# Patient Record
Sex: FEMALE | Race: WHITE | NOT HISPANIC OR LATINO | URBAN - METROPOLITAN AREA
[De-identification: names, ages, dates, MRNs, and addresses within clinical notes are randomized per-mention and may not be internally consistent; named-entity substitution may affect disease eponyms.]

---

## 2024-10-06 ENCOUNTER — HOSPITAL ENCOUNTER (INPATIENT)
Facility: HOSPITAL | Age: 64
LOS: 22 days | Discharge: NURSING, REHAB OR LONG-TERM CARE | DRG: 064 | End: 2024-10-28
Attending: EMERGENCY MEDICINE | Admitting: PSYCHIATRY & NEUROLOGY
Payer: MEDICARE

## 2024-10-06 DIAGNOSIS — E11.65 TYPE 2 DIABETES MELLITUS WITH HYPERGLYCEMIA, WITH LONG-TERM CURRENT USE OF INSULIN: ICD-10-CM

## 2024-10-06 DIAGNOSIS — Z79.4 TYPE 2 DIABETES MELLITUS WITH HYPERGLYCEMIA, WITH LONG-TERM CURRENT USE OF INSULIN: ICD-10-CM

## 2024-10-06 DIAGNOSIS — I63.9 STROKE: ICD-10-CM

## 2024-10-06 DIAGNOSIS — I10 HYPERTENSION, UNSPECIFIED TYPE: Primary | ICD-10-CM

## 2024-10-06 DIAGNOSIS — R29.818 ACUTE FOCAL NEUROLOGICAL DEFICIT: ICD-10-CM

## 2024-10-06 DIAGNOSIS — I63.89 ACUTE ARTERIAL ISCHEMIC STROKE, MULTIFOCAL, MULTIPLE VASCULAR TERRITORIES: ICD-10-CM

## 2024-10-06 DIAGNOSIS — R06.02 SOB (SHORTNESS OF BREATH): ICD-10-CM

## 2024-10-06 DIAGNOSIS — K56.7 ILEUS: ICD-10-CM

## 2024-10-06 PROBLEM — R53.1 LEFT-SIDED WEAKNESS: Status: ACTIVE | Noted: 2024-10-06

## 2024-10-06 LAB
ALBUMIN SERPL BCP-MCNC: 3.7 G/DL (ref 3.5–5.2)
ALP SERPL-CCNC: 93 U/L (ref 55–135)
ALT SERPL W/O P-5'-P-CCNC: 19 U/L (ref 10–44)
ANION GAP SERPL CALC-SCNC: 14 MMOL/L (ref 8–16)
AST SERPL-CCNC: 24 U/L (ref 10–40)
BASOPHILS # BLD AUTO: 0.04 K/UL (ref 0–0.2)
BASOPHILS NFR BLD: 0.4 % (ref 0–1.9)
BILIRUB SERPL-MCNC: 0.6 MG/DL (ref 0.1–1)
BUN SERPL-MCNC: 16 MG/DL (ref 8–23)
CALCIUM SERPL-MCNC: 10 MG/DL (ref 8.7–10.5)
CHLORIDE SERPL-SCNC: 97 MMOL/L (ref 95–110)
CHOLEST SERPL-MCNC: 178 MG/DL (ref 120–199)
CHOLEST/HDLC SERPL: 4.3 {RATIO} (ref 2–5)
CO2 SERPL-SCNC: 26 MMOL/L (ref 23–29)
CREAT SERPL-MCNC: 1 MG/DL (ref 0.5–1.4)
CREAT SERPL-MCNC: 1.2 MG/DL (ref 0.5–1.4)
DIFFERENTIAL METHOD BLD: ABNORMAL
EOSINOPHIL # BLD AUTO: 0.1 K/UL (ref 0–0.5)
EOSINOPHIL NFR BLD: 1.1 % (ref 0–8)
ERYTHROCYTE [DISTWIDTH] IN BLOOD BY AUTOMATED COUNT: 12.8 % (ref 11.5–14.5)
EST. GFR  (NO RACE VARIABLE): 50.2 ML/MIN/1.73 M^2
GLUCOSE SERPL-MCNC: 249 MG/DL (ref 70–110)
GLUCOSE SERPL-MCNC: 251 MG/DL (ref 70–110)
HCT VFR BLD AUTO: 49.6 % (ref 37–48.5)
HDLC SERPL-MCNC: 41 MG/DL (ref 40–75)
HDLC SERPL: 23 % (ref 20–50)
HGB BLD-MCNC: 17.6 G/DL (ref 12–16)
IMM GRANULOCYTES # BLD AUTO: 0.05 K/UL (ref 0–0.04)
IMM GRANULOCYTES NFR BLD AUTO: 0.5 % (ref 0–0.5)
INR PPP: 1 (ref 0.8–1.2)
LDLC SERPL CALC-MCNC: 98.4 MG/DL (ref 63–159)
LYMPHOCYTES # BLD AUTO: 2.3 K/UL (ref 1–4.8)
LYMPHOCYTES NFR BLD: 24.3 % (ref 18–48)
MCH RBC QN AUTO: 32.9 PG (ref 27–31)
MCHC RBC AUTO-ENTMCNC: 35.5 G/DL (ref 32–36)
MCV RBC AUTO: 93 FL (ref 82–98)
MONOCYTES # BLD AUTO: 0.6 K/UL (ref 0.3–1)
MONOCYTES NFR BLD: 6.4 % (ref 4–15)
NEUTROPHILS # BLD AUTO: 6.5 K/UL (ref 1.8–7.7)
NEUTROPHILS NFR BLD: 67.3 % (ref 38–73)
NONHDLC SERPL-MCNC: 137 MG/DL
NRBC BLD-RTO: 0 /100 WBC
OHS QRS DURATION: 82 MS
OHS QTC CALCULATION: 470 MS
PLATELET # BLD AUTO: 218 K/UL (ref 150–450)
PMV BLD AUTO: 10.9 FL (ref 9.2–12.9)
POC PTINR: 0.9 (ref 0.9–1.2)
POC PTWBT: 8.9 SEC (ref 9.7–14.3)
POTASSIUM SERPL-SCNC: 3.3 MMOL/L (ref 3.5–5.1)
PROT SERPL-MCNC: 7.5 G/DL (ref 6–8.4)
PROTHROMBIN TIME: 10.8 SEC (ref 9–12.5)
RBC # BLD AUTO: 5.35 M/UL (ref 4–5.4)
SAMPLE: ABNORMAL
SAMPLE: NORMAL
SODIUM SERPL-SCNC: 137 MMOL/L (ref 136–145)
TRIGL SERPL-MCNC: 193 MG/DL (ref 30–150)
TSH SERPL DL<=0.005 MIU/L-ACNC: 1.24 UIU/ML (ref 0.4–4)
WBC # BLD AUTO: 9.64 K/UL (ref 3.9–12.7)

## 2024-10-06 PROCEDURE — 25000003 PHARM REV CODE 250: Performed by: PHYSICIAN ASSISTANT

## 2024-10-06 PROCEDURE — 99223 1ST HOSP IP/OBS HIGH 75: CPT | Mod: AI,FS,, | Performed by: PSYCHIATRY & NEUROLOGY

## 2024-10-06 PROCEDURE — 25000003 PHARM REV CODE 250: Performed by: NURSE PRACTITIONER

## 2024-10-06 PROCEDURE — 80053 COMPREHEN METABOLIC PANEL: CPT | Performed by: EMERGENCY MEDICINE

## 2024-10-06 PROCEDURE — 85025 COMPLETE CBC W/AUTO DIFF WBC: CPT | Performed by: EMERGENCY MEDICINE

## 2024-10-06 PROCEDURE — 82565 ASSAY OF CREATININE: CPT

## 2024-10-06 PROCEDURE — 85610 PROTHROMBIN TIME: CPT | Performed by: EMERGENCY MEDICINE

## 2024-10-06 PROCEDURE — 80061 LIPID PANEL: CPT | Performed by: EMERGENCY MEDICINE

## 2024-10-06 PROCEDURE — 93005 ELECTROCARDIOGRAM TRACING: CPT

## 2024-10-06 PROCEDURE — 99900035 HC TECH TIME PER 15 MIN (STAT)

## 2024-10-06 PROCEDURE — 11000001 HC ACUTE MED/SURG PRIVATE ROOM

## 2024-10-06 PROCEDURE — 84443 ASSAY THYROID STIM HORMONE: CPT | Performed by: EMERGENCY MEDICINE

## 2024-10-06 PROCEDURE — 93010 ELECTROCARDIOGRAM REPORT: CPT | Mod: ,,, | Performed by: INTERNAL MEDICINE

## 2024-10-06 PROCEDURE — 99285 EMERGENCY DEPT VISIT HI MDM: CPT | Mod: 25

## 2024-10-06 PROCEDURE — 82962 GLUCOSE BLOOD TEST: CPT

## 2024-10-06 PROCEDURE — G0427 INPT/ED TELECONSULT70: HCPCS | Mod: GT,,, | Performed by: PSYCHIATRY & NEUROLOGY

## 2024-10-06 PROCEDURE — 85610 PROTHROMBIN TIME: CPT

## 2024-10-06 RX ORDER — FAMOTIDINE 20 MG/1
20 TABLET, FILM COATED ORAL 2 TIMES DAILY
Status: DISCONTINUED | OUTPATIENT
Start: 2024-10-06 | End: 2024-10-12

## 2024-10-06 RX ORDER — BISACODYL 10 MG/1
10 SUPPOSITORY RECTAL DAILY PRN
Status: DISCONTINUED | OUTPATIENT
Start: 2024-10-06 | End: 2024-10-15

## 2024-10-06 RX ORDER — FAMOTIDINE 20 MG/1
20 TABLET, FILM COATED ORAL 2 TIMES DAILY
Status: DISCONTINUED | OUTPATIENT
Start: 2024-10-06 | End: 2024-10-06

## 2024-10-06 RX ORDER — ATORVASTATIN CALCIUM 40 MG/1
40 TABLET, FILM COATED ORAL DAILY
Status: DISCONTINUED | OUTPATIENT
Start: 2024-10-06 | End: 2024-10-14

## 2024-10-06 RX ORDER — HYDRALAZINE HYDROCHLORIDE 20 MG/ML
10 INJECTION INTRAMUSCULAR; INTRAVENOUS EVERY 6 HOURS PRN
Status: DISCONTINUED | OUTPATIENT
Start: 2024-10-06 | End: 2024-10-24

## 2024-10-06 RX ORDER — SODIUM CHLORIDE 0.9 % (FLUSH) 0.9 %
10 SYRINGE (ML) INJECTION
Status: DISCONTINUED | OUTPATIENT
Start: 2024-10-06 | End: 2024-10-28 | Stop reason: HOSPADM

## 2024-10-06 RX ADMIN — APIXABAN 5 MG: 5 TABLET, FILM COATED ORAL at 02:10

## 2024-10-06 RX ADMIN — FAMOTIDINE 20 MG: 20 TABLET ORAL at 08:10

## 2024-10-06 RX ADMIN — ATORVASTATIN CALCIUM 40 MG: 40 TABLET, FILM COATED ORAL at 02:10

## 2024-10-06 RX ADMIN — APIXABAN 5 MG: 5 TABLET, FILM COATED ORAL at 08:10

## 2024-10-06 RX ADMIN — FAMOTIDINE 20 MG: 20 TABLET ORAL at 02:10

## 2024-10-06 NOTE — NURSING
Patient is AAOx4, on RA, received in room via a stretcher.Oriented to room and to unit.No c/o  pain or discomfort.Bed is in low position,bed rails up x3, call light within reach and bed alarm on.Instructed patient to call for assistance.

## 2024-10-06 NOTE — TELEMEDICINE CONSULT
[facility]  Vascular Neurology  Comprehensive Stroke Center  TeleVascular Neurology Acute Consultation Note        Consult Information  Consults    Consulting Provider: MARGARET KINCAID   Current Providers  No providers found    Patient Location: [facility] ED [facility] EMERGENCY DEPARTMENT Emergency Department    [facility] hospital nurse at bedside with patient assisting consultant.  Patient information was obtained from relative(s).       Stroke Documentation  Acute Stroke Times   Stroke Team Arrival Date: 10/06/24  Stroke Team Arrival Time: 0608    NIH Scale:  Interval: baseline  1a. Level of Consciousness: 1-->Not alert, but arousable by minor stimulation to obey, answer, or respond  1b. LOC Questions: 0-->Answers both questions correctly  1c. LOC Commands: 0-->Performs both tasks correctly  2. Best Gaze: 0-->Normal  3. Visual: 0-->No visual loss  4. Facial Palsy: 2-->Partial paralysis (total or near-total paralysis of lower face)  5a. Motor Arm, Left: 4-->No movement  5b. Motor Arm, Right: 0-->No drift, limb holds 90 (or 45) degrees for full 10 secs  6a. Motor Leg, Left: 3-->No effort against gravity, leg falls to bed immediately  6b. Motor Leg, Right: 0-->No drift, leg holds 30 degree position for full 5 secs  7. Limb Ataxia: 0-->Absent  8. Sensory: 1-->Mild-to-moderate sensory loss, patient feels pinprick is less sharp or is dull on the affected side, or there is a loss of superficial pain with pinprick, but patient is aware of being touched  9. Best Language: 0-->No aphasia, normal  10. Dysarthria: 1-->Mild-to-moderate dysarthria, patient slurs at least some words and, at worst, can be understood with some difficulty  11. Extinction and Inattention (formerly Neglect): 1-->Visual, tactile, auditory, spatial, or personal inattention or extinction to bilateral simultaneous stimulation in one of the sensory modalities  Total (NIH Stroke Scale): 13       Modified [place]: Score: 3  [place] Coma Scale: 14   ABCD2 Score:    [id number] Score:    HAS -BLED Score:    ICH Score:    Hunt & Shane Classification:      There were no vitals taken for this visit.      In my opinion, this was a: Tier 1; VAN Stroke Assessment: Positive     Medical Decision Making  HPI:  64 y.o. female fell this am and noted to have slurred speech, left facial droop and left sided weakness. On eliquis (indication unknown).       Images personally reviewed and interpreted:  Study: Done but not available for review  Study Interpretation: no ICH     Assessment and plan:  Acute ischemic stroke    Lytics recommendation: Thrombolytic therapy not recommended due to Full dose anticoagulation     Thrombectomy recommendation: Awaiting CTA results from [facility] for determination   Placement recommendation: pending further studies               ROS  Physical Exam  No past medical history on file.  No past surgical history on file.  No family history on file.    Diagnoses  No problems updated.    Paul Valdez MD      Emergent/Acute neurological consultation requested by spoke provider due to critical concerns for possible cerebrovascular event that could result in permanent loss of neurologic/bodily function, severe disability or death of this patient.  Immediate/timely evaluation by a highly prepared expert is paramount for optimal outcomes  High risk for neurological deterioration if not properly diagnosed  High risk for neurological deterioration if not treated promplty/as soon as possible  Complex diagnostic evaluation may be required (advanced imaging)  High risk treatment options (thrombolytics and/or thrombectomy)    Patient care was coordinated with spoke provider, including but not limted to    Discussing likely diagnosis/etiology of symptoms  Making recommendations for further diagnostic studies  Making recommendations for intravenous thrombolytics or other advanced therapies  Making  recommendations for disposition (admission/transfer for higher level of care)

## 2024-10-06 NOTE — ED PROVIDER NOTES
Encounter Date: 10/6/2024       History     Chief Complaint   Patient presents with    Transfer     From [facility] for Vascular Neuro eval      Tammy Walker is a 64 F transfer from outside hospital for vascular Neurology consultation.  Briefly, 64-year-old female presenting today for slurred speech started at 1:30 a.m. in the morning.  She has a history of his stroke with left-sided deficits but is normally able to transfer do a little walking.  This morning, she had complete hemiplegia of the left side.    Telestroke consult performed, concerning for LVO.  Patient accepted to ED as transfer for emergent vascular neurology evaluation.     No issues with flight over.         Review of patient's allergies indicates:  Not on File  No past medical history on file.  No past surgical history on file.  No family history on file.     Review of Systems    Physical Exam     Initial Vitals [10/06/24 0909]   BP Pulse Resp Temp SpO2   (!) 162/87 90 15 98.2 °F (36.8 °C) 95 %      MAP       --         Physical Exam    Nursing note and vitals reviewed.  Constitutional: She appears well-developed and well-nourished. No distress.   HENT: Mouth/Throat: Oropharynx is clear and moist.   Eyes: Conjunctivae are normal. No scleral icterus.   Neck: No JVD present.   Cardiovascular:  Normal rate, regular rhythm and intact distal pulses.           Pulmonary/Chest: Breath sounds normal. No respiratory distress. She has no wheezes. She has no rales.   Abdominal: Abdomen is soft. Bowel sounds are normal. She exhibits no distension. There is no abdominal tenderness. There is no rebound.   Musculoskeletal:         General: No edema.     Lymphadenopathy:     She has no cervical adenopathy.   Neurological: She is alert and oriented to person, place, and time.   + mild dysarthria  + left sided hemiplegia  - neglect/vision loss  + right gaze     Skin: Skin is warm. No rash noted.         ED Course   Procedures  Labs Reviewed   CBC W/ AUTO  DIFFERENTIAL - Abnormal       Result Value    WBC 9.64      RBC 5.35      Hemoglobin 17.6 (*)     Hematocrit 49.6 (*)     MCV 93      MCH 32.9 (*)     MCHC 35.5      RDW 12.8      Platelets 218      MPV 10.9      Immature Granulocytes 0.5      Gran # (ANC) 6.5      Immature Grans (Abs) 0.05 (*)     Lymph # 2.3      Mono # 0.6      Eos # 0.1      Baso # 0.04      nRBC 0      Gran % 67.3      Lymph % 24.3      Mono % 6.4      Eosinophil % 1.1      Basophil % 0.4      Differential Method Automated     COMPREHENSIVE METABOLIC PANEL - Abnormal    Sodium 137      Potassium 3.3 (*)     Chloride 97      CO2 26      Glucose 251 (*)     BUN 16      Creatinine 1.2      Calcium 10.0      Total Protein 7.5      Albumin 3.7      Total Bilirubin 0.6      Alkaline Phosphatase 93      AST 24      ALT 19      eGFR 50.2 (*)     Anion Gap 14     LIPID PANEL - Abnormal    Cholesterol 178      Triglycerides 193 (*)     HDL 41      LDL Cholesterol 98.4      HDL/Cholesterol Ratio 23.0      Total Cholesterol/HDL Ratio 4.3      Non-HDL Cholesterol 137     POCT GLUCOSE, HAND-HELD DEVICE - Abnormal    POC Glucose 249 (*)    ISTAT PROCEDURE - Abnormal    POC PTWBT 8.9 (*)     POC PTINR 0.9      Sample unknown     PROTIME-INR    Prothrombin Time 10.8      INR 1.0     TSH    TSH 1.237     HEMOGLOBIN A1C   ISTAT CREATININE    POC Creatinine 1.0      Sample unknown       EKG Readings: (Independently Interpreted)   EKG: Sinus rhythm at 83, left anterior fascicular block, no ST elevations or depressions.       Imaging Results              MRI Brain Ischemic Inter Pro Incl MRA W/O Con (In process)  Result time 10/06/24 11:07:51                     X-Ray Skull Ltd Less Than 4 Views (Final result)  Result time 10/06/24 10:28:36      Final result by Rayshawn Larsen MD (10/06/24 10:28:36)                   Impression:      No acute radiographic abnormalities.      Electronically signed by: Rayshawn Larsen MD  Date:    10/06/2024  Time:    10:28                Narrative:    EXAMINATION:  XR SKULL LTD LESS THAN 4 VIEWS    CLINICAL HISTORY:  MRI clearance;    TECHNIQUE:  Two views of the skull.    COMPARISON:  None    FINDINGS:  There are partially imaged postoperative changes of the cervical spine with cervical spondylosis.  No acute fracture.  No radiopaque retained foreign body that would preclude MRI.                                       X-Ray Abdomen AP 1 View (KUB) (Final result)  Result time 10/06/24 10:27:03      Final result by Rayshawn Larsen MD (10/06/24 10:27:03)                   Impression:      No acute radiographic findings in the abdomen.      Electronically signed by: Rayshawn Larsen MD  Date:    10/06/2024  Time:    10:27               Narrative:    EXAMINATION:  XR ABDOMEN AP 1 VIEW    CLINICAL HISTORY:  Cerebral infarction, unspecified    TECHNIQUE:  AP View(s) of the abdomen was performed.    COMPARISON:  None    FINDINGS:  Patient is rotated.  No high-grade bowel obstruction or free air.  Visualized portions of the lung bases show no acute abnormalities.  There are cholecystectomy clips.  There is contrast in the bladder from prior CTA.  No abnormal radiopaque retained foreign body that would preclude MRI.                                       X-Ray Chest AP Portable (Final result)  Result time 10/06/24 09:47:11      Final result by Nathan Ward MD (10/06/24 09:47:11)                   Impression:      Nonspecific patchy opacities in both lungs could relate to atelectasis, edema, infection, or noninfectious inflammatory process.      Electronically signed by: Nathan Ward  Date:    10/06/2024  Time:    09:47               Narrative:    EXAMINATION:  XR CHEST AP PORTABLE    CLINICAL HISTORY:  Shortness of breath    TECHNIQUE:  Single frontal view of the chest was performed.    COMPARISON:  None    FINDINGS:  Loop recorder device.  Cardiac monitoring leads.    Enlarged cardiac silhouette.  Prominent central vasculature.  Patchy opacities  "in the mid lower lung zones.    No definite pneumothorax or large volume pleural effusion.    Query prior cholecystectomy.    No acute findings in the visualized portions of the abdomen.    Status post ACDF.  Suture anchors project in the right humeral head, possibly sequela rotator cuff repair.    No acute osseous or soft tissue findings are suggested.                                    X-Rays:   Independently Interpreted Readings:   Chest X-Ray: Normal heart size. No focal consolidation or effusion.  Patchy bilateral interstitial opacities noted     Medications   sodium chloride 0.9% flush 10 mL (has no administration in time range)   sodium chloride 0.9% bolus 500 mL 500 mL (has no administration in time range)   bisacodyL suppository 10 mg (has no administration in time range)   hydrALAZINE injection 10 mg (has no administration in time range)   atorvastatin tablet 40 mg (has no administration in time range)   apixaban tablet 5 mg (has no administration in time range)     Medical Decision Making  Emergent evaluation a 64-year-old female transferred from outside hospital for vascular Neurology evaluation.    Per flight team, OSH unable to provide disc due to machine issue    Discussed with vascular Neurology, recommending stat MRI ischemic brain protocol.  Order placed.    Patient brought to MRI, then disclosed that she has " something in her chest".  Brought back to ED for chest x-ray clearance.     Amount and/or Complexity of Data Reviewed  Independent Historian: EMS     Details: Flight crew  External Data Reviewed: notes.     Details: WBC 10.2, hemoglobin 14.8 platelets 230  CM:, sodium 135, potassium 3.0, BUN/creatinine 13/1.2 glucose 235    CTA concerning for right A2 segment occlusion there is some reconstitution of the distal branches.  There is also hypoattenuation and gray white matter differentiation of the right frontal temporal region compatible with an acute infarct  Labs: ordered. Decision-making " details documented in ED Course.     Details: Labs reviewed with no leukocytosis.  Hemoglobin stable.  CMP largely unremarkable.  Radiology: ordered and independent interpretation performed.  ECG/medicine tests: ordered and independent interpretation performed.    Risk  Decision regarding hospitalization.              Attending Attestation:         Attending Critical Care:   Critical Care Times:   ==============================================================  Total Critical Care Time - exclusive of procedural time: 45 minutes.  ==============================================================  Critical care reasons: acute Stroke.   Critical care was time spent personally by me on the following activities: discussion with consultants, re-evaluation of patient's conition, evaluation of patient's response to treatment, ordering and performing treatments and interventions, development of treatment plan with patient or relative, ordering lab, x-rays, and/or EKG and review of x-rays / CT sent with the patient.   Critical Care Condition: potentially life-threatening           ED Course as of 10/06/24 1113   Sun Oct 06, 2024   1055 MRI ischemic brain protocol performed, no obvious LVO requiring thrombectomy.  Patient admitted to vascular Neurology for further workup [GM]      ED Course User Index  [GM] Criss Maxwell MD                           Clinical Impression:  Final diagnoses:  [R29.818] Acute focal neurological deficit  [R06.02] SOB (shortness of breath)  [I63.9] Stroke                 Criss Maxwell MD  10/06/24 1114

## 2024-10-06 NOTE — ED NOTES
RN asked Haja QUACH if a repeat Bloom exam could be performed to see if patient could tolerate PO meds ordered for 1215. Haja QUACH gave approval to do repeat bloom and if passed patient could now have PO meds.

## 2024-10-06 NOTE — CONSULTS
"[facility] - Emergency Dept  Vascular Neurology  Comprehensive Stroke Center  Consult Note    Inpatient consult to Vascular (Stroke) Neurology  Consult performed by: Ambika Horne DNP  Consult ordered by: Criss Maxwell MD  Reason for consult: R MCA syndrome        Assessment/Plan:     I have seen the patient, reviewed the Resident's history and physical, assessment and plan, personally reviewed the imaging studies reported on the note, and agree with the findings.   I also performed the MDM (medical decision making) for this patient as detailed in the resident's note.    .Germain Smyth MD   Vascular Neurology  Comprehensive Stroke Center  [facility]        Patient is a 64 y.o. year old female with:    Left-sided weakness  65 y/o F transferred from [facility] for higher level of care secondary to acute KIARA stroke on CTA.  Briefly,the patient presented to the OSH overnight for slurred speech started at 1:30 a.m. in the morning.  She has a history of his stroke with left-sided deficits but is normally able to transfer and do a little walking. This morning, she had complete hemiplegia of the left side. Telestroke consult performed, concerning for LVO.  NIH 13. CTA Stroke MP performed and  was read as "acute R KIARA infarct". No TNK due to AC use. Patient accepted to ED as transfer for emergent vascular neurology evaluation.     -NIH 13; LFD, R gaze, decrease sensation on L, LA 0/5, LL 1/5   -Symptoms consistent with R MCA syndrome but OSH CTA read with R KIARA. No disc available from OSH. Stroke team unable to obtain scans in chart. Patient states she has a pacemaker, unable to obtain MRI ischemic protocol at this. X-ray pending for MRI clearance.   -IR team on standby for possible EVT; pending MRI Ischemic protocol  -SBP<220 until repeat imaging done.  -If patient goes to IR, will go to NCC post procedure.        STROKE DOCUMENTATION     Acute Stroke Times   Last Known Normal " Date:  (OSH)  Stroke Team Called Date: 10/06/24  Stroke Team Called Time:  (At OSH)  Stroke Team Arrival Date: 10/06/24  Stroke Team Arrival Time: 0608  CT Interpretation Time:  (CTA done around 4:20am per documents that came to [facility] with patient)  Thrombolytic Therapy Recommended: No  Thrombectomy Recommended:  (Pending MRI Brain)    NIH Scale:  Interval: baseline  1a. Level of Consciousness: 0-->Alert, keenly responsive  1b. LOC Questions: 0-->Answers both questions correctly  1c. LOC Commands: 0-->Performs both tasks correctly  2. Best Gaze: 1-->Partial gaze palsy, gaze is abnormal in one or both eyes, but forced deviation or total gaze paresis is not present  3. Visual: 0-->No visual loss  4. Facial Palsy: 2-->Partial paralysis (total or near-total paralysis of lower face)  5a. Motor Arm, Left: 4-->No movement  5b. Motor Arm, Right: 0-->No drift, limb holds 90 (or 45) degrees for full 10 secs  6a. Motor Leg, Left: 3-->No effort against gravity, leg falls to bed immediately  6b. Motor Leg, Right: 0-->No drift, leg holds 30 degree position for full 5 secs  7. Limb Ataxia: 0-->Absent  8. Sensory: 2-->Severe to total sensory loss, patient is not aware of being touched in the face, arm, and leg  9. Best Language: 0-->No aphasia, normal  10. Dysarthria: 0-->Normal  11. Extinction and Inattention (formerly Neglect): 1-->Visual, tactile, auditory, spatial, or personal inattention or extinction to bilateral simultaneous stimulation in one of the sensory modalities  Total (NIH Stroke Scale): 13    Modified [place] Score: 3  [place] Coma Scale:15   ABCD2 Score:    [id number] Score:   HAS -BLED Score:   ICH Score:   Hunt & Shane Classification:       Thrombolysis Candidate? No, Current use of direct thrombin inhibitors (dabigatran) or direct factor Xa inhibitors within 48 hours    Delays to Thrombolysis?  Not Applicable    Interventional Revascularization Candidate?   Is the patient eligible for mechanical endovascular  "reperfusion (JAVIER)?  Awaiting CTA results from [facility] for determination     Delays to Thrombectomy? Not Applicable    Hemorrhagic change of an Ischemic Stroke: Does this patient have an ischemic stroke with hemorrhagic changes? No     Subjective:     History of Present Illness:  65 y/o F transferred from [facility] for higher level of care secondary to acute KIARA stroke on CTA.  Briefly,the patient presented to the OSH overnight for slurred speech started at 1:30 a.m. in the morning.  She has a history of his stroke with left-sided deficits but is normally able to transfer and do a little walking. This morning, she had complete hemiplegia of the left side. Telestroke consult performed, concerning for LVO.  CTA Stroke MP performed and  was read as "acute R KIARA infarct". Patient accepted to ED as transfer for emergent vascular neurology evaluation.       History reviewed. No pertinent past medical history.  History reviewed. No pertinent surgical history.     Review of patient's allergies indicates:  No Known Allergies    Medications: I have reviewed the current medication administration record.    (Not in a hospital admission)      Review of Systems   Unable to perform ROS: Acuity of condition     Objective:     Vital Signs (Most Recent):  Temp: 98.2 °F (36.8 °C) (10/06/24 0909)  Pulse: 76 (10/06/24 1015)  Resp: 15 (10/06/24 1015)  BP: (!) 189/66 (10/06/24 1015)  SpO2: 97 % (10/06/24 1015)    Vital Signs Range (Last 24H):  Temp:  [98.2 °F (36.8 °C)]   Pulse:  [76-90]   Resp:  [15]   BP: (145-189)/(66-87)   SpO2:  [95 %-97 %]        Physical Exam         Neurological Exam:   LOC: alert  Attention Span: Good   Language: No aphasia  Articulation: No dysarthria  Orientation: Person, Place, Time   Visual Fields: Full  EOM (CN III, IV, VI): Gaze preference  right  Pupils (CN II, III): PERRL  Facial Sensation (CN V): Facial sensory loss  Facial Movement (CN VII): Upper & lower facial weakness on the Left  Motor: " "Arm left  Plegia 0/5  Leg left  Paresis: 1/5  Arm right  Normal 5/5  Leg right Normal 5/5  Sensation: Jason-anesthesia left      Laboratory:  CMP:   Recent Labs   Lab 10/06/24  0934   CALCIUM 10.0   ALBUMIN 3.7   PROT 7.5      K 3.3*   CO2 26   CL 97   BUN 16   CREATININE 1.2   ALKPHOS 93   ALT 19   AST 24   BILITOT 0.6     CBC: No results for input(s): "WBC", "RBC", "HGB", "HCT", "PLT", "MCV", "MCH", "MCHC" in the last 168 hours.  Lipid Panel:   Recent Labs   Lab 10/06/24  0934   CHOL 178   LDLCALC 98.4   HDL 41   TRIG 193*     Coagulation:   Recent Labs   Lab 10/06/24  1001   INR 0.9     Hgb A1C: No results for input(s): "HGBA1C" in the last 168 hours.  TSH: No results for input(s): "TSH" in the last 168 hours.    Diagnostic Results:      Brain imaging/Vessel Imaging:  MRI Ishcemic protocol - pending      CTA stroke MP - From OSH on 10/6 with the following impression;    "1. A subabcute right anterior cerebral artery territory infarction is demonstrated. Mild mass effect is noted but no hemorrhage can be documented."             Ambika Horne DNP  Comprehensive Stroke Center  Department of Vascular Neurology   [facility] - [facility] Dept   "

## 2024-10-06 NOTE — H&P
Please see consult note from today.    MRI Ischemic protocol with multiple territory acute infarcts. No IR per team.     Will admit to NPU.

## 2024-10-06 NOTE — ED NOTES
MRI delayed at this time due to patient stating she has metal implant in heart but unsure what device is. No family contact on file or previous imaging to verify medical device. Will return to ED for xray imaging. Charge nurse and ED MD aware

## 2024-10-06 NOTE — SUBJECTIVE & OBJECTIVE
"  History reviewed. No pertinent past medical history.  History reviewed. No pertinent surgical history.     Review of patient's allergies indicates:  No Known Allergies    Medications: I have reviewed the current medication administration record.    (Not in a hospital admission)      Review of Systems   Unable to perform ROS: Acuity of condition     Objective:     Vital Signs (Most Recent):  Temp: 98.2 °F (36.8 °C) (10/06/24 0909)  Pulse: 76 (10/06/24 1015)  Resp: 15 (10/06/24 1015)  BP: (!) 189/66 (10/06/24 1015)  SpO2: 97 % (10/06/24 1015)    Vital Signs Range (Last 24H):  Temp:  [98.2 °F (36.8 °C)]   Pulse:  [76-90]   Resp:  [15]   BP: (145-189)/(66-87)   SpO2:  [95 %-97 %]        Physical Exam         Neurological Exam:   LOC: alert  Attention Span: Good   Language: No aphasia  Articulation: No dysarthria  Orientation: Person, Place, Time   Visual Fields: Full  EOM (CN III, IV, VI): Gaze preference  right  Pupils (CN II, III): PERRL  Facial Sensation (CN V): Facial sensory loss  Facial Movement (CN VII): Upper & lower facial weakness on the Left  Motor: Arm left  Plegia 0/5  Leg left  Paresis: 1/5  Arm right  Normal 5/5  Leg right Normal 5/5  Sensation: Jason-anesthesia left      Laboratory:  CMP:   Recent Labs   Lab 10/06/24  0934   CALCIUM 10.0   ALBUMIN 3.7   PROT 7.5      K 3.3*   CO2 26   CL 97   BUN 16   CREATININE 1.2   ALKPHOS 93   ALT 19   AST 24   BILITOT 0.6     CBC: No results for input(s): "WBC", "RBC", "HGB", "HCT", "PLT", "MCV", "MCH", "MCHC" in the last 168 hours.  Lipid Panel:   Recent Labs   Lab 10/06/24  0934   CHOL 178   LDLCALC 98.4   HDL 41   TRIG 193*     Coagulation:   Recent Labs   Lab 10/06/24  1001   INR 0.9     Hgb A1C: No results for input(s): "HGBA1C" in the last 168 hours.  TSH: No results for input(s): "TSH" in the last 168 hours.    Diagnostic Results:      Brain imaging/Vessel Imaging:  MRI Ishcemic protocol - pending      CTA stroke MP - From OSH on 10/6 with the " "following impression;    "1. A subabcute right anterior cerebral artery territory infarction is demonstrated. Mild mass effect is noted but no hemorrhage can be documented."           "

## 2024-10-06 NOTE — NURSING
Patient Transferred to NPU Room 960       Upon arrival to the floor, patient greeted and oriented to room. Complete head to toe assessment completed per protocol. VSS, see flowsheet for details. Neuro assessment completed. Primary team notified of patient's transfer to floor. All current and transfer orders reviewed/reconciled per protocol. All emergency equipment set up in patient's room. Fall/seizure precautions initiated per providers orders. 4 Eyes skin assessment performed, see below for details. Reviewed assessment and rounding frequency with patient and family. Questions were encouraged and addressed. Repositioned patient for comfort with bed locked in lowest position, side rails up x 3, bed alarm set, and call light within reach. Instructed patient to call staff for mobility/assistance, verbalized understanding. No acute signs of distress noted at this time.           +++++ Special Considerations+++++++      +(If any belongings come with patient, make note of them in the patient belongings flow sheet)      +(If patient is with vascular neurology team, make sure to mention swallow assessment completed and, Please add to note that stroke booklet was transferred with patient)  NIHSS assessment completed on floor arrival. Patient's NIHSS score is 11 at this time. SCDs applied to patient per provider's orders. Bloom bedside swallow screen completed per stroke protocol. Patient has passed bloom bedside swallow screen, team notified of results. Stroke book individualized to patient and given to patient upon transfer. Reviewed stroke book with the patient at the bedside, see education flowsheets for details.

## 2024-10-06 NOTE — ED TRIAGE NOTES
Patient identifiers for Tammy Walker 64 y.o. female checked and correct. Pt arrives to ED via EMS as transfer from [facility] for neuro consult. Pt arrives AAOx4 with complete left sided hemiplegia. Pt has hx of prior CVA with left sided deficits but usually ambulatory. LNK 1400 yesterday 10/05/24. Woke up with slurred speech at 0130AM with increased left sided weakness from baseline.    Chief Complaint   Patient presents with    Transfer     From [facility] for Vascular Neuro eval      History reviewed. No pertinent past medical history.  Allergies reported: Review of patient's allergies indicates:  No Known Allergies

## 2024-10-06 NOTE — HPI
"65 y/o F transferred from [facility] for higher level of care secondary to acute KIARA stroke on CTA.  Briefly,the patient presented to the OSH overnight for slurred speech started at 1:30 a.m. in the morning.  She has a history of his stroke with left-sided deficits but is normally able to transfer and do a little walking. This morning, she had complete hemiplegia of the left side. Telestroke consult performed, concerning for LVO.  CTA Stroke MP performed and  was read as "acute R KIARA infarct". Patient accepted to ED as transfer for emergent vascular neurology evaluation.   "

## 2024-10-06 NOTE — ED NOTES
Assumed care of patient and received report from Ashlyn RN. Patient is lying on stretcher gowned with side rails up x2. Patient has no complaints at this time. Care ongoing

## 2024-10-06 NOTE — ASSESSMENT & PLAN NOTE
"63 y/o F transferred from [facility] for higher level of care secondary to acute KIARA stroke on CTA.  Briefly,the patient presented to the OSH overnight for slurred speech started at 1:30 a.m. in the morning.  She has a history of his stroke with left-sided deficits but is normally able to transfer and do a little walking. This morning, she had complete hemiplegia of the left side. Telestroke consult performed, concerning for LVO.  NIH 13. CTA Stroke MP performed and  was read as "acute R KIARA infarct". No TNK due to AC use. Patient accepted to ED as transfer for emergent vascular neurology evaluation.     -NIH 13; LFD, R gaze, decrease sensation on L, LA 0/5, LL 1/5   -Symptoms consistent with R MCA syndrome but OSH CTA read with R KIARA. No disc available from OSH. Stroke team unable to obtain scans in chart. Patient states she has a pacemaker, unable to obtain MRI ischemic protocol at this. X-ray pending for MRI clearance.   -IR team on standby for possible EVT; pending MRI Ischemic protocol  -SBP<220 until repeat imaging done.  -If patient goes to IR, will go to NCC post procedure.  "

## 2024-10-07 PROBLEM — E11.9 TYPE 2 DIABETES MELLITUS, WITH LONG-TERM CURRENT USE OF INSULIN: Status: ACTIVE | Noted: 2024-10-07

## 2024-10-07 PROBLEM — Z74.09 REDUCED MOBILITY: Status: ACTIVE | Noted: 2024-10-07

## 2024-10-07 PROBLEM — G81.90 HEMIPLEGIA: Status: ACTIVE | Noted: 2024-10-06

## 2024-10-07 PROBLEM — I48.91 ATRIAL FIBRILLATION: Status: ACTIVE | Noted: 2024-10-07

## 2024-10-07 PROBLEM — G81.94 HEMIPLEGIA, UNSPECIFIED AFFECTING LEFT NONDOMINANT SIDE: Status: ACTIVE | Noted: 2024-10-07

## 2024-10-07 PROBLEM — I63.89 ACUTE ARTERIAL ISCHEMIC STROKE, MULTIFOCAL, MULTIPLE VASCULAR TERRITORIES: Status: ACTIVE | Noted: 2024-10-06

## 2024-10-07 PROBLEM — R20.0 SENSORY LOSS: Status: ACTIVE | Noted: 2024-10-07

## 2024-10-07 PROBLEM — I63.89 ACUTE ISCHEMIC MULTIFOCAL MULTIPLE VASCULAR TERRITORIES STROKE: Status: ACTIVE | Noted: 2024-10-07

## 2024-10-07 PROBLEM — F11.90 OPIOID USE: Status: ACTIVE | Noted: 2024-10-07

## 2024-10-07 PROBLEM — Z79.4 TYPE 2 DIABETES MELLITUS, WITH LONG-TERM CURRENT USE OF INSULIN: Status: ACTIVE | Noted: 2024-10-07

## 2024-10-07 PROBLEM — I10 HTN (HYPERTENSION): Status: ACTIVE | Noted: 2024-10-07

## 2024-10-07 PROBLEM — E87.6 HYPOKALEMIA: Status: ACTIVE | Noted: 2024-10-07

## 2024-10-07 LAB
ALBUMIN SERPL BCP-MCNC: 3.8 G/DL (ref 3.5–5.2)
ALP SERPL-CCNC: 94 U/L (ref 55–135)
ALT SERPL W/O P-5'-P-CCNC: 17 U/L (ref 10–44)
ANION GAP SERPL CALC-SCNC: 15 MMOL/L (ref 8–16)
APTT PPP: 32.8 SEC (ref 21–32)
ASCENDING AORTA: 3.16 CM
AST SERPL-CCNC: 25 U/L (ref 10–40)
AV AREA BY CONTINUOUS VTI: 2.8 CM2
AV INDEX (PROSTH): 0.77
AV LVOT MEAN GRADIENT: 2 MMHG
AV LVOT PEAK GRADIENT: 3 MMHG
AV MEAN GRADIENT: 3.7 MMHG
AV PEAK GRADIENT: 7.8 MMHG
AV VALVE AREA BY VELOCITY RATIO: 2.2 CM²
AV VALVE AREA: 2.7 CM2
AV VELOCITY RATIO: 0.64
BASOPHILS # BLD AUTO: 0.04 K/UL (ref 0–0.2)
BASOPHILS NFR BLD: 0.4 % (ref 0–1.9)
BILIRUB SERPL-MCNC: 0.8 MG/DL (ref 0.1–1)
BUN SERPL-MCNC: 17 MG/DL (ref 8–23)
CALCIUM SERPL-MCNC: 10.2 MG/DL (ref 8.7–10.5)
CHLORIDE SERPL-SCNC: 97 MMOL/L (ref 95–110)
CO2 SERPL-SCNC: 23 MMOL/L (ref 23–29)
CREAT SERPL-MCNC: 1 MG/DL (ref 0.5–1.4)
CV ECHO LV RWT: 0.53 CM
DIFFERENTIAL METHOD BLD: ABNORMAL
DOP CALC AO PEAK VEL: 1.4 M/S
DOP CALC AO VTI: 18.1 CM
DOP CALC LVOT AREA: 3.5 CM2
DOP CALC LVOT DIAMETER: 2.1 CM
DOP CALC LVOT PEAK VEL: 0.9 M/S
DOP CALC LVOT STROKE VOLUME: 48.1 CM3
DOP CALCLVOT PEAK VEL VTI: 13.9 CM
E WAVE DECELERATION TIME: 85.96 MS
E/A RATIO: 0.62
E/E' RATIO: 10.18 M/S
ECHO EF ESTIMATED: 58 %
ECHO LV POSTERIOR WALL: 0.9 CM (ref 0.6–1.1)
EOSINOPHIL # BLD AUTO: 0 K/UL (ref 0–0.5)
EOSINOPHIL NFR BLD: 0.4 % (ref 0–8)
ERYTHROCYTE [DISTWIDTH] IN BLOOD BY AUTOMATED COUNT: 12.5 % (ref 11.5–14.5)
EST. GFR  (NO RACE VARIABLE): >60 ML/MIN/1.73 M^2
FRACTIONAL SHORTENING: 29.4 % (ref 28–44)
GLUCOSE SERPL-MCNC: 253 MG/DL (ref 70–110)
HCT VFR BLD AUTO: 48.9 % (ref 37–48.5)
HGB BLD-MCNC: 17 G/DL (ref 12–16)
IMM GRANULOCYTES # BLD AUTO: 0.02 K/UL (ref 0–0.04)
IMM GRANULOCYTES NFR BLD AUTO: 0.2 % (ref 0–0.5)
INR PPP: 1.1 (ref 0.8–1.2)
INTERVENTRICULAR SEPTUM: 0.9 CM (ref 0.6–1.1)
LA MAJOR: 4.5 CM
LA MINOR: 3.94 CM
LA WIDTH: 3.04 CM
LEFT ATRIUM SIZE: 2.55 CM
LEFT ATRIUM VOLUME: 27.68 CM3
LEFT INTERNAL DIMENSION IN SYSTOLE: 2.4 CM (ref 2.1–4)
LEFT VENTRICLE DIASTOLIC VOLUME: 48.08 ML
LEFT VENTRICLE SYSTOLIC VOLUME: 20.26 ML
LEFT VENTRICULAR INTERNAL DIMENSION IN DIASTOLE: 3.4 CM (ref 3.5–6)
LEFT VENTRICULAR MASS: 84.9 G
LV LATERAL E/E' RATIO: 8
LV SEPTAL E/E' RATIO: 14
LYMPHOCYTES # BLD AUTO: 1.8 K/UL (ref 1–4.8)
LYMPHOCYTES NFR BLD: 19.6 % (ref 18–48)
MAGNESIUM SERPL-MCNC: 1.9 MG/DL (ref 1.6–2.6)
MCH RBC QN AUTO: 33 PG (ref 27–31)
MCHC RBC AUTO-ENTMCNC: 34.8 G/DL (ref 32–36)
MCV RBC AUTO: 95 FL (ref 82–98)
MONOCYTES # BLD AUTO: 0.6 K/UL (ref 0.3–1)
MONOCYTES NFR BLD: 6.8 % (ref 4–15)
MV A" WAVE DURATION": 117.03 MS
MV PEAK A VEL: 0.91 M/S
MV PEAK E VEL: 0.56 M/S
NEUTROPHILS # BLD AUTO: 6.6 K/UL (ref 1.8–7.7)
NEUTROPHILS NFR BLD: 72.6 % (ref 38–73)
NRBC BLD-RTO: 0 /100 WBC
OHS CV RV/LV RATIO: 0.82 CM
PHOSPHATE SERPL-MCNC: 2.5 MG/DL (ref 2.7–4.5)
PLATELET # BLD AUTO: 200 K/UL (ref 150–450)
PMV BLD AUTO: 10.5 FL (ref 9.2–12.9)
POTASSIUM SERPL-SCNC: 3.5 MMOL/L (ref 3.5–5.1)
PROT SERPL-MCNC: 7.7 G/DL (ref 6–8.4)
PROTHROMBIN TIME: 11.8 SEC (ref 9–12.5)
PULM VEIN A" WAVE DURATION": 117.03 MS
PULM VEIN S/D RATIO: 1.28
PULMONIC VEIN PEAK A VELOCITY: 0.3 M/S
PV PEAK D VEL: 0.36 M/S
PV PEAK S VEL: 0.46 M/S
RA MAJOR: 3.41 CM
RA PRESSURE ESTIMATED: 3 MMHG
RBC # BLD AUTO: 5.15 M/UL (ref 4–5.4)
RIGHT VENTRICLE DIASTOLIC BASEL DIMENSION: 2.8 CM
RV TISSUE DOPPLER FREE WALL SYSTOLIC VELOCITY 1 (APICAL 4 CHAMBER VIEW): 16.57 CM/S
SINUS: 3.21 CM
SODIUM SERPL-SCNC: 135 MMOL/L (ref 136–145)
STJ: 2.48 CM
TDI LATERAL: 0.07 M/S
TDI SEPTAL: 0.04 M/S
TDI: 0.06 M/S
TROPONIN I SERPL DL<=0.01 NG/ML-MCNC: 0.01 NG/ML (ref 0–0.03)
WBC # BLD AUTO: 9.12 K/UL (ref 3.9–12.7)

## 2024-10-07 PROCEDURE — 25000003 PHARM REV CODE 250: Performed by: NURSE PRACTITIONER

## 2024-10-07 PROCEDURE — 97530 THERAPEUTIC ACTIVITIES: CPT

## 2024-10-07 PROCEDURE — 84484 ASSAY OF TROPONIN QUANT: CPT | Performed by: NURSE PRACTITIONER

## 2024-10-07 PROCEDURE — 11000001 HC ACUTE MED/SURG PRIVATE ROOM

## 2024-10-07 PROCEDURE — 25000003 PHARM REV CODE 250

## 2024-10-07 PROCEDURE — 36415 COLL VENOUS BLD VENIPUNCTURE: CPT | Performed by: NURSE PRACTITIONER

## 2024-10-07 PROCEDURE — 97112 NEUROMUSCULAR REEDUCATION: CPT

## 2024-10-07 PROCEDURE — 85730 THROMBOPLASTIN TIME PARTIAL: CPT | Performed by: NURSE PRACTITIONER

## 2024-10-07 PROCEDURE — 97162 PT EVAL MOD COMPLEX 30 MIN: CPT

## 2024-10-07 PROCEDURE — 97535 SELF CARE MNGMENT TRAINING: CPT

## 2024-10-07 PROCEDURE — 99233 SBSQ HOSP IP/OBS HIGH 50: CPT | Mod: GC,,, | Performed by: PSYCHIATRY & NEUROLOGY

## 2024-10-07 PROCEDURE — 25000003 PHARM REV CODE 250: Performed by: PHYSICIAN ASSISTANT

## 2024-10-07 PROCEDURE — 83735 ASSAY OF MAGNESIUM: CPT | Performed by: NURSE PRACTITIONER

## 2024-10-07 PROCEDURE — 92610 EVALUATE SWALLOWING FUNCTION: CPT

## 2024-10-07 PROCEDURE — 85025 COMPLETE CBC W/AUTO DIFF WBC: CPT | Performed by: NURSE PRACTITIONER

## 2024-10-07 PROCEDURE — 85610 PROTHROMBIN TIME: CPT | Performed by: NURSE PRACTITIONER

## 2024-10-07 PROCEDURE — 84100 ASSAY OF PHOSPHORUS: CPT | Performed by: NURSE PRACTITIONER

## 2024-10-07 PROCEDURE — 97166 OT EVAL MOD COMPLEX 45 MIN: CPT

## 2024-10-07 PROCEDURE — 80053 COMPREHEN METABOLIC PANEL: CPT | Performed by: NURSE PRACTITIONER

## 2024-10-07 RX ORDER — INSULIN ASPART 100 [IU]/ML
0-10 INJECTION, SOLUTION INTRAVENOUS; SUBCUTANEOUS EVERY 6 HOURS PRN
Status: DISCONTINUED | OUTPATIENT
Start: 2024-10-07 | End: 2024-10-08

## 2024-10-07 RX ORDER — ONDANSETRON 4 MG/1
4 TABLET, ORALLY DISINTEGRATING ORAL EVERY 12 HOURS PRN
Status: DISCONTINUED | OUTPATIENT
Start: 2024-10-07 | End: 2024-10-14

## 2024-10-07 RX ORDER — ACETAMINOPHEN 325 MG/1
650 TABLET ORAL EVERY 8 HOURS PRN
Status: DISCONTINUED | OUTPATIENT
Start: 2024-10-07 | End: 2024-10-13

## 2024-10-07 RX ORDER — OXYCODONE HYDROCHLORIDE 5 MG/1
5 TABLET ORAL EVERY 12 HOURS PRN
Status: DISCONTINUED | OUTPATIENT
Start: 2024-10-07 | End: 2024-10-13

## 2024-10-07 RX ORDER — GLUCAGON 1 MG
1 KIT INJECTION
Status: DISCONTINUED | OUTPATIENT
Start: 2024-10-07 | End: 2024-10-08

## 2024-10-07 RX ORDER — CARVEDILOL 3.12 MG/1
3.12 TABLET ORAL 2 TIMES DAILY
Status: DISCONTINUED | OUTPATIENT
Start: 2024-10-07 | End: 2024-10-13

## 2024-10-07 RX ORDER — NAPROXEN SODIUM 220 MG/1
81 TABLET, FILM COATED ORAL DAILY
Status: DISCONTINUED | OUTPATIENT
Start: 2024-10-08 | End: 2024-10-11

## 2024-10-07 RX ADMIN — FAMOTIDINE 20 MG: 20 TABLET ORAL at 08:10

## 2024-10-07 RX ADMIN — FAMOTIDINE 20 MG: 20 TABLET ORAL at 09:10

## 2024-10-07 RX ADMIN — ATORVASTATIN CALCIUM 40 MG: 40 TABLET, FILM COATED ORAL at 08:10

## 2024-10-07 RX ADMIN — ACETAMINOPHEN 650 MG: 325 TABLET ORAL at 02:10

## 2024-10-07 RX ADMIN — CARVEDILOL 3.12 MG: 3.12 TABLET, FILM COATED ORAL at 12:10

## 2024-10-07 RX ADMIN — APIXABAN 5 MG: 5 TABLET, FILM COATED ORAL at 08:10

## 2024-10-07 RX ADMIN — CARVEDILOL 3.12 MG: 3.12 TABLET, FILM COATED ORAL at 09:10

## 2024-10-07 NOTE — CONSULTS
Inpatient consult to Physical Medicine Rehab  Consult performed by: Karely Balbuena NP  Consult ordered by: Ambika Horne DNP  Reason for consult: Rehab      Consult received.     AARON Washington, FNP-C  Physical Medicine & Rehabilitation   10/07/2024

## 2024-10-07 NOTE — PT/OT/SLP EVAL
Physical Therapy Co-Evaluation and Co-Treatment    Patient Name:  Tammy Multani   MRN:  [id number]    Co-evaluation and co-treatment performed for this visit due to suspected patient need for two skilled therapists to ensure patient and staff safety and to accommodate for patient activity tolerance/pain management     Recommendations:     Discharge Recommendations: High Intensity Therapy  Discharge Equipment Recommendations: hospital bed, lift device, wheelchair, bedside commode, bath bench   Barriers to Discharge: Increased level of assist and Decreased caregiver support  Safest Mobility Level with Nursing: Bed Level ROM - Pt only safe for OOB mobility with therapy staff at this time    Assessment:     Tammy Multani is a 64 y.o. female admitted with a medical diagnosis of Acute ischemic multifocal multiple vascular territories stroke. She presents with the following impairments/functional limitations: weakness, impaired endurance, impaired sensation, impaired self care skills, impaired functional mobility, gait instability, impaired balance, pain, decreased safety awareness, decreased lower extremity function, decreased upper extremity function, impaired fine motor, impaired coordination, impaired cognition, visual deficits, decreased ROM, abnormal tone. Pt presenting with HOB elevated and agreeable to completing therapy evaluation. Pt with L sided inattention (UE>LE), AMS, decreased command following, visual impairments, decreased trunk control, decreased activity tolerance, and absent LLE motor activation. Pt requiring significant assistance to complete all visualized functional mobility this date. Prior to admission, pt modified independent for mobility and independent with ADLs. Recommend high intensity therapy following discharge once medically stable in order to reduce fall risk, reduce caregiver burden, improve quality of life, and return to PLOF.  Patient presents with good participation,  "motivation, and family support to return to prior level of function and demonstrates appropriate endurance, strength, pain control, and fine motor control to participate in up to 3 hours daily or 15hrs weekly of multidisciplinary intensive therapy. Pt would continue to benefit from skilled acute PT in order to address current deficits and progress functional mobility.     Rehab Prognosis: Good; patient would benefit from acute skilled PT services 4 x/week to address these deficits and reach maximum level of function.  Recent Surgery: * No surgery found *      Plan:     During this hospitalization, patient to be seen 4 x/week to address the identified rehab impairments via gait training, therapeutic activities, therapeutic exercises, neuromuscular re-education and progress toward the following goals:    Plan of Care Expires:  11/07/24    Subjective     Chief Complaint: None verbalized  Patient/Family Comments/Goals: "[place]"   Pain/Comfort:  Pain Rating 1: 0/10    Patients cultural, spiritual, Worship conflicts given the current situation: no    Living Environment:  Living Environment: Patient lives with their granddaughter  in a first floor apartment with number of outside stair(s): 0 and tub-shower combo.  Prior Level of Function: Prior to admission, patient independent for ADLs and modified independent for mobility using RW.  Equipment Used at Home: walker, rolling.  DME owned (not currently used): none  Assistance Upon Discharge: family, not available 24/7, pt's daughter reports family that lives close either works or cannot provide physical assistance    Objective:     Communicated with nursing prior to session. Patient found HOB elevated with PureWick, telemetry, bed alarm, SCD upon PT entry to room.    General Precautions: Standard, fall  Orthopedic Precautions:N/A    Braces: N/A    Exams:  Cognitive Exam:  Patient is oriented to Person, Place (when provided binary options), Time, Situation, follows " commands 75% of the time  RLE ROM: WFL  RLE Strength: WFL, grossly 4/5  LLE ROM: WFL  LLE Strength: 0/5  Sensation: -       Impaired  light/touch LLE and sharp/dull LLE    Functional Mobility:  Bed Mobility:  Verbal cues for sequencing and technique  Scooting: total assistance  Supine to Sit: total assistance for LE management and trunk management  Sit to Supine: total assistance of 2 persons for LE management and trunk management  Transfers:   Deferred 2/2 absent LLE motor activation and poor static sitting balance  Balance:   Static Sitting: Poor, able to maintain for 20 minute(s) with total assistance progressing to moderate assistance; brief periods of CGA  Verbal and tactile cues provided for upright posture, increased cervical extension, maintenance of midline orientation, anterior trunk lean, core activation, command following, and improved used of BUE for support  Dynamic Sitting: Poor: Patient unable to accept challenge or move without loss of balance, total assistance  Pt completed 5x10s lateral lean onto left elbow while seated at EOB to increase joint proprioception and weightbearing with emphasis on tricep activation  Static Standing: not assessed this visit  Dynamic Standing: not assessed this visit    Therapeutic Activities and Exercises:  Patient educated on role of acute care PT and PT POC, safety while in hospital including calling nurse for mobility, and call light usage  Pt educated on importance of maximal participation in therapy session in order to reduce negative effects of prolonged sedentary positioning.   Answered all questions within PT scope of practice and addressed functional mobility concerns.    AM-PAC 6 CLICK MOBILITY  Total Score:6     Patient left HOB elevated with all lines intact, call button in reach, RN notified, bed alarm on, and daughter present.    GOALS:   Multidisciplinary Problems       Physical Therapy Goals          Problem: Physical Therapy    Goal Priority  Disciplines Outcome Interventions   Physical Therapy Goal     PT, PT/OT Progressing    Description: Goals to be met by: 2024     Patient will increase functional independence with mobility by performin. Supine to sit with Moderate Assistance  2. Sit to supine with Moderate Assistance  3. Bed to chair transfer with Maximum Assistance using LRAD  4. Sitting at edge of bed x5 minutes with Stand-by Assistance  5. Lower extremity exercise program x15 reps per handout, with independence    Hospital Bed - Patient requires a hospital bed for positioning of the body in ways that are not feasible with an ordinary bed. The patient requires special positioning for pain relief, limited mobility, and/or being unable to independently make changes in body position without the use of a hospital bed. Pillows and wedges will not be adequate for resolving these positional issues.     Wheelchair - Patient has a mobility limitation that significantly impairs their ability to participate in one or more mobility related activities of daily living in customary locations in the home. The mobility limitation cannot be sufficiently resolved by the use of a cane or walker. The use of a manual wheelchair will greatly improve the patient's ability to participate in MRADLs. The patient will use the wheelchair on a regular basis at home. They have expressed their willingness to use a manual wheelchair in the home, and have a caregiver who is available and willing to assist with the wheelchair if needed.    Bedside Commode - Patient has a mobility limitation that significantly impairs their ability to participate in one or more mobility related activities of daily living, including toileting. This deficit can be resolved by using a bedside commode. Patient demonstrates mobility limitations that will cause them to be confined to one room at home without bathroom access for up to 30 days. Using a bedside commode will greatly improve the  patient's ability to participate in MRADLs.                           History:     Past Medical History:   Diagnosis Date    Anticoagulant long-term use     Diabetes mellitus     Hypertension     Stroke        Past Surgical History:   Procedure Laterality Date    ABDOMINAL SURGERY      JOINT REPLACEMENT         Time Tracking:     PT Received On: 10/07/24  PT Start Time: 0959     PT Stop Time: 1029  PT Total Time (min): 30 min     Billable Minutes: Evaluation 10 Therapeutic Activity 10 and Neuromuscular Re-education 10      10/07/2024

## 2024-10-07 NOTE — CARE UPDATE
I have reviewed the chart of Tammy Multani who is hospitalized for the following:    Active Hospital Problems    Diagnosis    *Acute ischemic multifocal multiple vascular territories stroke    Reduced mobility     Therapy to evaluate and treat       Sensory loss    HTN (hypertension)    Hypokalemia     POA  Monitor with daily cmp  Replace   K 3.3      Hemiplegia        Jacque Whitfield NP  Unit Based [facility]

## 2024-10-07 NOTE — PLAN OF CARE
PT evaluation complete and appropriate goals established.    Problem: Physical Therapy  Goal: Physical Therapy Goal  Description: Goals to be met by: 2024     Patient will increase functional independence with mobility by performin. Supine to sit with Moderate Assistance  2. Sit to supine with Moderate Assistance  3. Bed to chair transfer with Maximum Assistance using LRAD  4. Sitting at edge of bed x5 minutes with Stand-by Assistance  5. Lower extremity exercise program x15 reps per handout, with independence    10/7/2024 1116 by Ana Paula Figueroa, PT  Outcome: Progressing     10/7/2024

## 2024-10-07 NOTE — PLAN OF CARE
10/07/24 1457   Post-Acute Status   Post-Acute Authorization Placement   Post-Acute Placement Status Patient List Provided   Discharge Plan   Discharge Plan A Rehab     SW provided Rehab list to patient and her daughter Milana at bedside.  They will review and provide choice.      Miladys Brown LMSW  [facility]  [phone]

## 2024-10-07 NOTE — ASSESSMENT & PLAN NOTE
History of. Prescribed oxycodone 15mg at home. Family concerned that medication is being diverted by family member, unclear how often patient takes. Reportedly is prescribed for neck pain.     -oxycodone 5mg PRN BID for severe pain  -monitor for signs of withdrawal, as is unclear how often patient is taking

## 2024-10-07 NOTE — PROGRESS NOTES
"[facility] - Neurosurgery ([facility])  Vascular Neurology  Comprehensive Stroke Center  Progress Note    Assessment/Plan:     * Acute arterial ischemic stroke, multifocal, multiple vascular territories  63 y/o F transferred from [facility] for higher level of care secondary to acute KIARA stroke on CTA.  Briefly,the patient presented to the OSH overnight for slurred speech started at 1:30 a.m. in the morning.  She has a history of his stroke with left-sided deficits but is normally able to transfer and do a little walking. This morning, she had complete hemiplegia of the left side. Telestroke consult performed, concerning for LVO.  NIH 13. CTA Stroke MP performed and  was read as "acute R KIARA infarct". No TNK due to AC use. Patient accepted to ED as transfer for emergent vascular neurology evaluation. No endovascular intervention.     History of atrial fibrillation per discussion with daughter. Was started on Eliquis on 10/6: hold given size of infarct, start ASA on 10/8. Transition again to Eliquis in 5-7 days. Given history of Afib, etiology likely embolic. Though not apparent on MRA, if R KIARA occlusion was proximal (supported by MRI) could account for R MCA-like picture.    -ASA 81mg-->Eliquis 5mg BID in 5-7 days  -high intensity statin  -SBP<220 in acute setting  -echo done, w/o evidence of intra-cardiac shunt, no LAE, but does have history of Afib  -PT/OT  -NPO per speech  ->pending MBSS tomorrow    Type 2 diabetes mellitus, with long-term current use of insulin  History of. Takes Levemir 35 units QHS at home. No prior A1C on file. A1C ordered on admission but never collected. CMP w/glucose 253 this morning. Patient is NPO.    -goal 140-180 while admitted  -ordered MDSS  -holding Levemir 35 units QHS as is NPO  -accuchecks    Atrial fibrillation  History of. Takes Eliquis 5mg BID and Coreg 6.25mg BID at home.    -hold Eliquis given size of stroke, may restarted in 5-7 days, ASA 81mg QD in the " interim  -continue coreg at 3.125mg BID given SBP goal of permissive HTN in acute setting    Opioid use  History of. Prescribed oxycodone 15mg at home. Family concerned that medication is being diverted by family member, unclear how often patient takes. Reportedly is prescribed for neck pain.     -oxycodone 5mg PRN BID for severe pain  -monitor for signs of withdrawal, as is unclear how often patient is taking    Left hemiplegia  See stroke    HTN (hypertension)  Home medication regimen as below  -lisinopril 40mg QD  -amlodipine 10mg QD    Hold home medications to allow for permissive HTN in acute setting. SBP goal < 220.         10/7: gómez SINCLAIR exam stable, echo today, NPO aside from meds per speech, barium study tmrw    STROKE DOCUMENTATION   Acute Stroke Times   Last Known Normal Date:  (OSH)  Stroke Team Called Date: 10/06/24  Stroke Team Called Time:  (At OSH)  Stroke Team Arrival Date: 10/06/24  Stroke Team Arrival Time: 0608  CT Interpretation Time:  (CTA done around 4:20am [place] [facility] [place]  Thrombolytic Therapy Recommended: No  Thrombectomy Recommended:  (Pending MRI Brain)    NIH Scale:  1a. Level of Consciousness: 0-->Alert, keenly responsive  1b. LOC Questions: 0-->Answers both questions correctly  1c. LOC Commands: 0-->Performs both tasks correctly  2. Best Gaze: 0-->Normal  3. Visual: 0-->No visual loss  4. Facial Palsy: 1-->Minor paralysis (flattened nasolabial fold, asymmetry on smiling)  5a. Motor Arm, Left: 4-->No movement  5b. Motor Arm, Right: 0-->No drift, limb holds 90 (or 45) degrees for full 10 secs  6a. Motor Leg, Left: 4-->No movement  6b. Motor Leg, Right: 0-->No drift, leg holds 30 degree position for full 5 secs  7. Limb Ataxia: 2-->Present in two limbs  8. Sensory: 0-->Normal, no sensory loss  9. Best Language: 1-->Mild-to-moderate aphasia, some obvious loss of fluency or facility of comprehension, without significant limitation on ideas expressed or form  of expression. Reduction of speech and/or comprehension, however, makes conversation. . . (see row details)  10. Dysarthria: 1-->Mild-to-moderate dysarthria, patient slurs at least some words and, at worst, can be understood with some difficulty  11. Extinction and Inattention (formerly Neglect): 0-->No abnormality  Total (NIH Stroke Scale): 13       Modified [place] Score: 3  [place] Coma Scale:    ABCD2 Score:    [id number] Score:   HAS -BLED Score:   ICH Score:   Hunt & Shane Classification:      Hemorrhagic change of an Ischemic Stroke: Does this patient have an ischemic stroke with hemorrhagic changes? No     Neurologic Chief Complaint: infarcts in multiple vascular territories    Subjective:     Interval History: Patient is seen for follow-up neurological assessment and treatment recommendations: see HPI    HPI, Past Medical, Family, and Social History remains the same as documented in the initial encounter.     Review of Systems   Respiratory:  Negative for shortness of breath.    Cardiovascular:  Negative for chest pain.   Neurological:  Positive for weakness and numbness.     Scheduled Meds:   [START ON 10/8/2024] aspirin  81 mg Oral Daily    atorvastatin  40 mg Oral Daily    carvediloL  3.125 mg Oral BID    famotidine  20 mg Oral BID     Continuous Infusions:  PRN Meds:  Current Facility-Administered Medications:     acetaminophen, 650 mg, Oral, Q8H PRN    bisacodyL, 10 mg, Rectal, Daily PRN    hydrALAZINE, 10 mg, Intravenous, Q6H PRN    ondansetron, 4 mg, Oral, Q12H PRN    oxyCODONE, 5 mg, Oral, Q12H PRN    sodium chloride 0.9%, 500 mL, Intravenous, PRN    sodium chloride 0.9%, 10 mL, Intravenous, PRN    Objective:     Vital Signs (Most Recent):  Temp: 97.5 °F (36.4 °C) (10/07/24 1700)  Pulse: 85 (10/07/24 1700)  Resp: 18 (10/07/24 1700)  BP: (!) 166/89 (10/07/24 1700)  SpO2: 95 % (10/07/24 1700)  BP Location: Left arm    Vital Signs Range (Last 24H):  Temp:  [97.5 °F (36.4 °C)-98.4 °F (36.9 °C)]   Pulse:  " []   Resp:  [16-18]   BP: (137-171)/()   SpO2:  [95 %-99 %]   BP Location: Left arm       Physical Exam  Vitals and nursing note reviewed.   Constitutional:       Appearance: She is ill-appearing.   HENT:      Head: Normocephalic and atraumatic.   Cardiovascular:      Rate and Rhythm: Normal rate and regular rhythm.   Pulmonary:      Effort: Pulmonary effort is normal.   Musculoskeletal:      Right lower leg: No edema.      Left lower leg: No edema.   Neurological:      Mental Status: She is alert.          Neurological Exam:   LOC: alert  Attention Span: poor  Language: Expressive aphasia.  Articulation: Dysarthria  Orientation: Person, Place, Time   Visual Fields: Full  EOM (CN III, IV, VI): Full/intact  Pupils (CN II, III): PERRL  Facial Sensation (CN V): Facial sensory loss  Facial Movement (CN VII): Upper facial weakness on the Left  Motor: Arm left  Plegia 0/5  Leg left  Paresis: 1/5  Arm right  Normal 5/5  Leg right Normal 5/5  Cerebellum: No evidence of appendicular or axial ataxia  Sensation: Jason-anesthesia left    Laboratory:  CMP:   Recent Labs   Lab 10/07/24  1000   CALCIUM 10.2   ALBUMIN 3.8   PROT 7.7   *   K 3.5   CO2 23   CL 97   BUN 17   CREATININE 1.0   ALKPHOS 94   ALT 17   AST 25   BILITOT 0.8     CBC:   Recent Labs   Lab 10/07/24  1000   WBC 9.12   RBC 5.15   HGB 17.0*   HCT 48.9*      MCV 95   MCH 33.0*   MCHC 34.8     Lipid Panel:   Recent Labs   Lab 10/06/24  0934   CHOL 178   LDLCALC 98.4   HDL 41   TRIG 193*     Hgb A1C: No results for input(s): "HGBA1C" in the last 168 hours.  TSH:   Recent Labs   Lab 10/06/24  0934   TSH 1.237       Diagnostic Results     Brain and Vessel Imaging   Impression: Acute infarcts involving the right anterior circulation and posterior circulation as detailed above without evidence of intracranial hemorrhage or mass effect.  Embolic disease to be considered.     Chronic left parietal infarct.     MRA demonstrates limited visualization " of distal right KIARA branches in keeping with the territory of the acute infarct.  There is also narrowing of at least one proximal left M2 branch although no acute infarct in that territory.    Cardiac Imaging     Left Ventricle: The left ventricle is normal in size. Normal wall thickness. There is concentric remodeling. Normal wall motion. There is hyperdynamic systolic function with a visually estimated ejection fraction of greater than 70%. There is normal diastolic function.    Right Ventricle: Normal right ventricular cavity size. Wall thickness is normal. Systolic function is normal.    Left Atrium: Bubble study is likely negative, but full opacification of the RA is not well seen on these images which decreases sensitivity.    IVC/SVC: Normal venous pressure at 3 mmHg.    Roger Conklin DO  [facility]  Department of Vascular Neurology   [facility] - Neurosurgery [facility])

## 2024-10-07 NOTE — PT/OT/SLP EVAL
Occupational Therapy  Co- Evaluation & Co-Treatment    Name: Tammy Multani  MRN: [id number]  Admitting Diagnosis: Acute ischemic multifocal multiple vascular territories stroke  Recent Surgery: * No surgery found *      Recommendations:     Discharge Recommendations: High Intensity Therapy  Discharge Equipment Recommendations:  bedside commode, bath bench, hospital bed, lift device, wheelchair  Barriers to discharge:  Other (Comment) (level of assistnace required)    Assessment:     Tammy Multani is a 64 y.o. female with a medical diagnosis of Acute ischemic multifocal multiple vascular territories stroke.  She presents with confusion, although able to follow commands. No active movement present in left upper extremity with noted inattention/neglect to left side. Decreased sensation overall during testing. Prior to admission, patient ambulatory and able to complete ADLs with modified independence. Performance deficits affecting function: impaired endurance, weakness, impaired balance, impaired sensation, visual deficits, impaired functional mobility, decreased upper extremity function, impaired self care skills, gait instability, decreased lower extremity function, impaired cognition, decreased safety awareness, impaired fine motor, decreased ROM.  Patient is significantly below PLOF. Patient presents with good participation and motivation to return to prior level of function with high intensity therapy.  The patient demonstrates appropriate pain control to participate in up to 3 hours or 15hrs of combined therapy post acute.     Rehab Prognosis: Good; patient would benefit from acute skilled OT services to address these deficits and reach maximum level of function.       Plan:     Patient to be seen   to address the above listed problems via self-care/home management, therapeutic activities, therapeutic exercises, neuromuscular re-education  Plan of Care Expires: 11/07/24  Plan of Care Reviewed with:  "patient    Subjective     Chief Complaint: L UE with no movement  Patient/Family Comments/goals: "[place]" (when asking type of building for orientation questions    Occupational Profile:  Living Environment: lives with their 22yo granddaughter in a first floor apartment with no steps to enter with. Bathroom set up: bathtub shower combo  with  no AD  Previous level of function: Patient reports they were independent with ADLs and ambulatory (intermittent use of RW); reports 6-7 falls within last 2 weeks d/t tripping over L leg  Roles and Routines: not active ; not working  Equipment Used at Home: walker, rolling  Assistance upon Discharge: family unable to provide 24/7 assistance    Pain/Comfort:  Pain Rating 1: 0/10  Pain Rating Post-Intervention 1: 0/10    Patients cultural, spiritual, Latter day conflicts given the current situation: no    Objective:   Co-evaluation and treatment necessary due to patient's medical complexity requiring two skilled therapists for patient safety, activity tolerance, and appropriate progression towards functional goals.      Communicated with: nursing prior to session.  Patient found HOB elevated with bed alarm, SCD, telemetry, PureWick upon OT entry to room. Patient's daughter present in room during session.     General Precautions: Standard, fall  Orthopedic Precautions: N/A  Braces: N/A  Respiratory Status: Room air    Occupational Performance:    Bed Mobility:    Patient completed Supine to Sit with total assistance  Patient completed Sit to Supine with total assistance and 2 persons  Patient sat EOB for ~ 20 minutes with Total Assistance progressing to overall moderate assistance with brief periods of CGA    Functional Mobility/Transfers:  not completed d/t level of assistance required for dynamic sitting balance    Cognitive/Visual Perceptual:  Cognitive/Psychosocial Skills:     -       Oriented to: Person, Time, and Situation   -       Follows " Commands/attention:Follows one-step commands  -       Communication: dysarthria  -       Memory: Impaired STM  -       Safety awareness/insight to disability: impaired   -       Mood/Affect/Coping skills/emotional control: Appropriate to situation  Visual/Perceptual:      -unable to complete formal visual testing secondary to complex command following capability ; noted R gaze preference; suspected L visual field cut but able to track across midline    Physical Exam:  Sensation:    -       Impaired  light touch in both upper extremities  Dominant hand:    -       R  Upper Extremity Range of Motion:     -       Right Upper Extremity: WFL  -       Left Upper Extremity: no active movement; PROM through full range with no pain  Upper Extremity Strength:    -       Right Upper Extremity: WFL  -       Left Upper Extremity: 0/5   Strength:    -       Right Upper Extremity: WFL  -       Left Upper Extremity: no grasp/release present  Fine Motor Coordination:    -       Impaired  Right hand thumb/finger opposition skills      AMPAC 6 Click ADL:  AMPAC Total Score: 10    Treatment & Education:  Patient educated on:   -purpose of OT and OT POC  -facilitation and education on proper body mechanics, energy conservation, and safety  -importance of early mobility and out of bed activities with staff assist  -overall benefits of therapy     All questions answered within OT scope and to patient's satisfaction    Patient left HOB elevated with all lines intact, call button in reach, bed alarm on, and daughter present    GOALS:   Multidisciplinary Problems       Occupational Therapy Goals          Problem: Occupational Therapy    Goal Priority Disciplines Outcome Interventions   Occupational Therapy Goal     OT, PT/OT Progressing    Description: Goals to be met by: 11/7/24     Patient will increase functional independence with ADLs by performing:    UE Dressing with Minimal Assistance.  LE Dressing with Moderate  Assistance.  Grooming while seated with Set-up Assistance.  Toileting from bedside commode with Moderate Assistance for hygiene and clothing management.   Sitting at edge of bed x5 minutes with Contact Guard Assistance.  Supine to sit with Contact Guard Assistance.  Step transfer with Moderate Assistance    DME Justifications (see above for complete DME recommendations)    Bedside Commode- Patient has a mobility limitation that significantly impairs their ability to participate in one or more mobility related activities of daily living, including toileting. This deficit can be resolved by using a bedside commode. Patient demonstrates mobility limitations that will cause them to be confined to one room at home without bathroom access for up to 30 days. Using a bedside commode will greatly improve the patient's ability to participate in MRADLs.     Wheelchair-  Patient has a mobility limitation that significantly impairs their ability to participate in one or more mobility related activities of daily living in customary locations in the home. The mobility limitation cannot be sufficiently resolved by the use of a cane or walker. The use of a manual wheelchair will greatly improve the patient's ability to participate in MRADLs. The patient will use the wheelchair on a regular basis at home. They have expressed their willingness to use a manual wheelchair in the home, and have a caregiver who is available and willing to assist with the wheelchair if needed.     Hospital Bed-  Patient requires a hospital bed for positioning of the body in ways that are not feasible with an ordinary bed. The patient requires special positioning for pain relief, limited mobility, and/or being unable to independently make changes in body position without the use of a hospital bed. Pillows and wedges will not be adequate for resolving these positional issues.                        History:     Past Medical History:   Diagnosis Date     Anticoagulant long-term use     Diabetes mellitus     Hypertension     Stroke        History reviewed. No pertinent surgical history.    Time Tracking:     OT Date of Treatment: 10/07/24  OT Start Time: 0959  OT Stop Time: 1029  OT Total Time (min): 30 min    Billable Minutes:Evaluation 7  Therapeutic Activity 13  Neuromuscular Re-education 10    10/7/2024

## 2024-10-07 NOTE — NURSING NOTE
Pt verified by 2 identifiers.  Pt here for TTE with bubble study. Test explained, understanding verbalized. IV in place to Rt AC. Bubble study explained & perfomed per protocol, images obtained. IV flushed. Patient tolerated procedure well without complaint.  Pt transferred back to room via stretcher accompanied by escort.

## 2024-10-07 NOTE — PT/OT/SLP EVAL
"Speech Language Pathology Evaluation  Bedside Swallow    Patient Name:  Tammy Multani   MRN:  [id number]  Admitting Diagnosis: Acute ischemic multifocal multiple vascular territories stroke    Recommendations:                 General Recommendations:  Speech language evaluation, Cognitive-linguistic evaluation, and Modified barium swallow study  Diet recommendations:  NPO, NPO   Aspiration Precautions: Frequent oral care, Meds crushed in puree, Monitor for s/s of aspiration, and Strict aspiration precautions   General Precautions: Standard, aspiration, fall, vision impaired, NPO (left neglect)  Communication strategies:  go to room if call light pushed    Assessment:     Tammy Multani is a 64 y.o. female with an SLP diagnosis of Dysphagia.  She also presents with cognitive-linguistic deficits and left neglect, though formal assessment still pending.     History:     Past Medical History:   Diagnosis Date    Anticoagulant long-term use     Diabetes mellitus     Hypertension     Stroke        Past Surgical History:   Procedure Laterality Date    ABDOMINAL SURGERY      JOINT REPLACEMENT       History of Present Illness:  65 y/o F transferred from [facility] for higher level of care secondary to acute KIARA stroke on CTA.  Briefly,the patient presented to the OSH overnight for slurred speech started at 1:30 a.m. in the morning.  She has a history of his stroke with left-sided deficits but is normally able to transfer and do a little walking. This morning, she had complete hemiplegia of the left side. Telestroke consult performed, concerning for LVO.  CTA Stroke MP performed and  was read as "acute R KIARA infarct". Patient accepted to ED as transfer for emergent vascular neurology evaluation.      Prior Intubation HX:  none during this admission    Modified Barium Swallow: none on file    Chest X-Rays: 10/6/24: Impression:   Nonspecific patchy opacities in both lungs could relate to atelectasis, edema, " "infection, or noninfectious inflammatory process.    Prior diet: currently NPO      Subjective     "I had a stroke last night." Pt was O x 4.     Pain/Comfort:  Pain Rating 1: 0/10    Respiratory Status: Room air    Objective:     Oral Musculature Evaluation  Oral Musculature: facial asymmetry present, left weakness  Dentition: scattered dentition (upper dentures at home; scattered lower dentition)  Secretion Management: adequate  Mucosal Quality: adequate  Mandibular Strength and Mobility: WFL  Oral Labial Strength and Mobility: impaired retraction  Lingual Strength and Mobility: WFL  Buccal Strength and Mobility: decreased tone  Volitional Cough: fair  Volitional Swallow: elicited  Voice Prior to PO Intake: dry, clear    Bedside Swallow Eval:   Consistencies Assessed:  Thin liquids tsp x 2, cup sip x 5, straw sip x 2  Puree 1/2 tsp x 1, full tsp x 5  Soft solids crumbled srinivasan cracker mixed in pudding x 1      Oral Phase:   Prespillage suspected    Pharyngeal Phase:   Delayed cough present after 1 out of 2 tsp thin liquid trials, cough after 2/5 cup sip of thin liquids, and after soft solid trial.  Slight changed in vocal quality detected after puree trial that followed thin liquid trial.     Compensatory Strategies  None    Treatment:  Education was provided to pt and family member regarding role of SLP, purpose of swallowing assessment, concerns for aspiration, recommendation to conduct MBSS to r/o aspiration, recommendations to remain NPO except for meds crushed/buried in puree until MBSS is performed, increasing attention to left, and SLP treatment plan and POC.  Pt's family member expressed good understanding. Pt's full understanding questionable.  Recommending MBSS tomorrow. SLC eval to be conducted this week as well.     Goals:   Multidisciplinary Problems       SLP Goals          Problem: SLP    Goal Priority Disciplines Outcome   SLP Goal     SLP    Description: Speech Language Pathology Goals  Goals " expected to be met by 10/14:  1. Pt will participate in Modified Barium Swallow Study to r/o aspiration and determine safest oral diet.   2. Pt will participate in speech/language/cognitive evaluation to establish further tx plan.                                Plan:     Patient to be seen:  5 x/week   Plan of Care expires:  11/06/24  Plan of Care reviewed with:  patient, family   SLP Follow-Up:  Yes       Discharge recommendations:  High Intensity Therapy     Time Tracking:     SLP Treatment Date:   10/07/24  Speech Start Time:  1155  Speech Stop Time:  1221     Speech Total Time (min):  26 min    Billable Minutes: Eval Swallow and Oral Function 16 and Self Care/Home Management Training 10    10/07/2024

## 2024-10-07 NOTE — PLAN OF CARE
[facility] ([facility])  Initial Discharge Assessment       Primary Care Provider: Tatum, Primary Doctor    Admission Diagnosis: SOB (shortness of breath) [R06.02]  Stroke [I63.9]  Acute arterial ischemic stroke, multifocal, multiple vascular territories [I63.89]  Acute focal neurological deficit [R29.818]    Admission Date: 10/6/2024  Expected Discharge Date:     Transition of Care Barriers: (P) None    Payor: [employer] MEDICARE / Plan: [employer] HMO PPO SPECIAL NEEDS / Product Type: Medicare Advantage /     No emergency contact information on file.    Discharge Plan A: (P) Rehab  Discharge Plan B: (P) Home with family, Home Health    No Pharmacies Listed    Initial Assessment (most recent)       Adult Discharge Assessment - 10/07/24 6121          Discharge Assessment    Assessment Type Discharge Planning Assessment (P)      Confirmed/corrected address, phone number and insurance Yes (P)      Confirmed Demographics Correct on Facesheet (P)      Source of Information patient;family (P)      When was your last doctors appointment? 09/04/24 (P)      Communicated SHARONDA with patient/caregiver Date not available/Unable to determine (P)      Reason For Admission stroke (P)      People in Home grandchild(beverly) (P)      Do you expect to return to your current living situation? Yes (P)      Do you have help at home or someone to help you manage your care at home? Yes (P)      Who are your caregiver(s) and their phone number(s[facility]      Prior to hospitilization cognitive status: Unable to Assess (P)      Current cognitive status: Alert/Oriented (P)      Walking or Climbing Stairs Difficulty yes (P)      Walking or Climbing Stairs ambulation difficulty, requires equipment (P)      Mobility Management RW (P)      Dressing/Bathing Difficulty no (P)      Home Accessibility wheelchair accessible (P)      Home Layout Able to live on 1st floor (P)      Equipment Currently Used at Home walker, rolling (P)       Readmission within 30 days? No (P)      Patient currently being followed by outpatient case management? No (P)      Do you currently have service(s) that help you manage your care at home? No (P)      Do you take prescription medications? Yes (P)      Do you have prescription coverage? Yes (P)      Coverage [employer] Medicare (P)      Do you have any problems affording any of your prescribed medications? No (P)      Is the patient taking medications as prescribed? yes (P)      Who is going to help you get home at discharge? dilip Cortés (P)      How do you get to doctors appointments? family or friend will provide (P)      Are you on dialysis? No (P)      Do you take coumadin? No (P)      Discharge Plan A Rehab (P)      Discharge Plan B Home with family;Home Health (P)      DME Needed Upon Discharge  none (P)      Discharge Plan discussed with: Patient;Adult children (P)      Transition of Care Barriers None (P)         Physical Activity    On average, how many days per week do you engage in moderate to strenuous exercise (like a brisk walk)? 0 days (P)      On average, how many minutes do you engage in exercise at this level? 0 min (P)         Financial Resource Strain    How hard is it for you to pay for the very basics like food, housing, medical care, and heating? Not very hard (P)         Housing Stability    In the last 12 months, was there a time when you were not able to pay the mortgage or rent on time? No (P)      At any time in the past 12 months, were you homeless or living in a shelter (including now)? No (P)         Transportation Needs    Has the lack of transportation kept you from medical appointments, meetings, work or from getting things needed for daily living? No (P)         Food Insecurity    Within the past 12 months, you worried that your food would run out before you got the money to buy more. Sometimes true (P)      Within the past 12 months, the food you bought just didn't  last and you didn't have money to get more. Sometimes true (P)         Stress    Do you feel stress - tense, restless, nervous, or anxious, or unable to sleep at night because your mind is troubled all the time - these days? To some extent (P)         Social Isolation    How often do you feel lonely or isolated from those around you?  Rarely (P)         Alcohol Use    Q1: How often do you have a drink containing alcohol? Never (P)      Q2: How many drinks containing alcohol do you have on a typical day when you are drinking? Patient does not drink (P)      Q3: How often do you have six or more drinks on one occasion? Never (P)         Utilities    In the past 12 months has the electric, gas, oil, or water company threatened to shut off services in your home? No (P)         Health Literacy    How often do you need to have someone help you when you read instructions, pamphlets, or other written material from your doctor or pharmacy? Rarely (P)         OTHER    Name(s) of People in Home grandtr and great grandtr (P)                  SW met with patient and her daughter Milana at bedside.  Patient resides in a 1st floor apartment with her granddaughter and great grandtr.  She uses a RW at home, but has no HH needs.  Patient is not on HD or Coumadin.  Patient has recs for high intensity and SW will provide Rehab list.      Discharge Plan A and Plan B have been determined by review of patient's clinical status, future medical and therapeutic needs, and coverage/benefits for post-acute care in coordination with multidisciplinary team members.    Miladys Brown, THONG  [facility]  [phone]

## 2024-10-07 NOTE — PLAN OF CARE
Problem: Stroke, Ischemic (Includes Transient Ischemic Attack)  Goal: Improved Communication Skills  Outcome: Progressing  Goal: Optimal Functional Ability  Outcome: Progressing  Goal: Improved Sensorimotor Function  Outcome: Progressing  Goal: Safe and Effective Swallow  Outcome: Progressing     Problem: Fall Injury Risk  Goal: Absence of Fall and Fall-Related Injury  Outcome: Progressing     Problem: Adult Inpatient Plan of Care  Goal: Plan of Care Review  Outcome: Progressing  Goal: Absence of Hospital-Acquired Illness or Injury  Outcome: Progressing  Goal: Optimal Comfort and Wellbeing  Outcome: Progressing  Goal: Readiness for Transition of Care  Outcome: Progressing     POC reviewed and updated with the patient. Questions regarding POC were encouraged and addressed with the patient. Tele maintained per clement. DOTTIE. Patient is AOx 4 at this time. Fall precautions maintained, no signs of injury noted during shift. Upon exiting room, patient's bed locked in low position, side rails up x 3, bed alarm set, with call light within reach. Instructed patient to call staff for mobility, verbalized understanding. No acute signs of distress noted at this time. Plan of care continues.

## 2024-10-07 NOTE — CONSULTS
"[facility] ([facility])  Adult Nutrition  Consult Note    SUMMARY     Recommendations    Advance diet per MD, texture per SLP.     RD to monitor and follow up.    Goals: Meet % EEN/EPN by RD follow up.  Nutrition Goal Status: new  Communication of RD Recs: other (comment) (POC)    Assessment and Plan    Nutrition Problem  Inadequate oral intake    Related to (etiology):   Inability to consume sufficient nutrients    Signs and Symptoms (as evidenced by):   NPO     Interventions/Recommendations (treatment strategy):  Collab of nutrition care w/ other providers    Nutrition Diagnosis Status:   New      Malnutrition Assessment    Will re-attempt to assess at f/u.    Reason for Assessment    Reason For Assessment: consult  Diagnosis: other (see comments) (Acute ischemic multifocal multiple vascular territories stroke)  General Information Comments: Consulted for cardiac diet education, but pt remains NPO. Diet education not appropriate at this time. Pt w/ no documented PMHx. PETER nutrition hx at visit due to pt being moved to a different bed. Limited wt hx in chart. Will assess on f/u.  Nutrition Discharge Planning: Pending medical course    Nutrition Related Social Determinants of Health: SDOH: Unable to assess at this time.       Nutrition Risk Screen    Nutrition Risk Screen: no indicators present    Nutrition/Diet History    Spiritual, Cultural Beliefs, Ep[employer]alian Practices, Values that Affect Care: no  Food Allergies: other (see comments) (PETER)  Factors Affecting Nutritional Intake: other (see comments) (PETER)    Anthropometrics    Temp: 98.1 °F (36.7 °C)  Height Method: Stated  Height: 5' 2" (157.5 cm)  Height (inches): 62 in  Weight Method: Bed Scale  Weight: 72.6 kg (160 lb 0.9 oz)  Weight (lb): 160.06 lb  Ideal Body Weight (IBW), Female: 110 lb  % Ideal Body Weight, Female (lb): 145.51 %  BMI (Calculated): 29.3  BMI Grade: 25 - 29.9 - overweight       Lab/Procedures/Meds    Pertinent Labs " Reviewed: reviewed  Pertinent Labs Comments: Sodium 135, Glucose 253, Phosphorus 2.5  Pertinent Medications Reviewed: reviewed  Pertinent Medications Comments: Atorvastatin, famotidine      Estimated/Assessed Needs    Weight Used For Calorie Calculations: 72.6 kg (160 lb 0.9 oz)  Energy Calorie Requirements (kcal): 1469  Energy Need Method: [place]-St Jeor (MSJ x 1.2 PAL)  Protein Requirements: 73-87 (1.0-1.2 g/kg ABW)  Weight Used For Protein Calculations: 72.6 kg (160 lb 0.9 oz)  Fluid Requirements (mL): 1 ml/kcal or Per MD     RDA Method (mL): 1469         Nutrition Prescription Ordered    Current Diet Order: NPO    Evaluation of Received Nutrient/Fluid Intake    I/O: No data documented  Comments: LBM unspecified  % Intake of Estimated Energy Needs: 0 - 25 %  % Meal Intake: NPO    Nutrition Risk    Level of Risk/Frequency of Follow-up: low - moderate (1-2x/week)       Monitor and Evaluation    Food and Nutrient Intake: energy intake, food and beverage intake  Food and Nutrient Adminstration: diet order  Anthropometric Measurements: body mass index, weight change, weight  Biochemical Data, Medical Tests and Procedures: lipid profile, inflammatory profile, glucose/endocrine profile, gastrointestinal profile, electrolyte and renal panel  Nutrition-Focused Physical Findings: overall appearance       Nutrition Follow-Up    RD Follow-up?: Yes

## 2024-10-07 NOTE — HOSPITAL COURSE
10/7: DOTTIE, nuero exam stable, echo today, NPO aside from meds per speech, barium study tmrw  10/08/2024 NAEON, stable neuro exam, went for the Br study, and was started on a diet. Endocrine consulted for the A1c 10%, pending recs.  10/09/2024 NAEON, Stable neuro exam, will restart Lisinopril 20 mg (half of her dome dose). Seen by endocrine, with adjustment of her insulin doses.  10/10/2024 NAEON, no change in her neuro exam, will restart Eliquis today, BP is better controlled.  10/11/2024 NAEON, stable neuro exam, still decreased PO intake. Patient gets some episodes of in attention; EEG was ordered. Endocrine adjusted her insulins. Accepted at [facility] in [place].   10/12/2024 NAYOLANDA, waiting on EEG interpretation (contacted epilepsy), started Keppra 500mg BID and will see if reduces transient alterations in awareness, neurologic examination stable  10/13/2024 NAEON, EEG w/encephalopathy (no seizure or discharges), stop Keppra, SBP ~150 to 160 so increased Coreg to 6.25mg BID  10/14/2024 General surgery was consulted overnight for constipation, episodes of nausea and emesis. KUB concerning for SBO vs. Ileus. F/u CT AP was more indicative of ileus, no evidence of mechanical obstruction was observed. Patient is NPO, can get some meds via NGT. Eliquis switched to heparin drip.   10/15/2024 Patient has still not had an actual bowel movement. Is distended, has some abdominal pain. General surgery following, is getting XR gastrogaffin challenge, will f/u results.   10/16/2024 Still monitoring patient for bowel movements in the setting of Ileus. General surgery is following. Mild hyperkalemia and hypernatremia this morning; will continue to monitor.   10/17/2024 Patient still has not had a bowel movement. Ileus not resolved on repeat imaging. Suppository ordered earlier this morning, will monitor to see if improvement with suppository.   10/18/2024 Patient still had not had a solid bowel movement due to her ileus, had  a number of watery movements yesterday but nothing substantive. Suppositories now scheduled. General surgery following.   10/19/2024 Patient stable overnight. LBM yesterday. Gen Surg ok with advancing diet to clear liquids. Heparin gtt stopped, started on Eliquis. Awaiting placement.  10/20/2024 NAEON. Neuro exam remains stable. Patient with audible upper respiratory sounds and cough. CPT ordered. Patient noted to be increasingly acidotic on labs. IVFs changed from NS to LR. Patient awaiting placement.  10/21/24 NAEON. Neuro exam remains stable. Not oriented to time. Family says patient's exam stable compared to yesterday. SLP to re-evaluate, on diet level 4. Placement pending  10/22/24 NAEON. Neuro exam remains stable. Not oriented to time. Family says patient's exam stable compared to yesterday. SLP to re-evaluate, on diet level 4. Placement pending  10/23/24 NAEON. Neuro exam remains stable/slightly improved mental status. Oriented to time. Accepted for placement but will plan for d/c tomorrow given hypokalemia. Lytes corrected and repeat lab pending.  10/24/24 NAEON. Neuro exam remains stable/slightly improved mental status. Workup for hypoK ordered and pending. DDX HyperAldo vs Diarrhea.  10/25/2024 NAEON. Neuro exam remains stable/slightly improved mental status. Diarrhea resolving. D/C Monday d/t logisitics  10/26/2024 NAEON. Neuro exam remains stable/slightly improved mental status. D/C Monday d/t logisitics  10/27/2024 NAEON. Neuro exam remains stable/slightly improved mental status. Mild transamnitis, will get Liver US and Hep C. Likely D/C Monday d/t logisitics  10/28/2024 Patient remained stable overnight. Neuro exam stable. LFTs stable. Liver US negative. Hep C non-reactive. Patient to d/c to [facility] today pending transportation.

## 2024-10-07 NOTE — ASSESSMENT & PLAN NOTE
Home medication regimen as below  -lisinopril 40mg QD  -amlodipine 10mg QD    Hold home medications to allow for permissive HTN in acute setting. SBP goal < 220.

## 2024-10-07 NOTE — PLAN OF CARE
PT evaluation complete and appropriate goals established.    Problem: Physical Therapy  Goal: Physical Therapy Goal  Description: Goals to be met by: 2024     Patient will increase functional independence with mobility by performin. Supine to sit with Stand-by Assistance  2. Sit to supine with Stand-by Assistance  3. Sit to stand transfer with Supervision  4. Gait  x 100 feet with Stand-by Assistance using LRAD.   5. Lower extremity exercise program x15 reps per handout, with independence    Outcome: Progressing     10/7/2024

## 2024-10-07 NOTE — ASSESSMENT & PLAN NOTE
History of. Takes Eliquis 5mg BID and Coreg 6.25mg BID at home.    -hold Eliquis given size of stroke, may restarted in 5-7 days, ASA 81mg QD in the interim  -continue coreg at 3.125mg BID given SBP goal of permissive HTN in acute setting

## 2024-10-07 NOTE — PLAN OF CARE
Problem: Skin Injury Risk Increased  Goal: Skin Health and Integrity  Outcome: Progressing     Problem: Stroke, Ischemic (Includes Transient Ischemic Attack)  Goal: Optimal Coping  Outcome: Progressing     Problem: Stroke, Ischemic (Includes Transient Ischemic Attack)  Goal: Optimal Cerebral Tissue Perfusion  Outcome: Progressing     Problem: Stroke, Ischemic (Includes Transient Ischemic Attack)  Goal: Optimal Cognitive Function  Outcome: Progressing     Problem: Stroke, Ischemic (Includes Transient Ischemic Attack)  Goal: Improved Communication Skills  Outcome: Progressing     Problem: Fall Injury Risk  Goal: Absence of Fall and Fall-Related Injury  Outcome: Progressing     Problem: Adult Inpatient Plan of Care  Goal: Readiness for Transition of Care  Outcome: Progressing

## 2024-10-07 NOTE — PLAN OF CARE
Problem: Occupational Therapy  Goal: Occupational Therapy Goal  Description: Goals to be met by: 11/7/24     Patient will increase functional independence with ADLs by performing:    UE Dressing with Minimal Assistance.  LE Dressing with Moderate Assistance.  Grooming while seated with Set-up Assistance.  Toileting from bedside commode with Moderate Assistance for hygiene and clothing management.   Sitting at edge of bed x5 minutes with Contact Guard Assistance.  Supine to sit with Contact Guard Assistance.  Step transfer with Moderate Assistance    DME Justifications (see above for complete DME recommendations)    Bedside Commode- Patient has a mobility limitation that significantly impairs their ability to participate in one or more mobility related activities of daily living, including toileting. This deficit can be resolved by using a bedside commode. Patient demonstrates mobility limitations that will cause them to be confined to one room at home without bathroom access for up to 30 days. Using a bedside commode will greatly improve the patient's ability to participate in MRADLs.     Wheelchair-  Patient has a mobility limitation that significantly impairs their ability to participate in one or more mobility related activities of daily living in customary locations in the home. The mobility limitation cannot be sufficiently resolved by the use of a cane or walker. The use of a manual wheelchair will greatly improve the patient's ability to participate in MRADLs. The patient will use the wheelchair on a regular basis at home. They have expressed their willingness to use a manual wheelchair in the home, and have a caregiver who is available and willing to assist with the wheelchair if needed.     Hospital Bed-  Patient requires a hospital bed for positioning of the body in ways that are not feasible with an ordinary bed. The patient requires special positioning for pain relief, limited mobility, and/or being  unable to independently make changes in body position without the use of a hospital bed. Pillows and wedges will not be adequate for resolving these positional issues.   Outcome: Progressing     Patient tolerated OT eval. Goals and POC established. See note for further details.

## 2024-10-07 NOTE — ASSESSMENT & PLAN NOTE
"63 y/o F transferred from [facility] for higher level of care secondary to acute KIARA stroke on CTA.  Briefly,the patient presented to the OSH overnight for slurred speech started at 1:30 a.m. in the morning.  She has a history of his stroke with left-sided deficits but is normally able to transfer and do a little walking. This morning, she had complete hemiplegia of the left side. Telestroke consult performed, concerning for LVO.  NIH 13. CTA Stroke MP performed and  was read as "acute R KIARA infarct". No TNK due to AC use. Patient accepted to ED as transfer for emergent vascular neurology evaluation. No endovascular intervention.     History of atrial fibrillation per discussion with daughter. Was started on Eliquis on 10/6: hold given size of infarct, start ASA on 10/8. Transition again to Eliquis in 5-7 days. Given history of Afib, etiology likely embolic. Though not apparent on MRA, if R KIARA occlusion was proximal (supported by MRI) could account for R MCA-like picture.    -ASA 81mg-->Eliquis 5mg BID in 5-7 days  -high intensity statin  -SBP<220 in acute setting  -echo done, w/o evidence of intra-cardiac shunt, no LAE, but does have history of Afib  -PT/OT  -NPO per speech  ->pending MBSS tomorrow  "

## 2024-10-08 LAB
ALBUMIN SERPL BCP-MCNC: 3.6 G/DL (ref 3.5–5.2)
ALP SERPL-CCNC: 88 U/L (ref 55–135)
ALT SERPL W/O P-5'-P-CCNC: 16 U/L (ref 10–44)
ANION GAP SERPL CALC-SCNC: 11 MMOL/L (ref 8–16)
AST SERPL-CCNC: 30 U/L (ref 10–40)
BASOPHILS # BLD AUTO: 0.05 K/UL (ref 0–0.2)
BASOPHILS NFR BLD: 0.5 % (ref 0–1.9)
BILIRUB SERPL-MCNC: 0.8 MG/DL (ref 0.1–1)
BUN SERPL-MCNC: 22 MG/DL (ref 8–23)
CALCIUM SERPL-MCNC: 9.9 MG/DL (ref 8.7–10.5)
CHLORIDE SERPL-SCNC: 98 MMOL/L (ref 95–110)
CO2 SERPL-SCNC: 25 MMOL/L (ref 23–29)
CREAT SERPL-MCNC: 1 MG/DL (ref 0.5–1.4)
DIFFERENTIAL METHOD BLD: ABNORMAL
EOSINOPHIL # BLD AUTO: 0.1 K/UL (ref 0–0.5)
EOSINOPHIL NFR BLD: 0.9 % (ref 0–8)
ERYTHROCYTE [DISTWIDTH] IN BLOOD BY AUTOMATED COUNT: 12.5 % (ref 11.5–14.5)
EST. GFR  (NO RACE VARIABLE): >60 ML/MIN/1.73 M^2
ESTIMATED AVG GLUCOSE: 240 MG/DL (ref 68–131)
GLUCOSE SERPL-MCNC: 254 MG/DL (ref 70–110)
HBA1C MFR BLD: 10 % (ref 4–5.6)
HCT VFR BLD AUTO: 44.5 % (ref 37–48.5)
HGB BLD-MCNC: 16.1 G/DL (ref 12–16)
IMM GRANULOCYTES # BLD AUTO: 0.02 K/UL (ref 0–0.04)
IMM GRANULOCYTES NFR BLD AUTO: 0.2 % (ref 0–0.5)
LYMPHOCYTES # BLD AUTO: 2.3 K/UL (ref 1–4.8)
LYMPHOCYTES NFR BLD: 24 % (ref 18–48)
MCH RBC QN AUTO: 33.4 PG (ref 27–31)
MCHC RBC AUTO-ENTMCNC: 36.2 G/DL (ref 32–36)
MCV RBC AUTO: 92 FL (ref 82–98)
MONOCYTES # BLD AUTO: 0.8 K/UL (ref 0.3–1)
MONOCYTES NFR BLD: 8.5 % (ref 4–15)
NEUTROPHILS # BLD AUTO: 6.2 K/UL (ref 1.8–7.7)
NEUTROPHILS NFR BLD: 65.9 % (ref 38–73)
NRBC BLD-RTO: 0 /100 WBC
PLATELET # BLD AUTO: 247 K/UL (ref 150–450)
PMV BLD AUTO: 10.7 FL (ref 9.2–12.9)
POCT GLUCOSE: 244 MG/DL (ref 70–110)
POCT GLUCOSE: 256 MG/DL (ref 70–110)
POCT GLUCOSE: 260 MG/DL (ref 70–110)
POCT GLUCOSE: 270 MG/DL (ref 70–110)
POCT GLUCOSE: 290 MG/DL (ref 70–110)
POTASSIUM SERPL-SCNC: 3.4 MMOL/L (ref 3.5–5.1)
PROT SERPL-MCNC: 7.6 G/DL (ref 6–8.4)
RBC # BLD AUTO: 4.82 M/UL (ref 4–5.4)
SODIUM SERPL-SCNC: 134 MMOL/L (ref 136–145)
WBC # BLD AUTO: 9.39 K/UL (ref 3.9–12.7)

## 2024-10-08 PROCEDURE — A9698 NON-RAD CONTRAST MATERIALNOC: HCPCS | Performed by: PSYCHIATRY & NEUROLOGY

## 2024-10-08 PROCEDURE — 80053 COMPREHEN METABOLIC PANEL: CPT | Performed by: NURSE PRACTITIONER

## 2024-10-08 PROCEDURE — 36415 COLL VENOUS BLD VENIPUNCTURE: CPT

## 2024-10-08 PROCEDURE — 25500020 PHARM REV CODE 255: Performed by: PSYCHIATRY & NEUROLOGY

## 2024-10-08 PROCEDURE — 83036 HEMOGLOBIN GLYCOSYLATED A1C: CPT

## 2024-10-08 PROCEDURE — 63600175 PHARM REV CODE 636 W HCPCS: Performed by: PHYSICIAN ASSISTANT

## 2024-10-08 PROCEDURE — 63600175 PHARM REV CODE 636 W HCPCS

## 2024-10-08 PROCEDURE — 99222 1ST HOSP IP/OBS MODERATE 55: CPT | Mod: ,,, | Performed by: PHYSICIAN ASSISTANT

## 2024-10-08 PROCEDURE — 25000003 PHARM REV CODE 250

## 2024-10-08 PROCEDURE — 25000003 PHARM REV CODE 250: Performed by: NURSE PRACTITIONER

## 2024-10-08 PROCEDURE — 97535 SELF CARE MNGMENT TRAINING: CPT

## 2024-10-08 PROCEDURE — 85025 COMPLETE CBC W/AUTO DIFF WBC: CPT | Performed by: NURSE PRACTITIONER

## 2024-10-08 PROCEDURE — 99233 SBSQ HOSP IP/OBS HIGH 50: CPT | Mod: GC,,, | Performed by: PSYCHIATRY & NEUROLOGY

## 2024-10-08 PROCEDURE — 92611 MOTION FLUOROSCOPY/SWALLOW: CPT

## 2024-10-08 PROCEDURE — 25000003 PHARM REV CODE 250: Performed by: PHYSICIAN ASSISTANT

## 2024-10-08 PROCEDURE — 11000001 HC ACUTE MED/SURG PRIVATE ROOM

## 2024-10-08 RX ORDER — GLUCAGON 1 MG
1 KIT INJECTION
Status: DISCONTINUED | OUTPATIENT
Start: 2024-10-08 | End: 2024-10-28 | Stop reason: HOSPADM

## 2024-10-08 RX ORDER — INSULIN ASPART 100 [IU]/ML
0-10 INJECTION, SOLUTION INTRAVENOUS; SUBCUTANEOUS EVERY 4 HOURS PRN
Status: DISCONTINUED | OUTPATIENT
Start: 2024-10-08 | End: 2024-10-09

## 2024-10-08 RX ORDER — INSULIN GLARGINE 100 [IU]/ML
15 INJECTION, SOLUTION SUBCUTANEOUS DAILY
Status: DISCONTINUED | OUTPATIENT
Start: 2024-10-08 | End: 2024-10-09

## 2024-10-08 RX ADMIN — FAMOTIDINE 20 MG: 20 TABLET ORAL at 09:10

## 2024-10-08 RX ADMIN — ATORVASTATIN CALCIUM 40 MG: 40 TABLET, FILM COATED ORAL at 09:10

## 2024-10-08 RX ADMIN — INSULIN ASPART 6 UNITS: 100 INJECTION, SOLUTION INTRAVENOUS; SUBCUTANEOUS at 04:10

## 2024-10-08 RX ADMIN — INSULIN ASPART 6 UNITS: 100 INJECTION, SOLUTION INTRAVENOUS; SUBCUTANEOUS at 01:10

## 2024-10-08 RX ADMIN — INSULIN ASPART 6 UNITS: 100 INJECTION, SOLUTION INTRAVENOUS; SUBCUTANEOUS at 09:10

## 2024-10-08 RX ADMIN — ASPIRIN 81 MG CHEWABLE TABLET 81 MG: 81 TABLET CHEWABLE at 09:10

## 2024-10-08 RX ADMIN — CARVEDILOL 3.12 MG: 3.12 TABLET, FILM COATED ORAL at 09:10

## 2024-10-08 RX ADMIN — ACETAMINOPHEN 650 MG: 325 TABLET ORAL at 02:10

## 2024-10-08 RX ADMIN — BARIUM SULFATE 10 ML: 0.81 POWDER, FOR SUSPENSION ORAL at 10:10

## 2024-10-08 RX ADMIN — INSULIN GLARGINE 15 UNITS: 100 INJECTION, SOLUTION SUBCUTANEOUS at 01:10

## 2024-10-08 RX ADMIN — INSULIN ASPART 2 UNITS: 100 INJECTION, SOLUTION INTRAVENOUS; SUBCUTANEOUS at 10:10

## 2024-10-08 NOTE — PROGRESS NOTES
"[facility] - Neurosurgery ([facility])  Vascular Neurology  Comprehensive Stroke Center  Progress Note    Assessment/Plan:     * Acute arterial ischemic stroke, multifocal, multiple vascular territories  65 y/o F transferred from [facility] for higher level of care secondary to acute KIARA stroke on CTA.  Briefly,the patient presented to the OSH overnight for slurred speech started at 1:30 a.m. in the morning.  She has a history of his stroke with left-sided deficits but is normally able to transfer and do a little walking. This morning, she had complete hemiplegia of the left side. Telestroke consult performed, concerning for LVO.  NIH 13. CTA Stroke MP performed and  was read as "acute R KIARA infarct". No TNK due to AC use. Patient accepted to ED as transfer for emergent vascular neurology evaluation. No endovascular intervention.     History of atrial fibrillation per discussion with daughter. Was started on Eliquis on 10/6: hold given size of infarct, start ASA on 10/8. Transition again to Eliquis in 5-7 days. Given history of Afib, etiology likely embolic. Though not apparent on MRA, if R KIARA occlusion was proximal (supported by MRI) could account for R MCA-like picture.    -ASA 81mg-->Eliquis 5mg BID in 5-7 days  -high intensity statin  -SBP<220 in acute setting  -echo done, w/o evidence of intra-cardiac shunt, no LAE, but does have history of Afib  -PT/OT  -Started on diet    Type 2 diabetes mellitus, with long-term current use of insulin  History of. Takes Levemir 35 units QHS at home. A1C 10%..     -goal 140-180 while admitted  -ordered MDSS  -On Levemir 15 units QHS   -accuchecks  -Endocrine consulted for A1c 10%, appreciate recs    Atrial fibrillation  History of. Takes Eliquis 5mg BID and Coreg 6.25mg BID at home.    -hold Eliquis given size of stroke, may restarted in 5-7 days, ASA 81mg QD in the interim  -continue coreg at 3.125mg BID given SBP goal of permissive HTN in acute setting    Opioid " use  History of. Prescribed oxycodone 15mg at home. Family concerned that medication is being diverted by family member, unclear how often patient takes. Reportedly is prescribed for neck pain.     -oxycodone 5mg PRN BID for severe pain  -monitor for signs of withdrawal, as is unclear how often patient is taking    Left hemiplegia  See stroke    HTN (hypertension)  Home medication regimen as below  -lisinopril 40mg QD  -amlodipine 10mg QD    Hold home medications to allow for permissive HTN in acute setting. SBP goal < 220.         10/7: gómez SINCLAIR exam stable, echo today, NPO aside from meds per speech, barium study tmrw  10/08/2024 NAEON, stable neuro exam, went for the Br study, and was started on a diet. Endocrine consulted for the A1c 10%, pending recs.    STROKE DOCUMENTATION   Acute Stroke Times   Last Known Normal Date:  (OSH)  Stroke Team Called Date: 10/06/24  Stroke Team Called Time:  (At OSH)  Stroke Team Arrival Date: 10/06/24  Stroke Team Arrival Time: 0608  CT Interpretation Time:  (CTA done around 4:20am per documents that came to [facility] with [place])  Thrombolytic Therapy Recommended: No  Thrombectomy Recommended:  (Pending MRI Brain)    NIH Scale:  1a. Level of Consciousness: 0-->Alert, keenly responsive  1b. LOC Questions: 0-->Answers both questions correctly  1c. LOC Commands: 0-->Performs both tasks correctly  2. Best Gaze: 0-->Normal  3. Visual: 0-->No visual loss  4. Facial Palsy: 1-->Minor paralysis (flattened nasolabial fold, asymmetry on smiling)  5a. Motor Arm, Left: 4-->No movement  5b. Motor Arm, Right: 0-->No drift, limb holds 90 (or 45) degrees for full 10 secs  6a. Motor Leg, Left: 4-->No movement  6b. Motor Leg, Right: 0-->No drift, leg holds 30 degree position for full 5 secs  7. Limb Ataxia: 2-->Present in two limbs  8. Sensory: 0-->Normal, no sensory loss  9. Best Language: 1-->Mild-to-moderate aphasia, some obvious loss of fluency or facility of comprehension, without significant  limitation on ideas expressed or form of expression. Reduction of speech and/or comprehension, however, makes conversation. . . (see row details)  10. Dysarthria: 1-->Mild-to-moderate dysarthria, patient slurs at least some words and, at worst, can be understood with some difficulty  11. Extinction and Inattention (formerly Neglect): 0-->No abnormality  Total (NIH Stroke Scale): 13       Modified Danny Score: 3  Diane Coma Scale:    ABCD2 Score:    [id number] Score:   HAS -BLED Score:   ICH Score:   Hunt & Shane Classification:      Hemorrhagic change of an Ischemic Stroke: Does this patient have an ischemic stroke with hemorrhagic changes? No     Neurologic Chief Complaint: infarcts in multiple vascular territories    Subjective:     Interval History: Patient is seen for follow-up neurological assessment and treatment recommendations: see HPI    HPI, Past Medical, Family, and Social History remains the same as documented in the initial encounter.     Review of Systems   Respiratory:  Negative for shortness of breath.    Cardiovascular:  Negative for chest pain.   Neurological:  Positive for weakness and numbness.     Scheduled Meds:   aspirin  81 mg Oral Daily    atorvastatin  40 mg Oral Daily    carvediloL  3.125 mg Oral BID    famotidine  20 mg Oral BID    insulin glargine U-100  15 Units Subcutaneous Daily     Continuous Infusions:  PRN Meds:  Current Facility-Administered Medications:     acetaminophen, 650 mg, Oral, Q8H PRN    bisacodyL, 10 mg, Rectal, Daily PRN    dextrose 10%, 12.5 g, Intravenous, PRN    dextrose 10%, 25 g, Intravenous, PRN    glucagon (human recombinant), 1 mg, Intramuscular, PRN    hydrALAZINE, 10 mg, Intravenous, Q6H PRN    insulin aspart U-100, 0-10 Units, Subcutaneous, Q4H PRN    ondansetron, 4 mg, Oral, Q12H PRN    oxyCODONE, 5 mg, Oral, Q12H PRN    sodium chloride 0.9%, 500 mL, Intravenous, PRN    sodium chloride 0.9%, 10 mL, Intravenous, PRN    Objective:     Vital Signs (Most  Recent):  Temp: 97.3 °F (36.3 °C) (10/08/24 1157)  Pulse: 97 (10/08/24 1157)  Resp: 16 (10/08/24 1157)  BP: (!) 144/96 (10/08/24 1157)  SpO2: 97 % (10/08/24 1157)  BP Location: Left arm    Vital Signs Range (Last 24H):  Temp:  [97.3 °F (36.3 °C)-98.6 °F (37 °C)]   Pulse:  [78-97]   Resp:  [16-19]   BP: (144-184)/(88-96)   SpO2:  [93 %-98 %]   BP Location: Left arm       Physical Exam  Vitals and nursing note reviewed.   Constitutional:       Appearance: She is ill-appearing.   HENT:      Head: Normocephalic and atraumatic.   Cardiovascular:      Rate and Rhythm: Normal rate and regular rhythm.   Pulmonary:      Effort: Pulmonary effort is normal.   Musculoskeletal:      Right lower leg: No edema.      Left lower leg: No edema.   Neurological:      Mental Status: She is alert.          Neurological Exam:   LOC: alert  Attention Span: poor  Language: Expressive aphasia.  Articulation: Dysarthria  Orientation: Person, Place, Time   Visual Fields: Full  EOM (CN III, IV, VI): Full/intact  Pupils (CN II, III): PERRL  Facial Sensation (CN V): Facial sensory loss  Facial Movement (CN VII): Upper facial weakness on the Left  Motor: Arm left  Plegia 0/5  Leg left  Paresis: 1/5  Arm right  Normal 5/5  Leg right Normal 5/5  Cerebellum: No evidence of appendicular or axial ataxia  Sensation: Jason-anesthesia left    Laboratory:  CMP:   Recent Labs   Lab 10/08/24  0217   CALCIUM 9.9   ALBUMIN 3.6   PROT 7.6   *   K 3.4*   CO2 25   CL 98   BUN 22   CREATININE 1.0   ALKPHOS 88   ALT 16   AST 30   BILITOT 0.8     CBC:   Recent Labs   Lab 10/08/24  0217   WBC 9.39   RBC 4.82   HGB 16.1*   HCT 44.5      MCV 92   MCH 33.4*   MCHC 36.2*     Lipid Panel:   Recent Labs   Lab 10/06/24  0934   CHOL 178   LDLCALC 98.4   HDL 41   TRIG 193*     Hgb A1C:   Recent Labs   Lab 10/08/24  0217   HGBA1C 10.0*     TSH:   Recent Labs   Lab 10/06/24  0934   TSH 1.237       Diagnostic Results     Brain and Vessel Imaging   Impression: Acute  infarcts involving the right anterior circulation and posterior circulation as detailed above without evidence of intracranial hemorrhage or mass effect.  Embolic disease to be considered.     Chronic left parietal infarct.     MRA demonstrates limited visualization of distal right KIARA branches in keeping with the territory of the acute infarct.  There is also narrowing of at least one proximal left M2 branch although no acute infarct in that territory.    Cardiac Imaging     Left Ventricle: The left ventricle is normal in size. Normal wall thickness. There is concentric remodeling. Normal wall motion. There is hyperdynamic systolic function with a visually estimated ejection fraction of greater than 70%. There is normal diastolic function.    Right Ventricle: Normal right ventricular cavity size. Wall thickness is normal. Systolic function is normal.    Left Atrium: Bubble study is likely negative, but full opacification of the RA is not well seen on these images which decreases sensitivity.    IVC/SVC: Normal venous pressure at 3 mmHg.    Nohelia Barlow MD  Comprehensive Stroke Center  Department of Vascular Neurology   [facility])

## 2024-10-08 NOTE — SUBJECTIVE & OBJECTIVE
Interval HPI:   No acute events overnight. Patient in room 960/960 A. Blood glucose worsening. BG above goal on current insulin regimen (SSI ). Steroid use- None .      Renal function- Normal   Vasopressors-  None     Diet NPO Except for: Medication (meds crushed in puree)     Eating:   NPO  Nausea: No  Hypoglycemia and intervention: No  Fever: No  TPN and/or TF: No       PMH, PSH, FH, SH updated and reviewed     ROS:       Review of Systems   Unable to perform ROS: Mental status change       Current Medications and/or Treatments Impacting Glycemic Control  Immunotherapy:    Immunosuppressants       None          Steroids:   Hormones (From admission, onward)      None          Pressors:    Autonomic Drugs (From admission, onward)      None          Hyperglycemia/Diabetes Medications:   Antihyperglycemics (From admission, onward)      Start     Stop Route Frequency Ordered    10/07/24 1951  insulin aspart U-100 pen 0-10 Units         -- SubQ Every 6 hours PRN 10/07/24 1852             PHYSICAL EXAMINATION:  Vitals:    10/08/24 0819   BP: (!) 184/92   Pulse: 90   Resp: 18   Temp: 98.6 °F (37 °C)     Body mass index is 29.27 kg/m².     Physical Exam  Constitutional:       General: She is not in acute distress.     Appearance: She is not ill-appearing.   Pulmonary:      Effort: No respiratory distress.   Abdominal:      General: There is no distension.

## 2024-10-08 NOTE — CARE UPDATE
Reviewed patient's home medications with her daughter today, and are as below.    -oxycodone 15mg IR PRN (unsure regarding frequency)  -Zanaflex 4mg QHS  -Ambien 2.5mg QHS  -Zofran PRN  -lisinopril 40mg QD  -norvasc 10mg QD  -Coreg 6.25mg BID  -liraglutide  -Levemir 35 units QHS  -aspirin 81mg (reportedly not taking)  -Eliquis 5mg BID    Please see today's progress not for further details.

## 2024-10-08 NOTE — ASSESSMENT & PLAN NOTE
"65 y/o F transferred from [facility] for higher level of care secondary to acute KIARA stroke on CTA.  Briefly,the patient presented to the OSH overnight for slurred speech started at 1:30 a.m. in the morning.  She has a history of his stroke with left-sided deficits but is normally able to transfer and do a little walking. This morning, she had complete hemiplegia of the left side. Telestroke consult performed, concerning for LVO.  NIH 13. CTA Stroke MP performed and  was read as "acute R KIARA infarct". No TNK due to AC use. Patient accepted to ED as transfer for emergent vascular neurology evaluation. No endovascular intervention.     History of atrial fibrillation per discussion with daughter. Was started on Eliquis on 10/6: hold given size of infarct, start ASA on 10/8. Transition again to Eliquis in 5-7 days. Given history of Afib, etiology likely embolic. Though not apparent on MRA, if R KIARA occlusion was proximal (supported by MRI) could account for R MCA-like picture.    -ASA 81mg-->Eliquis 5mg BID in 5-7 days  -high intensity statin  -SBP<220 in acute setting  -echo done, w/o evidence of intra-cardiac shunt, no LAE, but does have history of Afib  -PT/OT  -Started on diet  "

## 2024-10-08 NOTE — PLAN OF CARE
Problem: Skin Injury Risk Increased  Goal: Skin Health and Integrity  Outcome: Progressing     Problem: Stroke, Ischemic (Includes Transient Ischemic Attack)  Goal: Optimal Coping  Outcome: Progressing  Goal: Effective Bowel Elimination  Outcome: Progressing  Goal: Optimal Cerebral Tissue Perfusion  Outcome: Progressing  Goal: Optimal Cognitive Function  Outcome: Progressing   POC and meds reviewed with patient and daughter, started on puree diet during this shift, noted poor appetite , patient refusing all meals.Bed remains in low position, call lioght within reach and bed alarm on.

## 2024-10-08 NOTE — HPI
Reason for Consult: Management of T2DM, Hyperglycemia       Diabetes diagnosis year:  Over 5 years    Home Diabetes Medications:    -liraglutide  -Levemir 35 units QHS    How often checking glucose at home? Not checking       Diabetes Complications include:     Hyperglycemia    Complicating diabetes co morbidities:   History of CVA      HPI:   Patient is a 64 y.o. female with  DM2 , HTN who was transferred from [facility] for higher level of care secondary to acute KIARA stroke on CTA. Briefly,the patient presented to the OSH overnight for slurred speech started at 1:30 a.m. in the morning. She has a history of his stroke with left-sided deficits but is normally able to transfer and do a little walking.  Endocrine consulted for BG management.

## 2024-10-08 NOTE — PLAN OF CARE
Problem: Stroke, Ischemic (Includes Transient Ischemic Attack)  Goal: Optimal Coping  Outcome: Progressing     Problem: Skin Injury Risk Increased  Goal: Skin Health and Integrity  Outcome: Progressing     Problem: Stroke, Ischemic (Includes Transient Ischemic Attack)  Goal: Optimal Cognitive Function  Outcome: Progressing     Problem: Stroke, Ischemic (Includes Transient Ischemic Attack)  Goal: Optimal Cerebral Tissue Perfusion  Outcome: Progressing     Problem: Stroke, Ischemic (Includes Transient Ischemic Attack)  Goal: Optimal Functional Ability  Outcome: Progressing     Problem: Stroke, Ischemic (Includes Transient Ischemic Attack)  Goal: Improved Sensorimotor Function  Outcome: Progressing

## 2024-10-08 NOTE — ASSESSMENT & PLAN NOTE
BG goal: 140-180  T2DM admitted withacute KIARA stroke.  NPO.  BG above goal    - -Start Lantus 15 units daily  -Start Novolog Moderate dose correction with ISF 25 starting at 150    - POCT Glucose every 4 hours while NPO  - Hypoglycemia protocol in place      ** Please notify Endocrine for any change and/or advance in diet**  ** Please call Endocrine for any BG related issues **     Discharge Planning:   TBD. Please notify endocrinology prior to discharge.

## 2024-10-08 NOTE — SUBJECTIVE & OBJECTIVE
Neurologic Chief Complaint: infarcts in multiple vascular territories    Subjective:     Interval History: Patient is seen for follow-up neurological assessment and treatment recommendations: see HPI    HPI, Past Medical, Family, and Social History remains the same as documented in the initial encounter.     Review of Systems   Respiratory:  Negative for shortness of breath.    Cardiovascular:  Negative for chest pain.   Neurological:  Positive for weakness and numbness.     Scheduled Meds:   aspirin  81 mg Oral Daily    atorvastatin  40 mg Oral Daily    carvediloL  3.125 mg Oral BID    famotidine  20 mg Oral BID    insulin glargine U-100  15 Units Subcutaneous Daily     Continuous Infusions:  PRN Meds:  Current Facility-Administered Medications:     acetaminophen, 650 mg, Oral, Q8H PRN    bisacodyL, 10 mg, Rectal, Daily PRN    dextrose 10%, 12.5 g, Intravenous, PRN    dextrose 10%, 25 g, Intravenous, PRN    glucagon (human recombinant), 1 mg, Intramuscular, PRN    hydrALAZINE, 10 mg, Intravenous, Q6H PRN    insulin aspart U-100, 0-10 Units, Subcutaneous, Q4H PRN    ondansetron, 4 mg, Oral, Q12H PRN    oxyCODONE, 5 mg, Oral, Q12H PRN    sodium chloride 0.9%, 500 mL, Intravenous, PRN    sodium chloride 0.9%, 10 mL, Intravenous, PRN    Objective:     Vital Signs (Most Recent):  Temp: 97.3 °F (36.3 °C) (10/08/24 1157)  Pulse: 97 (10/08/24 1157)  Resp: 16 (10/08/24 1157)  BP: (!) 144/96 (10/08/24 1157)  SpO2: 97 % (10/08/24 1157)  BP Location: Left arm    Vital Signs Range (Last 24H):  Temp:  [97.3 °F (36.3 °C)-98.6 °F (37 °C)]   Pulse:  [78-97]   Resp:  [16-19]   BP: (144-184)/(88-96)   SpO2:  [93 %-98 %]   BP Location: Left arm       Physical Exam  Vitals and nursing note reviewed.   Constitutional:       Appearance: She is ill-appearing.   HENT:      Head: Normocephalic and atraumatic.   Cardiovascular:      Rate and Rhythm: Normal rate and regular rhythm.   Pulmonary:      Effort: Pulmonary effort is normal.    Musculoskeletal:      Right lower leg: No edema.      Left lower leg: No edema.   Neurological:      Mental Status: She is alert.          Neurological Exam:   LOC: alert  Attention Span: poor  Language: Expressive aphasia.  Articulation: Dysarthria  Orientation: Person, Place, Time   Visual Fields: Full  EOM (CN III, IV, VI): Full/intact  Pupils (CN II, III): PERRL  Facial Sensation (CN V): Facial sensory loss  Facial Movement (CN VII): Upper facial weakness on the Left  Motor: Arm left  Plegia 0/5  Leg left  Paresis: 1/5  Arm right  Normal 5/5  Leg right Normal 5/5  Cerebellum: No evidence of appendicular or axial ataxia  Sensation: Jason-anesthesia left    Laboratory:  CMP:   Recent Labs   Lab 10/08/24  0217   CALCIUM 9.9   ALBUMIN 3.6   PROT 7.6   *   K 3.4*   CO2 25   CL 98   BUN 22   CREATININE 1.0   ALKPHOS 88   ALT 16   AST 30   BILITOT 0.8     CBC:   Recent Labs   Lab 10/08/24  0217   WBC 9.39   RBC 4.82   HGB 16.1*   HCT 44.5      MCV 92   MCH 33.4*   MCHC 36.2*     Lipid Panel:   Recent Labs   Lab 10/06/24  0934   CHOL 178   LDLCALC 98.4   HDL 41   TRIG 193*     Hgb A1C:   Recent Labs   Lab 10/08/24  0217   HGBA1C 10.0*     TSH:   Recent Labs   Lab 10/06/24  0934   TSH 1.237       Diagnostic Results     Brain and Vessel Imaging   Impression: Acute infarcts involving the right anterior circulation and posterior circulation as detailed above without evidence of intracranial hemorrhage or mass effect.  Embolic disease to be considered.     Chronic left parietal infarct.     MRA demonstrates limited visualization of distal right KIARA branches in keeping with the territory of the acute infarct.  There is also narrowing of at least one proximal left M2 branch although no acute infarct in that territory.    Cardiac Imaging     Left Ventricle: The left ventricle is normal in size. Normal wall thickness. There is concentric remodeling. Normal wall motion. There is hyperdynamic systolic function with a  visually estimated ejection fraction of greater than 70%. There is normal diastolic function.    Right Ventricle: Normal right ventricular cavity size. Wall thickness is normal. Systolic function is normal.    Left Atrium: Bubble study is likely negative, but full opacification of the RA is not well seen on these images which decreases sensitivity.    IVC/SVC: Normal venous pressure at 3 mmHg.

## 2024-10-08 NOTE — PLAN OF CARE
Problem: Skin Injury Risk Increased  Goal: Skin Health and Integrity  10/8/2024 1819 by Sinai Beyer RN  Outcome: Progressing  10/8/2024 1810 by Sinai Beyer RN  Outcome: Progressing     Problem: Stroke, Ischemic (Includes Transient Ischemic Attack)  Goal: Optimal Coping  10/8/2024 1819 by Sinai Beyer RN  Outcome: Progressing  10/8/2024 1810 by Sinai Beyer RN  Outcome: Progressing  Goal: Effective Bowel Elimination  10/8/2024 1819 by Sinai Beyer RN  Outcome: Progressing  10/8/2024 1810 by Sinai Beyer RN  Outcome: Progressing  Goal: Optimal Cerebral Tissue Perfusion  10/8/2024 1819 by Sinai Beyer RN  Outcome: Progressing  10/8/2024 1810 by Sinai Beyer RN  Outcome: Progressing  Goal: Optimal Cognitive Function  10/8/2024 1819 by Sinai Beyer RN  Outcome: Progressing  10/8/2024 1810 by Sinai Beyer RN  Outcome: Progressing   Poc and meds reviewed with patient and daughter, all needs addressed.

## 2024-10-08 NOTE — PROCEDURES
Modified Barium Swallow    Patient Name:  Tammy Multani   MRN:  [id number]      Recommendations:     Recommendations:                General Recommendations:  Dysphagia therapy, Speech language evaluation, and Cognitive-linguistic evaluation  Diet recommendations:  Puree Diet - IDDSI Level 4, Mildly thick/Nectar thick liquids - IDDSI Level 2   Aspiration Precautions: 1 bite/sip at a time, Alternating bites/sips, Assistance with meals and Assistance with thickening liquids, Avoid talking while eating, Check for pocketing/oral residue, Eliminate distractions, Feed only when awake/alert, Frequent oral care, HOB to 90 degrees, Meds crushed in puree, Monitor for s/s of aspiration, Remain upright 30 minutes post meal, Small bites/sips, and Strict aspiration precautions   General Precautions: Standard, aspiration, fall, vision impaired  Communication strategies:  provide increased time to answer and go to room if call light pushed    Referral     Reason for Referral  Patient was referred for a Modified Barium Swallow Study to assess the efficiency of his/her swallow function, rule out aspiration and make recommendations regarding safe dietary consistencies, effective compensatory strategies, and safe eating environment.     Diagnosis: Acute arterial ischemic stroke, multifocal, multiple vascular territories       History:     Past Medical History:   Diagnosis Date    Anticoagulant long-term use     Diabetes mellitus     Hypertension     Stroke        Objective:     Current Respiratory Status: 10/08/24    Alert: yes    Cooperative: yes    Follows Directions: inconsistent    Visualization  Patient was seen in the lateral view  Cervical hardware/edema observed/osteophyte present    Oral Peripheral Examination  Oral Musculature: facial asymmetry present, left weakness  Dentition: scattered dentition (upper dentures at home; scattered lower dentition)  Secretion Management: adequate  Mucosal Quality: adequate  Mandibular  Strength and Mobility: WFL  Oral Labial Strength and Mobility: impaired retraction  Lingual Strength and Mobility: WFL  Buccal Strength and Mobility: decreased tone  Volitional Cough: fair  Volitional Swallow: elicited  Voice Prior to PO Intake: dry, clear    Consistencies Assessed  Thin tsp x 2  Nectar thick tsp x 1, cup sip x 1, straw sips x 2  Puree 1/2 tsp x 1, full tsp x 1  Minced/moist textures - full tsp pudding mixed with crumbled pieces of srinivasan cracker x 1    Oral Preparation/Oral Phase  Poor bolus formation and control (for minced and moist)  Oral holding across consistencies -inconsistent timing but often prolonged (>15 seconds ) holding   prolonged A-P   prolonged mastication (for minced and moist)  Decreased base of tongue mobility and tongue base residue (increased with minced and moist)  Premature spillage for thin liquids and minced and moist textures    Pharyngeal Phase   Pt delayed swallow initiation resulting in prespillage of thin liquids to the vallecula and pyriform sinuses and contributing to penetration.  Delayed swallow initiation also noted across remaining consistencies, but prespillage not observed with nectar thick, purees, or minced and moist textures  Pt with reduced BOT retraction resulting in tongue base residue across consistencies  Pt with adequate hyolaryngeal elevation and excursion patterns  Pt with complete epiglottic inversion patterns  Pt with penetration during the initial and secondary swallows with thin liquids.  Trace to min amounts remained coating the underbelly of the epiglottis.  No sensory response to penetration. Cued cough was not effective in clearing penetrated material.  Pt without penetration or aspiration with nectar thick liquids, purees, or minced/moist textures  Pt without sensory response/airway protective response when penetration of thin liquid occurred.   Pt warranted cued throat clear or cough, but this was ineffective in  ejecting material from  laryngeal vestibule  Pt with adequate airway protection without penetration or aspiration with nectar thick liquids, purees, and minced and moist textures  There was mild tongue base, vallecular, and pyriform sinus residue present for thin liquids.  Moderate tongue base residue present for minced and moist textures.       Cervical Esophageal Phase  UES appeared to accommodate all bolus types without stasis or retrograde movement observed     Assessment:     Impressions    Patient demonstrates moderate oral dysphagia and mild to moderate pharyngeal dysphagia characterized by inconsistent oral holding and delayed initiation of A-P transit, decreased bolus formation and control for minced/moist textures, decreased tongue base retraction, delayed initiation of pharyngeal swallow, penetration of thin liquids without sensory response and ineffective cued cough.  No penetration or aspiration observed with nectar thick liquids, purees, or minced/moist textures.      Prognosis: Fair    Barriers:  Cognitive status  Weakness s/p stroke    Plan  SLP recommending pt initiate a pureed diet with nectar thick liquids at this time.  SLP plans to continue to assess for appropriateness for diet advancement to minced and moist textures.  Pt must be fully upright for meals, will need assistance during meals, distractions eliminated during meals, small bites/sips, alternating bites/sips, and med crushed and buried in puree.      Education  Education was provided to pt and later to pt and daughter regarding MBSS procedure and results, diet recommendations, thickening instructions (with demonstration), plan for ongoing assessment to determine when safe for diet advancement, medication administration, aspiration precautions, and plan to initiate speech/language/cognitive evaluation on next service date.  Understanding was demonstrated, though pt will benefit from continued reinforcement.     Goals:   Multidisciplinary Problems       SLP  Goals          Problem: SLP    Goal Priority Disciplines Outcome   SLP Goal     SLP    Description: Speech Language Pathology Goals  Goals expected to be met by 10/14:  1. Pt will participate in Modified Barium Swallow Study to r/o aspiration and determine safest oral diet.   2. Pt will participate in speech/language/cognitive evaluation to establish further tx plan.                                Plan:   Patient to be seen:  Therapy Frequency: 4 x/week   Plan of Care expires:  11/06/24  Plan of Care reviewed with:  patient, daughter        Discharge recommendations:  High Intensity Therapy     Time Tracking:   SLP Treatment Date:   10/08/24  Speech Start Time:  1012  Speech Stop Time:  1100     Speech Total Time (min):  48 min    10/08/2024

## 2024-10-08 NOTE — PLAN OF CARE
10/08/24 1207   Post-Acute Status   Post-Acute Authorization Placement   Post-Acute Placement Status Referrals Sent   Discharge Plan   Discharge Plan A Rehab     Met with daughter Milana to review discharge recommendation of Rehab and is agreeable to plan    Patient/family provided list of facilities in-network with patient's payor plan. Providers that are owned, operated, or affiliated with [facility] are included on the list.     Notified that referral sent to below listed facilities from in-network list based on proximity to home/family support:   [facility]   2.   [facility]  3.  4.  5. (can send more than 5)    Patient/family instructed to identify preference.    Preferred Facility: (if more than 1, listed in order of descending preference)   [facility]     If an additional preferred facility not listed above is identified, additional referral to be sent. If above facilities unable to accept, will send additional referrals to in-network providers.     Discharge Plan A and Plan B have been determined by review of patient's clinical status, future medical and therapeutic needs, and coverage/benefits for post-acute care in coordination with multidisciplinary team members.    Miladys Brown, THOGN  [facility]  [phone]

## 2024-10-08 NOTE — ASSESSMENT & PLAN NOTE
History of. Takes Levemir 35 units QHS at home. A1C 10%..     -goal 140-180 while admitted  -ordered MDSS  -On Levemir 15 units QHS   -accuchecks  -Endocrine consulted for A1c 10%, appreciate recs

## 2024-10-08 NOTE — PT/OT/SLP PROGRESS
Occupational Therapy      Patient Name:  Tammy Multani   MRN:  [id number]    Patient not seen today secondary to out of room for Modified Barium Swallow study. Will follow-up 10/09/2024.    10/8/2024

## 2024-10-08 NOTE — PLAN OF CARE
Recommendations    Continue pureed diet as tolerated.     RD to monitor and follow up.    Goals: Meet % EEN/EPN by RD follow up.  Nutrition Goal Status: new  Communication of RD Recs: other (comment) (POC)

## 2024-10-08 NOTE — CONSULTS
[facility] ([facility])  Endocrinology  Diabetes Consult Note    Consult Requested by: Germain Smyth MD   Reason for admit: Acute arterial ischemic stroke, multifocal, multiple vascular territories    HISTORY OF PRESENT ILLNESS:  Reason for Consult: Management of T2DM, Hyperglycemia       Diabetes diagnosis year:  Over 5 years    Home Diabetes Medications:    -liraglutide  -Levemir 35 units QHS    How often checking glucose at home? Not checking       Diabetes Complications include:     Hyperglycemia    Complicating diabetes co morbidities:   History of CVA      HPI:   Patient is a 64 y.o. female with  DM2 , HTN who was transferred from [facility] for higher level of care secondary to acute KIARA stroke on CTA. Briefly,the patient presented to the OSH overnight for slurred speech started at 1:30 a.m. in the morning. She has a history of his stroke with left-sided deficits but is normally able to transfer and do a little walking.  Endocrine consulted for BG management.      Interval HPI:   No acute events overnight. Patient in room 960/960 A. Blood glucose worsening. BG above goal on current insulin regimen (SSI ). Steroid use- None .      Renal function- Normal   Vasopressors-  None     Diet NPO Except for: Medication (meds crushed in puree)     Eating:   NPO  Nausea: No  Hypoglycemia and intervention: No  Fever: No  TPN and/or TF: No       PMH, PSH, FH, SH updated and reviewed     ROS:       Review of Systems   Unable to perform ROS: Mental status change       Current Medications and/or Treatments Impacting Glycemic Control  Immunotherapy:    Immunosuppressants       None          Steroids:   Hormones (From admission, onward)      None          Pressors:    Autonomic Drugs (From admission, onward)      None          Hyperglycemia/Diabetes Medications:   Antihyperglycemics (From admission, onward)      Start     Stop Route Frequency Ordered    10/07/24 1951  insulin aspart U-100 pen 0-10  "Units         -- SubQ Every 6 hours PRN 10/07/24 1852             PHYSICAL EXAMINATION:  Vitals:    10/08/24 0819   BP: (!) 184/92   Pulse: 90   Resp: 18   Temp: 98.6 °F (37 °C)     Body mass index is 29.27 kg/m².     Physical Exam  Constitutional:       General: She is not in acute distress.     Appearance: She is not ill-appearing.   Pulmonary:      Effort: No respiratory distress.   Abdominal:      General: There is no distension.            Labs Reviewed and Include   Recent Labs   Lab 10/08/24  0217   *   CALCIUM 9.9   ALBUMIN 3.6   PROT 7.6   *   K 3.4*   CO2 25   CL 98   BUN 22   CREATININE 1.0   ALKPHOS 88   ALT 16   AST 30   BILITOT 0.8     Lab Results   Component Value Date    WBC 9.39 10/08/2024    HGB 16.1 (H) 10/08/2024    HCT 44.5 10/08/2024    MCV 92 10/08/2024     10/08/2024     Recent Labs   Lab 10/06/24  0934   TSH 1.237     Lab Results   Component Value Date    HGBA1C 10.0 (H) 10/08/2024       Nutritional status:   Body mass index is 29.27 kg/m².  Lab Results   Component Value Date    ALBUMIN 3.6 10/08/2024    ALBUMIN 3.8 10/07/2024    ALBUMIN 3.7 10/06/2024     No results found for: "PREALBUMIN"    Estimated Creatinine Clearance: 52.3 mL/min (based on SCr of 1 mg/dL).    Accu-Checks  Recent Labs     10/08/24  0636 10/08/24  0833 10/08/24  1202   POCTGLUCOSE 270* 290* 256*        ASSESSMENT and PLAN    Neuro  * Acute arterial ischemic stroke, multifocal, multiple vascular territories  Managed by primary team  Optimize BG control      Cardiac/Vascular  HTN (hypertension)  Managed by primary team  Optimize BG control      Endocrine  Type 2 diabetes mellitus, with long-term current use of insulin  BG goal: 140-180  T2DM admitted withacute KIARA stroke.  NPO.  BG above goal    - -Start Lantus 15 units daily  -Start Novolog Moderate dose correction with ISF 25 starting at 150    - POCT Glucose every 4 hours while NPO  - Hypoglycemia protocol in place      ** Please notify Endocrine " for any change and/or advance in diet**  ** Please call Endocrine for any BG related issues **     Discharge Planning:   TBD. Please notify endocrinology prior to discharge.            Plan discussed with patient, family, and RN at bedside.     Hilton Rodriguez PA-C  Endocrinology  [facility] ([facility])

## 2024-10-08 NOTE — PROGRESS NOTES
"[facility] ([facility])  Adult Nutrition  Progress Note    SUMMARY       Recommendations    Continue pureed diet as tolerated.     RD to monitor and follow up.    Goals: Meet % EEN/EPN by RD follow up.  Nutrition Goal Status: new  Communication of RD Recs: other (comment) (POC)    Assessment and Plan    Nutrition Problem  Inadequate oral intake     Related to (etiology):   Inability to consume sufficient nutrients     Signs and Symptoms (as evidenced by):   NPO      Interventions/Recommendations (treatment strategy):  Collab of nutrition care w/ other providers     Nutrition Diagnosis Status:   New         Reason for Assessment    Reason For Assessment: RD follow-up  Diagnosis: other (see comments) (Acute ischemic multifocal multiple vascular territories stroke)  General Information Comments: Pt w/ a PMHx of DM, HTN. Following up after initial consult, pt's diet advanced to pureed diet. Pt's daughter in room, states she attempted to feed pt mashed potatoes and meat, but pt spit it out. Daughter is unsure of pt's intake PTA as pt lives with her granddaughter [place] a different state. No visual wasting noted at this time. Pt's A1C 10.0%. Cardiac and CHO diet education not appropriate at this time.  Nutrition Discharge Planning: Pending medical course    Nutrition Risk Screen    Nutrition Risk Screen: difficulty chewing/swallowing    Nutrition/Diet History    Spiritual, Cultural Beliefs, Scientologist Practices, Values that Affect Care: no  Food Allergies: other (see comments) (PETER)  Factors Affecting Nutritional Intake: difficulty/impaired swallowing    Anthropometrics    Temp: 97.3 °F (36.3 °C)  Height Method: Stated  Height: 5' 2" (157.5 cm)  Height (inches): 62 in  Weight Method: Bed Scale  Weight: 72.6 kg (160 lb 0.9 oz)  Weight (lb): 160.06 lb  Ideal Body Weight (IBW), Female: 110 lb  % Ideal Body Weight, Female (lb): 145.51 %  BMI (Calculated): 29.3  BMI Grade: 25 - 29.9 - overweight   "     Lab/Procedures/Meds    Pertinent Labs Reviewed: reviewed  Pertinent Labs Comments: Sodium 134, Potassium 3.4, Glucose 254, Phosphorus 2.5, A1C 10.0  Pertinent Medications Reviewed: reviewed  Pertinent Medications Comments: Atorvastatin, famotidine, insulin        Estimated/Assessed Needs    Weight Used For Calorie Calculations: 72.6 kg (160 lb 0.9 oz)  Energy Calorie Requirements (kcal): 1469  Energy Need Method: Williams-St Jeor (MSJ x 1.2 PAL)  Protein Requirements: 73-87 (1.0-1.2 g/kg ABW)  Weight Used For Protein Calculations: 72.6 kg (160 lb 0.9 oz)  Fluid Requirements (mL): 1 ml/kcal or Per MD     RDA Method (mL): 1469         Nutrition Prescription Ordered    Current Diet Order: NPO    Evaluation of Received Nutrient/Fluid Intake    I/O: - 1,850 net I/O  Comments: LBM unspecified  % Intake of Estimated Energy Needs: 0 - 25 %  % Meal Intake: 0 - 25 %    Nutrition Risk    Level of Risk/Frequency of Follow-up: low - moderate (1-2x/week)     Monitor and Evaluation    Food and Nutrient Intake: energy intake, food and beverage intake  Food and Nutrient Adminstration: diet order  Anthropometric Measurements: body mass index, weight change, weight  Biochemical Data, Medical Tests and Procedures: lipid profile, inflammatory profile, glucose/endocrine profile, gastrointestinal profile, electrolyte and renal panel  Nutrition-Focused Physical Findings: overall appearance     Nutrition Follow-Up    RD Follow-up?: Yes    Benson Wilkinson, Registration Eligible, Provisional LDN

## 2024-10-09 PROBLEM — Z79.01 CHRONIC ANTICOAGULATION: Status: ACTIVE | Noted: 2024-10-09

## 2024-10-09 PROBLEM — D68.69 HYPERCOAGULABLE STATE DUE TO ATRIAL FIBRILLATION: Status: ACTIVE | Noted: 2024-10-09

## 2024-10-09 PROBLEM — I48.91 HYPERCOAGULABLE STATE DUE TO ATRIAL FIBRILLATION: Status: ACTIVE | Noted: 2024-10-09

## 2024-10-09 PROBLEM — E87.1 HYPONATREMIA: Status: ACTIVE | Noted: 2024-10-09

## 2024-10-09 LAB
POCT GLUCOSE: 240 MG/DL (ref 70–110)
POCT GLUCOSE: 295 MG/DL (ref 70–110)
POCT GLUCOSE: 346 MG/DL (ref 70–110)

## 2024-10-09 PROCEDURE — 97535 SELF CARE MNGMENT TRAINING: CPT

## 2024-10-09 PROCEDURE — 99233 SBSQ HOSP IP/OBS HIGH 50: CPT | Mod: GC,,, | Performed by: PSYCHIATRY & NEUROLOGY

## 2024-10-09 PROCEDURE — 97112 NEUROMUSCULAR REEDUCATION: CPT

## 2024-10-09 PROCEDURE — 63600175 PHARM REV CODE 636 W HCPCS: Performed by: PHYSICIAN ASSISTANT

## 2024-10-09 PROCEDURE — 99232 SBSQ HOSP IP/OBS MODERATE 35: CPT | Mod: ,,, | Performed by: PHYSICIAN ASSISTANT

## 2024-10-09 PROCEDURE — 97530 THERAPEUTIC ACTIVITIES: CPT

## 2024-10-09 PROCEDURE — 25000003 PHARM REV CODE 250: Performed by: PHYSICIAN ASSISTANT

## 2024-10-09 PROCEDURE — 25000003 PHARM REV CODE 250: Performed by: NURSE PRACTITIONER

## 2024-10-09 PROCEDURE — 92526 ORAL FUNCTION THERAPY: CPT

## 2024-10-09 PROCEDURE — 25000003 PHARM REV CODE 250

## 2024-10-09 PROCEDURE — 11000001 HC ACUTE MED/SURG PRIVATE ROOM

## 2024-10-09 RX ORDER — INSULIN ASPART 100 [IU]/ML
2 INJECTION, SOLUTION INTRAVENOUS; SUBCUTANEOUS
Status: DISCONTINUED | OUTPATIENT
Start: 2024-10-09 | End: 2024-10-09

## 2024-10-09 RX ORDER — INSULIN GLARGINE 100 [IU]/ML
18 INJECTION, SOLUTION SUBCUTANEOUS DAILY
Status: DISCONTINUED | OUTPATIENT
Start: 2024-10-09 | End: 2024-10-10

## 2024-10-09 RX ORDER — INSULIN ASPART 100 [IU]/ML
3 INJECTION, SOLUTION INTRAVENOUS; SUBCUTANEOUS
Status: DISCONTINUED | OUTPATIENT
Start: 2024-10-09 | End: 2024-10-10

## 2024-10-09 RX ORDER — LISINOPRIL 20 MG/1
20 TABLET ORAL DAILY
Status: DISCONTINUED | OUTPATIENT
Start: 2024-10-09 | End: 2024-10-11

## 2024-10-09 RX ORDER — INSULIN ASPART 100 [IU]/ML
0-10 INJECTION, SOLUTION INTRAVENOUS; SUBCUTANEOUS
Status: DISCONTINUED | OUTPATIENT
Start: 2024-10-09 | End: 2024-10-14

## 2024-10-09 RX ADMIN — INSULIN GLARGINE 18 UNITS: 100 INJECTION, SOLUTION SUBCUTANEOUS at 09:10

## 2024-10-09 RX ADMIN — INSULIN ASPART 3 UNITS: 100 INJECTION, SOLUTION INTRAVENOUS; SUBCUTANEOUS at 08:10

## 2024-10-09 RX ADMIN — FAMOTIDINE 20 MG: 20 TABLET ORAL at 09:10

## 2024-10-09 RX ADMIN — ASPIRIN 81 MG CHEWABLE TABLET 81 MG: 81 TABLET CHEWABLE at 10:10

## 2024-10-09 RX ADMIN — INSULIN ASPART 3 UNITS: 100 INJECTION, SOLUTION INTRAVENOUS; SUBCUTANEOUS at 03:10

## 2024-10-09 RX ADMIN — CARVEDILOL 3.12 MG: 3.12 TABLET, FILM COATED ORAL at 10:10

## 2024-10-09 RX ADMIN — LISINOPRIL 20 MG: 20 TABLET ORAL at 10:10

## 2024-10-09 RX ADMIN — INSULIN ASPART 3 UNITS: 100 INJECTION, SOLUTION INTRAVENOUS; SUBCUTANEOUS at 12:10

## 2024-10-09 RX ADMIN — CARVEDILOL 3.12 MG: 3.12 TABLET, FILM COATED ORAL at 08:10

## 2024-10-09 RX ADMIN — ATORVASTATIN CALCIUM 40 MG: 40 TABLET, FILM COATED ORAL at 10:10

## 2024-10-09 RX ADMIN — INSULIN ASPART 2 UNITS: 100 INJECTION, SOLUTION INTRAVENOUS; SUBCUTANEOUS at 09:10

## 2024-10-09 NOTE — ASSESSMENT & PLAN NOTE
BG goal: 140-180  T2DM admitted with acute KIARA stroke.   BG above goal.  Further increase regimen    - increase Lantus 18 units daily  -start NovoLog 3 units with meals  -continue Novolog Moderate dose correction with ISF 25 starting at 150    - POCT Glucose every 4 hours while NPO  - Hypoglycemia protocol in place      ** Please notify Endocrine for any change and/or advance in diet**  ** Please call Endocrine for any BG related issues **     Discharge Planning:   TBD. Please notify endocrinology prior to discharge.

## 2024-10-09 NOTE — PROGRESS NOTES
"Zackary Long - Neurosurgery ([facility])  Endocrinology  Progress Note    Admit Date: 10/6/2024     Reason for Consult: Management of T2DM, Hyperglycemia       Diabetes diagnosis year:  Over 5 years    Home Diabetes Medications:    -liraglutide  -Levemir 35 units QHS    How often checking glucose at home? Not checking       Diabetes Complications include:     Hyperglycemia    Complicating diabetes co morbidities:   History of CVA      HPI:   Patient is a 64 y.o. female with  DM2 , HTN who was transferred from [facility] for higher level of care secondary to acute KIARA stroke on CTA. Briefly,the patient presented to the OSH overnight for slurred speech started at 1:30 a.m. in the morning. She has a history of his stroke with left-sided deficits but is normally able to transfer and do a little walking.  Endocrine consulted for BG management.      Interval HPI:   No acute events overnight. Patient in room 960/960 A. Blood glucose stable. BG above goal on current insulin regimen (SSI and basal). Steroid use-       Renal function- Normal   Vasopressors-  None     Diet Pureed (IDDSI Level 4) Nectar Thick (Mildly Thick)     Eatin%  Nausea: No  Hypoglycemia and intervention: No  Fever: No  TPN and/or TF: No       BP (!) 170/98 (BP Location: Right arm, Patient Position: Lying)   Pulse 88   Temp 98.1 °F (36.7 °C)   Resp 16   Ht 5' 2" (1.575 m)   Wt 72.6 kg (160 lb 0.9 oz)   LMP  (LMP Unknown) Comment: Doesn't  have periods anymore  SpO2 95%   Breastfeeding No   BMI 29.27 kg/m²     Labs Reviewed and Include    No results for input(s): "GLU", "CALCIUM", "ALBUMIN", "PROT", "NA", "K", "CO2", "CL", "BUN", "CREATININE", "ALKPHOS", "ALT", "AST", "BILITOT" in the last 24 hours.  Lab Results   Component Value Date    WBC 9.39 10/08/2024    HGB 16.1 (H) 10/08/2024    HCT 44.5 10/08/2024    MCV 92 10/08/2024     10/08/2024     Recent Labs   Lab 10/06/24  0934   TSH 1.237     Lab Results   Component Value Date " "   HGBA1C 10.0 (H) 10/08/2024       Nutritional status:   Body mass index is 29.27 kg/m².  Lab Results   Component Value Date    ALBUMIN 3.6 10/08/2024    ALBUMIN 3.8 10/07/2024    ALBUMIN 3.7 10/06/2024     No results found for: "PREALBUMIN"    Estimated Creatinine Clearance: 52.3 mL/min (based on SCr of 1 mg/dL).    Accu-Checks  Recent Labs     10/08/24  0636 10/08/24  0833 10/08/24  1202 10/08/24  1543 10/08/24  2201   POCTGLUCOSE 270* 290* 256* 260* 244*       Current Medications and/or Treatments Impacting Glycemic Control  Immunotherapy:    Immunosuppressants       None          Steroids:   Hormones (From admission, onward)      None          Pressors:    Autonomic Drugs (From admission, onward)      None          Hyperglycemia/Diabetes Medications:   Antihyperglycemics (From admission, onward)      Start     Stop Route Frequency Ordered    10/09/24 0900  insulin glargine U-100 (Lantus) pen 18 Units         -- SubQ Daily 10/09/24 0710    10/09/24 0815  insulin aspart U-100 pen 3 Units         -- SubQ 3 times daily with meals 10/09/24 0712    10/09/24 0809  insulin aspart U-100 pen 0-10 Units         -- SubQ Before meals & nightly PRN 10/09/24 0710            ASSESSMENT and PLAN    Neuro  * Acute arterial ischemic stroke, multifocal, multiple vascular territories  Managed by primary team  Optimize BG control      Cardiac/Vascular  HTN (hypertension)  Managed by primary team  Optimize BG control      Endocrine  Type 2 diabetes mellitus, with long-term current use of insulin  BG goal: 140-180  T2DM admitted withacute KIARA stroke.   BG above goal.  Further increase regimen    - increase Lantus 18 units daily  -start NovoLog 3 units with meals  -continue Novolog Moderate dose correction with ISF 25 starting at 150    - POCT Glucose every 4 hours while NPO  - Hypoglycemia protocol in place      ** Please notify Endocrine for any change and/or advance in diet**  ** Please call Endocrine for any BG related issues **   "   Discharge Planning:   TBD. Please notify endocrinology prior to discharge.            Hilton Rodriguez PA-C  Endocrinology  Zackary haritha - Neurosurgery ([facility])

## 2024-10-09 NOTE — ASSESSMENT & PLAN NOTE
Home medication regimen as below  -lisinopril 40mg QD  -amlodipine 10mg QD    Restarted on Lisinopril 20 mg daily

## 2024-10-09 NOTE — PT/OT/SLP PROGRESS
Physical Therapy Treatment    Patient Name: Tammy Multani   MRN: [id number]    Therapy tech utilized for session to maximize physical performance and safety  Recommendations:     Discharge Recommendations: High Intensity Therapy  Discharge Equipment Recommendations: hospital bed, lift device, wheelchair, bedside commode, bath bench  Barriers to Discharge: Increased level of assist and Decreased caregiver support at current functional status  Safest Mobility Level with Nursing: Bed Level ROM - Pt only safe for OOB mobility with therapy staff at this time    Assessment:     Tammy Multani is a 64 y.o. female admitted with a medical diagnosis of Acute arterial ischemic stroke, multifocal, multiple vascular territories. She presents with the following impairments/functional limitations: weakness, impaired endurance, impaired self care skills, impaired sensation, impaired functional mobility, gait instability, impaired balance, decreased safety awareness, decreased lower extremity function, decreased upper extremity function, impaired coordination, impaired cognition, visual deficits, abnormal tone, decreased ROM. Pt presenting with HOB elevated and agreeable to completing therapy session. Pt in good spirits and eager to complete EOB mobility this date. Pt continues to require significant assistance to complete all visualized functional mobility, but improvements noted with education on hemiparetic techniques for bed mobility. Pt with improved postural stability while seated at EOB with moderate cues for RUE positioning and midline orientation. Pt limited by decreased activity tolerance, absent LLE motor activation, decreased command following, and L sided inattention. Pt remains appropriate for high intensity therapy following discharge once medically stable. Pt would continue to benefit from skilled acute PT in order to address current deficits and progress functional mobility.     Rehab Prognosis: Good; patient  "continues to benefit from acute skilled PT services to address these deficits and reach maximum level of function.  Recent Surgery: * No surgery found *      Plan:     During this hospitalization, patient to be seen 4 x/week to address the identified rehab impairments via gait training, therapeutic activities, therapeutic exercises, neuromuscular re-education and progress toward the following goals:    Plan of Care Expires:  11/07/24    Subjective     Chief Complaint: None verbalized  Patient/Family Comments/Goals: "My stomach is hurting. I think I need to use the restroom."  Pain/Comfort:  Pain Rating 1: 0/10    Objective:     Communicated with RN prior to session. Patient found HOB elevated with bed alarm, SCD, PureWick, telemetry upon PT entry to room.     General Precautions: Standard, fall  Orthopedic Precautions: N/A  Braces: N/A    Functional Mobility:  Bed Mobility:  Verbal cues for sequencing and hemiparetic technique  Supine to Sit: total assistance of 2 persons for LE management and trunk management  Sit to Supine: total assistance of 2 persons for LE management and trunk management  Upon laying down pt with 5-10 seconds of nystagmus (unable to clearly visualize direction) and reporting feeling dizzy. Pt's daughter informing therapist that happened earlier with OT  Transfers:   Deferred 2/2 absent LLE motor activation and pt request to return to supine for BM  Balance:   Static Sitting: Fair, able to maintain for 10 minute(s) with moderate assistance progressing to CGA  Verbal and tactile cues provided for upright posture, increased cervical extension, maintenance of midline orientation, anterior trunk lean, core activation, command following, and improved used of BUE for support.  Dynamic Sitting: Fair: Patient accepts minimal challenge, minimum assistance  PT completed PROM 1x10 to all major joints in LLE completed to reduce stiffness, maintain joint integrity, and prevent joint contractures while " seated at EOB  Patient performed 1 set(s) of 10 repetitions of  the following seated exercises: ankle pumps, long arc quads, and marches for right LE. Patient required skilled PT for instruction of exercises and appropriate cues to perform exercises safely and appropriately.    Pt completed 2x5 ea right UE multidirectional reaching with focus on weight shifts, UE ROM/strength, visual tracking, command following, core activation, challenging stability limits, crossing midline, and anterior trunk leans.    AM-PAC 6 CLICK MOBILITY  Turning over in bed (including adjusting bedclothes, sheets and blankets)?: 1  Sitting down on and standing up from a chair with arms (e.g., wheelchair, bedside commode, etc.): 1  Moving from lying on back to sitting on the side of the bed?: 1  Moving to and from a bed to a chair (including a wheelchair)?: 1  Need to walk in hospital room?: 1  Climbing 3-5 steps with a railing?: 1  Basic Mobility Total Score: 6     Therapeutic Activities and Exercises:  Patient educated on role of acute care PT and PT POC, safety while in hospital including calling nurse for mobility, and call light usage  Pt educated on importance of maximal participation in therapy session in order to reduce negative effects of prolonged sedentary positioning.   Answered all questions within PT scope of practice and addressed functional mobility concerns.  Pt requesting to have BM at end of session, no bed pan present in room. RN notified.    Patient left HOB elevated with all lines intact, call button in reach, RN notified, bed alarm on, and daughter present.    GOALS:   Multidisciplinary Problems       Physical Therapy Goals          Problem: Physical Therapy    Goal Priority Disciplines Outcome Interventions   Physical Therapy Goal     PT, PT/OT Progressing    Description: Goals to be met by: 2024     Patient will increase functional independence with mobility by performin. Supine to sit with Moderate  Assistance  2. Sit to supine with Moderate Assistance  3. Bed to chair transfer with Maximum Assistance using LRAD  4. Sitting at edge of bed x5 minutes with Stand-by Assistance  5. Lower extremity exercise program x15 reps per handout, with independence    Hospital Bed - Patient requires a hospital bed for positioning of the body in ways that are not feasible with an ordinary bed. The patient requires special positioning for pain relief, limited mobility, and/or being unable to independently make changes in body position without the use of a hospital bed. Pillows and wedges will not be adequate for resolving these positional issues.     Wheelchair - Patient has a mobility limitation that significantly impairs their ability to participate in one or more mobility related activities of daily living in customary locations in the home. The mobility limitation cannot be sufficiently resolved by the use of a cane or walker. The use of a manual wheelchair will greatly improve the patient's ability to participate in MRADLs. The patient will use the wheelchair on a regular basis at home. They have expressed their willingness to use a manual wheelchair in the home, and have a caregiver who is available and willing to assist with the wheelchair if needed.    Bedside Commode - Patient has a mobility limitation that significantly impairs their ability to participate in one or more mobility related activities of daily living, including toileting. This deficit can be resolved by using a bedside commode. Patient demonstrates mobility limitations that will cause them to be confined to one room at home without bathroom access for up to 30 days. Using a bedside commode will greatly improve the patient's ability to participate in MRADLs.                           Time Tracking:     PT Received On: 10/09/24  PT Start Time: 1256     PT Stop Time: 1312  PT Total Time (min): 16 min     Billable Minutes: Therapeutic Activity 16       Treatment Type: Treatment  PT/PTA: PT     Number of PTA visits since last PT visit: 0     10/09/2024

## 2024-10-09 NOTE — ASSESSMENT & PLAN NOTE
History of. Takes Levemir 35 units QHS at home. A1C 10%..     -goal 140-180 while admitted  -ordered MDSS  -On Levemir 18 units QHS   -accuchecks  -Endocrine consulted for A1c 10%

## 2024-10-09 NOTE — CARE UPDATE
Unit [facility] Care Support Interaction      I have reviewed the chart of Tammy Multani who is hospitalized for Acute arterial ischemic stroke, multifocal, multiple vascular territories. The patient is currently located in the following unit: NPU             I have seen and examined the patient and provided the following support:     Skin - waffle mattress and/or specialty bed ordered       Jacque Whitfield NP  Unit Based [facility]

## 2024-10-09 NOTE — PROGRESS NOTES
"Zackary Long - Neurosurgery ([facility])  Vascular Neurology  Comprehensive Stroke Center  Progress Note    Assessment/Plan:     * Acute arterial ischemic stroke, multifocal, multiple vascular territories  63 y/o F transferred from [facility] for higher level of care secondary to acute KIARA stroke on CTA.  Briefly,the patient presented to the OSH overnight for slurred speech started at 1:30 a.m. in the morning.  She has a history of his stroke with left-sided deficits but is normally able to transfer and do a little walking. This morning, she had complete hemiplegia of the left side. Telestroke consult performed, concerning for LVO.  NIH 13. CTA Stroke MP performed and  was read as "acute R KIARA infarct". No TNK due to AC use. Patient accepted to ED as transfer for emergent vascular neurology evaluation. No endovascular intervention.     History of atrial fibrillation per discussion with daughter. Was started on Eliquis on 10/6: hold given size of infarct, start ASA on 10/8. Transition again to Eliquis in 5-7 days. Given history of Afib, etiology likely embolic. Though not apparent on MRA, if R KIARA occlusion was proximal (supported by MRI) could account for R MCA-like picture.    -ASA 81mg-->Eliquis 5mg BID in 5-7 days  -high intensity statin  -SBP<220 in acute setting  -echo done, w/o evidence of intra-cardiac shunt, no LAE, but does have history of Afib  -PT/OT  -Started on diet    Type 2 diabetes mellitus, with long-term current use of insulin  History of. Takes Levemir 35 units QHS at home. A1C 10%..     -goal 140-180 while admitted  -ordered MDSS  -On Levemir 18 units QHS   -accuchecks  -Endocrine consulted for A1c 10%    Atrial fibrillation  History of. Takes Eliquis 5mg BID and Coreg 6.25mg BID at home.    -hold Eliquis given size of stroke, may restarted in 5-7 days, ASA 81mg QD in the interim  -continue coreg at 3.125mg BID given SBP goal of permissive HTN in acute setting    Opioid use  History of. " Prescribed oxycodone 15mg at home. Family concerned that medication is being diverted by family member, unclear how often patient takes. Reportedly is prescribed for neck pain.     -oxycodone 5mg PRN BID for severe pain  -monitor for signs of withdrawal, as is unclear how often patient is taking    Left hemiplegia  See stroke    HTN (hypertension)  Home medication regimen as below  -lisinopril 40mg QD  -amlodipine 10mg QD    Restarted on Lisinopril 20 mg daily          10/7: gómez SINCLAIR exam stable, echo today, NPO aside from meds per speech, barium study tmrw  10/08/2024 DOTTIE, stable neuro exam, went for the Br study, and was started on a diet. Endocrine consulted for the A1c 10%, pending recs.  10/09/2024 DOTTIE, Stable neuro exam, will restart Lisinopril 20 mg (half of her dome dose). Seen by endocrine, with adjustment of her insulin doses.    STROKE DOCUMENTATION   Acute Stroke Times   Last Known Normal Date:  (OSH)  Stroke Team Called Date: 10/06/24  Stroke Team Called Time:  (At OSH)  Stroke Team Arrival Date: 10/06/24  Stroke Team Arrival Time: 0608  CT Interpretation Time:  (CTA done around 4:20am per documents that came to C with patient)  Thrombolytic Therapy Recommended: No  Thrombectomy Recommended:  (Pending MRI Brain)    NIH Scale:  1a. Level of Consciousness: 0-->Alert, keenly responsive  1b. LOC Questions: 0-->Answers both questions correctly  1c. LOC Commands: 0-->Performs both tasks correctly  2. Best Gaze: 0-->Normal  3. Visual: 0-->No visual loss  4. Facial Palsy: 1-->Minor paralysis (flattened nasolabial fold, asymmetry on smiling)  5a. Motor Arm, Left: 4-->No movement  5b. Motor Arm, Right: 0-->No drift, limb holds 90 (or 45) degrees for full 10 secs  6a. Motor Leg, Left: 4-->No movement  6b. Motor Leg, Right: 0-->No drift, leg holds 30 degree position for full 5 secs  7. Limb Ataxia: 0-->Absent  8. Sensory: 0-->Normal, no sensory loss  9. Best Language: 1-->Mild-to-moderate aphasia, some  obvious loss of fluency or facility of comprehension, without significant limitation on ideas expressed or form of expression. Reduction of speech and/or comprehension, however, makes conversation. . . (see row details)  10. Dysarthria: 1-->Mild-to-moderate dysarthria, patient slurs at least some words and, at worst, can be understood with some difficulty  11. Extinction and Inattention (formerly Neglect): 0-->No abnormality  Total (NIH Stroke Scale): 11       Modified [place] Score: 3  [place] Coma Scale:    ABCD2 Score:    [id number] Score:   HAS -BLED Score:   ICH Score:   Hunt & Shane Classification:      Hemorrhagic change of an Ischemic Stroke: Does this patient have an ischemic stroke with hemorrhagic changes? No     Neurologic Chief Complaint: infarcts in multiple vascular territories    Subjective:     Interval History: Patient is seen for follow-up neurological assessment and treatment recommendations: see HPI    HPI, Past Medical, Family, and Social History remains the same as documented in the initial encounter.     Review of Systems   Respiratory:  Negative for shortness of breath.    Cardiovascular:  Negative for chest pain.   Neurological:  Positive for weakness and numbness.     Scheduled Meds:   aspirin  81 mg Oral Daily    atorvastatin  40 mg Oral Daily    carvediloL  3.125 mg Oral BID    famotidine  20 mg Oral BID    insulin aspart U-100  3 Units Subcutaneous TIDWM    insulin glargine U-100  18 Units Subcutaneous Daily    lisinopriL  20 mg Oral Daily     Continuous Infusions:  PRN Meds:  Current Facility-Administered Medications:     acetaminophen, 650 mg, Oral, Q8H PRN    bisacodyL, 10 mg, Rectal, Daily PRN    dextrose 10%, 12.5 g, Intravenous, PRN    dextrose 10%, 25 g, Intravenous, PRN    glucagon (human recombinant), 1 mg, Intramuscular, PRN    hydrALAZINE, 10 mg, Intravenous, Q6H PRN    insulin aspart U-100, 0-10 Units, Subcutaneous, QID (AC + HS) PRN    ondansetron, 4 mg, Oral, Q12H PRN     "oxyCODONE, 5 mg, Oral, Q12H PRN    sodium chloride 0.9%, 500 mL, Intravenous, PRN    sodium chloride 0.9%, 10 mL, Intravenous, PRN    Objective:     Vital Signs (Most Recent):  Temp: 98.3 °F (36.8 °C) (10/09/24 0820)  Pulse: 91 (10/09/24 1139)  Resp: 18 (10/09/24 0820)  BP: (!) 166/96 (10/09/24 0820)  SpO2: (!) 94 % (10/09/24 0820)  BP Location: Right arm    Vital Signs Range (Last 24H):  Temp:  [97.7 °F (36.5 °C)-98.3 °F (36.8 °C)]   Pulse:  [87-97]   Resp:  [16-20]   BP: (160-184)/(85-99)   SpO2:  [94 %-96 %]   BP Location: Right arm       Physical Exam  Vitals and nursing note reviewed.   Constitutional:       Appearance: She is ill-appearing.   HENT:      Head: Normocephalic and atraumatic.   Cardiovascular:      Rate and Rhythm: Normal rate and regular rhythm.   Pulmonary:      Effort: Pulmonary effort is normal.   Musculoskeletal:      Right lower leg: No edema.      Left lower leg: No edema.   Neurological:      Mental Status: She is alert.          Neurological Exam:   LOC: alert  Attention Span: poor  Language: Expressive aphasia.  Articulation: Dysarthria  Orientation: Person, Place, Time   Visual Fields: Full  EOM (CN III, IV, VI): Full/intact  Pupils (CN II, III): PERRL  Facial Sensation (CN V): Facial sensory loss  Facial Movement (CN VII): Upper facial weakness on the Left  Motor: Arm left  Plegia 0/5  Leg left  Paresis: 1/5  Arm right  Normal 5/5  Leg right Normal 5/5  Cerebellum: No evidence of appendicular or axial ataxia  Sensation: Jason-anesthesia left    Laboratory:  CMP:   No results for input(s): "GLUCOSE", "CALCIUM", "ALBUMIN", "PROT", "NA", "K", "CO2", "CL", "BUN", "CREATININE", "ALKPHOS", "ALT", "AST", "BILITOT" in the last 24 hours.    CBC:   Recent Labs   Lab 10/08/24  0217   WBC 9.39   RBC 4.82   HGB 16.1*   HCT 44.5      MCV 92   MCH 33.4*   MCHC 36.2*     Lipid Panel:   Recent Labs   Lab 10/06/24  0934   CHOL 178   LDLCALC 98.4   HDL 41   TRIG 193*     Hgb A1C:   Recent Labs "   Lab 10/08/24  0217   HGBA1C 10.0*     TSH:   Recent Labs   Lab 10/06/24  0934   TSH 1.237       Diagnostic Results     Brain and Vessel Imaging   Impression: Acute infarcts involving the right anterior circulation and posterior circulation as detailed above without evidence of intracranial hemorrhage or mass effect.  Embolic disease to be considered.     Chronic left parietal infarct.     MRA demonstrates limited visualization of distal right KIARA branches in keeping with the territory of the acute infarct.  There is also narrowing of at least one proximal left M2 branch although no acute infarct in that territory.    Cardiac Imaging     Left Ventricle: The left ventricle is normal in size. Normal wall thickness. There is concentric remodeling. Normal wall motion. There is hyperdynamic systolic function with a visually estimated ejection fraction of greater than 70%. There is normal diastolic function.    Right Ventricle: Normal right ventricular cavity size. Wall thickness is normal. Systolic function is normal.    Left Atrium: Bubble study is likely negative, but full opacification of the RA is not well seen on these images which decreases sensitivity.    IVC/SVC: Normal venous pressure at 3 mmHg.    Nohelia Barlow MD  Comprehensive Stroke Center  Department of Vascular Neurology   [facility] \[facility]\"")

## 2024-10-09 NOTE — SUBJECTIVE & OBJECTIVE
"Interval HPI:   No acute events overnight. Patient in room 960/960 A. Blood glucose stable. BG above goal on current insulin regimen (SSI and basal). Steroid use-       Renal function- Normal   Vasopressors-  None     Diet Pureed (IDDSI Level 4) Nectar Thick (Mildly Thick)     Eatin%  Nausea: No  Hypoglycemia and intervention: No  Fever: No  TPN and/or TF: No       BP (!) 170/98 (BP Location: Right arm, Patient Position: Lying)   Pulse 88   Temp 98.1 °F (36.7 °C)   Resp 16   Ht 5' 2" (1.575 m)   Wt 72.6 kg (160 lb 0.9 oz)   LMP  (LMP Unknown) Comment: Doesn't  have periods anymore  SpO2 95%   Breastfeeding No   BMI 29.27 kg/m²     Labs Reviewed and Include    No results for input(s): "GLU", "CALCIUM", "ALBUMIN", "PROT", "NA", "K", "CO2", "CL", "BUN", "CREATININE", "ALKPHOS", "ALT", "AST", "BILITOT" in the last 24 hours.  Lab Results   Component Value Date    WBC 9.39 10/08/2024    HGB 16.1 (H) 10/08/2024    HCT 44.5 10/08/2024    MCV 92 10/08/2024     10/08/2024     Recent Labs   Lab 10/06/24  0934   TSH 1.237     Lab Results   Component Value Date    HGBA1C 10.0 (H) 10/08/2024       Nutritional status:   Body mass index is 29.27 kg/m².  Lab Results   Component Value Date    ALBUMIN 3.6 10/08/2024    ALBUMIN 3.8 10/07/2024    ALBUMIN 3.7 10/06/2024     No results found for: "PREALBUMIN"    Estimated Creatinine Clearance: 52.3 mL/min (based on SCr of 1 mg/dL).    Accu-Checks  Recent Labs     10/08/24  0636 10/08/24  0833 10/08/24  1202 10/08/24  1543 10/08/24  2201   POCTGLUCOSE 270* 290* 256* 260* 244*       Current Medications and/or Treatments Impacting Glycemic Control  Immunotherapy:    Immunosuppressants       None          Steroids:   Hormones (From admission, onward)      None          Pressors:    Autonomic Drugs (From admission, onward)      None          Hyperglycemia/Diabetes Medications:   Antihyperglycemics (From admission, onward)      Start     Stop Route Frequency Ordered    " 10/09/24 0900  insulin glargine U-100 (Lantus) pen 18 Units         -- SubQ Daily 10/09/24 0710    10/09/24 0815  insulin aspart U-100 pen 3 Units         -- SubQ 3 times daily with meals 10/09/24 0712    10/09/24 0809  insulin aspart U-100 pen 0-10 Units         -- SubQ Before meals & nightly PRN 10/09/24 0710

## 2024-10-09 NOTE — SUBJECTIVE & OBJECTIVE
Neurologic Chief Complaint: infarcts in multiple vascular territories    Subjective:     Interval History: Patient is seen for follow-up neurological assessment and treatment recommendations: see HPI    HPI, Past Medical, Family, and Social History remains the same as documented in the initial encounter.     Review of Systems   Respiratory:  Negative for shortness of breath.    Cardiovascular:  Negative for chest pain.   Neurological:  Positive for weakness and numbness.     Scheduled Meds:   aspirin  81 mg Oral Daily    atorvastatin  40 mg Oral Daily    carvediloL  3.125 mg Oral BID    famotidine  20 mg Oral BID    insulin aspart U-100  3 Units Subcutaneous TIDWM    insulin glargine U-100  18 Units Subcutaneous Daily    lisinopriL  20 mg Oral Daily     Continuous Infusions:  PRN Meds:  Current Facility-Administered Medications:     acetaminophen, 650 mg, Oral, Q8H PRN    bisacodyL, 10 mg, Rectal, Daily PRN    dextrose 10%, 12.5 g, Intravenous, PRN    dextrose 10%, 25 g, Intravenous, PRN    glucagon (human recombinant), 1 mg, Intramuscular, PRN    hydrALAZINE, 10 mg, Intravenous, Q6H PRN    insulin aspart U-100, 0-10 Units, Subcutaneous, QID (AC + HS) PRN    ondansetron, 4 mg, Oral, Q12H PRN    oxyCODONE, 5 mg, Oral, Q12H PRN    sodium chloride 0.9%, 500 mL, Intravenous, PRN    sodium chloride 0.9%, 10 mL, Intravenous, PRN    Objective:     Vital Signs (Most Recent):  Temp: 98.3 °F (36.8 °C) (10/09/24 0820)  Pulse: 91 (10/09/24 1139)  Resp: 18 (10/09/24 0820)  BP: (!) 166/96 (10/09/24 0820)  SpO2: (!) 94 % (10/09/24 0820)  BP Location: Right arm    Vital Signs Range (Last 24H):  Temp:  [97.7 °F (36.5 °C)-98.3 °F (36.8 °C)]   Pulse:  [87-97]   Resp:  [16-20]   BP: (160-184)/(85-99)   SpO2:  [94 %-96 %]   BP Location: Right arm       Physical Exam  Vitals and nursing note reviewed.   Constitutional:       Appearance: She is ill-appearing.   HENT:      Head: Normocephalic and atraumatic.   Cardiovascular:      Rate and  "Rhythm: Normal rate and regular rhythm.   Pulmonary:      Effort: Pulmonary effort is normal.   Musculoskeletal:      Right lower leg: No edema.      Left lower leg: No edema.   Neurological:      Mental Status: She is alert.          Neurological Exam:   LOC: alert  Attention Span: poor  Language: Expressive aphasia.  Articulation: Dysarthria  Orientation: Person, Place, Time   Visual Fields: Full  EOM (CN III, IV, VI): Full/intact  Pupils (CN II, III): PERRL  Facial Sensation (CN V): Facial sensory loss  Facial Movement (CN VII): Upper facial weakness on the Left  Motor: Arm left  Plegia 0/5  Leg left  Paresis: 1/5  Arm right  Normal 5/5  Leg right Normal 5/5  Cerebellum: No evidence of appendicular or axial ataxia  Sensation: Jason-anesthesia left    Laboratory:  CMP:   No results for input(s): "GLUCOSE", "CALCIUM", "ALBUMIN", "PROT", "NA", "K", "CO2", "CL", "BUN", "CREATININE", "ALKPHOS", "ALT", "AST", "BILITOT" in the last 24 hours.    CBC:   Recent Labs   Lab 10/08/24  0217   WBC 9.39   RBC 4.82   HGB 16.1*   HCT 44.5      MCV 92   MCH 33.4*   MCHC 36.2*     Lipid Panel:   Recent Labs   Lab 10/06/24  0934   CHOL 178   LDLCALC 98.4   HDL 41   TRIG 193*     Hgb A1C:   Recent Labs   Lab 10/08/24  0217   HGBA1C 10.0*     TSH:   Recent Labs   Lab 10/06/24  0934   TSH 1.237       Diagnostic Results     Brain and Vessel Imaging   Impression: Acute infarcts involving the right anterior circulation and posterior circulation as detailed above without evidence of intracranial hemorrhage or mass effect.  Embolic disease to be considered.     Chronic left parietal infarct.     MRA demonstrates limited visualization of distal right KIARA branches in keeping with the territory of the acute infarct.  There is also narrowing of at least one proximal left M2 branch although no acute infarct in that territory.    Cardiac Imaging     Left Ventricle: The left ventricle is normal in size. Normal wall thickness. There is " concentric remodeling. Normal wall motion. There is hyperdynamic systolic function with a visually estimated ejection fraction of greater than 70%. There is normal diastolic function.    Right Ventricle: Normal right ventricular cavity size. Wall thickness is normal. Systolic function is normal.    Left Atrium: Bubble study is likely negative, but full opacification of the RA is not well seen on these images which decreases sensitivity.    IVC/SVC: Normal venous pressure at 3 mmHg.

## 2024-10-09 NOTE — PT/OT/SLP PROGRESS
Speech Language Pathology Treatment    Patient Name:  Tammy Multani   MRN:  [id number]  Admitting Diagnosis: Acute arterial ischemic stroke, multifocal, multiple vascular territories    Recommendations:                 General Recommendations:  Dysphagia therapy and Cognitive-linguistic evaluation  Diet recommendations:  Puree Diet - IDDSI Level 4, Liquid Diet Level: Mildly thick liquids - IDDSI Level 2   Aspiration Precautions: 1 bite/sip at a time, Alternating bites/sips, Assistance with meals, Eliminate distractions, Feed only when awake/alert, Frequent oral care, HOB to 90 degrees, Meds crushed in puree, Monitor for s/s of aspiration, Small bites/sips, and Strict aspiration precautions   General Precautions: Standard, aspiration, fall, pureed diet  Communication strategies:  go to room if call light pushed    Assessment:     Tammy Multani is a 64 y.o. female with an SLP diagnosis of Dysphagia.  She also presents with cognitive-linguistic deficits and left neglect, formal assessment pending.     Subjective     Pt pleasant and agreeable to session.    Pain/Comfort:  Pain Rating 1: 0/10    Respiratory Status: Room air    Objective:     Has the patient been evaluated by SLP for swallowing?   Yes  Keep patient NPO? No   Current Respiratory Status:    Room air    Pt seen at the bedside with her daughter present for a dysphagia f/u. Her lunch tray was untouched on bedside table. Daughter reports that she did not eat breakfast, but endorses that is normal for her. SLP notes liquids on bedside table that are thicker than mildly thick. SLP reviewed thickening process, remediated liquid to mildly thick, and wrote thickening instructions on the white board. HOB was raised prior to PO intake. Pt participated in trials of pureed green beans, apple sauce, and mildly thick apple juice. No overt signs of aspiration or changes in vocal quality noted, but pt continues to require cueing to swallow PO. Due to pt's poor  appetite, SLP administered cup and straw sips of Boost with no overt signs of aspiration. Pt appears safe to consume cup and straw sips of Boost for additional nutrition. SLP educated pt and daughter on aspiration precautions, feeding assistance, and poc.      Goals:   Multidisciplinary Problems       SLP Goals          Problem: SLP    Goal Priority Disciplines Outcome   SLP Goal     SLP    Description: Speech Language Pathology Goals  Goals expected to be met by 10/14:  1. Pt will participate in Modified Barium Swallow Study to r/o aspiration and determine safest oral diet.   2. Pt will participate in speech/language/cognitive evaluation to establish further tx plan.                                Plan:     Patient to be seen:  4 x/week   Plan of Care expires:  11/06/24  Plan of Care reviewed with:  patient, daughter   SLP Follow-Up:  Yes       Discharge recommendations:  High Intensity Therapy     Time Tracking:     SLP Treatment Date:   10/09/24  Speech Start Time:  1139  Speech Stop Time:  1210     Speech Total Time (min):  31 min    Billable Minutes: Treatment Swallowing Dysfunction 23 and Self Care/Home Management Training 8    10/09/2024

## 2024-10-09 NOTE — CARE UPDATE
I have reviewed the chart of Tammy Multani who is hospitalized for the following:    Active Hospital Problems    Diagnosis    *Acute arterial ischemic stroke, multifocal, multiple vascular territories    Hypercoagulable state due to atrial fibrillation     History of atrial fibrillation per discussion with daughter. Was started on Eliquis on 10/6: hold given size of infarct, start ASA on 10/8. Transition again to Eliquis in 5-7 days       Chronic anticoagulation     History of atrial fibrillation per discussion with daughter. Was started on Eliquis on 10/6: hold given size of infarct, start ASA on 10/8. Transition again to Eliquis in 5-7 days       Hyponatremia     Monitor with daily labs      Reduced mobility     Therapy to evaluate and treat       Sensory loss    HTN (hypertension)    Hypokalemia     POA  Monitor with daily cmp  Replace   K 3.3      Left hemiplegia    Opioid use    Atrial fibrillation    Type 2 diabetes mellitus, with long-term current use of insulin        Jacque Whitfield NP  [facility]

## 2024-10-09 NOTE — PLAN OF CARE
LISS [facility] to [facility]in at [facility].  She did not see the [facility] referral and asked referral to be faxed to [phone].  LISS sent fax and will f/u re: acceptance.      Miladys Brown, LMSW [facility]  [phone]

## 2024-10-09 NOTE — PT/OT/SLP PROGRESS
Occupational Therapy   Treatment    Name: Tammy Multani  MRN: [id number]  Admitting Diagnosis:  Acute arterial ischemic stroke, multifocal, multiple vascular territories       Recommendations:     Discharge Recommendations: High Intensity Therapy  Discharge Equipment Recommendations:  bedside commode, bath bench, hospital bed, wheelchair, lift device (TBD by next level of care)  Barriers to discharge:  Other (Comment) (increased (A) with ADLs and mobility)    Assessment:     Tammy Multani is a 64 y.o. female with a medical diagnosis of Acute arterial ischemic stroke, multifocal, multiple vascular territories.  Pt tolerated session well and without incident, but she continues to require significant (A) with ADLs and mobility.  She presents with the following.  Performance deficits affecting function are weakness, impaired endurance, impaired self care skills, impaired functional mobility, gait instability, impaired balance, visual deficits, impaired cognition, decreased lower extremity function, decreased upper extremity function, decreased safety awareness, decreased coordination, decreased ROM, impaired coordination, impaired fine motor.     Rehab Prognosis:  Good; patient would benefit from acute skilled OT services to address these deficits and reach maximum level of function.       Plan:     Patient to be seen 4 x/week to address the above listed problems via self-care/home management, therapeutic activities, therapeutic exercises, neuromuscular re-education  Plan of Care Expires: 11/07/24  Plan of Care Reviewed with: patient, daughter    Subjective     Chief Complaint: none  Patient/Family Comments/goals: pt was pleasant and agreeable to therapy  Pain/Comfort:  Pain Rating 1: 0/10  Pain Rating Post-Intervention 1: 0/10    Objective:     Communicated with: nurse prior to session.  Patient found HOB elevated with SCD, bed alarm, telemetry, PureWick with her daughter present upon OT entry to  room.    General Precautions: Standard, aspiration, nectar thick, pureed diet, fall, vision impaired    Orthopedic Precautions:N/A  Braces: N/A  Respiratory Status: Room air     Occupational Performance:     Bed Mobility:    Patient completed Supine to Sit to the L with total assistance.  She initiated moving her RLE toward EOB, but then her movements became uncoordinated.  Pt scooted to EOB to place her feet on the floor with total assistance.  She initially reported dizziness, which subsided.  Patient completed Sit to Supine with total assistance.  Once supine, her eyes began to flutter, lasting ~10 seconds.  When asked what had happened, pt said she felt dizzy.  Nurse notified.    Pt scooted to HOB while supine with HOB flat with total assistance of 2 via draw sheet and sharona pads    Functional Mobility/Transfers:  Not performed due to pt's requiring total assistance to shift her hips to the L toward HOB while seated EOB     Activities of Daily Living:  Feeding: Min-Max A for sitting balance EOB to drink nectar thick apple juice from cup using a straw.  Pt was able to perform using her R hand, but she required (A) to correct her L lateral lean; she also was able to wipe her mouth with a wash cloth with cueing.  She was able to drink some more juice while HOB elevated to prevent aspiration, but she required maximal cueing to swallow.    Lower Body Dressing: total assistance to adjust non-skid sock on her L foot while supine       [facility] 6 Click ADL: 10    Treatment & Education:  - While seated EOB, pt initially required total assistance for sitting balance to correct her posterior and L lateral lean.  She was able to progress to CGA on more than one occasion.  Pt was unable to hold midline for more than ~30 seconds at a time, requiring cueing to lean R to return to midline.  However, pt would overcorrect and reach for the foot of the bed to her R, and she required cueing and [place] (A) to return her RUE to her lap for  safety.    - While seated EOB, pt completed L lateral leans onto her LUE x 5 reps for proprioceptive input to her affected extremity, requiring total assistance.     - While seated EOB, pt completed functional reach activity x 5 reps.  Therapist cued pt to cross midline to her L visual field and touch therapist's hand with her RUE.  She was able to perform with cueing to track to her L visual field.  During last rep, pt was able to grab wash cloth from therapist's hand on command.      - Pt and her daughter educated on the importance of performing PROM LUE exercises at bed-level to maintain pt's functional ROM and to prevent contractures.  Therapist demonstrated the following: shoulder flex/ext, shoulder abd/add, elbow flex/ext, wrist flex/ext, radial/ulnar deviation, wrist circumduction, and composite digital flex/ext.  Educated pt and her daughter on going from proximal to distal.  Also educated pt and her daughter on having pt attempt to attend to her L visual field with cueing/assistance to perform AAROM LUE exercises while using her RUE to assist.    Pt and her daughter edu on role of OT, POC, safety when performing self care tasks, benefit of performing EOB activity, and safety when performing functional transfers and mobility.    - Self care tasks completed-- as noted above      Patient left HOB elevated with all lines intact, call button in reach, bed alarm on, nurse notified, and her daughter present    GOALS:   Multidisciplinary Problems       Occupational Therapy Goals          Problem: Occupational Therapy    Goal Priority Disciplines Outcome Interventions   Occupational Therapy Goal     OT, PT/OT Progressing    Description: Goals to be met by: 11/7/24     Patient will increase functional independence with ADLs by performing:    UE Dressing with Minimal Assistance.  LE Dressing with Moderate Assistance.  Grooming while seated with Set-up Assistance.  Toileting from bedside commode with Moderate  Assistance for hygiene and clothing management.   Sitting at edge of bed x5 minutes with Contact Guard Assistance.  Supine to sit with Contact Guard Assistance.  Step transfer with Moderate Assistance    DME Justifications (see above for complete DME recommendations)    Bedside Commode- Patient has a mobility limitation that significantly impairs their ability to participate in one or more mobility related activities of daily living, including toileting. This deficit can be resolved by using a bedside commode. Patient demonstrates mobility limitations that will cause them to be confined to one room at home without bathroom access for up to 30 days. Using a bedside commode will greatly improve the patient's ability to participate in MRADLs.     Wheelchair-  Patient has a mobility limitation that significantly impairs their ability to participate in one or more mobility related activities of daily living in customary locations in the home. The mobility limitation cannot be sufficiently resolved by the use of a cane or walker. The use of a manual wheelchair will greatly improve the patient's ability to participate in MRADLs. The patient will use the wheelchair on a regular basis at home. They have expressed their willingness to use a manual wheelchair in the home, and have a caregiver who is available and willing to assist with the wheelchair if needed.     Hospital Bed-  Patient requires a hospital bed for positioning of the body in ways that are not feasible with an ordinary bed. The patient requires special positioning for pain relief, limited mobility, and/or being unable to independently make changes in body position without the use of a hospital bed. Pillows and wedges will not be adequate for resolving these positional issues.                        Time Tracking:     OT Date of Treatment: 10/09/24  OT Start Time: 1007  OT Stop Time: 1042  OT Total Time (min): 35 min    Billable Minutes:Therapeutic Activity 18  min  Neuromuscular Re-education 17 min    OT/SUSANNE: OT          10/9/2024

## 2024-10-10 LAB
POCT GLUCOSE: 272 MG/DL (ref 70–110)
POCT GLUCOSE: 289 MG/DL (ref 70–110)
POCT GLUCOSE: 291 MG/DL (ref 70–110)
POCT GLUCOSE: 293 MG/DL (ref 70–110)
POCT GLUCOSE: 295 MG/DL (ref 70–110)

## 2024-10-10 PROCEDURE — 97535 SELF CARE MNGMENT TRAINING: CPT

## 2024-10-10 PROCEDURE — 25000003 PHARM REV CODE 250

## 2024-10-10 PROCEDURE — 11000001 HC ACUTE MED/SURG PRIVATE ROOM

## 2024-10-10 PROCEDURE — 97112 NEUROMUSCULAR REEDUCATION: CPT

## 2024-10-10 PROCEDURE — 25000003 PHARM REV CODE 250: Performed by: NURSE PRACTITIONER

## 2024-10-10 PROCEDURE — 63600175 PHARM REV CODE 636 W HCPCS: Performed by: NURSE PRACTITIONER

## 2024-10-10 PROCEDURE — 99232 SBSQ HOSP IP/OBS MODERATE 35: CPT | Mod: GC,,, | Performed by: NURSE PRACTITIONER

## 2024-10-10 PROCEDURE — 99233 SBSQ HOSP IP/OBS HIGH 50: CPT | Mod: GC,,, | Performed by: PSYCHIATRY & NEUROLOGY

## 2024-10-10 PROCEDURE — 97530 THERAPEUTIC ACTIVITIES: CPT

## 2024-10-10 PROCEDURE — 25000003 PHARM REV CODE 250: Performed by: PHYSICIAN ASSISTANT

## 2024-10-10 RX ORDER — INSULIN GLARGINE 100 [IU]/ML
24 INJECTION, SOLUTION SUBCUTANEOUS DAILY
Status: DISCONTINUED | OUTPATIENT
Start: 2024-10-11 | End: 2024-10-11

## 2024-10-10 RX ORDER — INSULIN GLARGINE 100 [IU]/ML
6 INJECTION, SOLUTION SUBCUTANEOUS ONCE
Status: COMPLETED | OUTPATIENT
Start: 2024-10-10 | End: 2024-10-10

## 2024-10-10 RX ORDER — INSULIN ASPART 100 [IU]/ML
6 INJECTION, SOLUTION INTRAVENOUS; SUBCUTANEOUS
Status: DISCONTINUED | OUTPATIENT
Start: 2024-10-10 | End: 2024-10-11

## 2024-10-10 RX ADMIN — FAMOTIDINE 20 MG: 20 TABLET ORAL at 08:10

## 2024-10-10 RX ADMIN — INSULIN GLARGINE 18 UNITS: 100 INJECTION, SOLUTION SUBCUTANEOUS at 09:10

## 2024-10-10 RX ADMIN — INSULIN ASPART 6 UNITS: 100 INJECTION, SOLUTION INTRAVENOUS; SUBCUTANEOUS at 04:10

## 2024-10-10 RX ADMIN — CARVEDILOL 3.12 MG: 3.12 TABLET, FILM COATED ORAL at 08:10

## 2024-10-10 RX ADMIN — INSULIN ASPART 6 UNITS: 100 INJECTION, SOLUTION INTRAVENOUS; SUBCUTANEOUS at 11:10

## 2024-10-10 RX ADMIN — INSULIN ASPART 6 UNITS: 100 INJECTION, SOLUTION INTRAVENOUS; SUBCUTANEOUS at 12:10

## 2024-10-10 RX ADMIN — ASPIRIN 81 MG CHEWABLE TABLET 81 MG: 81 TABLET CHEWABLE at 08:10

## 2024-10-10 RX ADMIN — INSULIN GLARGINE 6 UNITS: 100 INJECTION, SOLUTION SUBCUTANEOUS at 09:10

## 2024-10-10 RX ADMIN — INSULIN ASPART 6 UNITS: 100 INJECTION, SOLUTION INTRAVENOUS; SUBCUTANEOUS at 09:10

## 2024-10-10 RX ADMIN — INSULIN ASPART 3 UNITS: 100 INJECTION, SOLUTION INTRAVENOUS; SUBCUTANEOUS at 10:10

## 2024-10-10 RX ADMIN — LISINOPRIL 20 MG: 20 TABLET ORAL at 08:10

## 2024-10-10 RX ADMIN — INSULIN ASPART 3 UNITS: 100 INJECTION, SOLUTION INTRAVENOUS; SUBCUTANEOUS at 09:10

## 2024-10-10 RX ADMIN — ATORVASTATIN CALCIUM 40 MG: 40 TABLET, FILM COATED ORAL at 08:10

## 2024-10-10 NOTE — PT/OT/SLP PROGRESS
Speech Language Pathology Treatment    Patient Name:  Tammy Multani   MRN:  [id number]  Admitting Diagnosis: Acute arterial ischemic stroke, multifocal, multiple vascular territories    Recommendations:                 General Recommendations:  Dysphagia therapy and Cognitive-linguistic evaluation  Diet recommendations:  Puree Diet - IDDSI Level 4, Liquid Diet Level: Mildly thick liquids - IDDSI Level 2   Aspiration Precautions: 1 bite/sip at a time, Alternating bites/sips, Eliminate distractions, Feed only when awake/alert, Frequent oral care, HOB to 90 degrees, Meds crushed in puree, Monitor for s/s of aspiration, and Strict aspiration precautions   General Precautions: Standard, aspiration, fall, pureed diet  Communication strategies:  go to room if call light pushed    Assessment:     Tammy Multani is a 64 y.o. female with an SLP diagnosis of Dysphagia.  She presents with cognitive-linguistic deficits and left neglect, formal assessment pending. .    Subjective   Pt and daughter pleasant and agreeable to session.      Pain/Comfort:  Pain Rating 1: 0/10    Respiratory Status: Room air    Objective:     Has the patient been evaluated by SLP for swallowing?   Yes  Keep patient NPO? No   Current Respiratory Status:    Room air    Pt seen the bedside this date for ongoing dysphagia therapy and speech/language/cognitive evaluation with her daughter present. SLP provided education re: diet recs, signs of aspiration, aspiration precautions, continued clearance for straw use (white board updated), implication for speech/language/cognitive evaluation, and poc. Pt and her daughter both verbalized understanding of education. The daughter reported that pt had been finished using the bed pan for a while and had called RN a few minutes prior to SLP arrival. SLP left pt room to find RN or PCT for assistance. SLP schedule did permit her to return to room to initiate speech/language evaluation as planned. SLP to  follow.    Goals:   Multidisciplinary Problems       SLP Goals          Problem: SLP    Goal Priority Disciplines Outcome   SLP Goal     SLP    Description: Speech Language Pathology Goals  Goals expected to be met by 10/14:  1. Pt will participate in Modified Barium Swallow Study to r/o aspiration and determine safest oral diet.   2. Pt will participate in speech/language/cognitive evaluation to establish further tx plan.                                Plan:     Patient to be seen:  4 x/week   Plan of Care expires:  11/06/24  Plan of Care reviewed with:  patient, daughter   SLP Follow-Up:  Yes       Discharge recommendations:  High Intensity Therapy       Time Tracking:     SLP Treatment Date:   10/10/24  Speech Start Time:  1048  Speech Stop Time:  1056     Speech Total Time (min):  8 min    Billable Minutes: Self Care/Home Management Training 8    10/10/2024

## 2024-10-10 NOTE — ASSESSMENT & PLAN NOTE
BG goal: 140-180  T2DM admitted with acute KIARA stroke.   BG above goal.  Further increase regimen    - increase Lantus to 24 units daily (Fasting BG above goal ranges)   - increase NovoLog to 6 units with meals (prandial excursions noted; 0.5 u/kg dosing)   -continue Novolog Moderate dose correction with ISF 25 starting at 150    - POCT Glucose ac/hs  - Hypoglycemia protocol in place      ** Please notify Endocrine for any change and/or advance in diet**  ** Please call Endocrine for any BG related issues **     Discharge Planning:   TBD. Please notify endocrinology prior to discharge.

## 2024-10-10 NOTE — PT/OT/SLP PROGRESS
"Occupational Therapy   Treatment    Name: Tammy Multani  MRN: [id number]  Admitting Diagnosis:  Acute arterial ischemic stroke, multifocal, multiple vascular territories       Recommendations:     Discharge Recommendations: High Intensity Therapy  Discharge Equipment Recommendations:  hospital bed, lift device, wheelchair, bedside commode, bath bench  Barriers to discharge:   (increased skilled assistance)    Assessment:     Tammy Multani is a 64 y.o. female with a medical diagnosis of Acute arterial ischemic stroke, multifocal, multiple vascular territories.  She presents with the following performance deficits affecting function: weakness, impaired endurance, impaired sensation, impaired self care skills, impaired functional mobility, gait instability, impaired balance, decreased safety awareness, decreased lower extremity function, decreased upper extremity function, impaired cognition, visual deficits, abnormal tone, decreased ROM.  Pt was willing to participate, tolerated session fairly overall. Pt actively participated in session, required verbal cueing to maintain attention to tasks and sequence tasks. Pt sat eob, performed ADL tasks/exercises that involved crossing midline and weight shifting. Pt performed bed mobility and sat eob with significant assistance.     Rehab Prognosis:  Good; patient would benefit from acute skilled OT services to address these deficits and reach maximum level of function.       Plan:     Patient to be seen 4 x/week to address the above listed problems via self-care/home management, therapeutic activities, therapeutic exercises, neuromuscular re-education  Plan of Care Expires: 11/07/24  Plan of Care Reviewed with: patient, daughter    Subjective     Chief Complaint: none stated  Patient/Family Comments/goals: "Nice to meet you."   Pain/Comfort:  Pain Rating 1: 0/10  Pain Rating Post-Intervention 1: 0/10    Objective:     Communicated with: RN prior to session.  Patient " found supine with bed alarm, PureWick, SCD, telemetry upon OT entry to room.    General Precautions: Standard, aspiration, fall, pureed diet    Orthopedic Precautions:N/A  Braces: N/A  Respiratory Status: Room air     Occupational Performance:     Bed Mobility:    Patient completed Rolling/Turning to Left with  total assistance  Patient completed Rolling/Turning to Right with total assistance  Patient completed Scooting/Bridging with total assistance  Patient completed Supine to Sit with total assistance and 2 persons  Patient completed Sit to Supine with total assistance and 2 persons     Functional Mobility/Transfers:  Not attempted 2/2 to pt safety    Activities of Daily Living:  Grooming: setup; pt washed face sitting eob; pt able to cross midline c/ RUE to grab washcloth, cross midline to hand washcloth to therapist; required mod vc's to locate when crossing midline    Upper Body Dressing: maximal assistance donned gown as robe sitting eob  Toileting: total assistance pt c/ episode of incontinence sitting eob; pads changed; pt placed on bedpan; RN notified  Feeding: [place] assistance to grab cup c/ RUE, drink nectar thick liquid while sitting eob; max vc's to swallow    Neuro Ex:  Reaching ex: pt crossed midline c/ RUE to touch target; 1x5; min vc's to locate target, max vc's and Mod A to return to midline    Pt performed weight shifting exercise sitting eob; focused on lean onto L forearm/elbow to increase proprioception into LUE; 1x5; max vc's/tc's to lean L; Mod-Max A to return to midline c/ mod verbal cueing    Balance:   Sitting balance: initially Min A, progressed to Max A c/ L posterior lean; max vc's and tc's to maintain upright posture, use rail for support; sat eob ~15 min      [facility] 6 Click ADL: 10    Treatment & Education:  Pt edu on role of OT, POC, safety when performing self care tasks , benefit of performing OOB activity, and safety when performing functional transfers and mobility.  - White board  updated  - Self care tasks completed-- as noted above      Patient left supine with all lines intact, call button in reach, bed alarm on, RN notified, daughter present, and pt placed on bedpan    GOALS:   Multidisciplinary Problems       Occupational Therapy Goals          Problem: Occupational Therapy    Goal Priority Disciplines Outcome Interventions   Occupational Therapy Goal     OT, PT/OT Progressing    Description: Goals to be met by: 11/7/24     Patient will increase functional independence with ADLs by performing:    UE Dressing with Minimal Assistance.  LE Dressing with Moderate Assistance.  Grooming while seated with Set-up Assistance.  Toileting from bedside commode with Moderate Assistance for hygiene and clothing management.   Sitting at edge of bed x5 minutes with Contact Guard Assistance.  Supine to sit with Contact Guard Assistance.  Step transfer with Moderate Assistance    DME Justifications (see above for complete DME recommendations)    Bedside Commode- Patient has a mobility limitation that significantly impairs their ability to participate in one or more mobility related activities of daily living, including toileting. This deficit can be resolved by using a bedside commode. Patient demonstrates mobility limitations that will cause them to be confined to one room at home without bathroom access for up to 30 days. Using a bedside commode will greatly improve the patient's ability to participate in MRADLs.     Wheelchair-  Patient has a mobility limitation that significantly impairs their ability to participate in one or more mobility related activities of daily living in customary locations in the home. The mobility limitation cannot be sufficiently resolved by the use of a cane or walker. The use of a manual wheelchair will greatly improve the patient's ability to participate in MRADLs. The patient will use the wheelchair on a regular basis at home. They have expressed their willingness to use  a manual wheelchair in the home, and have a caregiver who is available and willing to assist with the wheelchair if needed.     Hospital Bed-  Patient requires a hospital bed for positioning of the body in ways that are not feasible with an ordinary bed. The patient requires special positioning for pain relief, limited mobility, and/or being unable to independently make changes in body position without the use of a hospital bed. Pillows and wedges will not be adequate for resolving these positional issues.                        Time Tracking:     OT Date of Treatment: 10/10/24  OT Start Time: 0956  OT Stop Time: 1027  OT Total Time (min): 31 min    Billable Minutes:Self Care/Home Management 16  Neuromuscular Re-education 15    OT/SUSANNE: OT          10/10/2024

## 2024-10-10 NOTE — PT/OT/SLP PROGRESS
Physical Therapy Treatment    Patient Name: Tammy Multani   MRN: [id number]    Therapy tech utilized for session to maximize physical performance and safety  Recommendations:     Discharge Recommendations: High Intensity Therapy  Discharge Equipment Recommendations: hospital bed, lift device, wheelchair, bedside commode, bath bench  Barriers to Discharge: Increased level of assist and Decreased caregiver support at current functional status  Safest Mobility Level with Nursing: Bed Level ROM - Pt only safe for OOB mobility with therapy staff at this time    Assessment:     Tammy Multani is a 64 y.o. female admitted with a medical diagnosis of Acute arterial ischemic stroke, multifocal, multiple vascular territories. She presents with the following impairments/functional limitations: weakness, impaired endurance, impaired self care skills, impaired sensation, impaired functional mobility, gait instability, impaired balance, decreased safety awareness, visual deficits, impaired cognition, impaired coordination, decreased upper extremity function, decreased lower extremity function, pain, impaired fine motor. Pt presenting with HOB elevated and agreeable to completing therapy session. Focus of session on improved static postural stability, command following, vision assessment, and RLE strengthening. Pt continues to require assistance to complete all visualized functional mobility this date but remains highly motivated to improve functional status. Pt remains appropriate for high intensity therapy following discharge once medically stable. Pt would continue to benefit from skilled acute PT in order to address current deficits and progress functional mobility.     Rehab Prognosis: Good; patient continues to benefit from acute skilled PT services to address these deficits and reach maximum level of function.  Recent Surgery: * No surgery found *      Plan:     During this hospitalization, patient to be seen 4  "x/week to address the identified rehab impairments via gait training, therapeutic activities, therapeutic exercises, neuromuscular re-education and progress toward the following goals:    Plan of Care Expires:  11/07/24    Subjective     Chief Complaint: None verbalized  Patient/Family Comments/Goals: "I want some Coke."  Pain/Comfort:  Pain Rating 1: 0/10    Objective:     Communicated with RN prior to session. Patient found HOB elevated with bed alarm, PureWick, SCD, telemetry upon PT entry to room.     General Precautions: Standard, aspiration, fall, pureed diet  Orthopedic Precautions: N/A  Braces: N/A    Functional Mobility:  Bed Mobility:  Verbal cues for sequencing and technique  Supine to Sit: total assistance of 2 persons for LE management and trunk management  Sit to Supine: total assistance of 2 persons for LE management and trunk management  Pt with R upward rotational nystagmus upon returning to supine lasting ~10s  Transfers:   Deferred 2/2 poor static sitting balance and absent LLE motor activation  Balance:   Static Sitting: Poor, able to maintain for 15 minute(s) with total assistance progressing to minimal assistance  Verbal and tactile cues provided for upright posture, increased cervical extension, maintenance of midline orientation, anterior trunk lean, core activation, command following, and improved used of BUE for support.  Dynamic Sitting: Poor: Patient unable to accept challenge or move without loss of balance, moderate assistance  PT completed PROM 1x10 to all major joints in LLE completed to reduce stiffness, maintain joint integrity, and prevent joint contractures while seated at EOB  Patient performed 1 set(s) of 10 repetitions of  the following seated exercises: ankle pumps, long arc quads, and marches for right LE. Patient required skilled PT for instruction of exercises and appropriate cues to perform exercises safely and appropriately.    Pt completed 2x5 ea right UE " multidirectional reaching with focus on weight shifts, UE ROM/strength, visual tracking, command following, core activation, challenging stability limits, crossing midline, and anterior trunk leans.    AM-PAC 6 CLICK MOBILITY  Turning over in bed (including adjusting bedclothes, sheets and blankets)?: 1  Sitting down on and standing up from a chair with arms (e.g., wheelchair, bedside commode, etc.): 1  Moving from lying on back to sitting on the side of the bed?: 1  Moving to and from a bed to a chair (including a wheelchair)?: 1  Need to walk in hospital room?: 1  Climbing 3-5 steps with a railing?: 1  Basic Mobility Total Score: 6     Therapeutic Activities and Exercises:  Patient educated on role of acute care PT and PT POC, safety while in hospital including calling nurse for mobility, and call light usage  Pt educated on importance of maximal participation in therapy session in order to reduce negative effects of prolonged sedentary positioning.   Answered all questions within PT scope of practice and addressed functional mobility concerns.    Patient left HOB elevated with all lines intact, call button in reach, RN notified, and daughter present.    GOALS:   Multidisciplinary Problems       Physical Therapy Goals          Problem: Physical Therapy    Goal Priority Disciplines Outcome Interventions   Physical Therapy Goal     PT, PT/OT Progressing    Description: Goals to be met by: 2024     Patient will increase functional independence with mobility by performin. Supine to sit with Moderate Assistance  2. Sit to supine with Moderate Assistance  3. Bed to chair transfer with Maximum Assistance using LRAD  4. Sitting at edge of bed x5 minutes with Stand-by Assistance  5. Lower extremity exercise program x15 reps per handout, with independence    Hospital Bed - Patient requires a hospital bed for positioning of the body in ways that are not feasible with an ordinary bed. The patient requires special  positioning for pain relief, limited mobility, and/or being unable to independently make changes in body position without the use of a hospital bed. Pillows and wedges will not be adequate for resolving these positional issues.     Wheelchair - Patient has a mobility limitation that significantly impairs their ability to participate in one or more mobility related activities of daily living in customary locations in the home. The mobility limitation cannot be sufficiently resolved by the use of a cane or walker. The use of a manual wheelchair will greatly improve the patient's ability to participate in MRADLs. The patient will use the wheelchair on a regular basis at home. They have expressed their willingness to use a manual wheelchair in the home, and have a caregiver who is available and willing to assist with the wheelchair if needed.    Bedside Commode - Patient has a mobility limitation that significantly impairs their ability to participate in one or more mobility related activities of daily living, including toileting. This deficit can be resolved by using a bedside commode. Patient demonstrates mobility limitations that will cause them to be confined to one room at home without bathroom access for up to 30 days. Using a bedside commode will greatly improve the patient's ability to participate in MRADLs.                           Time Tracking:     PT Received On: 10/10/24  PT Start Time: 1440     PT Stop Time: 1504  PT Total Time (min): 24 min     Billable Minutes: Therapeutic Activity 12 and Neuromuscular Re-education 12      Treatment Type: Treatment  PT/PTA: PT     Number of PTA visits since last PT visit: 0     10/10/2024

## 2024-10-10 NOTE — PLAN OF CARE
Problem: Skin Injury Risk Increased  Goal: Skin Health and Integrity  Outcome: Progressing     Problem: Stroke, Ischemic (Includes Transient Ischemic Attack)  Goal: Optimal Coping  Outcome: Progressing     Problem: Stroke, Ischemic (Includes Transient Ischemic Attack)  Goal: Optimal Cognitive Function  Outcome: Progressing     Problem: Stroke, Ischemic (Includes Transient Ischemic Attack)  Goal: Improved Communication Skills  Outcome: Progressing     Problem: Stroke, Ischemic (Includes Transient Ischemic Attack)  Goal: Optimal Functional Ability  Outcome: Progressing     Problem: Stroke, Ischemic (Includes Transient Ischemic Attack)  Goal: Safe and Effective Swallow  Outcome: Progressing     Problem: Fall Injury Risk  Goal: Absence of Fall and Fall-Related Injury  Outcome: Progressing     Problem: Adult Inpatient Plan of Care  Goal: Plan of Care Review  Outcome: Progressing     Problem: Adult Inpatient Plan of Care  Goal: Optimal Comfort and Wellbeing  Outcome: Progressing     Problem: Adult Inpatient Plan of Care  Goal: Readiness for Transition of Care  Outcome: Progressing  Patient has slept well this shift with no s/sx of distress, daughter remains at the bedside. She is alert to self and daughter and tell you her birth date but believes she is at her sisters house, not able to give year or today's date.  Has left side deficit noted, with left facial droop. Pureed diet and nectar thick liquids in place, explained to daughter she should not be using straws d/t risk for aspiration, voiced her understanding. SCD's in place with Stroke Booklet at the bedside. All questions/concerns addressed. POC continues.

## 2024-10-10 NOTE — PROGRESS NOTES
"Zackary Long - Neurosurgery ([facility])  Vascular Neurology  Comprehensive Stroke Center  Progress Note    Assessment/Plan:     * Acute arterial ischemic stroke, multifocal, multiple vascular territories  63 y/o F transferred from [facility] for higher level of care secondary to acute KIARA stroke on CTA.  Briefly,the patient presented to the OSH overnight for slurred speech started at 1:30 a.m. in the morning.  She has a history of his stroke with left-sided deficits but is normally able to transfer and do a little walking. This morning, she had complete hemiplegia of the left side. Telestroke consult performed, concerning for LVO.  NIH 13. CTA Stroke MP performed and  was read as "acute R KIARA infarct". No TNK due to AC use. Patient accepted to ED as transfer for emergent vascular neurology evaluation. No endovascular intervention.     History of atrial fibrillation per discussion with daughter. Was started on Eliquis on 10/6: hold given size of infarct, start ASA on 10/8. Transition again to Eliquis in 5-7 days. Given history of Afib, etiology likely embolic. Though not apparent on MRA, if R KIARA occlusion was proximal (supported by MRI) could account for R MCA-like picture.    -ASA 81mg-->Eliquis 5mg BID 10/10  -high intensity statin  -SBP<220 in acute setting  -echo done, w/o evidence of intra-cardiac shunt, no LAE, but does have history of Afib  -PT/OT  -Started on diet    Type 2 diabetes mellitus, with long-term current use of insulin  History of. Takes Levemir 35 units QHS at home. A1C 10%..     -goal 140-180 while admitted  -ordered MDSS  -On Levemir 18 units QHS   -accuchecks  -Endocrine consulted for A1c 10%    Atrial fibrillation  History of. Takes Eliquis 5mg BID and Coreg 6.25mg BID at home.    -hold Eliquis given size of stroke, may restarted in 5-7 days, ASA 81mg QD in the interim  -continue coreg at 3.125mg BID given SBP goal of permissive HTN in acute setting    Opioid use  History of. " Prescribed oxycodone 15mg at home. Family concerned that medication is being diverted by family member, unclear how often patient takes. Reportedly is prescribed for neck pain.     -oxycodone 5mg PRN BID for severe pain  -monitor for signs of withdrawal, as is unclear how often patient is taking    Left hemiplegia  See stroke    HTN (hypertension)  Home medication regimen as below  -lisinopril 40mg QD  -amlodipine 10mg QD    Restarted on Lisinopril 20 mg daily          10/7: gómez SINCLAIR exam stable, echo today, NPO aside from meds per speech, barium study tmrw  10/08/2024 DOTTIE, stable neuro exam, went for the Br study, and was started on a diet. Endocrine consulted for the A1c 10%, pending recs.  10/09/2024 DOTTIE, Stable neuro exam, will restart Lisinopril 20 mg (half of her dome dose). Seen by endocrine, with adjustment of her insulin doses.  10/10/2024 DOTTIE, no change in her neuro exam, will restart Eliquis today, BP is better controlled.    STROKE DOCUMENTATION   Acute Stroke Times   Last Known Normal Date:  (OSH)  Stroke Team Called Date: 10/06/24  Stroke Team Called Time:  (At OSH)  Stroke Team Arrival Date: 10/06/24  Stroke Team Arrival Time: 0608  CT Interpretation Time:  (CTA done around 4:20am per documents that came to C with patient)  Thrombolytic Therapy Recommended: No  Thrombectomy Recommended:  (Pending MRI Brain)    NIH Scale:  1a. Level of Consciousness: 0-->Alert, keenly responsive  1b. LOC Questions: 0-->Answers both questions correctly  1c. LOC Commands: 0-->Performs both tasks correctly  2. Best Gaze: 0-->Normal  3. Visual: 0-->No visual loss  4. Facial Palsy: 1-->Minor paralysis (flattened nasolabial fold, asymmetry on smiling)  5a. Motor Arm, Left: 4-->No movement  5b. Motor Arm, Right: 0-->No drift, limb holds 90 (or 45) degrees for full 10 secs  6a. Motor Leg, Left: 4-->No movement  6b. Motor Leg, Right: 0-->No drift, leg holds 30 degree position for full 5 secs  7. Limb Ataxia:  0-->Absent  8. Sensory: 0-->Normal, no sensory loss  9. Best Language: 1-->Mild-to-moderate aphasia, some obvious loss of fluency or facility of comprehension, without significant limitation on ideas expressed or form of expression. Reduction of speech and/or comprehension, however, makes conversation. . . (see row details)  10. Dysarthria: 1-->Mild-to-moderate dysarthria, patient slurs at least some words and, at worst, can be understood with some difficulty  11. Extinction and Inattention (formerly Neglect): 0-->No abnormality  Total (NIH Stroke Scale): 11       Modified Danny Score: 3  Diane Coma Scale:    ABCD2 Score:    [id number] Score:   HAS -BLED Score:   ICH Score:   Hunt & Shane Classification:      Hemorrhagic change of an Ischemic Stroke: Does this patient have an ischemic stroke with hemorrhagic changes? No     Neurologic Chief Complaint: infarcts in multiple vascular territories    Subjective:     Interval History: Patient is seen for follow-up neurological assessment and treatment recommendations: see HPI    HPI, Past Medical, Family, and Social History remains the same as documented in the initial encounter.     Review of Systems   Respiratory:  Negative for shortness of breath.    Cardiovascular:  Negative for chest pain.   Neurological:  Positive for weakness and numbness.     Scheduled Meds:   apixaban  5 mg Oral BID    aspirin  81 mg Oral Daily    atorvastatin  40 mg Oral Daily    carvediloL  3.125 mg Oral BID    famotidine  20 mg Oral BID    insulin aspart U-100  6 Units Subcutaneous TIDWM    [START ON 10/11/2024] insulin glargine U-100  24 Units Subcutaneous Daily    insulin glargine U-100  6 Units Subcutaneous Once    lisinopriL  20 mg Oral Daily     Continuous Infusions:  PRN Meds:  Current Facility-Administered Medications:     acetaminophen, 650 mg, Oral, Q8H PRN    bisacodyL, 10 mg, Rectal, Daily PRN    dextrose 10%, 12.5 g, Intravenous, PRN    dextrose 10%, 25 g, Intravenous, PRN     "glucagon (human recombinant), 1 mg, Intramuscular, PRN    hydrALAZINE, 10 mg, Intravenous, Q6H PRN    insulin aspart U-100, 0-10 Units, Subcutaneous, QID (AC + HS) PRN    ondansetron, 4 mg, Oral, Q12H PRN    oxyCODONE, 5 mg, Oral, Q12H PRN    sodium chloride 0.9%, 500 mL, Intravenous, PRN    sodium chloride 0.9%, 10 mL, Intravenous, PRN    Objective:     Vital Signs (Most Recent):  Temp: 98 °F (36.7 °C) (10/10/24 0800)  Pulse: 89 (10/10/24 0800)  Resp: 18 (10/10/24 0800)  BP: (!) 149/97 (10/10/24 0800)  SpO2: 97 % (10/10/24 0800)  BP Location: Left arm    Vital Signs Range (Last 24H):  Temp:  [97.2 °F (36.2 °C)-98.7 °F (37.1 °C)]   Pulse:  [78-95]   Resp:  [16-18]   BP: (139-180)/(80-97)   SpO2:  [87 %-99 %]   BP Location: Left arm       Physical Exam  Vitals and nursing note reviewed.   Constitutional:       Appearance: She is ill-appearing.   HENT:      Head: Normocephalic and atraumatic.   Cardiovascular:      Rate and Rhythm: Normal rate and regular rhythm.   Pulmonary:      Effort: Pulmonary effort is normal.   Musculoskeletal:      Right lower leg: No edema.      Left lower leg: No edema.   Neurological:      Mental Status: She is alert.          Neurological Exam:   LOC: alert  Attention Span: poor  Language: Expressive aphasia.  Articulation: Dysarthria  Orientation: Person, Place, Time   Visual Fields: Full  EOM (CN III, IV, VI): Full/intact  Pupils (CN II, III): PERRL  Facial Sensation (CN V): Facial sensory loss  Facial Movement (CN VII): Upper facial weakness on the Left  Motor: Arm left  Plegia 0/5  Leg left  Paresis: 1/5  Arm right  Normal 5/5  Leg right Normal 5/5  Cerebellum: No evidence of appendicular or axial ataxia  Sensation: Jason-anesthesia left    Laboratory:  CMP:   No results for input(s): "GLUCOSE", "CALCIUM", "ALBUMIN", "PROT", "NA", "K", "CO2", "CL", "BUN", "CREATININE", "ALKPHOS", "ALT", "AST", "BILITOT" in the last 24 hours.    CBC:   Recent Labs   Lab 10/08/24  0217   WBC 9.39   RBC " 4.82   HGB 16.1*   HCT 44.5      MCV 92   MCH 33.4*   MCHC 36.2*     Lipid Panel:   Recent Labs   Lab 10/06/24  0934   CHOL 178   LDLCALC 98.4   HDL 41   TRIG 193*     Hgb A1C:   Recent Labs   Lab 10/08/24  0217   HGBA1C 10.0*     TSH:   Recent Labs   Lab 10/06/24  0934   TSH 1.237       Diagnostic Results     Brain and Vessel Imaging   Impression: Acute infarcts involving the right anterior circulation and posterior circulation as detailed above without evidence of intracranial hemorrhage or mass effect.  Embolic disease to be considered.     Chronic left parietal infarct.     MRA demonstrates limited visualization of distal right KIARA branches in keeping with the territory of the acute infarct.  There is also narrowing of at least one proximal left M2 branch although no acute infarct in that territory.    Cardiac Imaging     Left Ventricle: The left ventricle is normal in size. Normal wall thickness. There is concentric remodeling. Normal wall motion. There is hyperdynamic systolic function with a visually estimated ejection fraction of greater than 70%. There is normal diastolic function.    Right Ventricle: Normal right ventricular cavity size. Wall thickness is normal. Systolic function is normal.    Left Atrium: Bubble study is likely negative, but full opacification of the RA is not well seen on these images which decreases sensitivity.    IVC/SVC: Normal venous pressure at 3 mmHg.    Nohelia Barlow MD  Comprehensive Stroke Center  Department of Vascular Neurology   [facility]

## 2024-10-10 NOTE — PROGRESS NOTES
"Zackary Long - Neurosurgery ([facility])  Endocrinology  Progress Note    Admit Date: 10/6/2024     Reason for Consult: Management of T2DM, Hyperglycemia       Diabetes diagnosis year:  Over 5 years    Home Diabetes Medications:    -liraglutide  -Levemir 35 units QHS    How often checking glucose at home? Not checking       Diabetes Complications include:     Hyperglycemia    Complicating diabetes co morbidities:   History of CVA      HPI:   Patient is a 64 y.o. female with  DM2 , HTN who was transferred from [facility] for higher level of care secondary to acute KIARA stroke on CTA. Briefly,the patient presented to the OSH overnight for slurred speech started at 1:30 a.m. in the morning. She has a history of his stroke with left-sided deficits but is normally able to transfer and do a little walking.  Endocrine consulted for BG management.      Interval HPI:   Overnight events: Remains on neuro floor. BG above goal ranges on current SQ insulin regimen. Diet Pureed (IDDSI Level 4) Nectar Thick (Mildly Thick)    Eatin%  Nausea: No  Hypoglycemia and intervention: No  Fever: No  TPN and/or TF: No  If yes, type of TF/TPN and rate: n/a    BP (!) 149/97 (BP Location: Left arm, Patient Position: Lying)   Pulse 89   Temp 98 °F (36.7 °C) (Oral)   Resp 18   Ht 5' 2" (1.575 m)   Wt 72.6 kg (160 lb 0.9 oz)   LMP  (LMP Unknown) Comment: Doesn't  have periods anymore  SpO2 97%   Breastfeeding No   BMI 29.27 kg/m²     Labs Reviewed and Include    No results for input(s): "GLU", "CALCIUM", "ALBUMIN", "PROT", "NA", "K", "CO2", "CL", "BUN", "CREATININE", "ALKPHOS", "ALT", "AST", "BILITOT" in the last 24 hours.  Lab Results   Component Value Date    WBC 9.39 10/08/2024    HGB 16.1 (H) 10/08/2024    HCT 44.5 10/08/2024    MCV 92 10/08/2024     10/08/2024     Recent Labs   Lab 10/06/24  0934   TSH 1.237     Lab Results   Component Value Date    HGBA1C 10.0 (H) 10/08/2024       Nutritional status:   Body mass " "index is 29.27 kg/m².  Lab Results   Component Value Date    ALBUMIN 3.6 10/08/2024    ALBUMIN 3.8 10/07/2024    ALBUMIN 3.7 10/06/2024     No results found for: "PREALBUMIN"    Estimated Creatinine Clearance: 52.3 mL/min (based on SCr of 1 mg/dL).    Accu-Checks  Recent Labs     10/08/24  0636 10/08/24  0833 10/08/24  1202 10/08/24  1543 10/08/24  2201 10/09/24  1224 10/09/24  1523 10/09/24  2132 10/10/24  0856   POCTGLUCOSE 270* 290* 256* 260* 244* 346* 295* 240* 291*       Current Medications and/or Treatments Impacting Glycemic Control  Immunotherapy:    Immunosuppressants       None          Steroids:   Hormones (From admission, onward)      None          Pressors:    Autonomic Drugs (From admission, onward)      None          Hyperglycemia/Diabetes Medications:   Antihyperglycemics (From admission, onward)      Start     Stop Route Frequency Ordered    10/11/24 0900  insulin glargine U-100 (Lantus) pen 24 Units         -- SubQ Daily 10/10/24 0923    10/10/24 1130  insulin aspart U-100 pen 6 Units         -- SubQ 3 times daily with meals 10/10/24 0922    10/09/24 0809  insulin aspart U-100 pen 0-10 Units         -- SubQ Before meals & nightly PRN 10/09/24 0710            ASSESSMENT and PLAN    Neuro  * Acute arterial ischemic stroke, multifocal, multiple vascular territories  Managed by primary team  Optimize BG control      Cardiac/Vascular  HTN (hypertension)  Managed by primary team  Optimize BG control      Endocrine  Type 2 diabetes mellitus, with long-term current use of insulin  BG goal: 140-180  T2DM admitted with acute KIARA stroke.   BG above goal.  Further increase regimen    - increase Lantus to 24 units daily (Fasting BG above goal ranges)   - increase NovoLog to 6 units with meals (prandial excursions noted; 0.5 u/kg dosing)   -continue Novolog Moderate dose correction with ISF 25 starting at 150    - POCT Glucose ac/hs  - Hypoglycemia protocol in place      ** Please notify Endocrine for any change " and/or advance in diet**  ** Please call Endocrine for any BG related issues **     Discharge Planning:   TBD. Please notify endocrinology prior to discharge.            Mattie Ramsey NP  Endocrinology  Zackary Long - Neurosurgery ([facility])

## 2024-10-10 NOTE — PLAN OF CARE
10/10/24 1339   Post-Acute Status   Post-Acute Authorization Placement   Post-Acute Placement Status Pending payor review/awaiting authorization (if required)   Discharge Plan   Discharge Plan A Rehab     SW spoke with Elin at [facility].  They have accepted the patient and submitted for auth with Елена.      Discharge Plan A and Plan B have been determined by review of patient's clinical status, future medical and therapeutic needs, and coverage/benefits for post-acute care in coordination with multidisciplinary team members.    Miladys Brown, THONG  [facility]  [phone]

## 2024-10-10 NOTE — SUBJECTIVE & OBJECTIVE
"Interval HPI:   Overnight events: Remains on neuro floor. BG above goal ranges on current SQ insulin regimen. Diet Pureed (IDDSI Level 4) Nectar Thick (Mildly Thick)    Eatin%  Nausea: No  Hypoglycemia and intervention: No  Fever: No  TPN and/or TF: No  If yes, type of TF/TPN and rate: n/a    BP (!) 149/97 (BP Location: Left arm, Patient Position: Lying)   Pulse 89   Temp 98 °F (36.7 °C) (Oral)   Resp 18   Ht 5' 2" (1.575 m)   Wt 72.6 kg (160 lb 0.9 oz)   LMP  (LMP Unknown) Comment: Doesn't  have periods anymore  SpO2 97%   Breastfeeding No   BMI 29.27 kg/m²     Labs Reviewed and Include    No results for input(s): "GLU", "CALCIUM", "ALBUMIN", "PROT", "NA", "K", "CO2", "CL", "BUN", "CREATININE", "ALKPHOS", "ALT", "AST", "BILITOT" in the last 24 hours.  Lab Results   Component Value Date    WBC 9.39 10/08/2024    HGB 16.1 (H) 10/08/2024    HCT 44.5 10/08/2024    MCV 92 10/08/2024     10/08/2024     Recent Labs   Lab 10/06/24  0934   TSH 1.237     Lab Results   Component Value Date    HGBA1C 10.0 (H) 10/08/2024       Nutritional status:   Body mass index is 29.27 kg/m².  Lab Results   Component Value Date    ALBUMIN 3.6 10/08/2024    ALBUMIN 3.8 10/07/2024    ALBUMIN 3.7 10/06/2024     No results found for: "PREALBUMIN"    Estimated Creatinine Clearance: 52.3 mL/min (based on SCr of 1 mg/dL).    Accu-Checks  Recent Labs     10/08/24  0636 10/08/24  0833 10/08/24  1202 10/08/24  1543 10/08/24  2201 10/09/24  1224 10/09/24  1523 10/09/24  2132 10/10/24  0856   POCTGLUCOSE 270* 290* 256* 260* 244* 346* 295* 240* 291*       Current Medications and/or Treatments Impacting Glycemic Control  Immunotherapy:    Immunosuppressants       None          Steroids:   Hormones (From admission, onward)      None          Pressors:    Autonomic Drugs (From admission, onward)      None          Hyperglycemia/Diabetes Medications:   Antihyperglycemics (From admission, onward)      Start     Stop Route Frequency " Ordered    10/11/24 0900  insulin glargine U-100 (Lantus) pen 24 Units         -- SubQ Daily 10/10/24 0923    10/10/24 1130  insulin aspart U-100 pen 6 Units         -- SubQ 3 times daily with meals 10/10/24 0922    10/09/24 0809  insulin aspart U-100 pen 0-10 Units         -- SubQ Before meals & nightly PRN 10/09/24 0710

## 2024-10-10 NOTE — ASSESSMENT & PLAN NOTE
"63 y/o F transferred from [facility] for higher level of care secondary to acute KIARA stroke on CTA.  Briefly,the patient presented to the OSH overnight for slurred speech started at 1:30 a.m. in the morning.  She has a history of his stroke with left-sided deficits but is normally able to transfer and do a little walking. This morning, she had complete hemiplegia of the left side. Telestroke consult performed, concerning for LVO.  NIH 13. CTA Stroke MP performed and  was read as "acute R KIARA infarct". No TNK due to AC use. Patient accepted to ED as transfer for emergent vascular neurology evaluation. No endovascular intervention.     History of atrial fibrillation per discussion with daughter. Was started on Eliquis on 10/6: hold given size of infarct, start ASA on 10/8. Transition again to Eliquis in 5-7 days. Given history of Afib, etiology likely embolic. Though not apparent on MRA, if R KIARA occlusion was proximal (supported by MRI) could account for R MCA-like picture.    -ASA 81mg-->Eliquis 5mg BID 10/10  -high intensity statin  -SBP<220 in acute setting  -echo done, w/o evidence of intra-cardiac shunt, no LAE, but does have history of Afib  -PT/OT  -Started on diet  " No

## 2024-10-10 NOTE — SUBJECTIVE & OBJECTIVE
Neurologic Chief Complaint: infarcts in multiple vascular territories    Subjective:     Interval History: Patient is seen for follow-up neurological assessment and treatment recommendations: see HPI    HPI, Past Medical, Family, and Social History remains the same as documented in the initial encounter.     Review of Systems   Respiratory:  Negative for shortness of breath.    Cardiovascular:  Negative for chest pain.   Neurological:  Positive for weakness and numbness.     Scheduled Meds:   apixaban  5 mg Oral BID    aspirin  81 mg Oral Daily    atorvastatin  40 mg Oral Daily    carvediloL  3.125 mg Oral BID    famotidine  20 mg Oral BID    insulin aspart U-100  6 Units Subcutaneous TIDWM    [START ON 10/11/2024] insulin glargine U-100  24 Units Subcutaneous Daily    insulin glargine U-100  6 Units Subcutaneous Once    lisinopriL  20 mg Oral Daily     Continuous Infusions:  PRN Meds:  Current Facility-Administered Medications:     acetaminophen, 650 mg, Oral, Q8H PRN    bisacodyL, 10 mg, Rectal, Daily PRN    dextrose 10%, 12.5 g, Intravenous, PRN    dextrose 10%, 25 g, Intravenous, PRN    glucagon (human recombinant), 1 mg, Intramuscular, PRN    hydrALAZINE, 10 mg, Intravenous, Q6H PRN    insulin aspart U-100, 0-10 Units, Subcutaneous, QID (AC + HS) PRN    ondansetron, 4 mg, Oral, Q12H PRN    oxyCODONE, 5 mg, Oral, Q12H PRN    sodium chloride 0.9%, 500 mL, Intravenous, PRN    sodium chloride 0.9%, 10 mL, Intravenous, PRN    Objective:     Vital Signs (Most Recent):  Temp: 98 °F (36.7 °C) (10/10/24 0800)  Pulse: 89 (10/10/24 0800)  Resp: 18 (10/10/24 0800)  BP: (!) 149/97 (10/10/24 0800)  SpO2: 97 % (10/10/24 0800)  BP Location: Left arm    Vital Signs Range (Last 24H):  Temp:  [97.2 °F (36.2 °C)-98.7 °F (37.1 °C)]   Pulse:  [78-95]   Resp:  [16-18]   BP: (139-180)/(80-97)   SpO2:  [87 %-99 %]   BP Location: Left arm       Physical Exam  Vitals and nursing note reviewed.   Constitutional:       Appearance: She is  "ill-appearing.   HENT:      Head: Normocephalic and atraumatic.   Cardiovascular:      Rate and Rhythm: Normal rate and regular rhythm.   Pulmonary:      Effort: Pulmonary effort is normal.   Musculoskeletal:      Right lower leg: No edema.      Left lower leg: No edema.   Neurological:      Mental Status: She is alert.          Neurological Exam:   LOC: alert  Attention Span: poor  Language: Expressive aphasia.  Articulation: Dysarthria  Orientation: Person, Place, Time   Visual Fields: Full  EOM (CN III, IV, VI): Full/intact  Pupils (CN II, III): PERRL  Facial Sensation (CN V): Facial sensory loss  Facial Movement (CN VII): Upper facial weakness on the Left  Motor: Arm left  Plegia 0/5  Leg left  Paresis: 1/5  Arm right  Normal 5/5  Leg right Normal 5/5  Cerebellum: No evidence of appendicular or axial ataxia  Sensation: Jason-anesthesia left    Laboratory:  CMP:   No results for input(s): "GLUCOSE", "CALCIUM", "ALBUMIN", "PROT", "NA", "K", "CO2", "CL", "BUN", "CREATININE", "ALKPHOS", "ALT", "AST", "BILITOT" in the last 24 hours.    CBC:   Recent Labs   Lab 10/08/24  0217   WBC 9.39   RBC 4.82   HGB 16.1*   HCT 44.5      MCV 92   MCH 33.4*   MCHC 36.2*     Lipid Panel:   Recent Labs   Lab 10/06/24  0934   CHOL 178   LDLCALC 98.4   HDL 41   TRIG 193*     Hgb A1C:   Recent Labs   Lab 10/08/24  0217   HGBA1C 10.0*     TSH:   Recent Labs   Lab 10/06/24  0934   TSH 1.237       Diagnostic Results     Brain and Vessel Imaging   Impression: Acute infarcts involving the right anterior circulation and posterior circulation as detailed above without evidence of intracranial hemorrhage or mass effect.  Embolic disease to be considered.     Chronic left parietal infarct.     MRA demonstrates limited visualization of distal right KIARA branches in keeping with the territory of the acute infarct.  There is also narrowing of at least one proximal left M2 branch although no acute infarct in that territory.    Cardiac Imaging "     Left Ventricle: The left ventricle is normal in size. Normal wall thickness. There is concentric remodeling. Normal wall motion. There is hyperdynamic systolic function with a visually estimated ejection fraction of greater than 70%. There is normal diastolic function.    Right Ventricle: Normal right ventricular cavity size. Wall thickness is normal. Systolic function is normal.    Left Atrium: Bubble study is likely negative, but full opacification of the RA is not well seen on these images which decreases sensitivity.    IVC/SVC: Normal venous pressure at 3 mmHg.

## 2024-10-11 PROBLEM — N17.9 AKI (ACUTE KIDNEY INJURY): Status: ACTIVE | Noted: 2024-10-11

## 2024-10-11 LAB
ALBUMIN SERPL BCP-MCNC: 3.3 G/DL (ref 3.5–5.2)
ALP SERPL-CCNC: 87 U/L (ref 55–135)
ALT SERPL W/O P-5'-P-CCNC: 15 U/L (ref 10–44)
ANION GAP SERPL CALC-SCNC: 13 MMOL/L (ref 8–16)
AST SERPL-CCNC: 24 U/L (ref 10–40)
BASOPHILS # BLD AUTO: 0.05 K/UL (ref 0–0.2)
BASOPHILS NFR BLD: 0.4 % (ref 0–1.9)
BILIRUB SERPL-MCNC: 0.6 MG/DL (ref 0.1–1)
BUN SERPL-MCNC: 62 MG/DL (ref 8–23)
CALCIUM SERPL-MCNC: 9.6 MG/DL (ref 8.7–10.5)
CHLORIDE SERPL-SCNC: 99 MMOL/L (ref 95–110)
CO2 SERPL-SCNC: 23 MMOL/L (ref 23–29)
CREAT SERPL-MCNC: 1.6 MG/DL (ref 0.5–1.4)
DIFFERENTIAL METHOD BLD: ABNORMAL
EOSINOPHIL # BLD AUTO: 0.1 K/UL (ref 0–0.5)
EOSINOPHIL NFR BLD: 0.5 % (ref 0–8)
ERYTHROCYTE [DISTWIDTH] IN BLOOD BY AUTOMATED COUNT: 13.1 % (ref 11.5–14.5)
EST. GFR  (NO RACE VARIABLE): 35.6 ML/MIN/1.73 M^2
GLUCOSE SERPL-MCNC: 280 MG/DL (ref 70–110)
HCT VFR BLD AUTO: 52.4 % (ref 37–48.5)
HGB BLD-MCNC: 17.3 G/DL (ref 12–16)
IMM GRANULOCYTES # BLD AUTO: 0.04 K/UL (ref 0–0.04)
IMM GRANULOCYTES NFR BLD AUTO: 0.3 % (ref 0–0.5)
LYMPHOCYTES # BLD AUTO: 1.7 K/UL (ref 1–4.8)
LYMPHOCYTES NFR BLD: 14.4 % (ref 18–48)
MCH RBC QN AUTO: 32.6 PG (ref 27–31)
MCHC RBC AUTO-ENTMCNC: 33 G/DL (ref 32–36)
MCV RBC AUTO: 99 FL (ref 82–98)
MONOCYTES # BLD AUTO: 1.1 K/UL (ref 0.3–1)
MONOCYTES NFR BLD: 9.5 % (ref 4–15)
NEUTROPHILS # BLD AUTO: 8.6 K/UL (ref 1.8–7.7)
NEUTROPHILS NFR BLD: 74.9 % (ref 38–73)
NRBC BLD-RTO: 0 /100 WBC
PLATELET # BLD AUTO: 265 K/UL (ref 150–450)
PMV BLD AUTO: 10.7 FL (ref 9.2–12.9)
POCT GLUCOSE: 135 MG/DL (ref 70–110)
POCT GLUCOSE: 195 MG/DL (ref 70–110)
POCT GLUCOSE: 209 MG/DL (ref 70–110)
POCT GLUCOSE: 285 MG/DL (ref 70–110)
POTASSIUM SERPL-SCNC: 2.6 MMOL/L (ref 3.5–5.1)
POTASSIUM SERPL-SCNC: 2.7 MMOL/L (ref 3.5–5.1)
PROT SERPL-MCNC: 7 G/DL (ref 6–8.4)
RBC # BLD AUTO: 5.3 M/UL (ref 4–5.4)
SODIUM SERPL-SCNC: 135 MMOL/L (ref 136–145)
WBC # BLD AUTO: 11.48 K/UL (ref 3.9–12.7)

## 2024-10-11 PROCEDURE — 63600175 PHARM REV CODE 636 W HCPCS: Mod: UD

## 2024-10-11 PROCEDURE — 99232 SBSQ HOSP IP/OBS MODERATE 35: CPT | Mod: ,,, | Performed by: NURSE PRACTITIONER

## 2024-10-11 PROCEDURE — 36415 COLL VENOUS BLD VENIPUNCTURE: CPT

## 2024-10-11 PROCEDURE — 97530 THERAPEUTIC ACTIVITIES: CPT

## 2024-10-11 PROCEDURE — 92526 ORAL FUNCTION THERAPY: CPT

## 2024-10-11 PROCEDURE — 99233 SBSQ HOSP IP/OBS HIGH 50: CPT | Mod: GC,,, | Performed by: PSYCHIATRY & NEUROLOGY

## 2024-10-11 PROCEDURE — 25000003 PHARM REV CODE 250

## 2024-10-11 PROCEDURE — 11000001 HC ACUTE MED/SURG PRIVATE ROOM

## 2024-10-11 PROCEDURE — 97535 SELF CARE MNGMENT TRAINING: CPT

## 2024-10-11 PROCEDURE — 95816 EEG AWAKE AND DROWSY: CPT

## 2024-10-11 PROCEDURE — 25000003 PHARM REV CODE 250: Performed by: PHYSICIAN ASSISTANT

## 2024-10-11 PROCEDURE — 25000003 PHARM REV CODE 250: Performed by: NURSE PRACTITIONER

## 2024-10-11 PROCEDURE — 85025 COMPLETE CBC W/AUTO DIFF WBC: CPT

## 2024-10-11 PROCEDURE — 95816 EEG AWAKE AND DROWSY: CPT | Mod: 26,,, | Performed by: PSYCHIATRY & NEUROLOGY

## 2024-10-11 PROCEDURE — 80053 COMPREHEN METABOLIC PANEL: CPT

## 2024-10-11 PROCEDURE — 84132 ASSAY OF SERUM POTASSIUM: CPT

## 2024-10-11 RX ORDER — SODIUM CHLORIDE 9 MG/ML
INJECTION, SOLUTION INTRAVENOUS CONTINUOUS
Status: ACTIVE | OUTPATIENT
Start: 2024-10-11 | End: 2024-10-12

## 2024-10-11 RX ORDER — INSULIN ASPART 100 [IU]/ML
10 INJECTION, SOLUTION INTRAVENOUS; SUBCUTANEOUS
Status: DISCONTINUED | OUTPATIENT
Start: 2024-10-11 | End: 2024-10-13

## 2024-10-11 RX ORDER — INSULIN ASPART 100 [IU]/ML
8 INJECTION, SOLUTION INTRAVENOUS; SUBCUTANEOUS
Status: DISCONTINUED | OUTPATIENT
Start: 2024-10-11 | End: 2024-10-11

## 2024-10-11 RX ORDER — INSULIN GLARGINE 100 [IU]/ML
30 INJECTION, SOLUTION SUBCUTANEOUS DAILY
Status: DISCONTINUED | OUTPATIENT
Start: 2024-10-12 | End: 2024-10-14

## 2024-10-11 RX ORDER — POTASSIUM CHLORIDE 7.45 MG/ML
10 INJECTION INTRAVENOUS
Status: DISPENSED | OUTPATIENT
Start: 2024-10-11 | End: 2024-10-11

## 2024-10-11 RX ADMIN — CARVEDILOL 3.12 MG: 3.12 TABLET, FILM COATED ORAL at 08:10

## 2024-10-11 RX ADMIN — CARVEDILOL 3.12 MG: 3.12 TABLET, FILM COATED ORAL at 09:10

## 2024-10-11 RX ADMIN — FAMOTIDINE 20 MG: 20 TABLET ORAL at 09:10

## 2024-10-11 RX ADMIN — LISINOPRIL 20 MG: 20 TABLET ORAL at 08:10

## 2024-10-11 RX ADMIN — POTASSIUM BICARBONATE 60 MEQ: 391 TABLET, EFFERVESCENT ORAL at 11:10

## 2024-10-11 RX ADMIN — SODIUM CHLORIDE: 9 INJECTION, SOLUTION INTRAVENOUS at 04:10

## 2024-10-11 RX ADMIN — ATORVASTATIN CALCIUM 40 MG: 40 TABLET, FILM COATED ORAL at 08:10

## 2024-10-11 RX ADMIN — INSULIN GLARGINE 24 UNITS: 100 INJECTION, SOLUTION SUBCUTANEOUS at 08:10

## 2024-10-11 RX ADMIN — INSULIN ASPART 2 UNITS: 100 INJECTION, SOLUTION INTRAVENOUS; SUBCUTANEOUS at 08:10

## 2024-10-11 RX ADMIN — APIXABAN 5 MG: 5 TABLET, FILM COATED ORAL at 08:10

## 2024-10-11 RX ADMIN — POTASSIUM CHLORIDE 10 MEQ: 7.46 INJECTION, SOLUTION INTRAVENOUS at 04:10

## 2024-10-11 RX ADMIN — INSULIN ASPART 6 UNITS: 100 INJECTION, SOLUTION INTRAVENOUS; SUBCUTANEOUS at 08:10

## 2024-10-11 RX ADMIN — INSULIN ASPART 8 UNITS: 100 INJECTION, SOLUTION INTRAVENOUS; SUBCUTANEOUS at 11:10

## 2024-10-11 RX ADMIN — INSULIN ASPART 4 UNITS: 100 INJECTION, SOLUTION INTRAVENOUS; SUBCUTANEOUS at 06:10

## 2024-10-11 RX ADMIN — FAMOTIDINE 20 MG: 20 TABLET ORAL at 08:10

## 2024-10-11 RX ADMIN — INSULIN ASPART 6 UNITS: 100 INJECTION, SOLUTION INTRAVENOUS; SUBCUTANEOUS at 12:10

## 2024-10-11 RX ADMIN — POTASSIUM BICARBONATE 60 MEQ: 391 TABLET, EFFERVESCENT ORAL at 08:10

## 2024-10-11 RX ADMIN — ASPIRIN 81 MG CHEWABLE TABLET 81 MG: 81 TABLET CHEWABLE at 08:10

## 2024-10-11 RX ADMIN — APIXABAN 5 MG: 5 TABLET, FILM COATED ORAL at 09:10

## 2024-10-11 RX ADMIN — BISACODYL 10 MG: 10 SUPPOSITORY RECTAL at 05:10

## 2024-10-11 RX ADMIN — INSULIN ASPART 10 UNITS: 100 INJECTION, SOLUTION INTRAVENOUS; SUBCUTANEOUS at 06:10

## 2024-10-11 NOTE — ASSESSMENT & PLAN NOTE
Likely pre-renal KAILYN, due to decreased PO intake  - Cr increased from 1 -> 1.6  - BUN 22 -> 62  - Start NS 75 mL/hr for 10 hours  - Strict input/output  - Avoid any nephrotoxic medication  - Monitory with daily CMP

## 2024-10-11 NOTE — PLAN OF CARE
Problem: Skin Injury Risk Increased  Goal: Skin Health and Integrity  Outcome: Progressing     Problem: Stroke, Ischemic (Includes Transient Ischemic Attack)  Goal: Optimal Coping  Outcome: Progressing     Problem: Stroke, Ischemic (Includes Transient Ischemic Attack)  Goal: Effective Bowel Elimination  Outcome: Progressing     Problem: Stroke, Ischemic (Includes Transient Ischemic Attack)  Goal: Improved Communication Skills  Outcome: Progressing     Problem: Stroke, Ischemic (Includes Transient Ischemic Attack)  Goal: Optimal Functional Ability  Outcome: Progressing   Patient slept throughout the night, no distress observed.

## 2024-10-11 NOTE — PT/OT/SLP PROGRESS
Physical Therapy Treatment    Patient Name: Tammy Multani   MRN: [id number]    Therapy tech utilized for session to maximize physical performance and safety  Recommendations:     Discharge Recommendations: High Intensity Therapy  Discharge Equipment Recommendations: hospital bed, lift device, wheelchair  Barriers to Discharge: Increased level of assist and Decreased caregiver support  Safest Mobility Level with Nursing: Bed Level ROM - Pt only safe for OOB mobility with therapy staff at this time    Assessment:     Tammy Multani is a 64 y.o. female admitted with a medical diagnosis of Acute arterial ischemic stroke, multifocal, multiple vascular territories. She presents with the following impairments/functional limitations: weakness, impaired endurance, impaired sensation, impaired self care skills, impaired functional mobility, gait instability, impaired balance, decreased safety awareness, decreased lower extremity function, decreased upper extremity function, impaired fine motor, impaired coordination, impaired cognition, visual deficits, abnormal tone, decreased ROM. Pt presenting with HOB elevated and agreeable to completing therapy session. Initial focus of session on clearance of cervical spine in preparation for Northwood-Hallpike testing due to pt's R upward rotational nystagmus noted yesterday. Northwood-Hallpike testing for R posterior canal found to be negative; therefore Epley maneuver not completed. Pt then found to be soiled with extensive BM, so toileting completed. Pt fatigued following bed mobility, so session ceased. Pt remains appropriate for high intensity therapy following discharge once medically stable. Pt would continue to benefit from skilled acute PT in order to address current deficits and progress functional mobility.     Rehab Prognosis: Good; patient continues to benefit from acute skilled PT services to address these deficits and reach maximum level of function.  Recent Surgery: * No  "surgery found *      Plan:     During this hospitalization, patient to be seen 4 x/week to address the identified rehab impairments via gait training, therapeutic activities, therapeutic exercises, neuromuscular re-education and progress toward the following goals:    Plan of Care Expires:  11/07/24    Subjective     Chief Complaint: None verbalized  Patient/Family Comments/Goals: "You came in here and pooped on me."  Pain/Comfort:  Pain Rating 1: 0/10    Objective:     Communicated with RN prior to session. Patient found HOB elevated with bed alarm, SCD, telemetry, PureWick upon PT entry to room.     General Precautions: Standard, aspiration, fall  Orthopedic Precautions: N/A  Braces: N/A    Functional Mobility:  Bed Mobility:    Rolling Left:  total assistance of 2 persons  Rolling Right: total assistance of 2 persons  Scooting: dependence    C-spine Clearing Test    Alar Ligament: negative   Sharp-Jim test: negative    Modified VAS (Vertebral Artery Screen), in sitting (rotation, then extension):   R: negative  L: negative     POSITIONAL CANAL TESTING (Looking for nystagmus)    Maurice Hallpike (posterior / CL anterior)   Right :  negative    AM-PAC 6 CLICK MOBILITY  Turning over in bed (including adjusting bedclothes, sheets and blankets)?: 1  Sitting down on and standing up from a chair with arms (e.g., wheelchair, bedside commode, etc.): 1  Moving from lying on back to sitting on the side of the bed?: 1  Moving to and from a bed to a chair (including a wheelchair)?: 1  Need to walk in hospital room?: 1  Climbing 3-5 steps with a railing?: 1  Basic Mobility Total Score: 6     Therapeutic Activities and Exercises:  Patient educated on role of acute care PT and PT POC, safety while in hospital including calling nurse for mobility, and call light usage  Pt educated on importance of maximal participation in therapy session in order to reduce negative effects of prolonged sedentary positioning.   Answered all " questions within PT scope of practice and addressed functional mobility concerns.  Pt with extensive BM during session. Pericare completed and chuckpads changed. RN notified.    Patient left HOB elevated with all lines intact, call button in reach, RN notified, and EEG staff and daughter present.    GOALS:   Multidisciplinary Problems       Physical Therapy Goals          Problem: Physical Therapy    Goal Priority Disciplines Outcome Interventions   Physical Therapy Goal     PT, PT/OT Progressing    Description: Goals to be met by: 2024     Patient will increase functional independence with mobility by performin. Supine to sit with Moderate Assistance  2. Sit to supine with Moderate Assistance  3. Bed to chair transfer with Maximum Assistance using LRAD  4. Sitting at edge of bed x5 minutes with Stand-by Assistance  5. Lower extremity exercise program x15 reps per handout, with independence    Hospital Bed - Patient requires a hospital bed for positioning of the body in ways that are not feasible with an ordinary bed. The patient requires special positioning for pain relief, limited mobility, and/or being unable to independently make changes in body position without the use of a hospital bed. Pillows and wedges will not be adequate for resolving these positional issues.     Wheelchair - Patient has a mobility limitation that significantly impairs their ability to participate in one or more mobility related activities of daily living in customary locations in the home. The mobility limitation cannot be sufficiently resolved by the use of a cane or walker. The use of a manual wheelchair will greatly improve the patient's ability to participate in MRADLs. The patient will use the wheelchair on a regular basis at home. They have expressed their willingness to use a manual wheelchair in the home, and have a caregiver who is available and willing to assist with the wheelchair if needed.                        Time Tracking:     PT Received On: 10/11/24  PT Start Time: 1336     PT Stop Time: 1400  PT Total Time (min): 24 min     Billable Minutes: Therapeutic Activity 24      Treatment Type: Treatment  PT/PTA: PT     Number of PTA visits since last PT visit: 0     10/11/2024

## 2024-10-11 NOTE — PT/OT/SLP PROGRESS
Speech Language Pathology      Tammy Multani  MRN: [id number]    Patient not seen today secondary to Medical team seeing pt at 11:04, and toileting x2 at 11:23 and 11:52. Will attempt to arrange for another SLP to attempt again this afternoon.

## 2024-10-11 NOTE — PT/OT/SLP PROGRESS
Occupational Therapy      Patient Name:  Tammy Multani   MRN:  [id number]    OT attempted this PM. EEG being performed upon therapist arrival. OT to attempt at next available date.     10/11/2024

## 2024-10-11 NOTE — ASSESSMENT & PLAN NOTE
"63 y/o F transferred from [facility] for higher level of care secondary to acute KIARA stroke on CTA.  Briefly,the patient presented to the OSH overnight for slurred speech started at 1:30 a.m. in the morning.  She has a history of his stroke with left-sided deficits but is normally able to transfer and do a little walking. This morning, she had complete hemiplegia of the left side. Telestroke consult performed, concerning for LVO.  NIH 13. CTA Stroke MP performed and  was read as "acute R KIARA infarct". No TNK due to AC use. Patient accepted to ED as transfer for emergent vascular neurology evaluation. No endovascular intervention.     History of atrial fibrillation per discussion with daughter. Was started on Eliquis on 10/6: hold given size of infarct, start ASA on 10/8. Transition again to Eliquis in 5-7 days. Given history of Afib, etiology likely embolic. Though not apparent on MRA, if R KIARA occlusion was proximal (supported by MRI) could account for R MCA-like picture.    -ASA 81mg-->Eliquis 5mg BID 10/10  -high intensity statin  -SBP<220 in acute setting  -echo done, w/o evidence of intra-cardiac shunt, no LAE, but does have history of Afib  -PT/OT  -Started on diet  "

## 2024-10-11 NOTE — SUBJECTIVE & OBJECTIVE
"Interval HPI:   Overnight events: Remains on neuro floor. BG above goal ranges on current SQ insulin regimen. Diet Pureed (IDDSI Level 4) Nectar Thick (Mildly Thick)    Eatin%  Nausea: No  Hypoglycemia and intervention: No  Fever: No  TPN and/or TF: No  If yes, type of TF/TPN and rate: n/a    /87   Pulse 83   Temp 97.5 °F (36.4 °C)   Resp 17   Ht 5' 2" (1.575 m)   Wt 72.6 kg (160 lb 0.9 oz)   LMP  (LMP Unknown) Comment: Doesn't  have periods anymore  SpO2 98%   Breastfeeding No   BMI 29.27 kg/m²     Labs Reviewed and Include    No results for input(s): "GLU", "CALCIUM", "ALBUMIN", "PROT", "NA", "K", "CO2", "CL", "BUN", "CREATININE", "ALKPHOS", "ALT", "AST", "BILITOT" in the last 24 hours.  Lab Results   Component Value Date    WBC 9.39 10/08/2024    HGB 16.1 (H) 10/08/2024    HCT 44.5 10/08/2024    MCV 92 10/08/2024     10/08/2024     Recent Labs   Lab 10/06/24  0934   TSH 1.237     Lab Results   Component Value Date    HGBA1C 10.0 (H) 10/08/2024       Nutritional status:   Body mass index is 29.27 kg/m².  Lab Results   Component Value Date    ALBUMIN 3.6 10/08/2024    ALBUMIN 3.8 10/07/2024    ALBUMIN 3.7 10/06/2024     No results found for: "PREALBUMIN"    Estimated Creatinine Clearance: 52.3 mL/min (based on SCr of 1 mg/dL).    Accu-Checks  Recent Labs     10/08/24  2201 10/09/24  1224 10/09/24  1523 10/09/24  2132 10/10/24  0856 10/10/24  1208 10/10/24  1550 10/10/24  2045 10/10/24  2215 10/11/24  0809   POCTGLUCOSE 244* 346* 295* 240* 291* 295* 293* 272* 289* 195*       Current Medications and/or Treatments Impacting Glycemic Control  Immunotherapy:    Immunosuppressants       None          Steroids:   Hormones (From admission, onward)      None          Pressors:    Autonomic Drugs (From admission, onward)      None          Hyperglycemia/Diabetes Medications:   Antihyperglycemics (From admission, onward)      Start     Stop Route Frequency Ordered    10/11/24 1130  insulin aspart " U-100 pen 8 Units         -- SubQ 3 times daily with meals 10/11/24 0905    10/11/24 0900  insulin glargine U-100 (Lantus) pen 24 Units         -- SubQ Daily 10/10/24 0923    10/09/24 0809  insulin aspart U-100 pen 0-10 Units         -- SubQ Before meals & nightly PRN 10/09/24 0710

## 2024-10-11 NOTE — PT/OT/SLP PROGRESS
"Speech Language Pathology Treatment    Patient Name:  Tammy Multani   MRN:  [id number]  Admitting Diagnosis: Acute arterial ischemic stroke, multifocal, multiple vascular territories    Recommendations:                 General Recommendations:  Dysphagia therapy and Cognitive-linguistic evaluation  Diet recommendations:  Puree Diet - IDDSI Level 4, Liquid Diet Level: Mildly thick liquids - IDDSI Level 2   Aspiration Precautions: 1 bite/sip at a time, Alternating bites/sips, Eliminate distractions, Feed only when awake/alert, Frequent oral care, HOB to 90 degrees, Meds crushed in puree, Monitor for s/s of aspiration, and Strict aspiration precautions   General Precautions: Standard, aspiration, fall, pureed diet  Communication strategies:  go to room if call light pushed                  Assessment:     Tammy Multani is a 64 y.o. female with an SLP diagnosis of Dysphagia, Cognitive-Linguistic Impairment and left neglect. SLP will continue to follow.     Subjective     SLP with two attempts to see the patient, however medical team at bedside requesting SLP to return. Upon third attempt, pt awake/alert and agreeable to SLP session. Daughter present for majority of session.     Pt stated, "you have something on your ear" as pt pointed to the SLP's earring.     Pain/Comfort:  Pain Rating 1: 0/10  Pain Rating Post-Intervention 1: 0/10    Respiratory Status: Room air    Objective:     Has the patient been evaluated by SLP for swallowing?   Yes  Keep patient NPO? No       Pt seen for ongoing dysphagia therapy. HOB elevated at the start of the session. Pt requested a soft drink prior to PO trials. SLP provided education re: recent MBSS, diet recs, and rationale for thickened liquids. Pt in agreement. Pt accepted PO trials of mildly (nectar) thick apple juice via straw sips (~2 oz.) and small bites of semi-solids (crackers crushed up in pudding) x2. Oral holding observed with all consistencies. Moderately prolonged " mastication and prolonged bolus formation noted with PO trials of semi solids. Pt displayed a delayed initiation of the pharyngeal swallow noted subjectively during palpation (~15 seconds with thickened liquids and ~30 seconds with semi-solids). SLP provided cues, large boluses, and dry spoon presentations to aid in improving the timeliness of the swallow. Post initial swallow, pt with majority of bolus remaining in the anterior portion of her oral cavity. Cued lingual sweeps and multiple liquid washes appeared effective in clearing oral residue. No overt signs of airway compromise noted with mildly (nectar) thick or semi-solids. With PO trials of semi-solids, pt took ~4 minutes and 15 seconds to complete oral and pharyngeal stages of the swallow (oral acceptance/labial seal to clearance of residue). Given moderate oral dysphagia deficits, safest means of PO is a puree/mildly (nectar) thick diet.  SLP provided education to the pt regarding ongoing diet recs, mbss results, thickening liquids, signs of airway compromise, aspiration precautions, and SLP POC. Pt in agreement. Upon exiting the room, pt remained upright in bed with no report of pain. RN at bedside.     Goals:   Multidisciplinary Problems       SLP Goals          Problem: SLP    Goal Priority Disciplines Outcome   SLP Goal     SLP    Description: Speech Language Pathology Goals  Goals expected to be met by 10/14:  1. Pt will participate in Modified Barium Swallow Study to r/o aspiration and determine safest oral diet.   2. Pt will participate in speech/language/cognitive evaluation to establish further tx plan.                                Plan:     Patient to be seen:  4 x/week   Plan of Care expires:  11/06/24  Plan of Care reviewed with:  patient, daughter   SLP Follow-Up:  Yes       Discharge recommendations:  High Intensity Therapy     Time Tracking:     SLP Treatment Date:   10/11/24  Speech Start Time:  1551  Speech Stop Time:  1611     Speech  Total Time (min):  20 min    Billable Minutes: Treatment Swallowing Dysfunction 12 and Self Care/Home Management Training 8    10/11/2024

## 2024-10-11 NOTE — ASSESSMENT & PLAN NOTE
BG goal: 140-180  T2DM admitted with acute KIARA stroke.   BG above goal.  Further increase regimen    - increase Lantus to 30 units daily (Fasting BG above goal ranges) 20% dose increase  - increase NovoLog to 10 units with meals (basal/prandial match)   -continue Novolog Moderate dose correction with ISF 25 starting at 150    - POCT Glucose ac/hs  - Hypoglycemia protocol in place      ** Please notify Endocrine for any change and/or advance in diet**  ** Please call Endocrine for any BG related issues **     Discharge Planning:   TBD. Please notify endocrinology prior to discharge.

## 2024-10-11 NOTE — ASSESSMENT & PLAN NOTE
History of. Takes Eliquis 5mg BID and Coreg 6.25mg BID at home.    -Initially Eliquis held given size of stroke; ASA 81mg QD in the interim. Eliquis was restarted on 10/10  -continue coreg at 3.125mg BID given SBP goal of permissive HTN in acute setting

## 2024-10-11 NOTE — PROGRESS NOTES
"Zackary Long - Neurosurgery ([facility])  Vascular Neurology  Comprehensive Stroke Center  Progress Note    Assessment/Plan:     * Acute arterial ischemic stroke, multifocal, multiple vascular territories  63 y/o F transferred from [facility] for higher level of care secondary to acute KIARA stroke on CTA.  Briefly,the patient presented to the OSH overnight for slurred speech started at 1:30 a.m. in the morning.  She has a history of his stroke with left-sided deficits but is normally able to transfer and do a little walking. This morning, she had complete hemiplegia of the left side. Telestroke consult performed, concerning for LVO.  NIH 13. CTA Stroke MP performed and  was read as "acute R KIARA infarct". No TNK due to AC use. Patient accepted to ED as transfer for emergent vascular neurology evaluation. No endovascular intervention.     History of atrial fibrillation per discussion with daughter. Was started on Eliquis on 10/6: hold given size of infarct, start ASA on 10/8. Transition again to Eliquis in 5-7 days. Given history of Afib, etiology likely embolic. Though not apparent on MRA, if R KIARA occlusion was proximal (supported by MRI) could account for R MCA-like picture.    -ASA 81mg-->Eliquis 5mg BID 10/10  -high intensity statin  -SBP<220 in acute setting  -echo done, w/o evidence of intra-cardiac shunt, no LAE, but does have history of Afib  -PT/OT  -Started on diet    KAILYN (acute kidney injury)  Likely pre-renal KAILYN, due to decreased PO intake  - Cr increased from 1 -> 1.6  - BUN 22 -> 62  - Start NS 75 mL/hr for 10 hours  - Strict input/output  - Avoid any nephrotoxic medication  - Monitory with daily CMP    Type 2 diabetes mellitus, with long-term current use of insulin  History of. Takes Levemir 35 units QHS at home. A1C 10%.    -goal 140-180 while admitted  -ordered MDSS  -On Levemir 30 units QHS & Aspart 10 units with meals  -accuchecks  -Endocrine consulted for A1c 10%; following, appreciate recs "     Atrial fibrillation  History of. Takes Eliquis 5mg BID and Coreg 6.25mg BID at home.    -Initially Eliquis held given size of stroke; ASA 81mg QD in the interim. Eliquis was restarted on 10/10  -continue coreg at 3.125mg BID given SBP goal of permissive HTN in acute setting    Opioid use  History of. Prescribed oxycodone 15mg at home. Family concerned that medication is being diverted by family member, unclear how often patient takes. Reportedly is prescribed for neck pain.     -oxycodone 5mg PRN BID for severe pain  -monitor for signs of withdrawal, as is unclear how often patient is taking    Left hemiplegia  See stroke    Hypokalemia  - K 2.7  Replace   - Follow up K level 8 PM  - Monitor with daily CMP        HTN (hypertension)  Home medication regimen as below  -lisinopril 40mg QD  -amlodipine 10mg QD    Restarted on Lisinopril 20 mg daily          10/7: gómez SINCLAIR exam stable, echo today, NPO aside from meds per speech, barium study tmrw  10/08/2024 DOTTIE, stable neuro exam, went for the Br study, and was started on a diet. Endocrine consulted for the A1c 10%, pending recs.  10/09/2024 DOTTIE, Stable neuro exam, will restart Lisinopril 20 mg (half of her dome dose). Seen by endocrine, with adjustment of her insulin doses.  10/10/2024 DOTTIE, no change in her neuro exam, will restart Eliquis today, BP is better controlled.  10/11/2024 DOTTIE, stable neuro exam, still decreased PO intake. Patient gets some episodes of in attention; EEG was ordered. Endocrine adjusted her insulins. Accepted at [facility] in [place].        STROKE DOCUMENTATION   Acute Stroke Times   Last Known Normal Date:  (OSH)  Stroke Team Called Date: 10/06/24  Stroke Team Called Time:  (At OSH)  Stroke Team Arrival Date: 10/06/24  Stroke Team Arrival Time: 0608  CT Interpretation Time:  (CTA done around 4:20am per documents that came to [facility] with patient)  Thrombolytic Therapy Recommended: No  Thrombectomy Recommended:  (Pending MRI  Brain)    NIH Scale:  1a. Level of Consciousness: 0-->Alert, keenly responsive  1b. LOC Questions: 0-->Answers both questions correctly  1c. LOC Commands: 0-->Performs both tasks correctly  2. Best Gaze: 0-->Normal  3. Visual: 0-->No visual loss  4. Facial Palsy: 1-->Minor paralysis (flattened nasolabial fold, asymmetry on smiling)  5a. Motor Arm, Left: 4-->No movement  5b. Motor Arm, Right: 0-->No drift, limb holds 90 (or 45) degrees for full 10 secs  6a. Motor Leg, Left: 4-->No movement  6b. Motor Leg, Right: 0-->No drift, leg holds 30 degree position for full 5 secs  7. Limb Ataxia: 0-->Absent  8. Sensory: 0-->Normal, no sensory loss  9. Best Language: 1-->Mild-to-moderate aphasia, some obvious loss of fluency or facility of comprehension, without significant limitation on ideas expressed or form of expression. Reduction of speech and/or comprehension, however, makes conversation. . . (see row details)  10. Dysarthria: 1-->Mild-to-moderate dysarthria, patient slurs at least some words and, at worst, can be understood with some difficulty  11. Extinction and Inattention (formerly Neglect): 0-->No abnormality  Total (NIH Stroke Scale): 11       Modified Mathiston Score: 3  Scott Coma Scale:    ABCD2 Score:    [id number] Score:   HAS -BLED Score:   ICH Score:   Hunt & Shane Classification:      Hemorrhagic change of an Ischemic Stroke: Does this patient have an ischemic stroke with hemorrhagic changes? No     Neurologic Chief Complaint: infarcts in multiple vascular territories    Subjective:     Interval History: Patient is seen for follow-up neurological assessment and treatment recommendations: see HPI    HPI, Past Medical, Family, and Social History remains the same as documented in the initial encounter.     Review of Systems   Constitutional:  Negative for fatigue.   Respiratory:  Negative for shortness of breath.    Cardiovascular:  Negative for chest pain.   Neurological:  Positive for weakness and numbness.      Scheduled Meds:   apixaban  5 mg Oral BID    atorvastatin  40 mg Oral Daily    carvediloL  3.125 mg Oral BID    famotidine  20 mg Oral BID    insulin aspart U-100  10 Units Subcutaneous TIDWM    [START ON 10/12/2024] insulin glargine U-100  30 Units Subcutaneous Daily    potassium chloride  10 mEq Intravenous Q1H     Continuous Infusions:   0.9% NaCl   Intravenous Continuous         PRN Meds:  Current Facility-Administered Medications:     acetaminophen, 650 mg, Oral, Q8H PRN    bisacodyL, 10 mg, Rectal, Daily PRN    dextrose 10%, 12.5 g, Intravenous, PRN    dextrose 10%, 25 g, Intravenous, PRN    glucagon (human recombinant), 1 mg, Intramuscular, PRN    hydrALAZINE, 10 mg, Intravenous, Q6H PRN    insulin aspart U-100, 0-10 Units, Subcutaneous, QID (AC + HS) PRN    ondansetron, 4 mg, Oral, Q12H PRN    oxyCODONE, 5 mg, Oral, Q12H PRN    sodium chloride 0.9%, 500 mL, Intravenous, PRN    sodium chloride 0.9%, 10 mL, Intravenous, PRN    Objective:     Vital Signs (Most Recent):  Temp: 98.1 °F (36.7 °C) (10/11/24 1211)  Pulse: 100 (10/11/24 1503)  Resp: 18 (10/11/24 1211)  BP: 106/63 (10/11/24 1211)  SpO2: 96 % (10/11/24 1211)  BP Location: Left arm    Vital Signs Range (Last 24H):  Temp:  [97.5 °F (36.4 °C)-98.4 °F (36.9 °C)]   Pulse:  []   Resp:  [16-20]   BP: (106-156)/(63-87)   SpO2:  [94 %-98 %]   BP Location: Left arm       Physical Exam  Vitals and nursing note reviewed.   Constitutional:       Appearance: She is ill-appearing.   HENT:      Head: Normocephalic and atraumatic.   Cardiovascular:      Rate and Rhythm: Normal rate and regular rhythm.   Pulmonary:      Effort: Pulmonary effort is normal.   Musculoskeletal:      Right lower leg: No edema.      Left lower leg: No edema.   Neurological:      Mental Status: She is alert.          Neurological Exam:   LOC: alert  Attention Span: poor  Language: Expressive aphasia.  Articulation: Dysarthria  Orientation: Person, Place, Time   Visual Fields:  Full  EOM (CN III, IV, VI): Full/intact  Pupils (CN II, III): PERRL  Facial Sensation (CN V): Facial sensory loss  Facial Movement (CN VII): Upper facial weakness on the Left  Motor: Arm left  Plegia 0/5  Leg left  Paresis: 1/5  Arm right  Normal 5/5  Leg right Normal 5/5  Cerebellum: No evidence of appendicular or axial ataxia  Sensation: Jason-anesthesia left    Laboratory:  CMP:   Recent Labs   Lab 10/11/24  1301   CALCIUM 9.6   ALBUMIN 3.3*   PROT 7.0   *   K 2.7*   CO2 23   CL 99   BUN 62*   CREATININE 1.6*   ALKPHOS 87   ALT 15   AST 24   BILITOT 0.6       CBC:   Recent Labs   Lab 10/11/24  1302   WBC 11.48   RBC 5.30   HGB 17.3*   HCT 52.4*      MCV 99*   MCH 32.6*   MCHC 33.0     Lipid Panel:   Recent Labs   Lab 10/06/24  0934   CHOL 178   LDLCALC 98.4   HDL 41   TRIG 193*     Hgb A1C:   Recent Labs   Lab 10/08/24  0217   HGBA1C 10.0*     TSH:   Recent Labs   Lab 10/06/24  0934   TSH 1.237       Diagnostic Results     Brain and Vessel Imaging   Impression: Acute infarcts involving the right anterior circulation and posterior circulation as detailed above without evidence of intracranial hemorrhage or mass effect.  Embolic disease to be considered.     Chronic left parietal infarct.     MRA demonstrates limited visualization of distal right KIARA branches in keeping with the territory of the acute infarct.  There is also narrowing of at least one proximal left M2 branch although no acute infarct in that territory.    Cardiac Imaging     Left Ventricle: The left ventricle is normal in size. Normal wall thickness. There is concentric remodeling. Normal wall motion. There is hyperdynamic systolic function with a visually estimated ejection fraction of greater than 70%. There is normal diastolic function.    Right Ventricle: Normal right ventricular cavity size. Wall thickness is normal. Systolic function is normal.    Left Atrium: Bubble study is likely negative, but full opacification of the RA is not  well seen on these images which decreases sensitivity.    IVC/SVC: Normal venous pressure at 3 mmHg.    Nohelia Barlow MD  [facility]  Department of Vascular Neurology   [facility])

## 2024-10-11 NOTE — PLAN OF CARE
10/11/24 1329   Post-Acute Status   Post-Acute Placement Status Set-up Complete/Auth obtained   Discharge Delays (!) Post-Acute Set-up   Discharge Plan   Discharge Plan A Rehab     Patient has been accepted at [facility] in [place], [place] and auth has been obtained.  Bed will be available on Monday.  They will need a covid test, d/c summary, vitals, orders and labs at that time.  They will contact family to let them know.      Miladys Brown, THONG  [facility]  [phone]

## 2024-10-11 NOTE — SUBJECTIVE & OBJECTIVE
Neurologic Chief Complaint: infarcts in multiple vascular territories    Subjective:     Interval History: Patient is seen for follow-up neurological assessment and treatment recommendations: see HPI    HPI, Past Medical, Family, and Social History remains the same as documented in the initial encounter.     Review of Systems   Constitutional:  Negative for fatigue.   Respiratory:  Negative for shortness of breath.    Cardiovascular:  Negative for chest pain.   Neurological:  Positive for weakness and numbness.     Scheduled Meds:   apixaban  5 mg Oral BID    atorvastatin  40 mg Oral Daily    carvediloL  3.125 mg Oral BID    famotidine  20 mg Oral BID    insulin aspart U-100  10 Units Subcutaneous TIDWM    [START ON 10/12/2024] insulin glargine U-100  30 Units Subcutaneous Daily    potassium chloride  10 mEq Intravenous Q1H     Continuous Infusions:   0.9% NaCl   Intravenous Continuous         PRN Meds:  Current Facility-Administered Medications:     acetaminophen, 650 mg, Oral, Q8H PRN    bisacodyL, 10 mg, Rectal, Daily PRN    dextrose 10%, 12.5 g, Intravenous, PRN    dextrose 10%, 25 g, Intravenous, PRN    glucagon (human recombinant), 1 mg, Intramuscular, PRN    hydrALAZINE, 10 mg, Intravenous, Q6H PRN    insulin aspart U-100, 0-10 Units, Subcutaneous, QID (AC + HS) PRN    ondansetron, 4 mg, Oral, Q12H PRN    oxyCODONE, 5 mg, Oral, Q12H PRN    sodium chloride 0.9%, 500 mL, Intravenous, PRN    sodium chloride 0.9%, 10 mL, Intravenous, PRN    Objective:     Vital Signs (Most Recent):  Temp: 98.1 °F (36.7 °C) (10/11/24 1211)  Pulse: 100 (10/11/24 1503)  Resp: 18 (10/11/24 1211)  BP: 106/63 (10/11/24 1211)  SpO2: 96 % (10/11/24 1211)  BP Location: Left arm    Vital Signs Range (Last 24H):  Temp:  [97.5 °F (36.4 °C)-98.4 °F (36.9 °C)]   Pulse:  []   Resp:  [16-20]   BP: (106-156)/(63-87)   SpO2:  [94 %-98 %]   BP Location: Left arm       Physical Exam  Vitals and nursing note reviewed.   Constitutional:        Appearance: She is ill-appearing.   HENT:      Head: Normocephalic and atraumatic.   Cardiovascular:      Rate and Rhythm: Normal rate and regular rhythm.   Pulmonary:      Effort: Pulmonary effort is normal.   Musculoskeletal:      Right lower leg: No edema.      Left lower leg: No edema.   Neurological:      Mental Status: She is alert.          Neurological Exam:   LOC: alert  Attention Span: poor  Language: Expressive aphasia.  Articulation: Dysarthria  Orientation: Person, Place, Time   Visual Fields: Full  EOM (CN III, IV, VI): Full/intact  Pupils (CN II, III): PERRL  Facial Sensation (CN V): Facial sensory loss  Facial Movement (CN VII): Upper facial weakness on the Left  Motor: Arm left  Plegia 0/5  Leg left  Paresis: 1/5  Arm right  Normal 5/5  Leg right Normal 5/5  Cerebellum: No evidence of appendicular or axial ataxia  Sensation: Jason-anesthesia left    Laboratory:  CMP:   Recent Labs   Lab 10/11/24  1301   CALCIUM 9.6   ALBUMIN 3.3*   PROT 7.0   *   K 2.7*   CO2 23   CL 99   BUN 62*   CREATININE 1.6*   ALKPHOS 87   ALT 15   AST 24   BILITOT 0.6       CBC:   Recent Labs   Lab 10/11/24  1302   WBC 11.48   RBC 5.30   HGB 17.3*   HCT 52.4*      MCV 99*   MCH 32.6*   MCHC 33.0     Lipid Panel:   Recent Labs   Lab 10/06/24  0934   CHOL 178   LDLCALC 98.4   HDL 41   TRIG 193*     Hgb A1C:   Recent Labs   Lab 10/08/24  0217   HGBA1C 10.0*     TSH:   Recent Labs   Lab 10/06/24  0934   TSH 1.237       Diagnostic Results     Brain and Vessel Imaging   Impression: Acute infarcts involving the right anterior circulation and posterior circulation as detailed above without evidence of intracranial hemorrhage or mass effect.  Embolic disease to be considered.     Chronic left parietal infarct.     MRA demonstrates limited visualization of distal right KIARA branches in keeping with the territory of the acute infarct.  There is also narrowing of at least one proximal left M2 branch although no acute infarct  in that territory.    Cardiac Imaging     Left Ventricle: The left ventricle is normal in size. Normal wall thickness. There is concentric remodeling. Normal wall motion. There is hyperdynamic systolic function with a visually estimated ejection fraction of greater than 70%. There is normal diastolic function.    Right Ventricle: Normal right ventricular cavity size. Wall thickness is normal. Systolic function is normal.    Left Atrium: Bubble study is likely negative, but full opacification of the RA is not well seen on these images which decreases sensitivity.    IVC/SVC: Normal venous pressure at 3 mmHg.

## 2024-10-11 NOTE — ASSESSMENT & PLAN NOTE
History of. Takes Levemir 35 units QHS at home. A1C 10%.    -goal 140-180 while admitted  -ordered MDSS  -On Levemir 30 units QHS & Aspart 10 units with meals  -accuchecks  -Endocrine consulted for A1c 10%; following, appreciate recs

## 2024-10-11 NOTE — PROGRESS NOTES
"Zackary Long - Neurosurgery ([facility])  Endocrinology  Progress Note    Admit Date: 10/6/2024     Reason for Consult: Management of T2DM, Hyperglycemia       Diabetes diagnosis year:  Over 5 years    Home Diabetes Medications:    -liraglutide  -Levemir 35 units QHS    How often checking glucose at home? Not checking       Diabetes Complications include:     Hyperglycemia    Complicating diabetes co morbidities:   History of CVA      HPI:   Patient is a 64 y.o. female with  DM2 , HTN who was transferred from [facility] for higher level of care secondary to acute KIARA stroke on CTA. Briefly,the patient presented to the OSH overnight for slurred speech started at 1:30 a.m. in the morning. She has a history of his stroke with left-sided deficits but is normally able to transfer and do a little walking.  Endocrine consulted for BG management.      Interval HPI:   Overnight events: Remains on neuro floor. BG above goal ranges on current SQ insulin regimen. Diet Pureed (IDDSI Level 4) Nectar Thick (Mildly Thick)    Eatin%  Nausea: No  Hypoglycemia and intervention: No  Fever: No  TPN and/or TF: No  If yes, type of TF/TPN and rate: n/a    /87   Pulse 83   Temp 97.5 °F (36.4 °C)   Resp 17   Ht 5' 2" (1.575 m)   Wt 72.6 kg (160 lb 0.9 oz)   LMP  (LMP Unknown) Comment: Doesn't  have periods anymore  SpO2 98%   Breastfeeding No   BMI 29.27 kg/m²     Labs Reviewed and Include    No results for input(s): "GLU", "CALCIUM", "ALBUMIN", "PROT", "NA", "K", "CO2", "CL", "BUN", "CREATININE", "ALKPHOS", "ALT", "AST", "BILITOT" in the last 24 hours.  Lab Results   Component Value Date    WBC 9.39 10/08/2024    HGB 16.1 (H) 10/08/2024    HCT 44.5 10/08/2024    MCV 92 10/08/2024     10/08/2024     Recent Labs   Lab 10/06/24  0934   TSH 1.237     Lab Results   Component Value Date    HGBA1C 10.0 (H) 10/08/2024       Nutritional status:   Body mass index is 29.27 kg/m².  Lab Results   Component Value Date " "   ALBUMIN 3.6 10/08/2024    ALBUMIN 3.8 10/07/2024    ALBUMIN 3.7 10/06/2024     No results found for: "PREALBUMIN"    Estimated Creatinine Clearance: 52.3 mL/min (based on SCr of 1 mg/dL).    Accu-Checks  Recent Labs     10/08/24  2201 10/09/24  1224 10/09/24  1523 10/09/24  2132 10/10/24  0856 10/10/24  1208 10/10/24  1550 10/10/24  2045 10/10/24  2215 10/11/24  0809   POCTGLUCOSE 244* 346* 295* 240* 291* 295* 293* 272* 289* 195*       Current Medications and/or Treatments Impacting Glycemic Control  Immunotherapy:    Immunosuppressants       None          Steroids:   Hormones (From admission, onward)      None          Pressors:    Autonomic Drugs (From admission, onward)      None          Hyperglycemia/Diabetes Medications:   Antihyperglycemics (From admission, onward)      Start     Stop Route Frequency Ordered    10/11/24 1130  insulin aspart U-100 pen 8 Units         -- SubQ 3 times daily with meals 10/11/24 0905    10/11/24 0900  insulin glargine U-100 (Lantus) pen 24 Units         -- SubQ Daily 10/10/24 0923    10/09/24 0809  insulin aspart U-100 pen 0-10 Units         -- SubQ Before meals & nightly PRN 10/09/24 0710            ASSESSMENT and PLAN    Neuro  * Acute arterial ischemic stroke, multifocal, multiple vascular territories  Managed by primary team  Optimize BG control      Cardiac/Vascular  HTN (hypertension)  Managed by primary team  Optimize BG control      Endocrine  Type 2 diabetes mellitus, with long-term current use of insulin  BG goal: 140-180  T2DM admitted with acute KIARA stroke.   BG above goal.  Further increase regimen    - increase Lantus to 30 units daily (Fasting BG above goal ranges) 20% dose increase  - increase NovoLog to 10 units with meals (basal/prandial match)   -continue Novolog Moderate dose correction with ISF 25 starting at 150    - POCT Glucose ac/hs  - Hypoglycemia protocol in place      ** Please notify Endocrine for any change and/or advance in diet**  ** Please call " Endocrine for any BG related issues **     Discharge Planning:   TBD. Please notify endocrinology prior to discharge.            Mattie Ramsey NP  Endocrinology  Zackary Long - Neurosurgery ([facility])

## 2024-10-11 NOTE — CARE UPDATE
I have reviewed the chart of Tammy Multani who is hospitalized for the following:    Active Hospital Problems    Diagnosis    *Acute arterial ischemic stroke, multifocal, multiple vascular territories    KAILYN (acute kidney injury)    Hypercoagulable state due to atrial fibrillation     History of atrial fibrillation per discussion with daughter. Was started on Eliquis on 10/6: hold given size of infarct, start ASA on 10/8. Transition again to Eliquis in 5-7 days       Chronic anticoagulation     History of atrial fibrillation per discussion with daughter. Was started on Eliquis on 10/6: hold given size of infarct, start ASA on 10/8. Transition again to Eliquis in 5-7 days       Hyponatremia     Monitor with daily labs      Reduced mobility     Therapy to evaluate and treat       Sensory loss    HTN (hypertension)    Hypokalemia     POA  Monitor with daily cmp  Replace   K 3.3      Left hemiplegia    Opioid use    Atrial fibrillation    Type 2 diabetes mellitus, with long-term current use of insulin        Jacque Whitfield NP  [facility] LILIBETH

## 2024-10-12 PROBLEM — R56.9 SEIZURE-LIKE ACTIVITY: Status: ACTIVE | Noted: 2024-10-12

## 2024-10-12 LAB
ALBUMIN SERPL BCP-MCNC: 3.2 G/DL (ref 3.5–5.2)
ALP SERPL-CCNC: 80 U/L (ref 55–135)
ALT SERPL W/O P-5'-P-CCNC: 15 U/L (ref 10–44)
ANION GAP SERPL CALC-SCNC: 14 MMOL/L (ref 8–16)
AST SERPL-CCNC: 24 U/L (ref 10–40)
BILIRUB SERPL-MCNC: 0.5 MG/DL (ref 0.1–1)
BUN SERPL-MCNC: 72 MG/DL (ref 8–23)
CALCIUM SERPL-MCNC: 10.5 MG/DL (ref 8.7–10.5)
CHLORIDE SERPL-SCNC: 97 MMOL/L (ref 95–110)
CO2 SERPL-SCNC: 25 MMOL/L (ref 23–29)
CREAT SERPL-MCNC: 1.6 MG/DL (ref 0.5–1.4)
EST. GFR  (NO RACE VARIABLE): 35.6 ML/MIN/1.73 M^2
GLUCOSE SERPL-MCNC: 157 MG/DL (ref 70–110)
MAGNESIUM SERPL-MCNC: 2.3 MG/DL (ref 1.6–2.6)
PHOSPHATE SERPL-MCNC: 3.1 MG/DL (ref 2.7–4.5)
POCT GLUCOSE: 218 MG/DL (ref 70–110)
POCT GLUCOSE: 237 MG/DL (ref 70–110)
POCT GLUCOSE: 94 MG/DL (ref 70–110)
POTASSIUM SERPL-SCNC: 3.6 MMOL/L (ref 3.5–5.1)
PROT SERPL-MCNC: 6.7 G/DL (ref 6–8.4)
SODIUM SERPL-SCNC: 136 MMOL/L (ref 136–145)

## 2024-10-12 PROCEDURE — 25000003 PHARM REV CODE 250: Mod: UD

## 2024-10-12 PROCEDURE — 25000003 PHARM REV CODE 250: Performed by: NURSE PRACTITIONER

## 2024-10-12 PROCEDURE — 99232 SBSQ HOSP IP/OBS MODERATE 35: CPT | Mod: ,,, | Performed by: NURSE PRACTITIONER

## 2024-10-12 PROCEDURE — 11000001 HC ACUTE MED/SURG PRIVATE ROOM

## 2024-10-12 PROCEDURE — 25000003 PHARM REV CODE 250: Performed by: PHYSICIAN ASSISTANT

## 2024-10-12 PROCEDURE — 83735 ASSAY OF MAGNESIUM: CPT

## 2024-10-12 PROCEDURE — 84100 ASSAY OF PHOSPHORUS: CPT

## 2024-10-12 PROCEDURE — 80053 COMPREHEN METABOLIC PANEL: CPT

## 2024-10-12 PROCEDURE — 36415 COLL VENOUS BLD VENIPUNCTURE: CPT

## 2024-10-12 PROCEDURE — 99233 SBSQ HOSP IP/OBS HIGH 50: CPT | Mod: GC,,, | Performed by: PSYCHIATRY & NEUROLOGY

## 2024-10-12 RX ORDER — POTASSIUM CHLORIDE 20 MEQ/1
20 TABLET, EXTENDED RELEASE ORAL ONCE
Status: COMPLETED | OUTPATIENT
Start: 2024-10-12 | End: 2024-10-12

## 2024-10-12 RX ORDER — FAMOTIDINE 20 MG/1
20 TABLET, FILM COATED ORAL DAILY
Status: DISCONTINUED | OUTPATIENT
Start: 2024-10-13 | End: 2024-10-14

## 2024-10-12 RX ORDER — LEVETIRACETAM 500 MG/1
500 TABLET ORAL 2 TIMES DAILY
Status: DISCONTINUED | OUTPATIENT
Start: 2024-10-12 | End: 2024-10-13

## 2024-10-12 RX ORDER — SODIUM CHLORIDE 9 MG/ML
INJECTION, SOLUTION INTRAVENOUS CONTINUOUS
Status: ACTIVE | OUTPATIENT
Start: 2024-10-12 | End: 2024-10-12

## 2024-10-12 RX ADMIN — CARVEDILOL 3.12 MG: 3.12 TABLET, FILM COATED ORAL at 09:10

## 2024-10-12 RX ADMIN — POTASSIUM CHLORIDE 20 MEQ: 1500 TABLET, EXTENDED RELEASE ORAL at 09:10

## 2024-10-12 RX ADMIN — INSULIN ASPART 10 UNITS: 100 INJECTION, SOLUTION INTRAVENOUS; SUBCUTANEOUS at 07:10

## 2024-10-12 RX ADMIN — LEVETIRACETAM 500 MG: 500 TABLET, FILM COATED ORAL at 08:10

## 2024-10-12 RX ADMIN — APIXABAN 5 MG: 5 TABLET, FILM COATED ORAL at 09:10

## 2024-10-12 RX ADMIN — ATORVASTATIN CALCIUM 40 MG: 40 TABLET, FILM COATED ORAL at 09:10

## 2024-10-12 RX ADMIN — APIXABAN 5 MG: 5 TABLET, FILM COATED ORAL at 08:10

## 2024-10-12 RX ADMIN — INSULIN ASPART 10 UNITS: 100 INJECTION, SOLUTION INTRAVENOUS; SUBCUTANEOUS at 12:10

## 2024-10-12 RX ADMIN — INSULIN ASPART 4 UNITS: 100 INJECTION, SOLUTION INTRAVENOUS; SUBCUTANEOUS at 07:10

## 2024-10-12 RX ADMIN — INSULIN ASPART 4 UNITS: 100 INJECTION, SOLUTION INTRAVENOUS; SUBCUTANEOUS at 12:10

## 2024-10-12 RX ADMIN — CARVEDILOL 3.12 MG: 3.12 TABLET, FILM COATED ORAL at 08:10

## 2024-10-12 RX ADMIN — FAMOTIDINE 20 MG: 20 TABLET ORAL at 09:10

## 2024-10-12 RX ADMIN — SODIUM CHLORIDE: 9 INJECTION, SOLUTION INTRAVENOUS at 11:10

## 2024-10-12 RX ADMIN — LEVETIRACETAM 500 MG: 500 TABLET, FILM COATED ORAL at 11:10

## 2024-10-12 RX ADMIN — INSULIN GLARGINE 30 UNITS: 100 INJECTION, SOLUTION SUBCUTANEOUS at 09:10

## 2024-10-12 NOTE — ASSESSMENT & PLAN NOTE
History of. Takes Levemir 35 units QHS at home. A1C 10%.    -goal 140-180 while admitted  -ordered MDSS  -On Lantus 30 units QHS & Aspart 10 units with meals  -accuchecks  -Endocrine consulted for A1c 10%; following, appreciate recs

## 2024-10-12 NOTE — SUBJECTIVE & OBJECTIVE
Neurologic Chief Complaint: infarcts in multiple vascular territories    Subjective:     Interval History: Patient is seen for follow-up neurological assessment and treatment recommendations: see HPI    HPI, Past Medical, Family, and Social History remains the same as documented in the initial encounter.     Review of Systems   Constitutional:  Negative for fatigue.   Respiratory:  Negative for shortness of breath.    Cardiovascular:  Negative for chest pain.   Neurological:  Positive for weakness and numbness.     Scheduled Meds:   apixaban  5 mg Oral BID    atorvastatin  40 mg Oral Daily    carvediloL  3.125 mg Oral BID    [START ON 10/13/2024] famotidine  20 mg Oral Daily    insulin aspart U-100  10 Units Subcutaneous TIDWM    insulin glargine U-100  30 Units Subcutaneous Daily    levETIRAcetam  500 mg Oral BID     Continuous Infusions:   0.9% NaCl   Intravenous Continuous 100 mL/hr at 10/12/24 1121 New Bag at 10/12/24 1121     PRN Meds:  Current Facility-Administered Medications:     acetaminophen, 650 mg, Oral, Q8H PRN    bisacodyL, 10 mg, Rectal, Daily PRN    dextrose 10%, 12.5 g, Intravenous, PRN    dextrose 10%, 25 g, Intravenous, PRN    glucagon (human recombinant), 1 mg, Intramuscular, PRN    hydrALAZINE, 10 mg, Intravenous, Q6H PRN    insulin aspart U-100, 0-10 Units, Subcutaneous, QID (AC + HS) PRN    ondansetron, 4 mg, Oral, Q12H PRN    oxyCODONE, 5 mg, Oral, Q12H PRN    sodium chloride 0.9%, 500 mL, Intravenous, PRN    sodium chloride 0.9%, 10 mL, Intravenous, PRN    Objective:     Vital Signs (Most Recent):  Temp: 97.8 °F (36.6 °C) (10/12/24 1134)  Pulse: 85 (10/12/24 1134)  Resp: 20 (10/12/24 1134)  BP: 132/60 (10/12/24 1134)  SpO2: 99 % (10/12/24 0733)  BP Location: Left arm    Vital Signs Range (Last 24H):  Temp:  [97.3 °F (36.3 °C)-98.1 °F (36.7 °C)]   Pulse:  []   Resp:  [16-20]   BP: (112-132)/(53-68)   SpO2:  [94 %-100 %]   BP Location: Left arm       Physical Exam  Vitals and nursing  note reviewed.   Constitutional:       Appearance: She is ill-appearing.   HENT:      Head: Normocephalic and atraumatic.   Cardiovascular:      Rate and Rhythm: Normal rate and regular rhythm.   Pulmonary:      Effort: Pulmonary effort is normal.   Musculoskeletal:      Right lower leg: No edema.      Left lower leg: No edema.   Neurological:      Mental Status: She is alert.          Neurological Exam:   LOC: alert  Attention Span: poor  Articulation: Dysarthria  Orientation: Person, Place, Time   Visual Fields: Full  EOM (CN III, IV, VI): Full/intact  Pupils (CN II, III): PERRL  Facial Sensation (CN V): Facial sensory loss  Facial Movement (CN VII): Upper facial weakness on the Left  Motor: Arm left  Plegia 0/5  Leg left  Paresis: 1/5  Arm right  Normal 5/5  Leg right Normal 5/5  Cerebellum: No evidence of appendicular or axial ataxia  Sensation: Jason-anesthesia left    Laboratory:  CMP:   Recent Labs   Lab 10/12/24  0229   CALCIUM 10.5   ALBUMIN 3.2*   PROT 6.7      K 3.6   CO2 25   CL 97   BUN 72*   CREATININE 1.6*   ALKPHOS 80   ALT 15   AST 24   BILITOT 0.5       CBC:   Recent Labs   Lab 10/11/24  1302   WBC 11.48   RBC 5.30   HGB 17.3*   HCT 52.4*      MCV 99*   MCH 32.6*   MCHC 33.0     Lipid Panel:   Recent Labs   Lab 10/06/24  0934   CHOL 178   LDLCALC 98.4   HDL 41   TRIG 193*     Hgb A1C:   Recent Labs   Lab 10/08/24  0217   HGBA1C 10.0*     TSH:   Recent Labs   Lab 10/06/24  0934   TSH 1.237       Diagnostic Results     Brain and Vessel Imaging   Impression: Acute infarcts involving the right anterior circulation and posterior circulation as detailed above without evidence of intracranial hemorrhage or mass effect.  Embolic disease to be considered.     Chronic left parietal infarct.     MRA demonstrates limited visualization of distal right KIARA branches in keeping with the territory of the acute infarct.  There is also narrowing of at least one proximal left M2 branch although no acute  infarct in that territory.    Cardiac Imaging     Left Ventricle: The left ventricle is normal in size. Normal wall thickness. There is concentric remodeling. Normal wall motion. There is hyperdynamic systolic function with a visually estimated ejection fraction of greater than 70%. There is normal diastolic function.    Right Ventricle: Normal right ventricular cavity size. Wall thickness is normal. Systolic function is normal.    Left Atrium: Bubble study is likely negative, but full opacification of the RA is not well seen on these images which decreases sensitivity.    IVC/SVC: Normal venous pressure at 3 mmHg.

## 2024-10-12 NOTE — ASSESSMENT & PLAN NOTE
Episodes of transient altered mental status observed by daughter, sometimes with a brief left gaze. Given location of KIARA stroke, considered seizure.    EEG done on 10/11, interpretation pending, contacted epilepsy  Started Keppra 500mg BID in the mean time to see if improves frequency of symptoms

## 2024-10-12 NOTE — NURSING
@2040 Received critical value from lab of potassium 2.6. Stroke team notified. IV leaking and not working. IV potassium chloride held. PO potassium bicarbonate ordered and given.

## 2024-10-12 NOTE — PROGRESS NOTES
"[facility] - Neurosurgery ([facility])  Vascular Neurology  Comprehensive Stroke Center  Progress Note    Assessment/Plan:     * Acute arterial ischemic stroke, multifocal, multiple vascular territories  65 y/o F transferred from [facility] for higher level of care secondary to acute KIARA stroke on CTA.  Briefly,the patient presented to the OSH overnight for slurred speech started at 1:30 a.m. in the morning.  She has a history of his stroke with left-sided deficits but is normally able to transfer and do a little walking. This morning, she had complete hemiplegia of the left side. Telestroke consult performed, concerning for LVO.  NIH 13. CTA Stroke MP performed and  was read as "acute R KIARA infarct". No TNK due to AC use. Patient accepted to ED as transfer for emergent vascular neurology evaluation. No endovascular intervention. History of atrial fibrillation per discussion with daughter. Was started on Eliquis on 10/6: hold given size of infarct, start ASA on 10/8. Transition again to Eliquis in 5-7 days. Given history of Afib, etiology likely embolic. Though not apparent on MRA, if R KIARA occlusion was proximal (supported by MRI) could account for R MCA-like picture.    Eliquis restarted. Transient episodes of altered awareness. EEG ordered and pending interpretation (contacted epilepsy). Started Keppra 500mg BID in the meantime to see if reduces the frequency of these episodes. Otherwise, pending placement    -Eliquis 5mg BID  -high intensity statin  -SBP<220 in acute setting  -echo done, w/o evidence of intra-cardiac shunt, no LAE, but does have history of Afib  -PT/OT  -Started on diet    Seizure-like activity  Episodes of transient altered mental status observed by daughter, sometimes with a brief left gaze. Given location of KIARA stroke, considered seizure.    EEG done on 10/11, interpretation pending, contacted epilepsy  Started Keppra 500mg BID in the mean time to see if improves frequency of " symptoms    KAILYN (acute kidney injury)  Likely pre-renal KAILYN, due to decreased PO intake  - Cr increased from 1 -> 1.6 --> 1.6  - BUN 22 -> 62  - Give 1L IVF over 10 hours  - Strict input/output  - Avoid any nephrotoxic medication  - Monitory with daily CMP    Type 2 diabetes mellitus, with long-term current use of insulin  History of. Takes Levemir 35 units QHS at home. A1C 10%.    -goal 140-180 while admitted  -ordered MDSS  -On Lantus 30 units QHS & Aspart 10 units with meals  -accuchecks  -Endocrine consulted for A1c 10%; following, appreciate recs     Atrial fibrillation  History of. Takes Eliquis 5mg BID and Coreg 6.25mg BID at home.    -Initially Eliquis held given size of stroke; ASA 81mg QD in the interim. Eliquis was restarted on 10/10  -continue coreg at 3.125mg BID given SBP goal of permissive HTN in acute setting    Opioid use  History of. Prescribed oxycodone 15mg at home. Family concerned that medication is being diverted by family member, unclear how often patient takes. Reportedly is prescribed for neck pain.     -oxycodone 5mg PRN BID for severe pain  -monitor for signs of withdrawal, as is unclear how often patient is taking    Left hemiplegia  See stroke    Hypokalemia  Resolved w/repletion. Continue to monitor on labs, particularly given Afib.        HTN (hypertension)  Home medication regimen as below  -lisinopril 40mg QD  -amlodipine 10mg QD    Lisinopril held given KAILYN. BP at goal, so have not needed to restart CCB. On Coreg 3.125 BID.         10/7: gómez SINCLAIR exam stable, echo today, NPO aside from meds per speech, barium study tmrw  10/08/2024 DOTTIE, stable neuro exam, went for the Br study, and was started on a diet. Endocrine consulted for the A1c 10%, pending recs.  10/09/2024 DOTTIE, Stable neuro exam, will restart Lisinopril 20 mg (half of her dome dose). Seen by endocrine, with adjustment of her insulin doses.  10/10/2024 DOTTIE, no change in her neuro exam, will restart Eliquis today,  yes BP is better controlled.  10/11/2024 NAEON, stable neuro exam, still decreased PO intake. Patient gets some episodes of in attention; EEG was ordered. Endocrine adjusted her insulins. Accepted at [facility] in [place].   10/12/2024 NAYOLANDA, waiting on EEG interpretation (contacted epilepsy), started Keppra 500mg BID and will see if reduces transient alterations in awareness, neurologic examination stable       STROKE DOCUMENTATION   Acute Stroke Times   Last Known Normal Date:  (OSH)  Stroke Team Called Date: 10/06/24  Stroke Team Called Time:  (At OSH)  Stroke Team Arrival Date: 10/06/24  Stroke Team Arrival Time: 0608  CT Interpretation Time:  (CTA done around 4:20am per documents that came to [facility] with patient)  Thrombolytic Therapy Recommended: No  Thrombectomy Recommended:  (Pending MRI Brain)    NIH Scale:  1a. Level of Consciousness: 0-->Alert, keenly responsive  1b. LOC Questions: 0-->Answers both questions correctly  1c. LOC Commands: 0-->Performs both tasks correctly  2. Best Gaze: 1-->Partial gaze palsy, gaze is abnormal in one or both eyes, but forced deviation or total gaze paresis is not present  3. Visual: 0-->No visual loss  4. Facial Palsy: 1-->Minor paralysis (flattened nasolabial fold, asymmetry on smiling)  5a. Motor Arm, Left: 4-->No movement  5b. Motor Arm, Right: 0-->No drift, limb holds 90 (or 45) degrees for full 10 secs  6a. Motor Leg, Left: 4-->No movement  6b. Motor Leg, Right: 0-->No drift, leg holds 30 degree position for full 5 secs  7. Limb Ataxia: 0-->Absent  8. Sensory: 1-->Mild-to-moderate sensory loss, patient feels pinprick is less sharp or is dull on the affected side, or there is a loss of superficial pain with pinprick, but patient is aware of being touched  9. Best Language: 0-->No aphasia, normal  10. Dysarthria: 1-->Mild-to-moderate dysarthria, patient slurs at least some words and, at worst, can be understood with some difficulty  11. Extinction and Inattention (formerly  Neglect): 0-->No abnormality  Total (NIH Stroke Scale): 12       Modified Ogden Score: 3  Diane Coma Scale:15   ABCD2 Score:    [id number] Score:   HAS -BLED Score:   ICH Score:   Hunt & Shane Classification:      Hemorrhagic change of an Ischemic Stroke: Does this patient have an ischemic stroke with hemorrhagic changes? No     Neurologic Chief Complaint: infarcts in multiple vascular territories    Subjective:     Interval History: Patient is seen for follow-up neurological assessment and treatment recommendations: see HPI    HPI, Past Medical, Family, and Social History remains the same as documented in the initial encounter.     Review of Systems   Constitutional:  Negative for fatigue.   Respiratory:  Negative for shortness of breath.    Cardiovascular:  Negative for chest pain.   Neurological:  Positive for weakness and numbness.     Scheduled Meds:   apixaban  5 mg Oral BID    atorvastatin  40 mg Oral Daily    carvediloL  3.125 mg Oral BID    [START ON 10/13/2024] famotidine  20 mg Oral Daily    insulin aspart U-100  10 Units Subcutaneous TIDWM    insulin glargine U-100  30 Units Subcutaneous Daily    levETIRAcetam  500 mg Oral BID     Continuous Infusions:   0.9% NaCl   Intravenous Continuous 100 mL/hr at 10/12/24 1121 New Bag at 10/12/24 1121     PRN Meds:  Current Facility-Administered Medications:     acetaminophen, 650 mg, Oral, Q8H PRN    bisacodyL, 10 mg, Rectal, Daily PRN    dextrose 10%, 12.5 g, Intravenous, PRN    dextrose 10%, 25 g, Intravenous, PRN    glucagon (human recombinant), 1 mg, Intramuscular, PRN    hydrALAZINE, 10 mg, Intravenous, Q6H PRN    insulin aspart U-100, 0-10 Units, Subcutaneous, QID (AC + HS) PRN    ondansetron, 4 mg, Oral, Q12H PRN    oxyCODONE, 5 mg, Oral, Q12H PRN    sodium chloride 0.9%, 500 mL, Intravenous, PRN    sodium chloride 0.9%, 10 mL, Intravenous, PRN    Objective:     Vital Signs (Most Recent):  Temp: 97.8 °F (36.6 °C) (10/12/24 1134)  Pulse: 85 (10/12/24  1134)  Resp: 20 (10/12/24 1134)  BP: 132/60 (10/12/24 1134)  SpO2: 99 % (10/12/24 0733)  BP Location: Left arm    Vital Signs Range (Last 24H):  Temp:  [97.3 °F (36.3 °C)-98.1 °F (36.7 °C)]   Pulse:  []   Resp:  [16-20]   BP: (112-132)/(53-68)   SpO2:  [94 %-100 %]   BP Location: Left arm       Physical Exam  Vitals and nursing note reviewed.   Constitutional:       Appearance: She is ill-appearing.   HENT:      Head: Normocephalic and atraumatic.   Cardiovascular:      Rate and Rhythm: Normal rate and regular rhythm.   Pulmonary:      Effort: Pulmonary effort is normal.   Musculoskeletal:      Right lower leg: No edema.      Left lower leg: No edema.   Neurological:      Mental Status: She is alert.          Neurological Exam:   LOC: alert  Attention Span: poor  Articulation: Dysarthria  Orientation: Person, Place, Time   Visual Fields: Full  EOM (CN III, IV, VI): Full/intact  Pupils (CN II, III): PERRL  Facial Sensation (CN V): Facial sensory loss  Facial Movement (CN VII): Upper facial weakness on the Left  Motor: Arm left  Plegia 0/5  Leg left  Paresis: 1/5  Arm right  Normal 5/5  Leg right Normal 5/5  Cerebellum: No evidence of appendicular or axial ataxia  Sensation: Jason-anesthesia left    Laboratory:  CMP:   Recent Labs   Lab 10/12/24  0229   CALCIUM 10.5   ALBUMIN 3.2*   PROT 6.7      K 3.6   CO2 25   CL 97   BUN 72*   CREATININE 1.6*   ALKPHOS 80   ALT 15   AST 24   BILITOT 0.5       CBC:   Recent Labs   Lab 10/11/24  1302   WBC 11.48   RBC 5.30   HGB 17.3*   HCT 52.4*      MCV 99*   MCH 32.6*   MCHC 33.0     Lipid Panel:   Recent Labs   Lab 10/06/24  0934   CHOL 178   LDLCALC 98.4   HDL 41   TRIG 193*     Hgb A1C:   Recent Labs   Lab 10/08/24  0217   HGBA1C 10.0*     TSH:   Recent Labs   Lab 10/06/24  0934   TSH 1.237       Diagnostic Results     Brain and Vessel Imaging   Impression: Acute infarcts involving the right anterior circulation and posterior circulation as detailed above  without evidence of intracranial hemorrhage or mass effect.  Embolic disease to be considered.     Chronic left parietal infarct.     MRA demonstrates limited visualization of distal right KIARA branches in keeping with the territory of the acute infarct.  There is also narrowing of at least one proximal left M2 branch although no acute infarct in that territory.    Cardiac Imaging     Left Ventricle: The left ventricle is normal in size. Normal wall thickness. There is concentric remodeling. Normal wall motion. There is hyperdynamic systolic function with a visually estimated ejection fraction of greater than 70%. There is normal diastolic function.    Right Ventricle: Normal right ventricular cavity size. Wall thickness is normal. Systolic function is normal.    Left Atrium: Bubble study is likely negative, but full opacification of the RA is not well seen on these images which decreases sensitivity.    IVC/SVC: Normal venous pressure at 3 mmHg.    Roger Conklin DO  [facility]  Department of Vascular Neurology   [facility] Neurosurgery [facility])

## 2024-10-12 NOTE — ASSESSMENT & PLAN NOTE
Home medication regimen as below  -lisinopril 40mg QD  -amlodipine 10mg QD    Lisinopril held given KAILYN. BP at goal, so have not needed to restart CCB. On Coreg 3.125 BID.

## 2024-10-12 NOTE — ASSESSMENT & PLAN NOTE
BG goal: 140-180  T2DM admitted with acute KIARA stroke.   BG above goal.  Further increase regimen    - increase Lantus to 30 units daily (Fasting BG above goal ranges) 20% dose increase  - continue NovoLog 10 units with meals (basal/prandial match)   -continue Novolog Moderate dose correction with ISF 25 starting at 150    - POCT Glucose ac/hs  - Hypoglycemia protocol in place      ** Please notify Endocrine for any change and/or advance in diet**  ** Please call Endocrine for any BG related issues **     Discharge Planning:   TBD. Please notify endocrinology prior to discharge.

## 2024-10-12 NOTE — ASSESSMENT & PLAN NOTE
Likely pre-renal KAILYN, due to decreased PO intake  - Cr increased from 1 -> 1.6 --> 1.6  - BUN 22 -> 62  - Give 1L IVF over 10 hours  - Strict input/output  - Avoid any nephrotoxic medication  - Monitory with daily CMP

## 2024-10-12 NOTE — SUBJECTIVE & OBJECTIVE
"Interval HPI:   Overnight events: Remains on neuro floor. BG at or slightly above goal ranges on current SQ insulin regimen. Diet Pureed (IDDSI Level 4) Nectar Thick (Mildly Thick)    Eatin%  Nausea: No  Hypoglycemia and intervention: No  Fever: No  TPN and/or TF: No  If yes, type of TF/TPN and rate: n/a      BP (!) 112/53 (Patient Position: Lying)   Pulse 85   Temp 97.3 °F (36.3 °C) (Oral)   Resp 16   Ht 5' 2" (1.575 m)   Wt 72.6 kg (160 lb 0.9 oz)   LMP  (LMP Unknown) Comment: Doesn't  have periods anymore  SpO2 99%   Breastfeeding No   BMI 29.27 kg/m²     Labs Reviewed and Include    Recent Labs   Lab 10/12/24  0229   *   CALCIUM 10.5   ALBUMIN 3.2*   PROT 6.7      K 3.6   CO2 25   CL 97   BUN 72*   CREATININE 1.6*   ALKPHOS 80   ALT 15   AST 24   BILITOT 0.5     Lab Results   Component Value Date    WBC 11.48 10/11/2024    HGB 17.3 (H) 10/11/2024    HCT 52.4 (H) 10/11/2024    MCV 99 (H) 10/11/2024     10/11/2024     Recent Labs   Lab 10/06/24  0934   TSH 1.237     Lab Results   Component Value Date    HGBA1C 10.0 (H) 10/08/2024       Nutritional status:   Body mass index is 29.27 kg/m².  Lab Results   Component Value Date    ALBUMIN 3.2 (L) 10/12/2024    ALBUMIN 3.3 (L) 10/11/2024    ALBUMIN 3.6 10/08/2024     No results found for: "PREALBUMIN"    Estimated Creatinine Clearance: 32.7 mL/min (A) (based on SCr of 1.6 mg/dL (H)).    Accu-Checks  Recent Labs     10/10/24  0856 10/10/24  1208 10/10/24  1550 10/10/24  2045 10/10/24  2215 10/11/24  0809 10/11/24  1154 10/11/24  1800 10/11/24  2302 10/12/24  0741   POCTGLUCOSE 291* 295* 293* 272* 289* 195* 285* 209* 135* 218*       Current Medications and/or Treatments Impacting Glycemic Control  Immunotherapy:    Immunosuppressants       None          Steroids:   Hormones (From admission, onward)      None          Pressors:    Autonomic Drugs (From admission, onward)      None          Hyperglycemia/Diabetes Medications: "   Antihyperglycemics (From admission, onward)      Start     Stop Route Frequency Ordered    10/12/24 0900  insulin glargine U-100 (Lantus) pen 30 Units         -- SubQ Daily 10/11/24 0906    10/11/24 1645  insulin aspart U-100 pen 10 Units         -- SubQ 3 times daily with meals 10/11/24 1513    10/09/24 0809  insulin aspart U-100 pen 0-10 Units         -- SubQ Before meals & nightly PRN 10/09/24 0710

## 2024-10-12 NOTE — PROGRESS NOTES
"[facility]ack[facility] Long - Neurosurgery ([facility])  Endocrinology  Progress Note    Admit Date: 10/6/2024     Reason for Consult: Management of T2DM, Hyperglycemia       Diabetes diagnosis year:  Over 5 years    Home Diabetes Medications:    -liraglutide  -Levemir 35 units QHS    How often checking glucose at home? Not checking       Diabetes Complications include:     Hyperglycemia    Complicating diabetes co morbidities:   History of CVA      HPI:   Patient is a 64 y.o. female with  DM2 , HTN who was transferred from [facility] for higher level of care secondary to acute KIARA stroke on CTA. Briefly,the patient presented to the OSH overnight for slurred speech started at 1:30 a.m. in the morning. She has a history of his stroke with left-sided deficits but is normally able to transfer and do a little walking.  Endocrine consulted for BG management.      Interval HPI:   Overnight events: Remains on neuro floor. BG at or slightly above goal ranges on current SQ insulin regimen. Diet Pureed (IDDSI Level 4) Nectar Thick (Mildly Thick)    Eatin%  Nausea: No  Hypoglycemia and intervention: No  Fever: No  TPN and/or TF: No  If yes, type of TF/TPN and rate: n/a      BP (!) 112/53 (Patient Position: Lying)   Pulse 85   Temp 97.3 °F (36.3 °C) (Oral)   Resp 16   Ht 5' 2" (1.575 m)   Wt 72.6 kg (160 lb 0.9 oz)   LMP  (LMP Unknown) Comment: Doesn't  have periods anymore  SpO2 99%   Breastfeeding No   BMI 29.27 kg/m²     Labs Reviewed and Include    Recent Labs   Lab 10/12/24  0229   *   CALCIUM 10.5   ALBUMIN 3.2*   PROT 6.7      K 3.6   CO2 25   CL 97   BUN 72*   CREATININE 1.6*   ALKPHOS 80   ALT 15   AST 24   BILITOT 0.5     Lab Results   Component Value Date    WBC 11.48 10/11/2024    HGB 17.3 (H) 10/11/2024    HCT 52.4 (H) 10/11/2024    MCV 99 (H) 10/11/2024     10/11/2024     Recent Labs   Lab 10/06/24  0934   TSH 1.237     Lab Results   Component Value Date    HGBA1C 10.0 (H) 10/08/2024 " "      Nutritional status:   Body mass index is 29.27 kg/m².  Lab Results   Component Value Date    ALBUMIN 3.2 (L) 10/12/2024    ALBUMIN 3.3 (L) 10/11/2024    ALBUMIN 3.6 10/08/2024     No results found for: "PREALBUMIN"    Estimated Creatinine Clearance: 32.7 mL/min (A) (based on SCr of 1.6 mg/dL (H)).    Accu-Checks  Recent Labs     10/10/24  0856 10/10/24  1208 10/10/24  1550 10/10/24  2045 10/10/24  2215 10/11/24  0809 10/11/24  1154 10/11/24  1800 10/11/24  2302 10/12/24  0741   POCTGLUCOSE 291* 295* 293* 272* 289* 195* 285* 209* 135* 218*       Current Medications and/or Treatments Impacting Glycemic Control  Immunotherapy:    Immunosuppressants       None          Steroids:   Hormones (From admission, onward)      None          Pressors:    Autonomic Drugs (From admission, onward)      None          Hyperglycemia/Diabetes Medications:   Antihyperglycemics (From admission, onward)      Start     Stop Route Frequency Ordered    10/12/24 0900  insulin glargine U-100 (Lantus) pen 30 Units         -- SubQ Daily 10/11/24 0906    10/11/24 1645  insulin aspart U-100 pen 10 Units         -- SubQ 3 times daily with meals 10/11/24 1513    10/09/24 0809  insulin aspart U-100 pen 0-10 Units         -- SubQ Before meals & nightly PRN 10/09/24 0710            ASSESSMENT and PLAN    Neuro  * Acute arterial ischemic stroke, multifocal, multiple vascular territories  Managed by primary team  Optimize BG control      Cardiac/Vascular  HTN (hypertension)  Managed by primary team  Optimize BG control      Endocrine  Type 2 diabetes mellitus, with long-term current use of insulin  BG goal: 140-180  T2DM admitted with acute KIARA stroke.   BG above goal.  Further increase regimen    - increase Lantus to 30 units daily (Fasting BG above goal ranges) 20% dose increase  - continue NovoLog 10 units with meals (basal/prandial match)   -continue Novolog Moderate dose correction with ISF 25 starting at 150    - POCT Glucose ac/hs  - " Hypoglycemia protocol in place      ** Please notify Endocrine for any change and/or advance in diet**  ** Please call Endocrine for any BG related issues **     Discharge Planning:   TBD. Please notify endocrinology prior to discharge.            Mattie Ramsey NP  Endocrinology  [facility] - Neurosurgery ([facility])

## 2024-10-12 NOTE — PLAN OF CARE
Problem: Stroke, Ischemic (Includes Transient Ischemic Attack)  Goal: Optimal Cerebral Tissue Perfusion  Outcome: Progressing  Intervention: Protect and Optimize Cerebral Perfusion  Flowsheets (Taken 10/12/2024 0417)  Cerebral Perfusion Promotion: blood pressure monitored     Problem: Adult Inpatient Plan of Care  Goal: Patient-Specific Goal (Individualized)  Description: Pt will maintain sbp below 220  Outcome: Progressing  Flowsheets (Taken 10/12/2024 0417)  Individualized Care Needs: care clustered  Anxieties, Fears or Concerns: none     @2040 Received critical value from lab of potassium 2.6. Stroke team notified. IV leaking and not working. IV potassium chloride held. PO potassium bicarbonate ordered and given.    VSS. Bed in lowest position, side rails x 3, and call light in use.   BMI Counseling: Body mass index is 26 85 kg/m²  The BMI is above normal  Nutrition recommendations include encouraging healthy choices of fruits and vegetables and moderation in carbohydrate intake  Exercise recommendations include exercising 3-5 times per week  No pharmacotherapy was ordered  Rationale for BMI follow-up plan is due to patient being overweight or obese  Assessment/Plan:    No problem-specific Assessment & Plan notes found for this encounter  Diagnoses and all orders for this visit:    Rash  -     dexamethasone (DECADRON) injection 4 mg  -     predniSONE 20 mg tablet; 2 PO QD X 4 DAYS, THEN 1 PO QD X 4 DAYS, THEN 1/2 PO QD X 4 DAYS  -     Ambulatory referral to Dermatology; Future    Chronic midline low back pain, unspecified whether sciatica present  -     dexamethasone (DECADRON) injection 4 mg  -     predniSONE 20 mg tablet; 2 PO QD X 4 DAYS, THEN 1 PO QD X 4 DAYS, THEN 1/2 PO QD X 4 DAYS  -     Ambulatory referral to Physical Therapy; Future  -     XR spine lumbar minimum 4 views non injury; Future    Other orders  -     finasteride (PROSCAR) 5 mg tablet; Take 5 mg by mouth daily          Patient Instructions   REST  AVOID LIFTING OR PUSHING  PT / LUMBAR SPINE  MAY NEED TO CONSIDER MRI    TRIAL OF PREDNISONE AND EUCERIN FOR RASH  DERM CONSULT  FURTHER PLANS PENDING EVALUATION      Return if symptoms worsen or fail to improve, for Next scheduled follow up  Subjective:      Patient ID: Nina Larose is a 75 y o  male  Chief Complaint   Patient presents with    Back Pain     Pt c/o continued continued back pain & skin psoriasis  Back Pain  This is a chronic problem  The current episode started more than 1 year ago  The problem occurs constantly  The problem has been gradually worsening since onset  The pain is present in the lumbar spine  The pain does not radiate  The pain is mild  The pain is the same all the time  The symptoms are aggravated by bending and position  Stiffness is present in the morning  Pertinent negatives include no abdominal pain, bladder incontinence, bowel incontinence, chest pain, dysuria, fever, headaches, leg pain, numbness, paresis, paresthesias, pelvic pain, perianal numbness, tingling, weakness or weight loss  He has tried NSAIDs and chiropractic manipulation for the symptoms  The treatment provided mild relief  Rash  This is a recurrent problem  The current episode started 1 to 4 weeks ago  The problem has been gradually worsening since onset  The affected locations include the abdomen, left arm and right arm  The rash is characterized by redness and itchiness  He was exposed to a new medication Carlos Wooten  Pertinent negatives include no anorexia, congestion, cough, diarrhea, eye pain, facial edema, fatigue, fever, joint pain, nail changes, rhinorrhea, shortness of breath, sore throat or vomiting  Past treatments include anti-itch cream  The treatment provided mild relief  His past medical history is significant for asthma  The following portions of the patient's history were reviewed and updated as appropriate: allergies, current medications, past family history, past medical history, past social history, past surgical history and problem list     Review of Systems   Constitutional: Negative for chills, fatigue, fever and weight loss  HENT: Negative for congestion, ear discharge, ear pain, mouth sores, postnasal drip, rhinorrhea, sore throat and trouble swallowing  Eyes: Negative for pain, discharge and visual disturbance  Respiratory: Negative for cough, shortness of breath and wheezing  Cardiovascular: Negative for chest pain, palpitations and leg swelling  Gastrointestinal: Negative for abdominal distention, abdominal pain, anorexia, blood in stool, bowel incontinence, diarrhea, nausea and vomiting  Endocrine: Negative for polydipsia, polyphagia and polyuria     Genitourinary: Negative for bladder incontinence, dysuria, frequency, hematuria, pelvic pain and urgency  Musculoskeletal: Positive for arthralgias and back pain  Negative for gait problem, joint pain and joint swelling  Skin: Positive for rash  Negative for nail changes and pallor  Neurological: Negative for dizziness, tingling, syncope, speech difficulty, weakness, light-headedness, numbness, headaches and paresthesias  Hematological: Negative for adenopathy  Psychiatric/Behavioral: Negative for behavioral problems, confusion and sleep disturbance  The patient is not nervous/anxious  Current Outpatient Medications   Medication Sig Dispense Refill    finasteride (PROSCAR) 5 mg tablet Take 5 mg by mouth daily      mometasone (Asmanex Twisthaler) 110 MCG/INH AEPB inhaler Inhale 1 puff 2 (two) times a day Rinse mouth after use  3 each 3    multivitamin (THERAGRAN) TABS Take 1 tablet by mouth daily      nabumetone (RELAFEN) 750 mg tablet TAKE 1 TABLET(750 MG) BY MOUTH TWICE DAILY 180 tablet 1    tamsulosin (FLOMAX) 0 4 mg TAKE 2 CAPSULES(0 8 MG) BY MOUTH DAILY AT BEDTIME 180 capsule 1    zolpidem (AMBIEN CR) 12 5 MG CR tablet TAKE 1 TABLET(12 5 MG) BY MOUTH DAILY AT BEDTIME AS NEEDED FOR SLEEP 30 tablet 0    predniSONE 20 mg tablet 2 PO QD X 4 DAYS, THEN 1 PO QD X 4 DAYS, THEN 1/2 PO QD X 4 DAYS 14 tablet 0     No current facility-administered medications for this visit  Objective:    /64 (BP Location: Left arm, Patient Position: Sitting, Cuff Size: Adult)   Pulse 76   Temp 97 6 °F (36 4 °C) (Temporal)   Resp 16   Ht 6' (1 829 m)   Wt 89 8 kg (198 lb)   SpO2 98%   BMI 26 85 kg/m²        Physical Exam  Constitutional:       Appearance: He is well-developed  HENT:      Head: Normocephalic and atraumatic  Eyes:      General:         Right eye: No discharge  Left eye: No discharge  Conjunctiva/sclera: Conjunctivae normal       Pupils: Pupils are equal, round, and reactive to light  Neck:      Thyroid: No thyromegaly  Vascular: No JVD  Cardiovascular:      Rate and Rhythm: Normal rate and regular rhythm  Heart sounds: Normal heart sounds  No murmur heard  Pulmonary:      Effort: Pulmonary effort is normal       Breath sounds: Normal breath sounds  No wheezing or rales  Abdominal:      General: Bowel sounds are normal       Palpations: Abdomen is soft  There is no mass  Tenderness: There is no abdominal tenderness  There is no guarding or rebound  Musculoskeletal:         General: No tenderness or deformity  Normal range of motion  Cervical back: Neck supple  Comments: MODERATE DJD CHANGES  MILD LOWER LUMBAR TENDERNESS  NEG SLR  REFLEXES PRESERVED   Lymphadenopathy:      Cervical: No cervical adenopathy  Skin:     General: Skin is warm and dry  Findings: Erythema and rash present  Comments: MILDLY ERYTHEMATOUS PATCHES OF DRY SKIN  NOTED TO BE PRURITIC BY PATIENT  LIMBS, TORSO   Neurological:      Mental Status: He is alert and oriented to person, place, and time  Psychiatric:         Behavior: Behavior normal          Thought Content:  Thought content normal          Judgment: Judgment normal                 Tulio Knowles MD

## 2024-10-12 NOTE — ASSESSMENT & PLAN NOTE
"65 y/o F transferred from [facility] for higher level of care secondary to acute KIARA stroke on CTA.  Briefly,the patient presented to the OSH overnight for slurred speech started at 1:30 a.m. in the morning.  She has a history of his stroke with left-sided deficits but is normally able to transfer and do a little walking. This morning, she had complete hemiplegia of the left side. Telestroke consult performed, concerning for LVO.  NIH 13. CTA Stroke MP performed and  was read as "acute R KIARA infarct". No TNK due to AC use. Patient accepted to ED as transfer for emergent vascular neurology evaluation. No endovascular intervention. History of atrial fibrillation per discussion with daughter. Was started on Eliquis on 10/6: hold given size of infarct, start ASA on 10/8. Transition again to Eliquis in 5-7 days. Given history of Afib, etiology likely embolic. Though not apparent on MRA, if R KIARA occlusion was proximal (supported by MRI) could account for R MCA-like picture.    Eliquis restarted. Transient episodes of altered awareness. EEG ordered and pending interpretation (contacted epilepsy). Started Keppra 500mg BID in the meantime to see if reduces the frequency of these episodes. Otherwise, pending placement    -Eliquis 5mg BID  -high intensity statin  -SBP<220 in acute setting  -echo done, w/o evidence of intra-cardiac shunt, no LAE, but does have history of Afib  -PT/OT  -Started on diet  "

## 2024-10-13 LAB
ALBUMIN SERPL BCP-MCNC: 3 G/DL (ref 3.5–5.2)
ALP SERPL-CCNC: 75 U/L (ref 55–135)
ALT SERPL W/O P-5'-P-CCNC: 18 U/L (ref 10–44)
ANION GAP SERPL CALC-SCNC: 12 MMOL/L (ref 8–16)
AST SERPL-CCNC: 32 U/L (ref 10–40)
BILIRUB SERPL-MCNC: 0.6 MG/DL (ref 0.1–1)
BUN SERPL-MCNC: 50 MG/DL (ref 8–23)
CALCIUM SERPL-MCNC: 9.5 MG/DL (ref 8.7–10.5)
CHLORIDE SERPL-SCNC: 101 MMOL/L (ref 95–110)
CO2 SERPL-SCNC: 24 MMOL/L (ref 23–29)
CREAT SERPL-MCNC: 1 MG/DL (ref 0.5–1.4)
EST. GFR  (NO RACE VARIABLE): >60 ML/MIN/1.73 M^2
GLUCOSE SERPL-MCNC: 136 MG/DL (ref 70–110)
MAGNESIUM SERPL-MCNC: 2.2 MG/DL (ref 1.6–2.6)
PHOSPHATE SERPL-MCNC: 2.7 MG/DL (ref 2.7–4.5)
POCT GLUCOSE: 149 MG/DL (ref 70–110)
POCT GLUCOSE: 270 MG/DL (ref 70–110)
POCT GLUCOSE: 277 MG/DL (ref 70–110)
POTASSIUM SERPL-SCNC: 3.3 MMOL/L (ref 3.5–5.1)
PROT SERPL-MCNC: 6.4 G/DL (ref 6–8.4)
SODIUM SERPL-SCNC: 137 MMOL/L (ref 136–145)

## 2024-10-13 PROCEDURE — 25000003 PHARM REV CODE 250

## 2024-10-13 PROCEDURE — 25000003 PHARM REV CODE 250: Performed by: NURSE PRACTITIONER

## 2024-10-13 PROCEDURE — 25000003 PHARM REV CODE 250: Performed by: PSYCHIATRY & NEUROLOGY

## 2024-10-13 PROCEDURE — 80053 COMPREHEN METABOLIC PANEL: CPT

## 2024-10-13 PROCEDURE — 83735 ASSAY OF MAGNESIUM: CPT

## 2024-10-13 PROCEDURE — 99232 SBSQ HOSP IP/OBS MODERATE 35: CPT | Mod: ,,, | Performed by: NURSE PRACTITIONER

## 2024-10-13 PROCEDURE — 99233 SBSQ HOSP IP/OBS HIGH 50: CPT | Mod: GC,,, | Performed by: PSYCHIATRY & NEUROLOGY

## 2024-10-13 PROCEDURE — 84100 ASSAY OF PHOSPHORUS: CPT

## 2024-10-13 PROCEDURE — 11000001 HC ACUTE MED/SURG PRIVATE ROOM

## 2024-10-13 PROCEDURE — 36415 COLL VENOUS BLD VENIPUNCTURE: CPT

## 2024-10-13 PROCEDURE — 63600175 PHARM REV CODE 636 W HCPCS: Mod: UD | Performed by: PHYSICIAN ASSISTANT

## 2024-10-13 RX ORDER — ONDANSETRON HYDROCHLORIDE 2 MG/ML
4 INJECTION, SOLUTION INTRAVENOUS ONCE
Status: COMPLETED | OUTPATIENT
Start: 2024-10-13 | End: 2024-10-13

## 2024-10-13 RX ORDER — ACETAMINOPHEN 325 MG/1
650 TABLET ORAL EVERY 8 HOURS PRN
Status: DISCONTINUED | OUTPATIENT
Start: 2024-10-13 | End: 2024-10-14

## 2024-10-13 RX ORDER — CARVEDILOL 6.25 MG/1
6.25 TABLET ORAL 2 TIMES DAILY
Status: DISCONTINUED | OUTPATIENT
Start: 2024-10-13 | End: 2024-10-14

## 2024-10-13 RX ORDER — INSULIN ASPART 100 [IU]/ML
12 INJECTION, SOLUTION INTRAVENOUS; SUBCUTANEOUS
Status: DISCONTINUED | OUTPATIENT
Start: 2024-10-14 | End: 2024-10-15

## 2024-10-13 RX ORDER — POTASSIUM CHLORIDE 20 MEQ/1
20 TABLET, EXTENDED RELEASE ORAL ONCE
Status: COMPLETED | OUTPATIENT
Start: 2024-10-13 | End: 2024-10-13

## 2024-10-13 RX ADMIN — FAMOTIDINE 20 MG: 20 TABLET ORAL at 08:10

## 2024-10-13 RX ADMIN — INSULIN ASPART 10 UNITS: 100 INJECTION, SOLUTION INTRAVENOUS; SUBCUTANEOUS at 04:10

## 2024-10-13 RX ADMIN — CARVEDILOL 6.25 MG: 6.25 TABLET, FILM COATED ORAL at 08:10

## 2024-10-13 RX ADMIN — INSULIN ASPART 6 UNITS: 100 INJECTION, SOLUTION INTRAVENOUS; SUBCUTANEOUS at 12:10

## 2024-10-13 RX ADMIN — ATORVASTATIN CALCIUM 40 MG: 40 TABLET, FILM COATED ORAL at 08:10

## 2024-10-13 RX ADMIN — INSULIN ASPART 10 UNITS: 100 INJECTION, SOLUTION INTRAVENOUS; SUBCUTANEOUS at 12:10

## 2024-10-13 RX ADMIN — INSULIN ASPART 6 UNITS: 100 INJECTION, SOLUTION INTRAVENOUS; SUBCUTANEOUS at 04:10

## 2024-10-13 RX ADMIN — APIXABAN 5 MG: 5 TABLET, FILM COATED ORAL at 08:10

## 2024-10-13 RX ADMIN — INSULIN GLARGINE 30 UNITS: 100 INJECTION, SOLUTION SUBCUTANEOUS at 08:10

## 2024-10-13 RX ADMIN — ONDANSETRON 4 MG: 2 INJECTION INTRAMUSCULAR; INTRAVENOUS at 06:10

## 2024-10-13 RX ADMIN — ONDANSETRON 4 MG: 4 TABLET, ORALLY DISINTEGRATING ORAL at 05:10

## 2024-10-13 RX ADMIN — POTASSIUM CHLORIDE 20 MEQ: 1500 TABLET, EXTENDED RELEASE ORAL at 08:10

## 2024-10-13 RX ADMIN — LEVETIRACETAM 500 MG: 500 TABLET, FILM COATED ORAL at 08:10

## 2024-10-13 NOTE — ASSESSMENT & PLAN NOTE
History of. Takes Eliquis 5mg BID and Coreg 6.25mg BID at home.    -Initially Eliquis held given size of stroke; ASA 81mg QD in the interim. Eliquis was restarted on 10/10  -continue coreg at 6.125mg BID

## 2024-10-13 NOTE — ASSESSMENT & PLAN NOTE
Cr to 1.6 from baseline of 1. Likely pre-renal KAILYN, due to decreased PO intake. Resolved with fluids. Continue to monitor CMP.

## 2024-10-13 NOTE — SUBJECTIVE & OBJECTIVE
Neurologic Chief Complaint: infarcts in multiple vascular territories    Subjective:     Interval History: Patient is seen for follow-up neurological assessment and treatment recommendations: see HPI    HPI, Past Medical, Family, and Social History remains the same as documented in the initial encounter.     Review of Systems   Constitutional:  Negative for fatigue.   Respiratory:  Negative for shortness of breath.    Cardiovascular:  Negative for chest pain.   Neurological:  Positive for weakness and numbness.     Scheduled Meds:   apixaban  5 mg Oral BID    atorvastatin  40 mg Oral Daily    carvediloL  6.25 mg Oral BID    famotidine  20 mg Oral Daily    insulin aspart U-100  10 Units Subcutaneous TIDWM    insulin glargine U-100  30 Units Subcutaneous Daily     Continuous Infusions:      PRN Meds:  Current Facility-Administered Medications:     acetaminophen, 650 mg, Oral, Q8H PRN    bisacodyL, 10 mg, Rectal, Daily PRN    dextrose 10%, 12.5 g, Intravenous, PRN    dextrose 10%, 25 g, Intravenous, PRN    glucagon (human recombinant), 1 mg, Intramuscular, PRN    hydrALAZINE, 10 mg, Intravenous, Q6H PRN    insulin aspart U-100, 0-10 Units, Subcutaneous, QID (AC + HS) PRN    ondansetron, 4 mg, Oral, Q12H PRN    oxyCODONE, 5 mg, Oral, Q12H PRN    sodium chloride 0.9%, 500 mL, Intravenous, PRN    sodium chloride 0.9%, 10 mL, Intravenous, PRN    Objective:     Vital Signs (Most Recent):  Temp: 97.8 °F (36.6 °C) (10/13/24 0350)  Pulse: 89 (10/13/24 1112)  Resp: 16 (10/13/24 0738)  BP: (!) 150/87 (10/13/24 0858)  SpO2: 95 % (10/13/24 0738)  BP Location: Right arm    Vital Signs Range (Last 24H):  Temp:  [97.4 °F (36.3 °C)-98.2 °F (36.8 °C)]   Pulse:  [73-89]   Resp:  [16-20]   BP: (102-162)/(57-87)   SpO2:  [93 %-96 %]   BP Location: Right arm       Physical Exam  Vitals and nursing note reviewed.   Constitutional:       Appearance: She is ill-appearing.   HENT:      Head: Normocephalic and atraumatic.   Cardiovascular:       "Rate and Rhythm: Normal rate and regular rhythm.   Pulmonary:      Effort: Pulmonary effort is normal.   Musculoskeletal:      Right lower leg: No edema.      Left lower leg: No edema.   Neurological:      Mental Status: She is alert.          Neurological Exam:   LOC: alert  Attention Span: poor  Articulation: Dysarthria  Orientation: Person, Place, Time   Visual Fields: Full  EOM (CN III, IV, VI): Full/intact  Pupils (CN II, III): PERRL  Facial Sensation (CN V): Facial sensory loss  Facial Movement (CN VII): Upper facial weakness on the Left  Motor: Arm left  Plegia 0/5  Leg left  Paresis: 1/5  Arm right  Normal 5/5  Leg right Normal 5/5  Cerebellum: No evidence of appendicular or axial ataxia  Sensation: Jason-anesthesia left    Laboratory:  CMP:   Recent Labs   Lab 10/13/24  0432   CALCIUM 9.5   ALBUMIN 3.0*   PROT 6.4      K 3.3*   CO2 24      BUN 50*   CREATININE 1.0   ALKPHOS 75   ALT 18   AST 32   BILITOT 0.6       CBC:   Recent Labs   Lab 10/11/24  1302   WBC 11.48   RBC 5.30   HGB 17.3*   HCT 52.4*      MCV 99*   MCH 32.6*   MCHC 33.0     Lipid Panel:   No results for input(s): "CHOL", "LDLCALC", "HDL", "TRIG" in the last 168 hours.    Hgb A1C:   Recent Labs   Lab 10/08/24  0217   HGBA1C 10.0*     TSH:   No results for input(s): "TSH" in the last 168 hours.      Diagnostic Results     Brain and Vessel Imaging   MRI/MRA Brain Ischemic Protocol: 10/6/24  Impression: Acute infarcts involving the right anterior circulation and posterior circulation as detailed above without evidence of intracranial hemorrhage or mass effect.  Embolic disease to be considered.     Chronic left parietal infarct. MRA demonstrates limited visualization of distal right KIARA branches in keeping with the territory of the acute infarct.  There is also narrowing of at least one proximal left M2 branch although no acute infarct in that territory.        Echo: 10/7/24  Cardiac Imaging     Left Ventricle: The left " ventricle is normal in size. Normal wall thickness. There is concentric remodeling. Normal wall motion. There is hyperdynamic systolic function with a visually estimated ejection fraction of greater than 70%. There is normal diastolic function.    Right Ventricle: Normal right ventricular cavity size. Wall thickness is normal. Systolic function is normal.    Left Atrium: Bubble study is likely negative, but full opacification of the RA is not well seen on these images which decreases sensitivity.    IVC/SVC: Normal venous pressure at 3 mmHg.

## 2024-10-13 NOTE — SUBJECTIVE & OBJECTIVE
"Interval HPI:   Overnight events: Remains on neuro floor. NAEON. BG reasonably controlled on current SQ insulin regimen. Diet Pureed (IDDSI Level 4) Nectar Thick (Mildly Thick)    Eatin%  Nausea: No  Hypoglycemia and intervention: No  Fever: No  TPN and/or TF: No  If yes, type of TF/TPN and rate: n/a    BP (!) 150/87   Pulse 85   Temp 97.8 °F (36.6 °C) (Axillary)   Resp 16   Ht 5' 2" (1.575 m)   Wt 72.6 kg (160 lb 0.9 oz)   LMP  (LMP Unknown) Comment: Doesn't  have periods anymore  SpO2 95%   Breastfeeding No   BMI 29.27 kg/m²     Labs Reviewed and Include    Recent Labs   Lab 10/13/24  0432   *   CALCIUM 9.5   ALBUMIN 3.0*   PROT 6.4      K 3.3*   CO2 24      BUN 50*   CREATININE 1.0   ALKPHOS 75   ALT 18   AST 32   BILITOT 0.6     Lab Results   Component Value Date    WBC 11.48 10/11/2024    HGB 17.3 (H) 10/11/2024    HCT 52.4 (H) 10/11/2024    MCV 99 (H) 10/11/2024     10/11/2024     Recent Labs   Lab 10/06/24  0934   TSH 1.237     Lab Results   Component Value Date    HGBA1C 10.0 (H) 10/08/2024       Nutritional status:   Body mass index is 29.27 kg/m².  Lab Results   Component Value Date    ALBUMIN 3.0 (L) 10/13/2024    ALBUMIN 3.2 (L) 10/12/2024    ALBUMIN 3.3 (L) 10/11/2024     No results found for: "PREALBUMIN"    Estimated Creatinine Clearance: 52.3 mL/min (based on SCr of 1 mg/dL).    Accu-Checks  Recent Labs     10/10/24  2045 10/10/24  2215 10/11/24  0809 10/11/24  1154 10/11/24  1800 10/11/24  2302 10/12/24  0741 10/12/24  1155 10/12/24  1649 10/13/24  0741   POCTGLUCOSE 272* 289* 195* 285* 209* 135* 218* 237* 94 149*       Current Medications and/or Treatments Impacting Glycemic Control  Immunotherapy:    Immunosuppressants       None          Steroids:   Hormones (From admission, onward)      None          Pressors:    Autonomic Drugs (From admission, onward)      None          Hyperglycemia/Diabetes Medications:   Antihyperglycemics (From admission, onward) "      Start     Stop Route Frequency Ordered    10/12/24 0900  insulin glargine U-100 (Lantus) pen 30 Units         -- SubQ Daily 10/11/24 0906    10/11/24 1645  insulin aspart U-100 pen 10 Units         -- SubQ 3 times daily with meals 10/11/24 1513    10/09/24 0809  insulin aspart U-100 pen 0-10 Units         -- SubQ Before meals & nightly PRN 10/09/24 0710

## 2024-10-13 NOTE — PROGRESS NOTES
"[facility] - Neurosurgery ([facility])  Endocrinology  Progress Note    Admit Date: 10/6/2024     Reason for Consult: Management of T2DM, Hyperglycemia       Diabetes diagnosis year:  Over 5 years    Home Diabetes Medications:    -liraglutide  -Levemir 35 units QHS    How often checking glucose at home? Not checking       Diabetes Complications include:     Hyperglycemia    Complicating diabetes co morbidities:   History of CVA      HPI:   Patient is a 64 y.o. female with  DM2 , HTN who was transferred from [facility] for higher level of care secondary to acute KIARA stroke on CTA. Briefly,the patient presented to the OSH overnight for slurred speech started at 1:30 a.m. in the morning. She has a history of his stroke with left-sided deficits but is normally able to transfer and do a little walking.  Endocrine consulted for BG management.      Interval HPI:   Overnight events: Remains on neuro floor. NAEON. BG reasonably controlled on current SQ insulin regimen. Diet Pureed (IDDSI Level 4) Nectar Thick (Mildly Thick)    Eatin%  Nausea: No  Hypoglycemia and intervention: No  Fever: No  TPN and/or TF: No  If yes, type of TF/TPN and rate: n/a    BP (!) 150/87   Pulse 85   Temp 97.8 °F (36.6 °C) (Axillary)   Resp 16   Ht 5' 2" (1.575 m)   Wt 72.6 kg (160 lb 0.9 oz)   LMP  (LMP Unknown) Comment: Doesn't  have periods anymore  SpO2 95%   Breastfeeding No   BMI 29.27 kg/m²     Labs Reviewed and Include    Recent Labs   Lab 10/13/24  0432   *   CALCIUM 9.5   ALBUMIN 3.0*   PROT 6.4      K 3.3*   CO2 24      BUN 50*   CREATININE 1.0   ALKPHOS 75   ALT 18   AST 32   BILITOT 0.6     Lab Results   Component Value Date    WBC 11.48 10/11/2024    HGB 17.3 (H) 10/11/2024    HCT 52.4 (H) 10/11/2024    MCV 99 (H) 10/11/2024     10/11/2024     Recent Labs   Lab 10/06/24  0934   TSH 1.237     Lab Results   Component Value Date    HGBA1C 10.0 (H) 10/08/2024       Nutritional status: " "  Body mass index is 29.27 kg/m².  Lab Results   Component Value Date    ALBUMIN 3.0 (L) 10/13/2024    ALBUMIN 3.2 (L) 10/12/2024    ALBUMIN 3.3 (L) 10/11/2024     No results found for: "PREALBUMIN"    Estimated Creatinine Clearance: 52.3 mL/min (based on SCr of 1 mg/dL).    Accu-Checks  Recent Labs     10/10/24  2045 10/10/24  2215 10/11/24  0809 10/11/24  1154 10/11/24  1800 10/11/24  2302 10/12/24  0741 10/12/24  1155 10/12/24  1649 10/13/24  0741   POCTGLUCOSE 272* 289* 195* 285* 209* 135* 218* 237* 94 149*       Current Medications and/or Treatments Impacting Glycemic Control  Immunotherapy:    Immunosuppressants       None          Steroids:   Hormones (From admission, onward)      None          Pressors:    Autonomic Drugs (From admission, onward)      None          Hyperglycemia/Diabetes Medications:   Antihyperglycemics (From admission, onward)      Start     Stop Route Frequency Ordered    10/12/24 0900  insulin glargine U-100 (Lantus) pen 30 Units         -- SubQ Daily 10/11/24 0906    10/11/24 1645  insulin aspart U-100 pen 10 Units         -- SubQ 3 times daily with meals 10/11/24 1513    10/09/24 0809  insulin aspart U-100 pen 0-10 Units         -- SubQ Before meals & nightly PRN 10/09/24 0710            ASSESSMENT and PLAN    Neuro  * Acute arterial ischemic stroke, multifocal, multiple vascular territories  Managed by primary team  Optimize BG control      Cardiac/Vascular  HTN (hypertension)  Managed by primary team  Optimize BG control      Endocrine  Type 2 diabetes mellitus, with long-term current use of insulin  BG goal: 140-180  T2DM admitted with acute KIARA stroke.   BG stable.     - Continue Lantus 30 units daily   - continue NovoLog 10 units with meals (basal/prandial match)   -continue Novolog Moderate dose correction with ISF 25 starting at 150    - POCT Glucose ac/hs  - Hypoglycemia protocol in place      ** Please notify Endocrine for any change and/or advance in diet**  ** Please call " Endocrine for any BG related issues **     Discharge Planning:   TBD. Please notify endocrinology prior to discharge.            Mattie Ramsey NP  Endocrinology  [facility] - Neurosurgery ([facility])

## 2024-10-13 NOTE — ASSESSMENT & PLAN NOTE
Home medication regimen as below  -lisinopril 40mg QD  -amlodipine 10mg QD    Lisinopril held given prior KAILYN. SBP ~150-160s and HR ~80-90s in context of Hx Afib, so increased Coreg to 6.25. If rate-controlled and BP still elevated tomorrow, can add back lisinopril or amlodipine.

## 2024-10-13 NOTE — ASSESSMENT & PLAN NOTE
BG goal: 140-180  T2DM admitted with acute KIARA stroke.   BG stable.     - Continue Lantus 30 units daily   - continue NovoLog 10 units with meals (basal/prandial match)   -continue Novolog Moderate dose correction with ISF 25 starting at 150    - POCT Glucose ac/hs  - Hypoglycemia protocol in place      ** Please notify Endocrine for any change and/or advance in diet**  ** Please call Endocrine for any BG related issues **     Discharge Planning:   TBD. Please notify endocrinology prior to discharge.

## 2024-10-13 NOTE — PLAN OF CARE
Problem: Skin Injury Risk Increased  Goal: Skin Health and Integrity  Outcome: Progressing     Problem: Stroke, Ischemic (Includes Transient Ischemic Attack)  Goal: Optimal Coping  Outcome: Progressing  Goal: Effective Bowel Elimination  Outcome: Progressing  Goal: Optimal Cerebral Tissue Perfusion  Outcome: Progressing  Goal: Optimal Cognitive Function  Outcome: Progressing  Goal: Improved Communication Skills  Outcome: Progressing  Goal: Optimal Functional Ability  Outcome: Progressing  Goal: Optimal Nutrition Intake  Outcome: Progressing  Goal: Effective Oxygenation and Ventilation  Outcome: Progressing  Goal: Improved Sensorimotor Function  Outcome: Progressing  Goal: Safe and Effective Swallow  Outcome: Progressing  Goal: Effective Urinary Elimination  Outcome: Progressing     Problem: Fall Injury Risk  Goal: Absence of Fall and Fall-Related Injury  Outcome: Progressing     Problem: Adult Inpatient Plan of Care  Goal: Plan of Care Review  Outcome: Progressing  Goal: Patient-Specific Goal (Individualized)  Description: Pt will maintain sbp below 220  Outcome: Progressing  Goal: Absence of Hospital-Acquired Illness or Injury  Outcome: Progressing  Goal: Optimal Comfort and Wellbeing  Outcome: Progressing  Goal: Readiness for Transition of Care  Outcome: Progressing     Problem: Infection  Goal: Absence of Infection Signs and Symptoms  Outcome: Progressing     Problem: Diabetes Comorbidity  Goal: Blood Glucose Level Within Targeted Range  Outcome: Progressing     Problem: Acute Kidney Injury/Impairment  Goal: Fluid and Electrolyte Balance  Outcome: Progressing  Goal: Improved Oral Intake  Outcome: Progressing  Goal: Effective Renal Function  Outcome: Progressing

## 2024-10-13 NOTE — PROGRESS NOTES
"[facility] - Neurosurgery ([facility])  Vascular Neurology  Comprehensive Stroke Center  Progress Note    Assessment/Plan:     * Acute arterial ischemic stroke, multifocal, multiple vascular territories  65 y/o F transferred from [facility] for higher level of care secondary to acute KIARA stroke on CTA.  Briefly,the patient presented to the OSH overnight for slurred speech started at 1:30 a.m. in the morning.  She has a history of his stroke with left-sided deficits but is normally able to transfer and do a little walking. This morning, she had complete hemiplegia of the left side. Telestroke consult performed, concerning for LVO.  NIH 13. CTA Stroke MP performed and  was read as "acute R KIARA infarct". No TNK due to AC use. Patient accepted to ED as transfer for emergent vascular neurology evaluation. No endovascular intervention. History of atrial fibrillation per discussion with daughter. Was started on Eliquis on 10/6: hold given size of infarct, start ASA on 10/8. Transition again to Eliquis in 5-7 days. Given history of Afib, etiology likely embolic. Though not apparent on MRA, if R KIARA occlusion was proximal (supported by MRI) could account for R MCA-like picture.    Eliquis restarted. Transient episodes of altered awareness: EEG negative, and no change in frequency observed by family w/Keppra so have stopped this medicine. Pending placement.    -Eliquis 5mg BID  -high intensity statin  -SBP<220 in acute setting  -echo done, w/o evidence of intra-cardiac shunt, no LAE, but does have history of Afib  -PT/OT  -Started on diet    Seizure-like activity  Episodes of transient altered mental status observed by daughter, sometimes with a brief left gaze. Given location of KIARA stroke, considered seizure.    EEG done on 10/11, interpretation pending, contacted epilepsy  Started Keppra 500mg BID in the mean time to see if improves frequency of symptoms    KAILYN (acute kidney injury)  Cr to 1.6 from baseline of 1. " Likely pre-renal KAILYN, due to decreased PO intake. Resolved with fluids. Continue to monitor CMP.    Type 2 diabetes mellitus, with long-term current use of insulin  History of. Takes Levemir 35 units QHS at home. A1C 10%.    -goal 140-180 while admitted  -ordered MDSS  -On Lantus 30 units QHS & Aspart 10 units with meals  -accuchecks  -Endocrine consulted for A1c 10%; following, appreciate recs     Atrial fibrillation  History of. Takes Eliquis 5mg BID and Coreg 6.25mg BID at home.    -Initially Eliquis held given size of stroke; ASA 81mg QD in the interim. Eliquis was restarted on 10/10  -continue coreg at 6.125mg BID    Opioid use  History of. Prescribed oxycodone 15mg at home. Family concerned that medication is being diverted by family member, unclear how often patient takes. Reportedly is prescribed for neck pain.     -oxycodone 5mg PRN BID for severe pain  -monitor for signs of withdrawal, as is unclear how often patient is taking    Left hemiplegia  See stroke    Hypokalemia  Resolved w/repletion. Continue to monitor on labs, particularly given Afib.        HTN (hypertension)  Home medication regimen as below  -lisinopril 40mg QD  -amlodipine 10mg QD    Lisinopril held given prior KAILYN. SBP ~150-160s and HR ~80-90s in context of Hx Afib, so increased Coreg to 6.25. If rate-controlled and BP still elevated tomorrow, can add back lisinopril or amlodipine.         10/7: gómez SINCLAIR exam stable, echo today, NPO aside from meds per speech, barium study tmrw  10/08/2024 DOTTIE, stable neuro exam, went for the Br study, and was started on a diet. Endocrine consulted for the A1c 10%, pending recs.  10/09/2024 DOTTIE, Stable neuro exam, will restart Lisinopril 20 mg (half of her dome dose). Seen by endocrine, with adjustment of her insulin doses.  10/10/2024 DOTTIE, no change in her neuro exam, will restart Eliquis today, BP is better controlled.  10/11/2024 DOTTIE, stable neuro exam, still decreased PO intake. Patient  gets some episodes of in attention; EEG was ordered. Endocrine adjusted her insulins. Accepted at [facility] in [place].   10/12/2024 NAEON, waiting on EEG interpretation (contacted epilepsy), started Keppra 500mg BID and will see if reduces transient alterations in awareness, neurologic examination stable  10/23/2024 NAEON, EEG w/encephalopathy (no seizure or discharges), stop Keppra, SBP ~150 to 160 so increased Coreg to 6.25mg BID    STROKE DOCUMENTATION   Acute Stroke Times   Last Known Normal Date:  (OSH)  Stroke Team Called Date: 10/06/24  Stroke Team Called Time:  (At OSH)  Stroke Team Arrival Date: 10/06/24  Stroke Team Arrival Time: 0608  CT Interpretation Time:  (CTA done around 4:20am per documents that came to [facility] [place] patient)  Thrombolytic Therapy Recommended: No  Thrombectomy Recommended:  (Pending MRI Brain)    NIH Scale:  1a. Level of Consciousness: 0-->Alert, keenly responsive  1b. LOC Questions: 0-->Answers both questions correctly  1c. LOC Commands: 0-->Performs both tasks correctly  2. Best Gaze: 0-->Normal  3. Visual: 0-->No visual loss  4. Facial Palsy: 1-->Minor paralysis (flattened nasolabial fold, asymmetry on smiling)  5a. Motor Arm, Left: 4-->No movement  5b. Motor Arm, Right: 0-->No drift, limb holds 90 (or 45) degrees for full 10 secs  6a. Motor Leg, Left: 4-->No movement  6b. Motor Leg, Right: 0-->No drift, leg holds 30 degree position for full 5 secs  7. Limb Ataxia: 0-->Absent  8. Sensory: 1-->Mild-to-moderate sensory loss, patient feels pinprick is less sharp or is dull on the affected side, or there is a loss of superficial pain with pinprick, but patient is aware of being touched  9. Best Language: 0-->No aphasia, normal  10. Dysarthria: 1-->Mild-to-moderate dysarthria, patient slurs at least some words and, at worst, can be understood with some difficulty  11. Extinction and Inattention (formerly Neglect): 0-->No abnormality  Total (NIH Stroke Scale): 11       Modified [place]  Score: 3  [place] Coma Scale:15   ABCD2 Score:    [id number] Score:   HAS -BLED Score:   ICH Score:   Hunt & Shane Classification:      Hemorrhagic change of an Ischemic Stroke: Does this patient have an ischemic stroke with hemorrhagic changes? No     Neurologic Chief Complaint: infarcts in multiple vascular territories    Subjective:     Interval History: Patient is seen for follow-up neurological assessment and treatment recommendations: see HPI    HPI, Past Medical, Family, and Social History remains the same as documented in the initial encounter.     Review of Systems   Constitutional:  Negative for fatigue.   Respiratory:  Negative for shortness of breath.    Cardiovascular:  Negative for chest pain.   Neurological:  Positive for weakness and numbness.     Scheduled Meds:   apixaban  5 mg Oral BID    atorvastatin  40 mg Oral Daily    carvediloL  6.25 mg Oral BID    famotidine  20 mg Oral Daily    insulin aspart U-100  10 Units Subcutaneous TIDWM    insulin glargine U-100  30 Units Subcutaneous Daily     Continuous Infusions:      PRN Meds:  Current Facility-Administered Medications:     acetaminophen, 650 mg, Oral, Q8H PRN    bisacodyL, 10 mg, Rectal, Daily PRN    dextrose 10%, 12.5 g, Intravenous, PRN    dextrose 10%, 25 g, Intravenous, PRN    glucagon (human recombinant), 1 mg, Intramuscular, PRN    hydrALAZINE, 10 mg, Intravenous, Q6H PRN    insulin aspart U-100, 0-10 Units, Subcutaneous, QID (AC + HS) PRN    ondansetron, 4 mg, Oral, Q12H PRN    oxyCODONE, 5 mg, Oral, Q12H PRN    sodium chloride 0.9%, 500 mL, Intravenous, PRN    sodium chloride 0.9%, 10 mL, Intravenous, PRN    Objective:     Vital Signs (Most Recent):  Temp: 97.8 °F (36.6 °C) (10/13/24 0350)  Pulse: 89 (10/13/24 1112)  Resp: 16 (10/13/24 0738)  BP: (!) 150/87 (10/13/24 0858)  SpO2: 95 % (10/13/24 0738)  BP Location: Right arm    Vital Signs Range (Last 24H):  Temp:  [97.4 °F (36.3 °C)-98.2 °F (36.8 °C)]   Pulse:  [73-89]   Resp:  [16-20]  "  BP: (102-162)/(57-87)   SpO2:  [93 %-96 %]   BP Location: Right arm       Physical Exam  Vitals and nursing note reviewed.   Constitutional:       Appearance: She is ill-appearing.   HENT:      Head: Normocephalic and atraumatic.   Cardiovascular:      Rate and Rhythm: Normal rate and regular rhythm.   Pulmonary:      Effort: Pulmonary effort is normal.   Musculoskeletal:      Right lower leg: No edema.      Left lower leg: No edema.   Neurological:      Mental Status: She is alert.          Neurological Exam:   LOC: alert  Attention Span: poor  Articulation: Dysarthria  Orientation: Person, Place, Time   Visual Fields: Full  EOM (CN III, IV, VI): Full/intact  Pupils (CN II, III): PERRL  Facial Sensation (CN V): Facial sensory loss  Facial Movement (CN VII): Upper facial weakness on the Left  Motor: Arm left  Plegia 0/5  Leg left  Paresis: 1/5  Arm right  Normal 5/5  Leg right Normal 5/5  Cerebellum: No evidence of appendicular or axial ataxia  Sensation: Jason-anesthesia left    Laboratory:  CMP:   Recent Labs   Lab 10/13/24  0432   CALCIUM 9.5   ALBUMIN 3.0*   PROT 6.4      K 3.3*   CO2 24      BUN 50*   CREATININE 1.0   ALKPHOS 75   ALT 18   AST 32   BILITOT 0.6       CBC:   Recent Labs   Lab 10/11/24  1302   WBC 11.48   RBC 5.30   HGB 17.3*   HCT 52.4*      MCV 99*   MCH 32.6*   MCHC 33.0     Lipid Panel:   No results for input(s): "CHOL", "LDLCALC", "HDL", "TRIG" in the last 168 hours.    Hgb A1C:   Recent Labs   Lab 10/08/24  0217   HGBA1C 10.0*     TSH:   No results for input(s): "TSH" in the last 168 hours.      Diagnostic Results     Brain and Vessel Imaging   MRI/MRA Brain Ischemic Protocol: 10/6/24  Impression: Acute infarcts involving the right anterior circulation and posterior circulation as detailed above without evidence of intracranial hemorrhage or mass effect.  Embolic disease to be considered.     Chronic left parietal infarct. MRA demonstrates limited visualization of distal " right KIARA branches in keeping with the territory of the acute infarct.  There is also narrowing of at least one proximal left M2 branch although no acute infarct in that territory.        Echo: 10/7/24  Cardiac Imaging     Left Ventricle: The left ventricle is normal in size. Normal wall thickness. There is concentric remodeling. Normal wall motion. There is hyperdynamic systolic function with a visually estimated ejection fraction of greater than 70%. There is normal diastolic function.    Right Ventricle: Normal right ventricular cavity size. Wall thickness is normal. Systolic function is normal.    Left Atrium: Bubble study is likely negative, but full opacification of the RA is not well seen on these images which decreases sensitivity.    IVC/SVC: Normal venous pressure at 3 mmHg.    Roger Conklin DO  [facility]  Department of Vascular Neurology   [facility])

## 2024-10-13 NOTE — ASSESSMENT & PLAN NOTE
"65 y/o F transferred from [facility] for higher level of care secondary to acute KIARA stroke on CTA.  Briefly,the patient presented to the OSH overnight for slurred speech started at 1:30 a.m. in the morning.  She has a history of his stroke with left-sided deficits but is normally able to transfer and do a little walking. This morning, she had complete hemiplegia of the left side. Telestroke consult performed, concerning for LVO.  NIH 13. CTA Stroke MP performed and  was read as "acute R KIARA infarct". No TNK due to AC use. Patient accepted to ED as transfer for emergent vascular neurology evaluation. No endovascular intervention. History of atrial fibrillation per discussion with daughter. Was started on Eliquis on 10/6: hold given size of infarct, start ASA on 10/8. Transition again to Eliquis in 5-7 days. Given history of Afib, etiology likely embolic. Though not apparent on MRA, if R KIARA occlusion was proximal (supported by MRI) could account for R MCA-like picture.    Eliquis restarted. Transient episodes of altered awareness: EEG negative, and no change in frequency observed by family w/Keppra so have stopped this medicine. Pending placement.    -Eliquis 5mg BID  -high intensity statin  -SBP<220 in acute setting  -echo done, w/o evidence of intra-cardiac shunt, no LAE, but does have history of Afib  -PT/OT  -Started on diet  "

## 2024-10-14 PROBLEM — R29.818 ACUTE FOCAL NEUROLOGICAL DEFICIT: Status: ACTIVE | Noted: 2024-10-12

## 2024-10-14 PROBLEM — K56.7 ILEUS: Status: ACTIVE | Noted: 2024-10-14

## 2024-10-14 LAB
ALBUMIN SERPL BCP-MCNC: 3.1 G/DL (ref 3.5–5.2)
ALP SERPL-CCNC: 79 U/L (ref 55–135)
ALT SERPL W/O P-5'-P-CCNC: 22 U/L (ref 10–44)
ANION GAP SERPL CALC-SCNC: 12 MMOL/L (ref 8–16)
APTT PPP: 30.2 SEC (ref 21–32)
APTT PPP: 55.7 SEC (ref 21–32)
AST SERPL-CCNC: 39 U/L (ref 10–40)
BASOPHILS # BLD AUTO: 0.04 K/UL (ref 0–0.2)
BASOPHILS # BLD AUTO: 0.09 K/UL (ref 0–0.2)
BASOPHILS NFR BLD: 0.6 % (ref 0–1.9)
BASOPHILS NFR BLD: 1.5 % (ref 0–1.9)
BILIRUB SERPL-MCNC: 0.7 MG/DL (ref 0.1–1)
BUN SERPL-MCNC: 34 MG/DL (ref 8–23)
CALCIUM SERPL-MCNC: 10 MG/DL (ref 8.7–10.5)
CHLORIDE SERPL-SCNC: 105 MMOL/L (ref 95–110)
CO2 SERPL-SCNC: 23 MMOL/L (ref 23–29)
CREAT SERPL-MCNC: 0.8 MG/DL (ref 0.5–1.4)
DIFFERENTIAL METHOD BLD: ABNORMAL
DIFFERENTIAL METHOD BLD: ABNORMAL
DOHLE BOD BLD QL SMEAR: PRESENT
EOSINOPHIL # BLD AUTO: 0.1 K/UL (ref 0–0.5)
EOSINOPHIL # BLD AUTO: 0.1 K/UL (ref 0–0.5)
EOSINOPHIL NFR BLD: 0.9 % (ref 0–8)
EOSINOPHIL NFR BLD: 0.9 % (ref 0–8)
ERYTHROCYTE [DISTWIDTH] IN BLOOD BY AUTOMATED COUNT: 12.7 % (ref 11.5–14.5)
ERYTHROCYTE [DISTWIDTH] IN BLOOD BY AUTOMATED COUNT: 13 % (ref 11.5–14.5)
EST. GFR  (NO RACE VARIABLE): >60 ML/MIN/1.73 M^2
GLUCOSE SERPL-MCNC: 85 MG/DL (ref 70–110)
HCT VFR BLD AUTO: 48.3 % (ref 37–48.5)
HCT VFR BLD AUTO: 50.1 % (ref 37–48.5)
HGB BLD-MCNC: 16.5 G/DL (ref 12–16)
HGB BLD-MCNC: 16.8 G/DL (ref 12–16)
HYPOCHROMIA BLD QL SMEAR: ABNORMAL
IMM GRANULOCYTES # BLD AUTO: 0.01 K/UL (ref 0–0.04)
IMM GRANULOCYTES # BLD AUTO: 0.02 K/UL (ref 0–0.04)
IMM GRANULOCYTES NFR BLD AUTO: 0.2 % (ref 0–0.5)
IMM GRANULOCYTES NFR BLD AUTO: 0.3 % (ref 0–0.5)
INR PPP: 1.1 (ref 0.8–1.2)
LYMPHOCYTES # BLD AUTO: 1 K/UL (ref 1–4.8)
LYMPHOCYTES # BLD AUTO: 1.2 K/UL (ref 1–4.8)
LYMPHOCYTES NFR BLD: 17.7 % (ref 18–48)
LYMPHOCYTES NFR BLD: 19.5 % (ref 18–48)
MAGNESIUM SERPL-MCNC: 2.2 MG/DL (ref 1.6–2.6)
MCH RBC QN AUTO: 32.2 PG (ref 27–31)
MCH RBC QN AUTO: 33.1 PG (ref 27–31)
MCHC RBC AUTO-ENTMCNC: 33.5 G/DL (ref 32–36)
MCHC RBC AUTO-ENTMCNC: 34.2 G/DL (ref 32–36)
MCV RBC AUTO: 96 FL (ref 82–98)
MCV RBC AUTO: 97 FL (ref 82–98)
MONOCYTES # BLD AUTO: 0.8 K/UL (ref 0.3–1)
MONOCYTES # BLD AUTO: 0.8 K/UL (ref 0.3–1)
MONOCYTES NFR BLD: 13.2 % (ref 4–15)
MONOCYTES NFR BLD: 13.8 % (ref 4–15)
NEUTROPHILS # BLD AUTO: 3.9 K/UL (ref 1.8–7.7)
NEUTROPHILS # BLD AUTO: 4.2 K/UL (ref 1.8–7.7)
NEUTROPHILS NFR BLD: 65.5 % (ref 38–73)
NEUTROPHILS NFR BLD: 65.9 % (ref 38–73)
NRBC BLD-RTO: 0 /100 WBC
NRBC BLD-RTO: 0 /100 WBC
PHOSPHATE SERPL-MCNC: 3 MG/DL (ref 2.7–4.5)
PLATELET # BLD AUTO: 226 K/UL (ref 150–450)
PLATELET # BLD AUTO: 275 K/UL (ref 150–450)
PLATELET BLD QL SMEAR: ABNORMAL
PMV BLD AUTO: 11.5 FL (ref 9.2–12.9)
PMV BLD AUTO: 11.7 FL (ref 9.2–12.9)
POCT GLUCOSE: 144 MG/DL (ref 70–110)
POCT GLUCOSE: 94 MG/DL (ref 70–110)
POCT GLUCOSE: 95 MG/DL (ref 70–110)
POTASSIUM SERPL-SCNC: 3.8 MMOL/L (ref 3.5–5.1)
PROT SERPL-MCNC: 6.9 G/DL (ref 6–8.4)
PROTHROMBIN TIME: 11.9 SEC (ref 9–12.5)
RBC # BLD AUTO: 4.99 M/UL (ref 4–5.4)
RBC # BLD AUTO: 5.21 M/UL (ref 4–5.4)
SODIUM SERPL-SCNC: 140 MMOL/L (ref 136–145)
TOXIC GRANULES BLD QL SMEAR: PRESENT
WBC # BLD AUTO: 5.87 K/UL (ref 3.9–12.7)
WBC # BLD AUTO: 6.35 K/UL (ref 3.9–12.7)

## 2024-10-14 PROCEDURE — 83735 ASSAY OF MAGNESIUM: CPT | Performed by: PHYSICIAN ASSISTANT

## 2024-10-14 PROCEDURE — 36415 COLL VENOUS BLD VENIPUNCTURE: CPT | Mod: XB | Performed by: PSYCHIATRY & NEUROLOGY

## 2024-10-14 PROCEDURE — 85730 THROMBOPLASTIN TIME PARTIAL: CPT | Mod: 91 | Performed by: PSYCHIATRY & NEUROLOGY

## 2024-10-14 PROCEDURE — 85025 COMPLETE CBC W/AUTO DIFF WBC: CPT | Mod: 91

## 2024-10-14 PROCEDURE — 25000003 PHARM REV CODE 250

## 2024-10-14 PROCEDURE — 63600175 PHARM REV CODE 636 W HCPCS: Mod: UD | Performed by: STUDENT IN AN ORGANIZED HEALTH CARE EDUCATION/TRAINING PROGRAM

## 2024-10-14 PROCEDURE — 63600175 PHARM REV CODE 636 W HCPCS: Mod: UD

## 2024-10-14 PROCEDURE — 85730 THROMBOPLASTIN TIME PARTIAL: CPT

## 2024-10-14 PROCEDURE — 97530 THERAPEUTIC ACTIVITIES: CPT

## 2024-10-14 PROCEDURE — 84100 ASSAY OF PHOSPHORUS: CPT | Performed by: PHYSICIAN ASSISTANT

## 2024-10-14 PROCEDURE — 36415 COLL VENOUS BLD VENIPUNCTURE: CPT

## 2024-10-14 PROCEDURE — 97110 THERAPEUTIC EXERCISES: CPT

## 2024-10-14 PROCEDURE — 85610 PROTHROMBIN TIME: CPT

## 2024-10-14 PROCEDURE — 97535 SELF CARE MNGMENT TRAINING: CPT

## 2024-10-14 PROCEDURE — 85025 COMPLETE CBC W/AUTO DIFF WBC: CPT | Performed by: PHYSICIAN ASSISTANT

## 2024-10-14 PROCEDURE — 94761 N-INVAS EAR/PLS OXIMETRY MLT: CPT

## 2024-10-14 PROCEDURE — 11000001 HC ACUTE MED/SURG PRIVATE ROOM

## 2024-10-14 PROCEDURE — 36415 COLL VENOUS BLD VENIPUNCTURE: CPT | Performed by: PHYSICIAN ASSISTANT

## 2024-10-14 PROCEDURE — 97112 NEUROMUSCULAR REEDUCATION: CPT | Mod: CQ

## 2024-10-14 PROCEDURE — 99233 SBSQ HOSP IP/OBS HIGH 50: CPT | Mod: GC,,, | Performed by: PSYCHIATRY & NEUROLOGY

## 2024-10-14 PROCEDURE — 99232 SBSQ HOSP IP/OBS MODERATE 35: CPT | Mod: ,,,

## 2024-10-14 PROCEDURE — 92523 SPEECH SOUND LANG COMPREHEN: CPT

## 2024-10-14 PROCEDURE — 80053 COMPREHEN METABOLIC PANEL: CPT | Performed by: PHYSICIAN ASSISTANT

## 2024-10-14 PROCEDURE — 25500020 PHARM REV CODE 255: Performed by: PSYCHIATRY & NEUROLOGY

## 2024-10-14 RX ORDER — HEPARIN SODIUM,PORCINE/D5W 25000/250
0-40 INTRAVENOUS SOLUTION INTRAVENOUS CONTINUOUS
Status: DISCONTINUED | OUTPATIENT
Start: 2024-10-14 | End: 2024-10-19

## 2024-10-14 RX ORDER — ONDANSETRON 4 MG/1
4 TABLET, ORALLY DISINTEGRATING ORAL EVERY 12 HOURS PRN
Status: DISCONTINUED | OUTPATIENT
Start: 2024-10-14 | End: 2024-10-15

## 2024-10-14 RX ORDER — FAMOTIDINE 20 MG/1
20 TABLET, FILM COATED ORAL DAILY
Status: DISCONTINUED | OUTPATIENT
Start: 2024-10-15 | End: 2024-10-19

## 2024-10-14 RX ORDER — ATORVASTATIN CALCIUM 40 MG/1
40 TABLET, FILM COATED ORAL DAILY
Status: DISCONTINUED | OUTPATIENT
Start: 2024-10-15 | End: 2024-10-19

## 2024-10-14 RX ORDER — INSULIN GLARGINE 100 [IU]/ML
24 INJECTION, SOLUTION SUBCUTANEOUS DAILY
Status: DISCONTINUED | OUTPATIENT
Start: 2024-10-14 | End: 2024-10-16

## 2024-10-14 RX ORDER — SODIUM CHLORIDE AND POTASSIUM CHLORIDE 150; 900 MG/100ML; MG/100ML
INJECTION, SOLUTION INTRAVENOUS CONTINUOUS
Status: DISCONTINUED | OUTPATIENT
Start: 2024-10-14 | End: 2024-10-18

## 2024-10-14 RX ORDER — ACETAMINOPHEN 650 MG/20.3ML
650 LIQUID ORAL EVERY 8 HOURS PRN
Status: DISCONTINUED | OUTPATIENT
Start: 2024-10-14 | End: 2024-10-28 | Stop reason: HOSPADM

## 2024-10-14 RX ORDER — CARVEDILOL 6.25 MG/1
6.25 TABLET ORAL 2 TIMES DAILY
Status: DISCONTINUED | OUTPATIENT
Start: 2024-10-14 | End: 2024-10-17

## 2024-10-14 RX ORDER — INSULIN ASPART 100 [IU]/ML
0-10 INJECTION, SOLUTION INTRAVENOUS; SUBCUTANEOUS EVERY 4 HOURS PRN
Status: DISCONTINUED | OUTPATIENT
Start: 2024-10-14 | End: 2024-10-19

## 2024-10-14 RX ADMIN — HEPARIN SODIUM AND DEXTROSE 12 UNITS/KG/HR: 10000; 5 INJECTION INTRAVENOUS at 01:10

## 2024-10-14 RX ADMIN — INSULIN GLARGINE 24 UNITS: 100 INJECTION, SOLUTION SUBCUTANEOUS at 01:10

## 2024-10-14 RX ADMIN — IOHEXOL 75 ML: 350 INJECTION, SOLUTION INTRAVENOUS at 10:10

## 2024-10-14 RX ADMIN — INSULIN ASPART 12 UNITS: 100 INJECTION, SOLUTION INTRAVENOUS; SUBCUTANEOUS at 07:10

## 2024-10-14 RX ADMIN — CARVEDILOL 6.25 MG: 6.25 TABLET, FILM COATED ORAL at 09:10

## 2024-10-14 RX ADMIN — SODIUM CHLORIDE AND POTASSIUM CHLORIDE: .9; .15 SOLUTION INTRAVENOUS at 05:10

## 2024-10-14 NOTE — ASSESSMENT & PLAN NOTE
Episodes of transient altered mental status observed by daughter, sometimes with a brief left gaze. Given location of KIARA stroke, considered seizure. EEG done on 10/11, negative.     Plan:   - no longer on keppra regimen

## 2024-10-14 NOTE — SUBJECTIVE & OBJECTIVE
No current facility-administered medications on file prior to encounter.     No current outpatient medications on file prior to encounter.       Review of patient's allergies indicates:  No Known Allergies    Past Medical History:   Diagnosis Date    Anticoagulant long-term use     Diabetes mellitus     Hypertension     Stroke      Past Surgical History:   Procedure Laterality Date    ABDOMINAL SURGERY      JOINT REPLACEMENT       Family History    None       Tobacco Use    Smoking status: Never    Smokeless tobacco: Not on file   Substance and Sexual Activity    Alcohol use: Never    Drug use: Never    Sexual activity: Not Currently     Review of Systems   Constitutional:  Negative for chills, fatigue and fever.   HENT:  Negative for hearing loss and trouble swallowing.    Eyes:  Negative for discharge and visual disturbance.   Respiratory:  Negative for chest tightness and shortness of breath.    Cardiovascular:  Negative for chest pain and palpitations.   Gastrointestinal:  Positive for nausea and vomiting. Negative for abdominal distention and abdominal pain.   Genitourinary:  Negative for difficulty urinating and hematuria.   Musculoskeletal:  Negative for arthralgias and back pain.   Skin:  Negative for rash and wound.   Neurological:  Positive for weakness and numbness. Negative for dizziness and headaches.     Objective:     Vital Signs (Most Recent):  Temp: 96.7 °F (35.9 °C) (10/13/24 1925)  Pulse: 102 (10/13/24 1925)  Resp: 18 (10/13/24 1925)  BP: 138/87 (10/13/24 1925)  SpO2: 98 % (10/13/24 1925) Vital Signs (24h Range):  Temp:  [96.3 °F (35.7 °C)-98.2 °F (36.8 °C)] 96.7 °F (35.9 °C)  Pulse:  [] 102  Resp:  [16-18] 18  SpO2:  [95 %-98 %] 98 %  BP: (129-162)/(64-88) 138/87     Weight: 72.6 kg (160 lb 0.9 oz)  Body mass index is 29.27 kg/m².     Physical Exam  Vitals and nursing note reviewed.   Constitutional:       Appearance: She is ill-appearing.   HENT:      Head: Normocephalic and atraumatic.    Cardiovascular:      Rate and Rhythm: Normal rate and regular rhythm.   Pulmonary:      Effort: Pulmonary effort is normal. No respiratory distress.   Abdominal:      General: Abdomen is flat. There is no distension.      Palpations: Abdomen is soft.      Tenderness: There is abdominal tenderness.      Comments: Mild tenderness  Well healed abdominal incisions   Musculoskeletal:      Right lower leg: No edema.      Left lower leg: No edema.   Skin:     General: Skin is warm and dry.   Neurological:      Mental Status: She is alert.      Comments: Left sided weakness            I have reviewed all pertinent lab results within the past 24 hours.  CBC:   Recent Labs   Lab 10/11/24  1302   WBC 11.48   RBC 5.30   HGB 17.3*   HCT 52.4*      MCV 99*   MCH 32.6*   MCHC 33.0     CMP:   Recent Labs   Lab 10/13/24  0432   *   CALCIUM 9.5   ALBUMIN 3.0*   PROT 6.4      K 3.3*   CO2 24      BUN 50*   CREATININE 1.0   ALKPHOS 75   ALT 18   AST 32   BILITOT 0.6       Significant Diagnostics:  I have reviewed all pertinent imaging results/findings within the past 24 hours.

## 2024-10-14 NOTE — SUBJECTIVE & OBJECTIVE
Interval History: NGT placed last night with 1 L feculent output. Afebrile, HDS. Still reporting abdominal pain.    Medications:  Continuous Infusions:  Scheduled Meds:   atorvastatin  40 mg Oral Daily    carvediloL  6.25 mg Oral BID    famotidine  20 mg Oral Daily    insulin aspart U-100  12 Units Subcutaneous TIDWM    insulin glargine U-100  30 Units Subcutaneous Daily     PRN Meds:  Current Facility-Administered Medications:     acetaminophen, 650 mg, Oral, Q8H PRN    bisacodyL, 10 mg, Rectal, Daily PRN    dextrose 10%, 12.5 g, Intravenous, PRN    dextrose 10%, 25 g, Intravenous, PRN    glucagon (human recombinant), 1 mg, Intramuscular, PRN    hydrALAZINE, 10 mg, Intravenous, Q6H PRN    insulin aspart U-100, 0-10 Units, Subcutaneous, QID (AC + HS) PRN    iohexol, 15 mL, Oral, PRN    ondansetron, 4 mg, Oral, Q12H PRN    sodium chloride 0.9%, 500 mL, Intravenous, PRN    sodium chloride 0.9%, 10 mL, Intravenous, PRN     Review of patient's allergies indicates:  No Known Allergies  Objective:     Vital Signs (Most Recent):  Temp: 97.8 °F (36.6 °C) (10/14/24 0453)  Pulse: 95 (10/14/24 0453)  Resp: 18 (10/14/24 0453)  BP: (!) 158/81 (10/14/24 0453)  SpO2: 96 % (10/14/24 0453) Vital Signs (24h Range):  Temp:  [96.3 °F (35.7 °C)-97.8 °F (36.6 °C)] 97.8 °F (36.6 °C)  Pulse:  [] 95  Resp:  [16-18] 18  SpO2:  [96 %-98 %] 96 %  BP: (138-158)/(81-88) 158/81     Weight: 72.6 kg (160 lb 0.9 oz)  Body mass index is 29.27 kg/m².    Intake/Output - Last 3 Shifts         10/12 0700  10/13 0659 10/13 0700  10/14 0659 10/14 0700  10/15 0659    P.O. 480      I.V. (mL/kg) 1801.7 (24.8)      Total Intake(mL/kg) 2281.7 (31.4)      Urine (mL/kg/hr) 350 (0.2)      Drains  750     Total Output 350 750     Net +1931.7 -750            Stool Occurrence  0 x              Physical Exam  Vitals and nursing note reviewed.   Constitutional:       Appearance: She is ill-appearing.   HENT:      Head: Normocephalic and atraumatic.    Cardiovascular:      Rate and Rhythm: Normal rate and regular rhythm.   Pulmonary:      Effort: Pulmonary effort is normal. No respiratory distress.   Abdominal:      General: Abdomen is flat. There is no distension.      Palpations: Abdomen is soft.      Tenderness: There is abdominal tenderness.      Comments: Mild tenderness  Well healed abdominal incisions  NGT to LIWS   Musculoskeletal:      Right lower leg: No edema.      Left lower leg: No edema.   Skin:     General: Skin is warm and dry.   Neurological:      Mental Status: She is alert.      Comments: Left sided weakness          Significant Labs:  I have reviewed all pertinent lab results within the past 24 hours.  CBC:   Recent Labs   Lab 10/14/24  0641   WBC 6.35   RBC 4.99   HGB 16.5*   HCT 48.3      MCV 97   MCH 33.1*   MCHC 34.2     BMP:   Recent Labs   Lab 10/14/24  0641   GLU 85      K 3.8      CO2 23   BUN 34*   CREATININE 0.8   CALCIUM 10.0   MG 2.2       Significant Diagnostics:  I have reviewed all pertinent imaging results/findings within the past 24 hours.

## 2024-10-14 NOTE — PROCEDURES
EEG REPORT      Tammy Multani  [id number]  1960    DATE OF SERVICE: 10/11/2024         METHODOLOGY      Extended electroencephalographic recording is made while the patient is ambulatory and continuing normal daily activities.  Electrodes are placed according to the International 10-20 placement system and included T1 and T2 electrode placement.  Twenty four (24) channels of digital signal (sampling rate of 512/sec) was simultaneously recorded from the scalp including EKG and eye monitors.  Recording band pass was 0.1 to 100 hz and all data was stored digitally on the recorder.  The patient is instructed to press an event button when clinical symptoms occur and write the symptoms into a diary. Activation procedures which include photic stimulation, hyperventilation and instructing patients to perform simple task are done in selected patients.        The EEG is displayed on a monitor screen and can be reformatted into different montages for evaluation.  The entire recoding is submitted for computer assisted analysis to detect spike and electrographic seizure activity.  The entire recording is visually reviewed and the times identified by computer analysis as being spikes or seizures are reviewed again.  Compresses spectral analysis (CSA) is also performed on the activity recorded from each individual channel.  This is displayed as a power display of frequencies from 0 to 30 Hz over time.   The CSA analysis is done and displayed continuously.  This is reviewed for asymmetries in power between homologous areas of the scalp and for presence of changes in power which canbe seen when seizures occur.  Sections of suspected abnormalities on the CSA is then compared with the original EEG recording.  .     UIBLUEPRINT software was also utilized in the review of this study.  This software suite analyzes the EEG recording in multiple domains.  Coherence and rhythmicity is computed to identify EEG sections which may  contain organized seizures.  Each channel undergoes analysis to detect presence of spike and sharp waves which have special and morphological characteristic of epileptic activity.  The routine EEG recording is converted from spacial into frequency domain.  This is then displayed comparing homologous areas to identify areas of significant asymmetry.  Algorithm to identify non-cortically generated artifact is used to separate eye movement, EMG and other artifact from the EEG     Recording Times    A total of 00:24:15 hours of EEG was recorded.      EEG FINDINGS:  Background activity:   The background rhythm was characterized by alpha and theta activity with a 6-7Hz posterior dominant rhythm at 30-70 microvolts.   Symmetry and continuity: there is intermittent delta and a more poorly maintained PDR over the right hemisphere     Sleep:   Normal sleep transients including sleep spindles, K complexes, vertex waves were seen although sleep was poorly staged.    Activation procedures:   NA    Abnormal activity:   No epileptiform discharges, periodic discharges, lateralized rhythmic delta activity or electrographic seizures were seen.    IMPRESSION:   Abnormal EEG due to a mild right greater than left hemisphere dysfunction with no electrographic seizures or indications of seizure tendency.      Ajith Gallardo MD  Neurology-Epilepsy.  [facility].

## 2024-10-14 NOTE — PT/OT/SLP EVAL
Speech Language Pathology Evaluation  Cognitive Communication    Patient Name:  Tammy Multani   MRN:  [id number]  Admitting Diagnosis: Acute arterial ischemic stroke, multifocal, multiple vascular territories    Recommendations:     Recommendations:                General Recommendations:  Dysphagia therapy, Speech/language therapy, and Cognitive-linguistic therapy  Diet recommendations:  Puree, Nectar Thick   Aspiration Precautions: Assistance with meals and Assistance with thickening liquids, Feed only when awake/alert, HOB to 90 degrees, Meds crushed in puree, No straws, Small bites/sips, and Standard aspiration precautions   General Precautions: Standard, aspiration, fall  Communication strategies:  none    Assessment:     Tammy Multani is a 64 y.o. female with an SLP diagnosis of Dysphagia, Dysarthria, and Cognitive-Linguistic Impairment.    History:     Past Medical History:   Diagnosis Date    Anticoagulant long-term use     Diabetes mellitus     Hypertension     Stroke        Past Surgical History:   Procedure Laterality Date    ABDOMINAL SURGERY      JOINT REPLACEMENT         Social History: Patient lives with self with niece often spending the night.      Prior diet: regualr.    Occupation/hobbies/homemaking: na.      Subjective     Pt.'s daughter , Verenice, reported residual memory deficits from CVA in June 2024  Patient goals: home     Pain/Comfort:  Pain Rating 1: 0/10    Respiratory Status: Room air    Objective:     Cognitive Status:    Pt. was oriented to month and year only with poor recall of recent and remote temporal and general information.  Poor recall of personal/biographical information noted.  Immediate/delayed verbal recall was impaired with pt. Repeating 5 digits and 3 words without difficulty. Responses to hypothetical verbal problem solving tasks were accurate and complete for simple tasks only.  Pt. Compared but did not contrast objects and 0/3 multiple solutions to problems.   Thought organization and categorization skills were impaired  with pt. Naming 5 animals given one minute when 15-20 are wnl.        Receptive Language:   Comprehension:   Pt. Followed simple commands with 100% accuracy and responded to 7/8 complex yes/no questions     Pragmatics:    Flat affect  Poor eye contact    Expressive Language:  Verbal:    Pt. Recalled common nouns in response to questions and named objects with 100% accuracy        Motor Speech:  Speech was intelligible in conversation with mild dysarthria    Voice:   wfl    Visual-Spatial:  tba    Reading:   tba     Written Expression:   tba    Treatment: education provided to pt. And daughter re slp plan of care as well as diet recs when po intake is resumed.    Goals:   Multidisciplinary Problems       SLP Goals          Problem: SLP    Goal Priority Disciplines Outcome   SLP Goal     SLP    Description: Speech Language Pathology Goals  Goals expected to be met by 10/14:  1. Pt will participate in Modified Barium Swallow Study to r/o aspiration and determine safest oral diet.   2. Pt will participate in speech/language/cognitive evaluation to establish further tx plan.                                Plan:   Patient to be seen:  4 x/week   Plan of Care expires:  11/06/24  Plan of Care reviewed with:  patient, daughter   SLP Follow-Up:  Yes       Discharge recommendations:  Therapy Intensity Recommendations at Discharge: High Intensity Therapy   Barriers to Discharge:  Decreased Care Giver Support    Time Tracking:     SLP Treatment Date:   10/14/24  Speech Start Time:  0725  Speech Stop Time:  0741     Speech Total Time (min):  16 min    Billable Minutes: Eval 8  and Self Care/Home Management Training 8    10/14/2024

## 2024-10-14 NOTE — ASSESSMENT & PLAN NOTE
Cr was initially 1.6, up from a baseline of 1. Likely pre-renal KAILYN, due to decreased PO intake. Resolved with fluids.     Plan:   - Continue to monitor CMP.

## 2024-10-14 NOTE — ASSESSMENT & PLAN NOTE
Tammy Multani is a 64 y.o. lady admitted with an KIARA stroke now with concerns for SBO vs ileus    - NGT needs to be retracted 6 cm  - follow up CT scan to rule out mechanical obstruction  - continue NG to LIWS  - replete electrolytes; K>4, Phos>3, Mag>2  - eliquis is likely not being absorbed if she has an ileus or SBO, would consider switching to a heparin drip  - rest of care per primary team

## 2024-10-14 NOTE — SUBJECTIVE & OBJECTIVE
"Interval HPI:   No acute events overnight. Patient in room 960/960 A. Blood glucose  variable . BG at, above, and below goal on current insulin regimen (SSI, prandial, and basal insulin ). Steroid use- None.    Renal function- Normal   Lab Results   Component Value Date    CREATININE 0.8 10/14/2024     Vasopressors-  None     Endocrine will continue to follow and manage insulin orders inpatient.     Diet NPO     Eating:   NPO  Nausea: No  Hypoglycemia and intervention: No  Fever: No  TPN and/or TF: No  If yes, type of TF/TPN and rate: N/A    BP (!) 158/81 (Patient Position: Lying)   Pulse 95   Temp 97.8 °F (36.6 °C) (Axillary)   Resp 18   Ht 5' 2" (1.575 m)   Wt 72.6 kg (160 lb 0.9 oz)   LMP  (LMP Unknown) Comment: Doesn't  have periods anymore  SpO2 96%   Breastfeeding No   BMI 29.27 kg/m²     Labs Reviewed and Include    Recent Labs   Lab 10/14/24  0641   GLU 85   CALCIUM 10.0   ALBUMIN 3.1*   PROT 6.9      K 3.8   CO2 23      BUN 34*   CREATININE 0.8   ALKPHOS 79   ALT 22   AST 39   BILITOT 0.7     Lab Results   Component Value Date    WBC 6.35 10/14/2024    HGB 16.5 (H) 10/14/2024    HCT 48.3 10/14/2024    MCV 97 10/14/2024     10/14/2024     No results for input(s): "TSH", "FREET4" in the last 168 hours.  Lab Results   Component Value Date    HGBA1C 10.0 (H) 10/08/2024       Nutritional status:   Body mass index is 29.27 kg/m².  Lab Results   Component Value Date    ALBUMIN 3.1 (L) 10/14/2024    ALBUMIN 3.0 (L) 10/13/2024    ALBUMIN 3.2 (L) 10/12/2024     No results found for: "PREALBUMIN"    Estimated Creatinine Clearance: 65.4 mL/min (based on SCr of 0.8 mg/dL).    Accu-Checks  Recent Labs     10/11/24  1154 10/11/24  1800 10/11/24  2302 10/12/24  0741 10/12/24  1155 10/12/24  1649 10/13/24  0741 10/13/24  1146 10/13/24  1523   POCTGLUCOSE 285* 209* 135* 218* 237* 94 149* 277* 270*       Current Medications and/or Treatments Impacting Glycemic Control  Immunotherapy:  "   Immunosuppressants       None          Steroids:   Hormones (From admission, onward)      None          Pressors:    Autonomic Drugs (From admission, onward)      None          Hyperglycemia/Diabetes Medications:   Antihyperglycemics (From admission, onward)      Start     Stop Route Frequency Ordered    10/14/24 0715  insulin aspart U-100 pen 12 Units         -- SubQ 3 times daily with meals 10/13/24 2104    10/12/24 0900  insulin glargine U-100 (Lantus) pen 30 Units         -- SubQ Daily 10/11/24 0906    10/09/24 0809  insulin aspart U-100 pen 0-10 Units         -- SubQ Before meals & nightly PRN 10/09/24 0710

## 2024-10-14 NOTE — PROGRESS NOTES
"[facility] - Neurosurgery ([facility])  Vascular Neurology  Comprehensive Stroke Center  Progress Note    Assessment/Plan:     * Acute arterial ischemic stroke, multifocal, multiple vascular territories  63 y/o F transferred from [facility] for higher level of care secondary to acute KIARA stroke on CTA.  Briefly,the patient presented to the OSH overnight for slurred speech started at 1:30 a.m. in the morning.  She has a history of his stroke with left-sided deficits but is normally able to transfer and do a little walking. This morning, she had complete hemiplegia of the left side. Telestroke consult performed, concerning for LVO.  NIH 13. CTA Stroke MP performed and  was read as "acute R KIARA infarct". No TNK due to AC use. Patient accepted to ED as transfer for emergent vascular neurology evaluation. No endovascular intervention. History of atrial fibrillation per discussion with daughter. Was started on Eliquis on 10/6: hold given size of infarct, start ASA on 10/8. Currently on heparin drip given likely lack of absorption through NG tube in setting of Ileus. Given history of Afib, etiology likely embolic. Though not apparent on MRA, if R KIARA occlusion was proximal (supported by MRI) could account for R MCA-like picture.    Transient episodes of altered awareness: EEG negative, and no change in frequency observed by family w/Keppra so have stopped this medicine. Pending placement.    -Heparin drip   -high intensity statin  -SBP<220 in acute setting  -echo done, w/o evidence of intra-cardiac shunt, no LAE, but does have history of Afib  -PT/OT  -NPO, medications via NGT     Atrial fibrillation  Takes Eliquis 5mg BID and Coreg 6.25mg BID at home.  Initially Eliquis held given size of stroke; ASA 81mg QD in the interim. Eliquis was restarted on 10/10.       Plan:   -continue at home coreg regimen   -eliquis currently switched to heparin drip due to issues described in ileus section of the assessment and plan. " Patient called with complaints of sinus infection--requests abx.  Per Dr Knott Loss will order ceftin     HTN (hypertension)  Home medication regimen as below  -lisinopril 40mg QD  -amlodipine 10mg QD    Plan:   -lisinopril and amlodipine were initially held in the setting of KAILYN. Was started on coreg in the context of Afib.   -continue coreg   -if patient's BP still elevated, consider adding back amlodipine and lisinopril     Acute focal neurological deficit  Episodes of transient altered mental status observed by daughter, sometimes with a brief left gaze. Given location of KIARA stroke, considered seizure. EEG done on 10/11, negative.     Plan:   - no longer on keppra regimen     Ileus  Patient was seen by general surgery for some episodes of nausea and emesis and some constipation. KUB was concerning for SBO vs. Ileus. Follow up CT AP did not show evidence of mechanical obstruction, was more indicative of Ileus.     Plan:   - place NPO. Continue NGT to LIWS   - monitor electrolytes daily, correct as needed   - Eliquis switched to heparin as it might not have been fully absorbed considering Ileus     KAILYN (acute kidney injury)  Cr was initially 1.6, up from a baseline of 1. Likely pre-renal KAILYN, due to decreased PO intake. Resolved with fluids.     Plan:   - Continue to monitor CMP.     Type 2 diabetes mellitus, with long-term current use of insulin  History of. Takes Levemir 35 units QHS at home. A1C 10%.    -goal 140-180 while admitted  -On Lantus 24 units QHS & Aspart 12 units with meals  -SSI prn   -continue to monitor glucose  -Endocrine consulted for A1c 10%; following, appreciate recs       Opioid use  History of. Prescribed oxycodone 15mg at home. Family concerned that medication is being diverted by family member, unclear how often patient takes. Reportedly is prescribed for neck pain.     Plan:   - holding oxycodone 5 mg BID prn secondary to ileus   -monitor for signs of withdrawal, as is unclear how often patient is taking    Left hemiplegia  See stroke    Plan:   - continue PT/OT/SLP      Hypokalemia  Resolved w/repletion.     Plan:   - Continue to monitor   - Correct as needed              10/7: NAEON, nuero exam stable, echo today, NPO aside from meds per speech, barium study tmrw  10/08/2024 NAEON, stable neuro exam, went for the Br study, and was started on a diet. Endocrine consulted for the A1c 10%, pending recs.  10/09/2024 NAEON, Stable neuro exam, will restart Lisinopril 20 mg (half of her dome dose). Seen by endocrine, with adjustment of her insulin doses.  10/10/2024 NAEON, no change in her neuro exam, will restart Eliquis today, BP is better controlled.  10/11/2024 NAEON, stable neuro exam, still decreased PO intake. Patient gets some episodes of in attention; EEG was ordered. Endocrine adjusted her insulins. Accepted at [facility] in [place].   10/12/2024 NAEON, waiting on EEG interpretation (contacted epilepsy), started Keppra 500mg BID and will see if reduces transient alterations in awareness, neurologic examination stable  10/13/2024 NAEON, EEG w/encephalopathy (no seizure or discharges), stop Keppra, SBP ~150 to 160 so increased Coreg to 6.25mg BID  10/14/2024 General surgery was consulted overnight for constipation, episodes of nausea and emesis. KUB concerning for SBO vs. Ileus. F/u CT AP was more indicative of ileus, no evidence of mechanical obstruction was observed. Patient is NPO, can get some meds via NGT. Eliquis switched to heparin drip.     STROKE DOCUMENTATION   Acute Stroke Times   Last Known Normal Date:  (OSH)  Stroke Team Called Date: 10/06/24  Stroke Team Called Time:  (At OSH)  Stroke Team Arrival Date: 10/06/24  Stroke Team Arrival Time: 0608  CT Interpretation Time:  (CTA done around 4:20am per documents that came to [facility] with patient)  Thrombolytic Therapy Recommended: No  Thrombectomy Recommended:  (Pending MRI Brain)    NIH Scale:  1a. Level of Consciousness: 0-->Alert, keenly responsive  1b. LOC Questions: 0-->Answers both questions correctly  1c. LOC  Commands: 0-->Performs both tasks correctly  2. Best Gaze: 0-->Normal  3. Visual: 0-->No visual loss  4. Facial Palsy: 1-->Minor paralysis (flattened nasolabial fold, asymmetry on smiling)  5a. Motor Arm, Left: 4-->No movement  5b. Motor Arm, Right: 0-->No drift, limb holds 90 (or 45) degrees for full 10 secs  6a. Motor Leg, Left: 4-->No movement  6b. Motor Leg, Right: 0-->No drift, leg holds 30 degree position for full 5 secs  7. Limb Ataxia: 0-->Absent  8. Sensory: 1-->Mild-to-moderate sensory loss, patient feels pinprick is less sharp or is dull on the affected side, or there is a loss of superficial pain with pinprick, but patient is aware of being touched  9. Best Language: 0-->No aphasia, normal  10. Dysarthria: 1-->Mild-to-moderate dysarthria, patient slurs at least some words and, at worst, can be understood with some difficulty  11. Extinction and Inattention (formerly Neglect): 0-->No abnormality  Total (NIH Stroke Scale): 11       Modified Butts Score: 3  [place] Coma Scale:    ABCD2 Score:    [id number] Score:   HAS -BLED Score:   ICH Score:   Hunt & Shane Classification:      Hemorrhagic change of an Ischemic Stroke: Does this patient have an ischemic stroke with hemorrhagic changes? No     Neurologic Chief Complaint: infarcts in multiple vascular territories    Subjective:     Interval History: Patient is seen for follow-up neurological assessment and treatment recommendations: See hospital course.     HPI, Past Medical, Family, and Social History remains the same as documented in the initial encounter.     Review of Systems   Neurological:  Positive for weakness.   All other systems reviewed and are negative.    Scheduled Meds:   [START ON 10/15/2024] atorvastatin  40 mg Per NG tube Daily    carvediloL  6.25 mg Per NG tube BID    [START ON 10/15/2024] famotidine  20 mg Per NG tube Daily    insulin aspart U-100  12 Units Subcutaneous TIDWM    insulin glargine U-100  24 Units Subcutaneous Daily      Continuous Infusions:   0/9% NACL & POTASSIUM CHLORIDE 20 MEQ/L   Intravenous Continuous        heparin (porcine) in D5W  0-40 Units/kg/hr (Adjusted) Intravenous Continuous 7.1 mL/hr at 10/14/24 1317 12 Units/kg/hr at 10/14/24 1317     PRN Meds:  Current Facility-Administered Medications:     acetaminophen, 650 mg, Per NG tube, Q8H PRN    bisacodyL, 10 mg, Rectal, Daily PRN    dextrose 10%, 12.5 g, Intravenous, PRN    dextrose 10%, 25 g, Intravenous, PRN    glucagon (human recombinant), 1 mg, Intramuscular, PRN    hydrALAZINE, 10 mg, Intravenous, Q6H PRN    insulin aspart U-100, 0-10 Units, Subcutaneous, Q4H PRN    iohexol, 15 mL, Oral, PRN    ondansetron, 4 mg, Per NG tube, Q12H PRN    sodium chloride 0.9%, 500 mL, Intravenous, PRN    sodium chloride 0.9%, 10 mL, Intravenous, PRN    Objective:     Vital Signs (Most Recent):  Temp: 97.5 °F (36.4 °C) (10/14/24 1511)  Pulse: 100 (10/14/24 1542)  Resp: 18 (10/14/24 1511)  BP: (!) 158/85 (10/14/24 1511)  SpO2: 95 % (10/14/24 1511)  BP Location: Left arm    Vital Signs Range (Last 24H):  Temp:  [96.7 °F (35.9 °C)-97.8 °F (36.6 °C)]   Pulse:  []   Resp:  [16-18]   BP: (138-158)/(81-94)   SpO2:  [95 %-98 %]   BP Location: Left arm       Physical Exam  Vitals reviewed.   Cardiovascular:      Rate and Rhythm: Normal rate.   Pulmonary:      Effort: Pulmonary effort is normal. No respiratory distress.   Musculoskeletal:      Right lower leg: No edema.      Left lower leg: No edema.   Skin:     General: Skin is warm and dry.          Neurological Exam:   LOC: alert  Attention Span: poor  Articulation: Dysarthria  Orientation: Person, Place, Time   Visual Fields: Full  EOM (CN III, IV, VI): Full/intact  Pupils (CN II, III): PERRL  Facial Sensation (CN V): Facial sensory loss  Facial Movement (CN VII): Upper facial weakness on the Left  Motor: Arm left  Plegia 0/5  Leg left  Paresis: 0/5  Arm right  Normal 5/5  Leg right Normal 5/5  Cerebellum: No evidence of  "appendicular or axial ataxia  Sensation: Jason-anesthesia left    Laboratory:  CMP:   Recent Labs   Lab 10/14/24  0641   CALCIUM 10.0   ALBUMIN 3.1*   PROT 6.9      K 3.8   CO2 23      BUN 34*   CREATININE 0.8   ALKPHOS 79   ALT 22   AST 39   BILITOT 0.7     CBC:   Recent Labs   Lab 10/14/24  1105   WBC 5.87   RBC 5.21   HGB 16.8*   HCT 50.1*      MCV 96   MCH 32.2*   MCHC 33.5     Lipid Panel: No results for input(s): "CHOL", "LDLCALC", "HDL", "TRIG" in the last 168 hours.  Hgb A1C:   Recent Labs   Lab 10/08/24  0217   HGBA1C 10.0*     TSH: No results for input(s): "TSH" in the last 168 hours.    Diagnostic Results      Brain and Vessel Imaging   MRI/MRA Brain Ischemic Protocol: 10/6/24  Impression: Acute infarcts involving the right anterior circulation and posterior circulation as detailed above without evidence of intracranial hemorrhage or mass effect.  Embolic disease to be considered.     Chronic left parietal infarct. MRA demonstrates limited visualization of distal right KIARA branches in keeping with the territory of the acute infarct.  There is also narrowing of at least one proximal left M2 branch although no acute infarct in that territory.          Echo: 10/7/24  Cardiac Imaging     Left Ventricle: The left ventricle is normal in size. Normal wall thickness. There is concentric remodeling. Normal wall motion. There is hyperdynamic systolic function with a visually estimated ejection fraction of greater than 70%. There is normal diastolic function.    Right Ventricle: Normal right ventricular cavity size. Wall thickness is normal. Systolic function is normal.    Left Atrium: Bubble study is likely negative, but full opacification of the RA is not well seen on these images which decreases sensitivity.    IVC/SVC: Normal venous pressure at 3 mmHg.    Arian Williamson MD  Comprehensive Stroke Center  Department of Vascular Neurology   [facility] ([facility])        "

## 2024-10-14 NOTE — PLAN OF CARE
[facility] - Neurosurgery (Hospital)  Discharge Reassessment    Primary Care Provider: Soha Tucker FNP    Expected Discharge Date: 10/16/2024    Reassessment (most recent)       Discharge Reassessment - 10/14/24 1144          Discharge Reassessment    Assessment Type Discharge Planning Reassessment (P)      Did the patient's condition or plan change since previous assessment? Yes (P)      Discharge Plan discussed with: Adult children (P)      Communicated SHARONDA with patient/caregiver Yes (P)      Discharge Plan A Rehab (P)      Transition of Care Barriers None (P)      Why the patient remains in the hospital Requires continued medical care (P)         Post-Acute Status    Post-Acute Authorization Placement (P)      Post-Acute Placement Status Pending medical clearance/testing (P)      Discharge Delays Change in Medical Condition (P)                  Patient was scheduled to discharge to [facility] in [place] today.  However, over the weekend, it was discovered that patient has  either an Ileus or SBO.  NG was inserted and patient pending abdominal/pelvic CT and will be put on heparin drip.  Family aware and SW notified Elin at [facility] and faxed updated notes to [phone].      Discharge Plan A and Plan B have been determined by review of patient's clinical status, future medical and therapeutic needs, and coverage/benefits for post-acute care in coordination with multidisciplinary team members.    Miladys Brown, LMSW [facility]  [phone]

## 2024-10-14 NOTE — ASSESSMENT & PLAN NOTE
BG goal: 140-180  T2DM admitted with acute KIARA stroke    - Continue Lantus 30 units daily   - NovoLog 10 units with meals (basal/prandial match). HOLD if patient is NPO, unable to eat, or if Blood Glucose is less than 100 mg/dL.   - Continue Novolog Moderate dose correction with ISF 25 starting at 150    - POCT Glucose ac/hs  - Hypoglycemia protocol in place      ** Please notify Endocrine for any change and/or advance in diet**  ** Please call Endocrine for any BG related issues **     Discharge Planning:   TBD. Please notify endocrinology prior to discharge.

## 2024-10-14 NOTE — PROGRESS NOTES
[facility]ack[facility] Long - Neurosurgery ([facility])  General Surgery  Progress Note    Subjective:     History of Present Illness:  Tammy Multani is a 64 y.o. lady with PMH DM, HTN, prior stroke with left sided deficits, afib on eliquis admitted to the neurology service since 10/6 for an KIARA stroke. Symptoms included slurred speech and hemiplegia of her left side. She had been tolerating a diet but in the past day has had a few episodes of nausea with nonbloody nonbilious thin emesis that she describes appears like spit. Last documented bowel movement on 8/11 but reports a loose watery movement earlier today. Mildly hypokalemic, remainder of labs bland. KUB with evidence of dilated large and small bowel. General surgery consulted for recs regarding ileus vs SBO.     Preciously has had c section and a lap radha.           Post-Op Info:  * No surgery found *         Interval History: NGT placed last night with 1 L feculent output. Afebrile, HDS. Still reporting abdominal pain.    Medications:  Continuous Infusions:  Scheduled Meds:   atorvastatin  40 mg Oral Daily    carvediloL  6.25 mg Oral BID    famotidine  20 mg Oral Daily    insulin aspart U-100  12 Units Subcutaneous TIDWM    insulin glargine U-100  30 Units Subcutaneous Daily     PRN Meds:  Current Facility-Administered Medications:     acetaminophen, 650 mg, Oral, Q8H PRN    bisacodyL, 10 mg, Rectal, Daily PRN    dextrose 10%, 12.5 g, Intravenous, PRN    dextrose 10%, 25 g, Intravenous, PRN    glucagon (human recombinant), 1 mg, Intramuscular, PRN    hydrALAZINE, 10 mg, Intravenous, Q6H PRN    insulin aspart U-100, 0-10 Units, Subcutaneous, QID (AC + HS) PRN    iohexol, 15 mL, Oral, PRN    ondansetron, 4 mg, Oral, Q12H PRN    sodium chloride 0.9%, 500 mL, Intravenous, PRN    sodium chloride 0.9%, 10 mL, Intravenous, PRN     Review of patient's allergies indicates:  No Known Allergies  Objective:     Vital Signs (Most Recent):  Temp: 97.8 °F (36.6 °C) (10/14/24  0453)  Pulse: 95 (10/14/24 0453)  Resp: 18 (10/14/24 0453)  BP: (!) 158/81 (10/14/24 0453)  SpO2: 96 % (10/14/24 0453) Vital Signs (24h Range):  Temp:  [96.3 °F (35.7 °C)-97.8 °F (36.6 °C)] 97.8 °F (36.6 °C)  Pulse:  [] 95  Resp:  [16-18] 18  SpO2:  [96 %-98 %] 96 %  BP: (138-158)/(81-88) 158/81     Weight: 72.6 kg (160 lb 0.9 oz)  Body mass index is 29.27 kg/m².    Intake/Output - Last 3 Shifts         10/12 0700  10/13 0659 10/13 0700  10/14 0659 10/14 0700  10/15 0659    P.O. 480      I.V. (mL/kg) 1801.7 (24.8)      Total Intake(mL/kg) 2281.7 (31.4)      Urine (mL/kg/hr) 350 (0.2)      Drains  750     Total Output 350 750     Net +1931.7 -750            Stool Occurrence  0 x              Physical Exam  Vitals and nursing note reviewed.   Constitutional:       Appearance: She is ill-appearing.   HENT:      Head: Normocephalic and atraumatic.   Cardiovascular:      Rate and Rhythm: Normal rate and regular rhythm.   Pulmonary:      Effort: Pulmonary effort is normal. No respiratory distress.   Abdominal:      General: Abdomen is flat. There is no distension.      Palpations: Abdomen is soft.      Tenderness: There is abdominal tenderness.      Comments: Mild tenderness  Well healed abdominal incisions  NGT to LIWS   Musculoskeletal:      Right lower leg: No edema.      Left lower leg: No edema.   Skin:     General: Skin is warm and dry.   Neurological:      Mental Status: She is alert.      Comments: Left sided weakness          Significant Labs:  I have reviewed all pertinent lab results within the past 24 hours.  CBC:   Recent Labs   Lab 10/14/24  0641   WBC 6.35   RBC 4.99   HGB 16.5*   HCT 48.3      MCV 97   MCH 33.1*   MCHC 34.2     BMP:   Recent Labs   Lab 10/14/24  0641   GLU 85      K 3.8      CO2 23   BUN 34*   CREATININE 0.8   CALCIUM 10.0   MG 2.2       Significant Diagnostics:  I have reviewed all pertinent imaging results/findings within the past 24 hours.  Assessment/Plan:      * Acute arterial ischemic stroke, multifocal, multiple vascular territories  Tammy Multani is a 64 y.o. lady admitted with an KIARA stroke now with concerns for SBO vs ileus    - NGT needs to be retracted 6 cm  - follow up CT scan to rule out mechanical obstruction  - continue NG to LIWS  - replete electrolytes; K>4, Phos>3, Mag>2  - eliquis is likely not being absorbed if she has an ileus or SBO, would consider switching to a heparin drip  - rest of care per primary team        Meghan Vasquez MD  General Surgery  [facility] - Neurosurgery (Hospital)

## 2024-10-14 NOTE — ASSESSMENT & PLAN NOTE
Tammy Multani is a 64 y.o. lady admitted with an KIARA stroke now with concerns for SBO vs ileus    - recommend NG tube placement  - CT scan to rule out mechanical obstruction  - NG to LIWS  - replete electrolytes; K>4, Phos>3, Mag>2  - eliquis is likely not being absorbed if she has an ileus or SBO, would consider switching to a heparin drip

## 2024-10-14 NOTE — ASSESSMENT & PLAN NOTE
History of. Prescribed oxycodone 15mg at home. Family concerned that medication is being diverted by family member, unclear how often patient takes. Reportedly is prescribed for neck pain.     Plan:   - holding oxycodone 5 mg BID prn secondary to ileus   -monitor for signs of withdrawal, as is unclear how often patient is taking

## 2024-10-14 NOTE — ASSESSMENT & PLAN NOTE
Home medication regimen as below  -lisinopril 40mg QD  -amlodipine 10mg QD    Plan:   -lisinopril and amlodipine were initially held in the setting of KAILYN. Was started on coreg in the context of Afib.   -continue coreg   -if patient's BP still elevated, consider adding back amlodipine and lisinopril

## 2024-10-14 NOTE — PT/OT/SLP PROGRESS
Physical Therapy Treatment/co-Treatment with O.T.    Patient Name:  Tammy Multani   MRN:  [id number]    Recommendations:     Discharge Recommendations: High Intensity Therapy  Discharge Equipment Recommendations: hospital bed, lift device, wheelchair  Barriers to discharge: Inaccessible home    Assessment:     Tammy Multani is a 64 y.o. female admitted with a medical diagnosis of Acute arterial ischemic stroke, multifocal, multiple vascular territories.  She presents with the following impairments/functional limitations: weakness, impaired endurance, impaired self care skills, impaired functional mobility, impaired balance, decreased upper extremity function, decreased lower extremity function, decreased safety awareness, abnormal tone, impaired coordination, impaired fine motor, edema, impaired cardiopulmonary response to activity . Patient was able to sit at EOB for a few minutes, but c/o feeling noxious and dizzy and had to be transferred back to supine. Patient reported feeling better after transferring back to supine.    Rehab Prognosis: Fair; patient would benefit from acute skilled PT services to address these deficits and reach maximum level of function.    Recent Surgery: * No surgery found *      Plan:     During this hospitalization, patient to be seen 4 x/week to address the identified rehab impairments via therapeutic activities, therapeutic exercises, neuromuscular re-education and progress toward the following goals:    Plan of Care Expires:  11/07/24    Subjective     Chief Complaint: she feels like she needs to have a BM, but has not been able to have one.  Patient/Family Comments/goals: to heal well.  Pain/Comfort:  Pain Rating 1: 0/10  Pain Rating Post-Intervention 1: 0/10      Objective:     Communicated with NSG prior to session.  Patient found HOB elevated with telemetry, NG tube, peripheral IV upon PT entry to room.     General Precautions: Standard, fall, aspiration  Orthopedic  Precautions: N/A  Braces: N/A  Respiratory Status: Room air     Functional Mobility:  Bed Mobility:     Rolling Left:  maximal assistance  Rolling Right: maximal assistance  Scooting: total assistance and of 2 persons  Supine to Sit: total assistance and of 2 persons  Sit to Supine: total assistance and of 2 persons  Balance: poor in sitting      AM-PAC 6 CLICK MOBILITY  Turning over in bed (including adjusting bedclothes, sheets and blankets)?: 2  Sitting down on and standing up from a chair with arms (e.g., wheelchair, bedside commode, etc.): 1  Moving from lying on back to sitting on the side of the bed?: 2  Moving to and from a bed to a chair (including a wheelchair)?: 1  Need to walk in hospital room?: 1  Climbing 3-5 steps with a railing?: 1  Basic Mobility Total Score: 8       Treatment & Education:  Co-Treatment performed with O.T. Patient sat at EOB ~8-9 minutes with min to mod assistance due to posterior and lateral loss of balance. Patient transferred back to bed and rolled side to side to add another pad to bed and to place on bed pan. B LE PROM x 25 reps on all available planes of motion.    Patient left HOB elevated with all lines intact, call button in reach, and daughter and PCT present..    GOALS:   Multidisciplinary Problems       Physical Therapy Goals          Problem: Physical Therapy    Goal Priority Disciplines Outcome Interventions   Physical Therapy Goal     PT, PT/OT Progressing    Description: Goals to be met by: 2024     Patient will increase functional independence with mobility by performin. Supine to sit with Moderate Assistance  2. Sit to supine with Moderate Assistance  3. Bed to chair transfer with Maximum Assistance using LRAD  4. Sitting at edge of bed x5 minutes with Stand-by Assistance  5. Lower extremity exercise program x15 reps per handout, with independence    Hospital Bed - Patient requires a hospital bed for positioning of the body in ways that are not feasible  with an ordinary bed. The patient requires special positioning for pain relief, limited mobility, and/or being unable to independently make changes in body position without the use of a hospital bed. Pillows and wedges will not be adequate for resolving these positional issues.     Wheelchair - Patient has a mobility limitation that significantly impairs their ability to participate in one or more mobility related activities of daily living in customary locations in the home. The mobility limitation cannot be sufficiently resolved by the use of a cane or walker. The use of a manual wheelchair will greatly improve the patient's ability to participate in MRADLs. The patient will use the wheelchair on a regular basis at home. They have expressed their willingness to use a manual wheelchair in the home, and have a caregiver who is available and willing to assist with the wheelchair if needed.                       Time Tracking:     PT Received On: 10/14/24  PT Start Time: 1440     PT Stop Time: 1510  PT Total Time (min): 30 min     Billable Minutes: Neuromuscular Re-education 30    Treatment Type: Treatment  PT/PTA: PTA     Number of PTA visits since last PT visit: 1     10/14/2024

## 2024-10-14 NOTE — SUBJECTIVE & OBJECTIVE
Neurologic Chief Complaint: infarcts in multiple vascular territories    Subjective:     Interval History: Patient is seen for follow-up neurological assessment and treatment recommendations: See hospital course.     HPI, Past Medical, Family, and Social History remains the same as documented in the initial encounter.     Review of Systems   Neurological:  Positive for weakness.   All other systems reviewed and are negative.    Scheduled Meds:   [START ON 10/15/2024] atorvastatin  40 mg Per NG tube Daily    carvediloL  6.25 mg Per NG tube BID    [START ON 10/15/2024] famotidine  20 mg Per NG tube Daily    insulin aspart U-100  12 Units Subcutaneous TIDWM    insulin glargine U-100  24 Units Subcutaneous Daily     Continuous Infusions:   0/9% NACL & POTASSIUM CHLORIDE 20 MEQ/L   Intravenous Continuous        heparin (porcine) in D5W  0-40 Units/kg/hr (Adjusted) Intravenous Continuous 7.1 mL/hr at 10/14/24 1317 12 Units/kg/hr at 10/14/24 1317     PRN Meds:  Current Facility-Administered Medications:     acetaminophen, 650 mg, Per NG tube, Q8H PRN    bisacodyL, 10 mg, Rectal, Daily PRN    dextrose 10%, 12.5 g, Intravenous, PRN    dextrose 10%, 25 g, Intravenous, PRN    glucagon (human recombinant), 1 mg, Intramuscular, PRN    hydrALAZINE, 10 mg, Intravenous, Q6H PRN    insulin aspart U-100, 0-10 Units, Subcutaneous, Q4H PRN    iohexol, 15 mL, Oral, PRN    ondansetron, 4 mg, Per NG tube, Q12H PRN    sodium chloride 0.9%, 500 mL, Intravenous, PRN    sodium chloride 0.9%, 10 mL, Intravenous, PRN    Objective:     Vital Signs (Most Recent):  Temp: 97.5 °F (36.4 °C) (10/14/24 1511)  Pulse: 100 (10/14/24 1542)  Resp: 18 (10/14/24 1511)  BP: (!) 158/85 (10/14/24 1511)  SpO2: 95 % (10/14/24 1511)  BP Location: Left arm    Vital Signs Range (Last 24H):  Temp:  [96.7 °F (35.9 °C)-97.8 °F (36.6 °C)]   Pulse:  []   Resp:  [16-18]   BP: (138-158)/(81-94)   SpO2:  [95 %-98 %]   BP Location: Left arm       Physical Exam  Vitals  "reviewed.   Cardiovascular:      Rate and Rhythm: Normal rate.   Pulmonary:      Effort: Pulmonary effort is normal. No respiratory distress.   Musculoskeletal:      Right lower leg: No edema.      Left lower leg: No edema.   Skin:     General: Skin is warm and dry.          Neurological Exam:   LOC: alert  Attention Span: poor  Articulation: Dysarthria  Orientation: Person, Place, Time   Visual Fields: Full  EOM (CN III, IV, VI): Full/intact  Pupils (CN II, III): PERRL  Facial Sensation (CN V): Facial sensory loss  Facial Movement (CN VII): Upper facial weakness on the Left  Motor: Arm left  Plegia 0/5  Leg left  Paresis: 0/5  Arm right  Normal 5/5  Leg right Normal 5/5  Cerebellum: No evidence of appendicular or axial ataxia  Sensation: Jason-anesthesia left    Laboratory:  CMP:   Recent Labs   Lab 10/14/24  0641   CALCIUM 10.0   ALBUMIN 3.1*   PROT 6.9      K 3.8   CO2 23      BUN 34*   CREATININE 0.8   ALKPHOS 79   ALT 22   AST 39   BILITOT 0.7     CBC:   Recent Labs   Lab 10/14/24  1105   WBC 5.87   RBC 5.21   HGB 16.8*   HCT 50.1*      MCV 96   MCH 32.2*   MCHC 33.5     Lipid Panel: No results for input(s): "CHOL", "LDLCALC", "HDL", "TRIG" in the last 168 hours.  Hgb A1C:   Recent Labs   Lab 10/08/24  0217   HGBA1C 10.0*     TSH: No results for input(s): "TSH" in the last 168 hours.    Diagnostic Results      Brain and Vessel Imaging   MRI/MRA Brain Ischemic Protocol: 10/6/24  Impression: Acute infarcts involving the right anterior circulation and posterior circulation as detailed above without evidence of intracranial hemorrhage or mass effect.  Embolic disease to be considered.     Chronic left parietal infarct. MRA demonstrates limited visualization of distal right KIARA branches in keeping with the territory of the acute infarct.  There is also narrowing of at least one proximal left M2 branch although no acute infarct in that territory.          Echo: 10/7/24  Cardiac Imaging     Left " Ventricle: The left ventricle is normal in size. Normal wall thickness. There is concentric remodeling. Normal wall motion. There is hyperdynamic systolic function with a visually estimated ejection fraction of greater than 70%. There is normal diastolic function.    Right Ventricle: Normal right ventricular cavity size. Wall thickness is normal. Systolic function is normal.    Left Atrium: Bubble study is likely negative, but full opacification of the RA is not well seen on these images which decreases sensitivity.    IVC/SVC: Normal venous pressure at 3 mmHg.

## 2024-10-14 NOTE — PROGRESS NOTES
"[facility]ackary Long - Neurosurgery ([facility])  Endocrinology  Progress Note    Admit Date: 10/6/2024     Reason for Consult: Management of T2DM, Hyperglycemia       Diabetes diagnosis year:  Over 5 years    Home Diabetes Medications:    -liraglutide  -Levemir 35 units QHS    How often checking glucose at home? Not checking       Diabetes Complications include:     Hyperglycemia    Complicating diabetes co morbidities:   History of CVA      HPI:   Patient is a 64 y.o. female with  DM2 , HTN who was transferred from [facility] for higher level of care secondary to acute KIARA stroke on CTA. Briefly,the patient presented to the OSH overnight for slurred speech started at 1:30 a.m. in the morning. She has a history of his stroke with left-sided deficits but is normally able to transfer and do a little walking.  Endocrine consulted for BG management.      Interval HPI:   No acute events overnight. Patient in room 960/960 A. Blood glucose  variable . BG at, above, and below goal on current insulin regimen (SSI, prandial, and basal insulin ). Steroid use- None.    Renal function- Normal   Lab Results   Component Value Date    CREATININE 0.8 10/14/2024     Vasopressors-  None     Endocrine will continue to follow and manage insulin orders inpatient.     Diet NPO     Eating:   NPO  Nausea: No  Hypoglycemia and intervention: No  Fever: No  TPN and/or TF: No  If yes, type of TF/TPN and rate: N/A    BP (!) 158/81 (Patient Position: Lying)   Pulse 95   Temp 97.8 °F (36.6 °C) (Axillary)   Resp 18   Ht 5' 2" (1.575 m)   Wt 72.6 kg (160 lb 0.9 oz)   LMP  (LMP Unknown) Comment: Doesn't  have periods anymore  SpO2 96%   Breastfeeding No   BMI 29.27 kg/m²     Labs Reviewed and Include    Recent Labs   Lab 10/14/24  0641   GLU 85   CALCIUM 10.0   ALBUMIN 3.1*   PROT 6.9      K 3.8   CO2 23      BUN 34*   CREATININE 0.8   ALKPHOS 79   ALT 22   AST 39   BILITOT 0.7     Lab Results   Component Value Date    WBC 6.35 " "10/14/2024    HGB 16.5 (H) 10/14/2024    HCT 48.3 10/14/2024    MCV 97 10/14/2024     10/14/2024     No results for input(s): "TSH", "FREET4" in the last 168 hours.  Lab Results   Component Value Date    HGBA1C 10.0 (H) 10/08/2024       Nutritional status:   Body mass index is 29.27 kg/m².  Lab Results   Component Value Date    ALBUMIN 3.1 (L) 10/14/2024    ALBUMIN 3.0 (L) 10/13/2024    ALBUMIN 3.2 (L) 10/12/2024     No results found for: "PREALBUMIN"    Estimated Creatinine Clearance: 65.4 mL/min (based on SCr of 0.8 mg/dL).    Accu-Checks  Recent Labs     10/11/24  1154 10/11/24  1800 10/11/24  2302 10/12/24  0741 10/12/24  1155 10/12/24  1649 10/13/24  0741 10/13/24  1146 10/13/24  1523   POCTGLUCOSE 285* 209* 135* 218* 237* 94 149* 277* 270*       Current Medications and/or Treatments Impacting Glycemic Control  Immunotherapy:    Immunosuppressants       None          Steroids:   Hormones (From admission, onward)      None          Pressors:    Autonomic Drugs (From admission, onward)      None          Hyperglycemia/Diabetes Medications:   Antihyperglycemics (From admission, onward)      Start     Stop Route Frequency Ordered    10/14/24 0715  insulin aspart U-100 pen 12 Units         -- SubQ 3 times daily with meals 10/13/24 2104    10/12/24 0900  insulin glargine U-100 (Lantus) pen 30 Units         -- SubQ Daily 10/11/24 0906    10/09/24 0809  insulin aspart U-100 pen 0-10 Units         -- SubQ Before meals & nightly PRN 10/09/24 0710            ASSESSMENT and PLAN    Neuro  * Acute arterial ischemic stroke, multifocal, multiple vascular territories  Managed by primary team  Optimize BG control      Cardiac/Vascular  HTN (hypertension)  Managed by primary team  Optimize BG control      Endocrine  Type 2 diabetes mellitus, with long-term current use of insulin  BG goal: 140-180  T2DM admitted with acute KIARA stroke    - Decrease Lantus to 24 units daily (20% decrease due to fasting blood glucose below " goal)  - NovoLog 12 units with meals (basal/prandial match). HOLD if patient is NPO, unable to eat, eats <25% of meal, or if Blood Glucose is less than 100 mg/dL.   - Continue Novolog Moderate dose correction with ISF 25 starting at 150    - POCT Glucose Q4 hr while NPO  - Hypoglycemia protocol in place      ** Please notify Endocrine for any change and/or advance in diet**  ** Please call Endocrine for any BG related issues **     Discharge Planning:   TBD. Please notify endocrinology prior to discharge.            Do Ochoa PA-C  Endocrinology  [facility]a - Neurosurgery ([facility])

## 2024-10-14 NOTE — ASSESSMENT & PLAN NOTE
Patient was seen by general surgery for some episodes of nausea and emesis and some constipation. KUB was concerning for SBO vs. Ileus. Follow up CT AP did not show evidence of mechanical obstruction, was more indicative of Ileus.     Plan:   - place NPO. Continue NGT to LIWS   - monitor electrolytes daily, correct as needed   - Eliquis switched to heparin as it might not have been fully absorbed considering Ileus

## 2024-10-14 NOTE — ASSESSMENT & PLAN NOTE
Takes Eliquis 5mg BID and Coreg 6.25mg BID at home.  Initially Eliquis held given size of stroke; ASA 81mg QD in the interim. Eliquis was restarted on 10/10.       Plan:   -continue at home coreg regimen   -eliquis currently switched to heparin drip due to issues described in ileus section of the assessment and plan.

## 2024-10-14 NOTE — ASSESSMENT & PLAN NOTE
"63 y/o F transferred from [facility] for higher level of care secondary to acute KIARA stroke on CTA.  Briefly,the patient presented to the OSH overnight for slurred speech started at 1:30 a.m. in the morning.  She has a history of his stroke with left-sided deficits but is normally able to transfer and do a little walking. This morning, she had complete hemiplegia of the left side. Telestroke consult performed, concerning for LVO.  NIH 13. CTA Stroke MP performed and  was read as "acute R KIARA infarct". No TNK due to AC use. Patient accepted to ED as transfer for emergent vascular neurology evaluation. No endovascular intervention. History of atrial fibrillation per discussion with daughter. Was started on Eliquis on 10/6: hold given size of infarct, start ASA on 10/8. Currently on heparin drip given likely lack of absorption through NG tube in setting of Ileus. Given history of Afib, etiology likely embolic. Though not apparent on MRA, if R KIARA occlusion was proximal (supported by MRI) could account for R MCA-like picture.    Transient episodes of altered awareness: EEG negative, and no change in frequency observed by family w/Keppra so have stopped this medicine. Pending placement.    -Heparin drip   -high intensity statin  -SBP<220 in acute setting  -echo done, w/o evidence of intra-cardiac shunt, no LAE, but does have history of Afib  -PT/OT  -NPO, medications via NGT   "

## 2024-10-14 NOTE — ASSESSMENT & PLAN NOTE
History of. Takes Levemir 35 units QHS at home. A1C 10%.    -goal 140-180 while admitted  -On Lantus 24 units QHS & Aspart 12 units with meals  -SSI prn   -continue to monitor glucose  -Endocrine consulted for A1c 10%; following, appreciate recs

## 2024-10-14 NOTE — PLAN OF CARE
Problem: Skin Injury Risk Increased  Goal: Skin Health and Integrity  Outcome: Progressing     Problem: Stroke, Ischemic (Includes Transient Ischemic Attack)  Goal: Optimal Coping  Outcome: Progressing  Goal: Effective Bowel Elimination  Outcome: Progressing  Goal: Optimal Cerebral Tissue Perfusion  Outcome: Progressing  Goal: Optimal Cognitive Function  Outcome: Progressing  Goal: Improved Communication Skills  Outcome: Progressing  Goal: Optimal Functional Ability  Outcome: Progressing  Goal: Optimal Nutrition Intake  Outcome: Progressing  Goal: Effective Oxygenation and Ventilation  Outcome: Progressing  Goal: Improved Sensorimotor Function  Outcome: Progressing  Goal: Safe and Effective Swallow  Outcome: Progressing  Goal: Effective Urinary Elimination  Outcome: Progressing     Problem: Adult Inpatient Plan of Care  Goal: Plan of Care Review  Outcome: Progressing  Goal: Patient-Specific Goal (Individualized)  Description: Pt will maintain sbp below 220  Outcome: Progressing  Goal: Absence of Hospital-Acquired Illness or Injury  Outcome: Progressing  Goal: Optimal Comfort and Wellbeing  Outcome: Progressing  Goal: Readiness for Transition of Care  Outcome: Progressing     Problem: Fall Injury Risk  Goal: Absence of Fall and Fall-Related Injury  Outcome: Progressing     Problem: Infection  Goal: Absence of Infection Signs and Symptoms  Outcome: Progressing     Problem: Diabetes Comorbidity  Goal: Blood Glucose Level Within Targeted Range  Outcome: Progressing     Problem: Acute Kidney Injury/Impairment  Goal: Fluid and Electrolyte Balance  Outcome: Progressing  Goal: Improved Oral Intake  Outcome: Progressing  Goal: Effective Renal Function  Outcome: Progressing

## 2024-10-14 NOTE — HPI
Tammy Multani is a 64 y.o. lady with PMH DM, HTN, prior stroke with left sided deficits, afib on eliquis admitted to the neurology service since 10/6 for an KIARA stroke. Symptoms included slurred speech and hemiplegia of her left side. She had been tolerating a diet but in the past day has had a few episodes of nausea with nonbloody nonbilious thin emesis that she describes appears like spit. Last documented bowel movement on 8/11 but reports a loose watery movement earlier today. Mildly hypokalemic, remainder of labs bland. KUB with evidence of dilated large and small bowel. General surgery consulted for recs regarding ileus vs SBO.     Preciously has had c section and a lap radha.          Home

## 2024-10-14 NOTE — CONSULTS
[facility] - Neurosurgery ([facility])  General Surgery  Consult Note    Patient Name: Tammy Multani  MRN: [id number]  Code Status: Full Code  Admission Date: 10/6/2024  Hospital Length of Stay: 7 days  Attending Physician: Facundo Ríos MD  Primary Care Provider: Soha Tucker FNP    Patient information was obtained from patient, relative(s), and past medical records.     Inpatient consult to General Surgery  Consult performed by: Omar Stiles MD  Consult ordered by: Dorothy Stout PA-C        Subjective:     Principal Problem: Acute arterial ischemic stroke, multifocal, multiple vascular territories    History of Present Illness: Tammy Multani is a 64 y.o. lady with PMH DM, HTN, prior stroke with left sided deficits, afib on eliquis admitted to the neurology service since 10/6 for an KIARA stroke. Symptoms included slurred speech and hemiplegia of her left side. She had been tolerating a diet but in the past day has had a few episodes of nausea with nonbloody nonbilious thin emesis that she describes appears like spit. Last documented bowel movement on 8/11 but reports a loose watery movement earlier today. Mildly hypokalemic, remainder of labs bland. KUB with evidence of dilated large and small bowel. General surgery consulted for recs regarding ileus vs SBO.     Preciously has had c section and a lap radha.           No current facility-administered medications on file prior to encounter.     No current outpatient medications on file prior to encounter.       Review of patient's allergies indicates:  No Known Allergies    Past Medical History:   Diagnosis Date    Anticoagulant long-term use     Diabetes mellitus     Hypertension     Stroke      Past Surgical History:   Procedure Laterality Date    ABDOMINAL SURGERY      JOINT REPLACEMENT       Family History    None       Tobacco Use    Smoking status: Never    Smokeless tobacco: Not on file   Substance and Sexual Activity    Alcohol use: Never     Drug use: Never    Sexual activity: Not Currently     Review of Systems   Constitutional:  Negative for chills, fatigue and fever.   HENT:  Negative for hearing loss and trouble swallowing.    Eyes:  Negative for discharge and visual disturbance.   Respiratory:  Negative for chest tightness and shortness of breath.    Cardiovascular:  Negative for chest pain and palpitations.   Gastrointestinal:  Positive for nausea and vomiting. Negative for abdominal distention and abdominal pain.   Genitourinary:  Negative for difficulty urinating and hematuria.   Musculoskeletal:  Negative for arthralgias and back pain.   Skin:  Negative for rash and wound.   Neurological:  Positive for weakness and numbness. Negative for dizziness and headaches.     Objective:     Vital Signs (Most Recent):  Temp: 96.7 °F (35.9 °C) (10/13/24 1925)  Pulse: 102 (10/13/24 1925)  Resp: 18 (10/13/24 1925)  BP: 138/87 (10/13/24 1925)  SpO2: 98 % (10/13/24 1925) Vital Signs (24h Range):  Temp:  [96.3 °F (35.7 °C)-98.2 °F (36.8 °C)] 96.7 °F (35.9 °C)  Pulse:  [] 102  Resp:  [16-18] 18  SpO2:  [95 %-98 %] 98 %  BP: (129-162)/(64-88) 138/87     Weight: 72.6 kg (160 lb 0.9 oz)  Body mass index is 29.27 kg/m².     Physical Exam  Vitals and nursing note reviewed.   Constitutional:       Appearance: She is ill-appearing.   HENT:      Head: Normocephalic and atraumatic.   Cardiovascular:      Rate and Rhythm: Normal rate and regular rhythm.   Pulmonary:      Effort: Pulmonary effort is normal. No respiratory distress.   Abdominal:      General: Abdomen is flat. There is no distension.      Palpations: Abdomen is soft.      Tenderness: There is abdominal tenderness.      Comments: Mild tenderness  Well healed abdominal incisions   Musculoskeletal:      Right lower leg: No edema.      Left lower leg: No edema.   Skin:     General: Skin is warm and dry.   Neurological:      Mental Status: She is alert.      Comments: Left sided weakness            I  have reviewed all pertinent lab results within the past 24 hours.  CBC:   Recent Labs   Lab 10/11/24  1302   WBC 11.48   RBC 5.30   HGB 17.3*   HCT 52.4*      MCV 99*   MCH 32.6*   MCHC 33.0     CMP:   Recent Labs   Lab 10/13/24  0432   *   CALCIUM 9.5   ALBUMIN 3.0*   PROT 6.4      K 3.3*   CO2 24      BUN 50*   CREATININE 1.0   ALKPHOS 75   ALT 18   AST 32   BILITOT 0.6       Significant Diagnostics:  I have reviewed all pertinent imaging results/findings within the past 24 hours.    Assessment/Plan:     * Acute arterial ischemic stroke, multifocal, multiple vascular territories  Tammy Multani is a 64 y.o. lady admitted with an KIARA stroke now with concerns for SBO vs ileus    - recommend NG tube placement  - CT scan to rule out mechanical obstruction  - NG to LIWS  - replete electrolytes; K>4, Phos>3, Mag>2  - eliquis is likely not being absorbed if she has an ileus or SBO, would consider switching to a heparin drip      VTE Risk Mitigation (From admission, onward)           Ordered     apixaban tablet 5 mg  2 times daily         10/10/24 1624     Reason for No Pharmacological VTE Prophylaxis  Once        Question:  Reasons:  Answer:  Already adequately anticoagulated on oral Anticoagulants    10/06/24 1103     IP VTE HIGH RISK PATIENT  Once         10/06/24 1102     Place sequential compression device  Until discontinued         10/06/24 1102                    Thank you for your consult. I will follow-up with patient. Please contact us if you have any additional questions.    Omar Stiles MD  General Surgery  [facility] - Neurosurgery ([facility])

## 2024-10-15 LAB
ALBUMIN SERPL BCP-MCNC: 2.8 G/DL (ref 3.5–5.2)
ALP SERPL-CCNC: 68 U/L (ref 55–135)
ALT SERPL W/O P-5'-P-CCNC: 19 U/L (ref 10–44)
ANION GAP SERPL CALC-SCNC: 13 MMOL/L (ref 8–16)
APTT PPP: 39.9 SEC (ref 21–32)
APTT PPP: 43.6 SEC (ref 21–32)
AST SERPL-CCNC: 31 U/L (ref 10–40)
BASOPHILS # BLD AUTO: 0.07 K/UL (ref 0–0.2)
BASOPHILS # BLD AUTO: 0.07 K/UL (ref 0–0.2)
BASOPHILS NFR BLD: 1.2 % (ref 0–1.9)
BASOPHILS NFR BLD: 1.2 % (ref 0–1.9)
BILIRUB SERPL-MCNC: 1 MG/DL (ref 0.1–1)
BUN SERPL-MCNC: 36 MG/DL (ref 8–23)
CALCIUM SERPL-MCNC: 9.4 MG/DL (ref 8.7–10.5)
CHLORIDE SERPL-SCNC: 107 MMOL/L (ref 95–110)
CO2 SERPL-SCNC: 24 MMOL/L (ref 23–29)
CREAT SERPL-MCNC: 0.9 MG/DL (ref 0.5–1.4)
DIFFERENTIAL METHOD BLD: ABNORMAL
DIFFERENTIAL METHOD BLD: ABNORMAL
EOSINOPHIL # BLD AUTO: 0.1 K/UL (ref 0–0.5)
EOSINOPHIL # BLD AUTO: 0.1 K/UL (ref 0–0.5)
EOSINOPHIL NFR BLD: 0.9 % (ref 0–8)
EOSINOPHIL NFR BLD: 0.9 % (ref 0–8)
ERYTHROCYTE [DISTWIDTH] IN BLOOD BY AUTOMATED COUNT: 12.9 % (ref 11.5–14.5)
ERYTHROCYTE [DISTWIDTH] IN BLOOD BY AUTOMATED COUNT: 12.9 % (ref 11.5–14.5)
EST. GFR  (NO RACE VARIABLE): >60 ML/MIN/1.73 M^2
GLUCOSE SERPL-MCNC: 114 MG/DL (ref 70–110)
HCT VFR BLD AUTO: 44.8 % (ref 37–48.5)
HCT VFR BLD AUTO: 44.8 % (ref 37–48.5)
HGB BLD-MCNC: 14.8 G/DL (ref 12–16)
HGB BLD-MCNC: 14.8 G/DL (ref 12–16)
IMM GRANULOCYTES # BLD AUTO: 0.03 K/UL (ref 0–0.04)
IMM GRANULOCYTES # BLD AUTO: 0.03 K/UL (ref 0–0.04)
IMM GRANULOCYTES NFR BLD AUTO: 0.5 % (ref 0–0.5)
IMM GRANULOCYTES NFR BLD AUTO: 0.5 % (ref 0–0.5)
LYMPHOCYTES # BLD AUTO: 1.1 K/UL (ref 1–4.8)
LYMPHOCYTES # BLD AUTO: 1.1 K/UL (ref 1–4.8)
LYMPHOCYTES NFR BLD: 19.6 % (ref 18–48)
LYMPHOCYTES NFR BLD: 19.6 % (ref 18–48)
MAGNESIUM SERPL-MCNC: 2.3 MG/DL (ref 1.6–2.6)
MCH RBC QN AUTO: 32.9 PG (ref 27–31)
MCH RBC QN AUTO: 32.9 PG (ref 27–31)
MCHC RBC AUTO-ENTMCNC: 33 G/DL (ref 32–36)
MCHC RBC AUTO-ENTMCNC: 33 G/DL (ref 32–36)
MCV RBC AUTO: 100 FL (ref 82–98)
MCV RBC AUTO: 100 FL (ref 82–98)
MONOCYTES # BLD AUTO: 0.9 K/UL (ref 0.3–1)
MONOCYTES # BLD AUTO: 0.9 K/UL (ref 0.3–1)
MONOCYTES NFR BLD: 16 % (ref 4–15)
MONOCYTES NFR BLD: 16 % (ref 4–15)
NEUTROPHILS # BLD AUTO: 3.5 K/UL (ref 1.8–7.7)
NEUTROPHILS # BLD AUTO: 3.5 K/UL (ref 1.8–7.7)
NEUTROPHILS NFR BLD: 61.8 % (ref 38–73)
NEUTROPHILS NFR BLD: 61.8 % (ref 38–73)
NRBC BLD-RTO: 0 /100 WBC
NRBC BLD-RTO: 0 /100 WBC
PHOSPHATE SERPL-MCNC: 3 MG/DL (ref 2.7–4.5)
PLATELET # BLD AUTO: 230 K/UL (ref 150–450)
PLATELET # BLD AUTO: 230 K/UL (ref 150–450)
PLATELET BLD QL SMEAR: ABNORMAL
PLATELET BLD QL SMEAR: ABNORMAL
PMV BLD AUTO: 11.9 FL (ref 9.2–12.9)
PMV BLD AUTO: 11.9 FL (ref 9.2–12.9)
POCT GLUCOSE: 119 MG/DL (ref 70–110)
POCT GLUCOSE: 122 MG/DL (ref 70–110)
POCT GLUCOSE: 125 MG/DL (ref 70–110)
POCT GLUCOSE: 146 MG/DL (ref 70–110)
POCT GLUCOSE: 90 MG/DL (ref 70–110)
POCT GLUCOSE: 93 MG/DL (ref 70–110)
POTASSIUM SERPL-SCNC: 4.1 MMOL/L (ref 3.5–5.1)
PROT SERPL-MCNC: 6.6 G/DL (ref 6–8.4)
RBC # BLD AUTO: 4.5 M/UL (ref 4–5.4)
RBC # BLD AUTO: 4.5 M/UL (ref 4–5.4)
SODIUM SERPL-SCNC: 144 MMOL/L (ref 136–145)
WBC # BLD AUTO: 5.61 K/UL (ref 3.9–12.7)
WBC # BLD AUTO: 5.61 K/UL (ref 3.9–12.7)

## 2024-10-15 PROCEDURE — 97530 THERAPEUTIC ACTIVITIES: CPT

## 2024-10-15 PROCEDURE — 63600175 PHARM REV CODE 636 W HCPCS

## 2024-10-15 PROCEDURE — 25000003 PHARM REV CODE 250

## 2024-10-15 PROCEDURE — 36415 COLL VENOUS BLD VENIPUNCTURE: CPT

## 2024-10-15 PROCEDURE — 99232 SBSQ HOSP IP/OBS MODERATE 35: CPT | Mod: ,,, | Performed by: NURSE PRACTITIONER

## 2024-10-15 PROCEDURE — 99232 SBSQ HOSP IP/OBS MODERATE 35: CPT | Mod: ,,, | Performed by: STUDENT IN AN ORGANIZED HEALTH CARE EDUCATION/TRAINING PROGRAM

## 2024-10-15 PROCEDURE — 25500020 PHARM REV CODE 255: Performed by: STUDENT IN AN ORGANIZED HEALTH CARE EDUCATION/TRAINING PROGRAM

## 2024-10-15 PROCEDURE — 97129 THER IVNTJ 1ST 15 MIN: CPT

## 2024-10-15 PROCEDURE — 11000001 HC ACUTE MED/SURG PRIVATE ROOM

## 2024-10-15 PROCEDURE — 99233 SBSQ HOSP IP/OBS HIGH 50: CPT | Mod: GC,,, | Performed by: PSYCHIATRY & NEUROLOGY

## 2024-10-15 PROCEDURE — 85025 COMPLETE CBC W/AUTO DIFF WBC: CPT | Performed by: PHYSICIAN ASSISTANT

## 2024-10-15 PROCEDURE — 83735 ASSAY OF MAGNESIUM: CPT | Performed by: PHYSICIAN ASSISTANT

## 2024-10-15 PROCEDURE — 97535 SELF CARE MNGMENT TRAINING: CPT

## 2024-10-15 PROCEDURE — 92526 ORAL FUNCTION THERAPY: CPT

## 2024-10-15 PROCEDURE — 84100 ASSAY OF PHOSPHORUS: CPT | Performed by: PHYSICIAN ASSISTANT

## 2024-10-15 PROCEDURE — 97112 NEUROMUSCULAR REEDUCATION: CPT

## 2024-10-15 PROCEDURE — 85730 THROMBOPLASTIN TIME PARTIAL: CPT | Performed by: PSYCHIATRY & NEUROLOGY

## 2024-10-15 PROCEDURE — 80053 COMPREHEN METABOLIC PANEL: CPT | Performed by: PHYSICIAN ASSISTANT

## 2024-10-15 PROCEDURE — 63600175 PHARM REV CODE 636 W HCPCS: Mod: UD | Performed by: STUDENT IN AN ORGANIZED HEALTH CARE EDUCATION/TRAINING PROGRAM

## 2024-10-15 PROCEDURE — 85730 THROMBOPLASTIN TIME PARTIAL: CPT | Mod: 91

## 2024-10-15 PROCEDURE — 36415 COLL VENOUS BLD VENIPUNCTURE: CPT | Performed by: PSYCHIATRY & NEUROLOGY

## 2024-10-15 RX ORDER — MAGNESIUM SULFATE HEPTAHYDRATE 40 MG/ML
2 INJECTION, SOLUTION INTRAVENOUS ONCE
Status: COMPLETED | OUTPATIENT
Start: 2024-10-15 | End: 2024-10-15

## 2024-10-15 RX ORDER — PROCHLORPERAZINE EDISYLATE 5 MG/ML
2.5 INJECTION INTRAMUSCULAR; INTRAVENOUS EVERY 6 HOURS PRN
Status: DISCONTINUED | OUTPATIENT
Start: 2024-10-15 | End: 2024-10-16

## 2024-10-15 RX ADMIN — CARVEDILOL 6.25 MG: 6.25 TABLET, FILM COATED ORAL at 09:10

## 2024-10-15 RX ADMIN — MAGNESIUM SULFATE HEPTAHYDRATE 2 G: 40 INJECTION, SOLUTION INTRAVENOUS at 05:10

## 2024-10-15 RX ADMIN — ATORVASTATIN CALCIUM 40 MG: 40 TABLET, FILM COATED ORAL at 09:10

## 2024-10-15 RX ADMIN — SODIUM CHLORIDE AND POTASSIUM CHLORIDE: .9; .15 SOLUTION INTRAVENOUS at 05:10

## 2024-10-15 RX ADMIN — SODIUM CHLORIDE AND POTASSIUM CHLORIDE: .9; .15 SOLUTION INTRAVENOUS at 06:10

## 2024-10-15 RX ADMIN — ACETAMINOPHEN 650 MG: 650 SOLUTION ORAL at 01:10

## 2024-10-15 RX ADMIN — FAMOTIDINE 20 MG: 20 TABLET ORAL at 09:10

## 2024-10-15 RX ADMIN — PROCHLORPERAZINE EDISYLATE 2.5 MG: 5 INJECTION INTRAMUSCULAR; INTRAVENOUS at 12:10

## 2024-10-15 RX ADMIN — DIATRIZOATE MEGLUMINE AND DIATRIZOATE SODIUM 100 ML: 660; 100 LIQUID ORAL; RECTAL at 09:10

## 2024-10-15 RX ADMIN — INSULIN GLARGINE 24 UNITS: 100 INJECTION, SOLUTION SUBCUTANEOUS at 09:10

## 2024-10-15 NOTE — PROGRESS NOTES
[facility] - Neurosurgery ([facility])  General Surgery  Progress Note    Subjective:     History of Present Illness:  Tammy Multani is a 64 y.o. lady with PMH DM, HTN, prior stroke with left sided deficits, afib on eliquis admitted to the neurology service since 10/6 for an KIARA stroke. Symptoms included slurred speech and hemiplegia of her left side. She had been tolerating a diet but in the past day has had a few episodes of nausea with nonbloody nonbilious thin emesis that she describes appears like spit. Last documented bowel movement on 8/11 but reports a loose watery movement earlier today. Mildly hypokalemic, remainder of labs bland. KUB with evidence of dilated large and small bowel. General surgery consulted for recs regarding ileus vs SBO.     Preciously has had c section and a lap radha.           Post-Op Info:  * No surgery found *         Interval History: NAEON. Afebrile. HDS. Pain is controlled with current regimen. Denies n/v. No ROBF. NGT with 300cc out overnight. No new symptoms or concerns this morning. Family at bedside. All questions answered.   AM labs pending     Medications:  Continuous Infusions:   0/9% NACL & POTASSIUM CHLORIDE 20 MEQ/L   Intravenous Continuous 100 mL/hr at 10/15/24 0516 New Bag at 10/15/24 0516    heparin (porcine) in D5W  0-40 Units/kg/hr (Adjusted) Intravenous Continuous 5.9 mL/hr at 10/14/24 2348 10 Units/kg/hr at 10/14/24 2348     Scheduled Meds:   atorvastatin  40 mg Per NG tube Daily    carvediloL  6.25 mg Per NG tube BID    famotidine  20 mg Per NG tube Daily    insulin aspart U-100  12 Units Subcutaneous TIDWM    insulin glargine U-100  24 Units Subcutaneous Daily     PRN Meds:  Current Facility-Administered Medications:     acetaminophen, 650 mg, Per NG tube, Q8H PRN    bisacodyL, 10 mg, Rectal, Daily PRN    dextrose 10%, 12.5 g, Intravenous, PRN    dextrose 10%, 25 g, Intravenous, PRN    glucagon (human recombinant), 1 mg, Intramuscular, PRN    hydrALAZINE, 10  mg, Intravenous, Q6H PRN    insulin aspart U-100, 0-10 Units, Subcutaneous, Q4H PRN    iohexol, 15 mL, Oral, PRN    ondansetron, 4 mg, Per NG tube, Q12H PRN    sodium chloride 0.9%, 500 mL, Intravenous, PRN    sodium chloride 0.9%, 10 mL, Intravenous, PRN     Review of patient's allergies indicates:  No Known Allergies  Objective:     Vital Signs (Most Recent):  Temp: 97.9 °F (36.6 °C) (10/15/24 0450)  Pulse: 92 (10/15/24 0450)  Resp: 18 (10/15/24 0450)  BP: 128/70 (10/15/24 0450)  SpO2: 97 % (10/15/24 0450) Vital Signs (24h Range):  Temp:  [97.3 °F (36.3 °C)-99.1 °F (37.3 °C)] 97.9 °F (36.6 °C)  Pulse:  [] 92  Resp:  [16-20] 18  SpO2:  [94 %-97 %] 97 %  BP: (117-158)/(69-94) 128/70     Weight: 72.6 kg (160 lb 0.9 oz)  Body mass index is 29.27 kg/m².    Intake/Output - Last 3 Shifts         10/13 0700  10/14 0659 10/14 0700  10/15 0659 10/15 0700  10/16 0659    P.O.  0     I.V. (mL/kg)       NG/GT  60     Total Intake(mL/kg)  60 (0.8)     Urine (mL/kg/hr)  300 (0.2)     Drains 750 300     Stool  0     Total Output 750 600     Net -750 -540            Urine Occurrence  1 x     Stool Occurrence 0 x 0 x              Physical Exam  Vitals and nursing note reviewed.   Constitutional:       General: She is not in acute distress.     Appearance: She is ill-appearing. She is not diaphoretic.      Comments: Room air  NGT to LIWS   HENT:      Head: Normocephalic and atraumatic.      Mouth/Throat:      Mouth: Mucous membranes are moist.      Pharynx: Oropharynx is clear.   Eyes:      Extraocular Movements: Extraocular movements intact.      Conjunctiva/sclera: Conjunctivae normal.   Cardiovascular:      Rate and Rhythm: Normal rate.   Pulmonary:      Effort: Pulmonary effort is normal. No respiratory distress.   Abdominal:      General: Abdomen is flat. There is no distension.      Palpations: Abdomen is soft.      Tenderness: There is abdominal tenderness. There is no guarding or rebound.      Comments: Well healed  abdominal incisions  Mild TTP diffusely. No peritonitic signs   Musculoskeletal:      Right lower leg: No edema.      Left lower leg: No edema.   Skin:     General: Skin is warm and dry.   Neurological:      Mental Status: She is alert.      Comments: Left sided weakness          Significant Labs:  I have reviewed all pertinent lab results within the past 24 hours.  CBC:   Recent Labs   Lab 10/14/24  1105   WBC 5.87   RBC 5.21   HGB 16.8*   HCT 50.1*      MCV 96   MCH 32.2*   MCHC 33.5     BMP:   Recent Labs   Lab 10/14/24  0641   GLU 85      K 3.8      CO2 23   BUN 34*   CREATININE 0.8   CALCIUM 10.0   MG 2.2       Significant Diagnostics:  I have reviewed all pertinent imaging results/findings within the past 24 hours.  Assessment/Plan:      Lori Multani is a 64 y.o. lady admitted with an KIARA stroke now with concerns for SBO vs ileus. She is undergoing conservative management with bowel rest, NGT decompression    - NPO  - NGT  - Gastrograffin challenge today with KUBs at 4 and 8 hours  - replete electrolytes; K>4, Phos>3, Mag>2  - eliquis is likely not being absorbed if she has an ileus or SBO, would consider switching to a heparin drip  - Serial abdominal exams  - Remainder of care per primary team  - Please contact general surgery with any questions, concerns, or clinical status changes      Case discussed with Dr. Washburn.      Antonio Anderson PA-C  General Surgery  [facility] - Neurosurgery ([facility])

## 2024-10-15 NOTE — PLAN OF CARE
Problem: Skin Injury Risk Increased  Goal: Skin Health and Integrity  Outcome: Progressing     Problem: Stroke, Ischemic (Includes Transient Ischemic Attack)  Goal: Optimal Coping  Outcome: Progressing  Goal: Effective Bowel Elimination  Outcome: Progressing  Goal: Optimal Cerebral Tissue Perfusion  Outcome: Progressing  Goal: Optimal Cognitive Function  Outcome: Progressing  Goal: Improved Communication Skills  Outcome: Progressing  Goal: Optimal Functional Ability  Outcome: Progressing  Goal: Optimal Nutrition Intake  Outcome: Progressing  Goal: Effective Oxygenation and Ventilation  Outcome: Progressing  Goal: Improved Sensorimotor Function  Outcome: Progressing  Goal: Safe and Effective Swallow  Outcome: Progressing  Goal: Effective Urinary Elimination  Outcome: Progressing     Problem: Fall Injury Risk  Goal: Absence of Fall and Fall-Related Injury  Outcome: Progressing     Problem: Adult Inpatient Plan of Care  Goal: Plan of Care Review  Outcome: Progressing  Goal: Patient-Specific Goal (Individualized)  Description: Pt will maintain sbp below 220  Outcome: Progressing  Goal: Absence of Hospital-Acquired Illness or Injury  Outcome: Progressing  Goal: Optimal Comfort and Wellbeing  Outcome: Progressing  Goal: Readiness for Transition of Care  Outcome: Progressing     Problem: Infection  Goal: Absence of Infection Signs and Symptoms  Outcome: Progressing     Problem: Diabetes Comorbidity  Goal: Blood Glucose Level Within Targeted Range  Outcome: Progressing     Problem: Acute Kidney Injury/Impairment  Goal: Fluid and Electrolyte Balance  Outcome: Progressing  Goal: Improved Oral Intake  Outcome: Progressing  Goal: Effective Renal Function  Outcome: Progressing     Problem: Restraint, Nonviolent  Goal: Absence of Harm or Injury  Outcome: Progressing

## 2024-10-15 NOTE — ASSESSMENT & PLAN NOTE
Tammy Multani is a 64 y.o. lady admitted with an KIARA stroke now with concerns for SBO vs ileus. She is undergoing non-operative management with bowel rest, NGT decompression. Overall clinical course is improving with resolution of pain/tenderness, soft and non-distended abromen though still with dark brown, feculent appearing fluid from NG tube. Will continue with Ng decompression and bowel rest. GGC with 2x Xrays without contrast in the colon though now in small bowel.     - NPO with mIVF   - NGT  - replete electrolytes; K>4, Phos>3, Mag>2  - eliquis is likely not being absorbed if she has an ileus or SBO, would consider switching to a heparin drip  - Serial abdominal exams  - Remainder of care per primary team  - Please contact general surgery with any questions, concerns, or clinical status changes

## 2024-10-15 NOTE — ASSESSMENT & PLAN NOTE
"65 y/o F transferred from [facility] for higher level of care secondary to acute KIARA stroke on CTA.  Briefly,the patient presented to the OSH overnight for slurred speech started at 1:30 a.m. in the morning.  She has a history of his stroke with left-sided deficits but is normally able to transfer and do a little walking. This morning, she had complete hemiplegia of the left side. Telestroke consult performed, concerning for LVO.  NIH 13. CTA Stroke MP performed and  was read as "acute R KIARA infarct". No TNK due to AC use. Patient accepted to ED as transfer for emergent vascular neurology evaluation. No endovascular intervention. History of atrial fibrillation per discussion with daughter. Was started on Eliquis on 10/6: hold given size of infarct, start ASA on 10/8. Currently on heparin drip given likely lack of absorption through NG tube in setting of Ileus. Given history of Afib, etiology likely embolic. Though not apparent on MRA, if R KIARA occlusion was proximal (supported by MRI) could account for R MCA-like picture.    Transient episodes of altered awareness: EEG negative, and no change in frequency observed by family w/Keppra so have stopped this medicine.     -Heparin drip   -high intensity statin  -SBP<220 in acute setting  -echo done, w/o evidence of intra-cardiac shunt, no LAE, but does have history of Afib  -PT/OT  -NPO  "

## 2024-10-15 NOTE — PT/OT/SLP PROGRESS
"Speech Language Pathology Treatment    Patient Name:  Tammy Multani   MRN:  [id number]  Admitting Diagnosis: Acute arterial ischemic stroke, multifocal, multiple vascular territories    Recommendations:                 General Recommendations:  Dysphagia therapy and Cognitive-linguistic therapy  Diet recommendations:  Puree Diet - IDDSI Level 4, Liquid Diet Level: Mildly thick/Nectar thick liquids - IDDSI Level 2   Aspiration Precautions: 1 bite/sip at a time, Assistance with meals, Assistance with thickening liquids, Feed only when awake/alert, Frequent oral care, HOB to 90 degrees, Meds crushed in puree, Monitor for s/s of aspiration, Small bites/sips, and Standard aspiration precautions   General Precautions: Standard, aspiration, fall  Communication strategies:  provide increased time to answer and go to room if call light pushed    Assessment:     Tammy Multani is a 64 y.o. female with an SLP diagnosis of Dysphagia, Dysarthria, and Cognitive-Linguistic Impairment.    Subjective     "My stomach hurts."    Respiratory Status: Room air    Objective:     Has the patient been evaluated by SLP for swallowing?   Yes  Keep patient NPO? No   Current Respiratory Status:        Pt seen this date for ongoing swallow, cognitive-linguistic tx with NG tube in place with suctioning turned on. Pt NPO for testing but SLP approved for PO during session. HOB lifted prior to PO. Pt accepted ice chips x5 for comfort with no overt signs of aspiration, but with noted delayed swallow initiation and spontaneous 2-3 swallows per ice chip utilizing the effortful swallow technique. SLP assessed her reading skills at the sentence level with moderately unintelligible speech and intermittent instances of left side neglect. Pt with complaints of of stomach pain, RN notified. SLP attempted further cognitive therapy, but discontinued 2/2 pt discomfort. SLP educated pt and daughter re: SLP services and poc to which they verbalized " understanding. Recommend continued Puree/Mildly thick liquid diet with aspiration precautions when medically appropriate. SLP to follow.     Goals:   Multidisciplinary Problems       SLP Goals          Problem: SLP    Goal Priority Disciplines Outcome   SLP Goal     SLP    Description: Speech Language Pathology Goals  Goals expected to be met by 10/14:  1. Pt will participate in Modified Barium Swallow Study to r/o aspiration and determine safest oral diet.   2. Pt will participate in speech/language/cognitive evaluation to establish further tx plan.   3.  Assess functional reading and writing skills  4.  [place] to month, year and place  5.  Respond to simple categorization tasks with 75% accuracy                                Plan:     Patient to be seen:  4 x/week   Plan of Care expires:  11/06/24  Plan of Care reviewed with:  patient, daughter   SLP Follow-Up:  Yes       Discharge recommendations:  High Intensity Therapy     Time Tracking:     SLP Treatment Date:   10/15/24  Speech Start Time:  1304  Speech Stop Time:  1328     Speech Total Time (min):  24 min    Billable Minutes: Speech Therapy Individual 8, Treatment Swallowing Dysfunction 8, and Self Care/Home Management Training 8    10/15/2024

## 2024-10-15 NOTE — SUBJECTIVE & OBJECTIVE
"Interval HPI:   Overnight events: Remains in 960A. Concerns now for SBO vs ileus - made NPO. BG has been well controlled on current SQ insulin regimen. Diet NPO    Eating:   NPO  Nausea: No  Hypoglycemia and intervention: No  Fever: No  TPN and/or TF: No  If yes, type of TF/TPN and rate: n/a    BP (!) 173/84 (BP Location: Right arm, Patient Position: Lying)   Pulse 97   Temp 98.1 °F (36.7 °C) (Oral)   Resp 18   Ht 5' 2" (1.575 m)   Wt 72.6 kg (160 lb 0.9 oz)   LMP  (LMP Unknown) Comment: Doesn't  have periods anymore  SpO2 96%   Breastfeeding No   BMI 29.27 kg/m²     Labs Reviewed and Include    Recent Labs   Lab 10/15/24  0758   *   CALCIUM 9.4   ALBUMIN 2.8*   PROT 6.6      K 4.1   CO2 24      BUN 36*   CREATININE 0.9   ALKPHOS 68   ALT 19   AST 31   BILITOT 1.0     Lab Results   Component Value Date    WBC 5.61 10/15/2024    WBC 5.61 10/15/2024    HGB 14.8 10/15/2024    HGB 14.8 10/15/2024    HCT 44.8 10/15/2024    HCT 44.8 10/15/2024     (H) 10/15/2024     (H) 10/15/2024     10/15/2024     10/15/2024     No results for input(s): "TSH", "FREET4" in the last 168 hours.  Lab Results   Component Value Date    HGBA1C 10.0 (H) 10/08/2024       Nutritional status:   Body mass index is 29.27 kg/m².  Lab Results   Component Value Date    ALBUMIN 2.8 (L) 10/15/2024    ALBUMIN 3.1 (L) 10/14/2024    ALBUMIN 3.0 (L) 10/13/2024     No results found for: "PREALBUMIN"    Estimated Creatinine Clearance: 58.1 mL/min (based on SCr of 0.9 mg/dL).    Accu-Checks  Recent Labs     10/13/24  0741 10/13/24  1146 10/13/24  1523 10/14/24  1140 10/14/24  1509 10/14/24  2159 10/15/24  0000 10/15/24  0404 10/15/24  0837 10/15/24  1057   POCTGLUCOSE 149* 277* 270* 94 144* 95 90 119* 122* 125*       Current Medications and/or Treatments Impacting Glycemic Control  Immunotherapy:    Immunosuppressants       None          Steroids:   Hormones (From admission, onward)      None      "     Pressors:    Autonomic Drugs (From admission, onward)      None          Hyperglycemia/Diabetes Medications:   Antihyperglycemics (From admission, onward)      Start     Stop Route Frequency Ordered    10/14/24 1317  insulin aspart U-100 pen 0-10 Units         -- SubQ Every 4 hours PRN 10/14/24 1217    10/14/24 1215  insulin glargine U-100 (Lantus) pen 24 Units         -- SubQ Daily 10/14/24 1214    10/14/24 0715  insulin aspart U-100 pen 12 Units         -- SubQ 3 times daily with meals 10/13/24 1056

## 2024-10-15 NOTE — ASSESSMENT & PLAN NOTE
Tammy Multani is a 64 y.o. lady admitted with an KIARA stroke now with concerns for SBO vs ileus. She is undergoing conservative management with bowel rest, NGT decompression    - NPO  - NGT  - Gastrograffin challenge today with KUBs at 4 and 8 hours  - replete electrolytes; K>4, Phos>3, Mag>2  - eliquis is likely not being absorbed if she has an ileus or SBO, would consider switching to a heparin drip  - Serial abdominal exams  - Remainder of care per primary team  - Please contact general surgery with any questions, concerns, or clinical status changes

## 2024-10-15 NOTE — SUBJECTIVE & OBJECTIVE
Neurologic Chief Complaint: infarcts in multiple vascular territories    Subjective:     Interval History: Patient is seen for follow-up neurological assessment and treatment recommendations: See hospital course.     HPI, Past Medical, Family, and Social History remains the same as documented in the initial encounter.     Review of Systems   Neurological:  Positive for weakness.   All other systems reviewed and are negative.    Scheduled Meds:   atorvastatin  40 mg Per NG tube Daily    carvediloL  6.25 mg Per NG tube BID    famotidine  20 mg Per NG tube Daily    insulin glargine U-100  24 Units Subcutaneous Daily    magnesium sulfate IVPB  2 g Intravenous Once     Continuous Infusions:   0/9% NACL & POTASSIUM CHLORIDE 20 MEQ/L   Intravenous Continuous 100 mL/hr at 10/15/24 0516 New Bag at 10/15/24 0516    heparin (porcine) in D5W  0-40 Units/kg/hr (Adjusted) Intravenous Continuous 5.9 mL/hr at 10/15/24 1008 10 Units/kg/hr at 10/15/24 1008     PRN Meds:  Current Facility-Administered Medications:     acetaminophen, 650 mg, Per NG tube, Q8H PRN    dextrose 10%, 12.5 g, Intravenous, PRN    dextrose 10%, 25 g, Intravenous, PRN    glucagon (human recombinant), 1 mg, Intramuscular, PRN    hydrALAZINE, 10 mg, Intravenous, Q6H PRN    insulin aspart U-100, 0-10 Units, Subcutaneous, Q4H PRN    iohexol, 15 mL, Oral, PRN    prochlorperazine, 2.5 mg, Intravenous, Q6H PRN    sodium chloride 0.9%, 500 mL, Intravenous, PRN    sodium chloride 0.9%, 10 mL, Intravenous, PRN    Objective:     Vital Signs (Most Recent):  Temp: 97.3 °F (36.3 °C) (10/15/24 1557)  Pulse: 107 (10/15/24 1557)  Resp: 18 (10/15/24 1557)  BP: (!) 179/83 (10/15/24 1557)  SpO2: 97 % (10/15/24 1557)  BP Location: Left arm    Vital Signs Range (Last 24H):  Temp:  [97.2 °F (36.2 °C)-99.1 °F (37.3 °C)]   Pulse:  []   Resp:  [16-20]   BP: (117-179)/(68-84)   SpO2:  [94 %-99 %]   BP Location: Left arm       Physical Exam  Vitals reviewed.   Cardiovascular:       "Rate and Rhythm: Normal rate.   Pulmonary:      Effort: Pulmonary effort is normal. No respiratory distress.   Abdominal:      General: There is distension.      Palpations: Abdomen is soft.      Tenderness: There is abdominal tenderness.   Musculoskeletal:      Right lower leg: No edema.      Left lower leg: No edema.   Skin:     General: Skin is warm and dry.          Neurological Exam:   LOC: alert  Attention Span: poor  Articulation: Dysarthria  Orientation: Person, Place, Time   Visual Fields: Full  EOM (CN III, IV, VI): Full/intact  Pupils (CN II, III): PERRL  Facial Sensation (CN V): Facial sensory loss  Facial Movement (CN VII): Upper facial weakness on the Left  Motor: Arm left  Plegia 0/5  Leg left  Paresis: 0/5  Arm right  Normal 5/5  Leg right Normal 5/5  Cerebellum: No evidence of appendicular or axial ataxia  Sensation: Jason-anesthesia left    Laboratory:  CMP:   Recent Labs   Lab 10/15/24  0758   CALCIUM 9.4   ALBUMIN 2.8*   PROT 6.6      K 4.1   CO2 24      BUN 36*   CREATININE 0.9   ALKPHOS 68   ALT 19   AST 31   BILITOT 1.0     CBC:   Recent Labs   Lab 10/15/24  0758   WBC 5.61  5.61   RBC 4.50  4.50   HGB 14.8  14.8   HCT 44.8  44.8     230   *  100*   MCH 32.9*  32.9*   MCHC 33.0  33.0     Lipid Panel: No results for input(s): "CHOL", "LDLCALC", "HDL", "TRIG" in the last 168 hours.  Hgb A1C:   No results for input(s): "HGBA1C" in the last 168 hours.    TSH: No results for input(s): "TSH" in the last 168 hours.    Diagnostic Results      Brain and Vessel Imaging   MRI/MRA Brain Ischemic Protocol: 10/6/24  Impression: Acute infarcts involving the right anterior circulation and posterior circulation as detailed above without evidence of intracranial hemorrhage or mass effect.  Embolic disease to be considered.     Chronic left parietal infarct. MRA demonstrates limited visualization of distal right KIARA branches in keeping with the territory of the acute infarct.  " There is also narrowing of at least one proximal left M2 branch although no acute infarct in that territory.          Echo: 10/7/24  Cardiac Imaging     Left Ventricle: The left ventricle is normal in size. Normal wall thickness. There is concentric remodeling. Normal wall motion. There is hyperdynamic systolic function with a visually estimated ejection fraction of greater than 70%. There is normal diastolic function.    Right Ventricle: Normal right ventricular cavity size. Wall thickness is normal. Systolic function is normal.    Left Atrium: Bubble study is likely negative, but full opacification of the RA is not well seen on these images which decreases sensitivity.    IVC/SVC: Normal venous pressure at 3 mmHg.

## 2024-10-15 NOTE — ASSESSMENT & PLAN NOTE
History of. Takes Levemir 35 units QHS at home. A1C 10%.    -goal 140-180 while admitted  -On Lantus 24 units QHS   -SSI prn   -continue to monitor glucose  -Endocrine consulted for A1c 10%; following, appreciate recs

## 2024-10-15 NOTE — ASSESSMENT & PLAN NOTE
History of. Prescribed oxycodone 15mg at home. Family concerned that medication is being diverted by family member, unclear how often patient takes. Reportedly is prescribed for neck pain.     Plan:   - holding oxycodone 5 mg BID   -monitor for signs of withdrawal, as is unclear how often patient is taking

## 2024-10-15 NOTE — ASSESSMENT & PLAN NOTE
BG goal: 140-180  T2DM admitted with acute KIARA stroke    - Continue Lantus 24 units daily   - Discontinue NovoLog 10 units with meals (Now NPO)   - Continue Novolog Moderate dose correction with ISF 25 starting at 150    - POCT Glucose q 4 hrs while NPO   - Hypoglycemia protocol in place      ** Please notify Endocrine for any change and/or advance in diet**  ** Please call Endocrine for any BG related issues **     Discharge Planning:   TBD. Please notify endocrinology prior to discharge.

## 2024-10-15 NOTE — PLAN OF CARE
Recommendations    Advance to pureed diet when able.   RD to monitor and follow up.    Goals: Meet % EEN/EPN by RD follow up.  Nutrition Goal Status: progressing towards goal  Communication of RD Recs: other (comment) (POC)

## 2024-10-15 NOTE — PROGRESS NOTES
"[facility] - Neurosurgery ([facility])  Endocrinology  Progress Note    Admit Date: 10/6/2024     Reason for Consult: Management of T2DM, Hyperglycemia       Diabetes diagnosis year:  Over 5 years    Home Diabetes Medications:    -liraglutide  -Levemir 35 units QHS    How often checking glucose at home? Not checking       Diabetes Complications include:     Hyperglycemia    Complicating diabetes co morbidities:   History of CVA      HPI:   Patient is a 64 y.o. female with  DM2 , HTN who was transferred from [facility] for higher level of care secondary to acute KIARA stroke on CTA. Briefly,the patient presented to the OSH overnight for slurred speech started at 1:30 a.m. in the morning. She has a history of his stroke with left-sided deficits but is normally able to transfer and do a little walking.  Endocrine consulted for BG management.      Interval HPI:   Overnight events: Remains in 960A. Concerns now for SBO vs ileus - made NPO. BG has been well controlled on current SQ insulin regimen. Diet NPO    Eating:   NPO  Nausea: No  Hypoglycemia and intervention: No  Fever: No  TPN and/or TF: No  If yes, type of TF/TPN and rate: n/a    BP (!) 173/84 (BP Location: Right arm, Patient Position: Lying)   Pulse 97   Temp 98.1 °F (36.7 °C) (Oral)   Resp 18   Ht 5' 2" (1.575 m)   Wt 72.6 kg (160 lb 0.9 oz)   LMP  (LMP Unknown) Comment: Doesn't  have periods anymore  SpO2 96%   Breastfeeding No   BMI 29.27 kg/m²     Labs Reviewed and Include    Recent Labs   Lab 10/15/24  0758   *   CALCIUM 9.4   ALBUMIN 2.8*   PROT 6.6      K 4.1   CO2 24      BUN 36*   CREATININE 0.9   ALKPHOS 68   ALT 19   AST 31   BILITOT 1.0     Lab Results   Component Value Date    WBC 5.61 10/15/2024    WBC 5.61 10/15/2024    HGB 14.8 10/15/2024    HGB 14.8 10/15/2024    HCT 44.8 10/15/2024    HCT 44.8 10/15/2024     (H) 10/15/2024     (H) 10/15/2024     10/15/2024     10/15/2024     No " "results for input(s): "TSH", "FREET4" in the last 168 hours.  Lab Results   Component Value Date    HGBA1C 10.0 (H) 10/08/2024       Nutritional status:   Body mass index is 29.27 kg/m².  Lab Results   Component Value Date    ALBUMIN 2.8 (L) 10/15/2024    ALBUMIN 3.1 (L) 10/14/2024    ALBUMIN 3.0 (L) 10/13/2024     No results found for: "PREALBUMIN"    Estimated Creatinine Clearance: 58.1 mL/min (based on SCr of 0.9 mg/dL).    Accu-Checks  Recent Labs     10/13/24  0741 10/13/24  1146 10/13/24  1523 10/14/24  1140 10/14/24  1509 10/14/24  2159 10/15/24  0000 10/15/24  0404 10/15/24  0837 10/15/24  1057   POCTGLUCOSE 149* 277* 270* 94 144* 95 90 119* 122* 125*       Current Medications and/or Treatments Impacting Glycemic Control  Immunotherapy:    Immunosuppressants       None          Steroids:   Hormones (From admission, onward)      None          Pressors:    Autonomic Drugs (From admission, onward)      None          Hyperglycemia/Diabetes Medications:   Antihyperglycemics (From admission, onward)      Start     Stop Route Frequency Ordered    10/14/24 1317  insulin aspart U-100 pen 0-10 Units         -- SubQ Every 4 hours PRN 10/14/24 1217    10/14/24 1215  insulin glargine U-100 (Lantus) pen 24 Units         -- SubQ Daily 10/14/24 1214    10/14/24 0715  insulin aspart U-100 pen 12 Units         -- SubQ 3 times daily with meals 10/13/24 2104            ASSESSMENT and PLAN    Neuro  * Acute arterial ischemic stroke, multifocal, multiple vascular territories  Managed by primary team  Optimize BG control      Cardiac/Vascular  HTN (hypertension)  Managed by primary team  Optimize BG control      Endocrine  Type 2 diabetes mellitus, with long-term current use of insulin  BG goal: 140-180  T2DM admitted with acute KIARA stroke    - Continue Lantus 24 units daily   - Discontinue NovoLog 10 units with meals (Now NPO)   - Continue Novolog Moderate dose correction with ISF 25 starting at 150    - POCT Glucose q 4 hrs " while NPO   - Hypoglycemia protocol in place      ** Please notify Endocrine for any change and/or advance in diet**  ** Please call Endocrine for any BG related issues **     Discharge Planning:   TBD. Please notify endocrinology prior to discharge.            Mattie Ramsey, NP  Endocrinology  [facility] - Neurosurgery ([facility])

## 2024-10-15 NOTE — SUBJECTIVE & OBJECTIVE
Interval History: NAEON. Afebrile. HDS. Pain is controlled with current regimen. Denies n/v. No ROBF. NGT with 300cc out overnight. No new symptoms or concerns this morning. Family at bedside. All questions answered.   AM labs pending     Medications:  Continuous Infusions:   0/9% NACL & POTASSIUM CHLORIDE 20 MEQ/L   Intravenous Continuous 100 mL/hr at 10/15/24 0516 New Bag at 10/15/24 0516    heparin (porcine) in D5W  0-40 Units/kg/hr (Adjusted) Intravenous Continuous 5.9 mL/hr at 10/14/24 2348 10 Units/kg/hr at 10/14/24 2348     Scheduled Meds:   atorvastatin  40 mg Per NG tube Daily    carvediloL  6.25 mg Per NG tube BID    famotidine  20 mg Per NG tube Daily    insulin aspart U-100  12 Units Subcutaneous TIDWM    insulin glargine U-100  24 Units Subcutaneous Daily     PRN Meds:  Current Facility-Administered Medications:     acetaminophen, 650 mg, Per NG tube, Q8H PRN    bisacodyL, 10 mg, Rectal, Daily PRN    dextrose 10%, 12.5 g, Intravenous, PRN    dextrose 10%, 25 g, Intravenous, PRN    glucagon (human recombinant), 1 mg, Intramuscular, PRN    hydrALAZINE, 10 mg, Intravenous, Q6H PRN    insulin aspart U-100, 0-10 Units, Subcutaneous, Q4H PRN    iohexol, 15 mL, Oral, PRN    ondansetron, 4 mg, Per NG tube, Q12H PRN    sodium chloride 0.9%, 500 mL, Intravenous, PRN    sodium chloride 0.9%, 10 mL, Intravenous, PRN     Review of patient's allergies indicates:  No Known Allergies  Objective:     Vital Signs (Most Recent):  Temp: 97.9 °F (36.6 °C) (10/15/24 0450)  Pulse: 92 (10/15/24 0450)  Resp: 18 (10/15/24 0450)  BP: 128/70 (10/15/24 0450)  SpO2: 97 % (10/15/24 0450) Vital Signs (24h Range):  Temp:  [97.3 °F (36.3 °C)-99.1 °F (37.3 °C)] 97.9 °F (36.6 °C)  Pulse:  [] 92  Resp:  [16-20] 18  SpO2:  [94 %-97 %] 97 %  BP: (117-158)/(69-94) 128/70     Weight: 72.6 kg (160 lb 0.9 oz)  Body mass index is 29.27 kg/m².    Intake/Output - Last 3 Shifts         10/13 0700  10/14 0659 10/14 0700  10/15 0659 10/15  0700  10/16 0659    P.O.  0     I.V. (mL/kg)       NG/GT  60     Total Intake(mL/kg)  60 (0.8)     Urine (mL/kg/hr)  300 (0.2)     Drains 750 300     Stool  0     Total Output 750 600     Net -750 -540            Urine Occurrence  1 x     Stool Occurrence 0 x 0 x              Physical Exam  Vitals and nursing note reviewed.   Constitutional:       General: She is not in acute distress.     Appearance: She is ill-appearing. She is not diaphoretic.      Comments: Room air  NGT to VLAD   HENT:      Head: Normocephalic and atraumatic.      Mouth/Throat:      Mouth: Mucous membranes are moist.      Pharynx: Oropharynx is clear.   Eyes:      Extraocular Movements: Extraocular movements intact.      Conjunctiva/sclera: Conjunctivae normal.   Cardiovascular:      Rate and Rhythm: Normal rate.   Pulmonary:      Effort: Pulmonary effort is normal. No respiratory distress.   Abdominal:      General: Abdomen is flat. There is no distension.      Palpations: Abdomen is soft.      Tenderness: There is abdominal tenderness. There is no guarding or rebound.      Comments: Well healed abdominal incisions  Mild TTP diffusely. No peritonitic signs   Musculoskeletal:      Right lower leg: No edema.      Left lower leg: No edema.   Skin:     General: Skin is warm and dry.   Neurological:      Mental Status: She is alert.      Comments: Left sided weakness          Significant Labs:  I have reviewed all pertinent lab results within the past 24 hours.  CBC:   Recent Labs   Lab 10/14/24  1105   WBC 5.87   RBC 5.21   HGB 16.8*   HCT 50.1*      MCV 96   MCH 32.2*   MCHC 33.5     BMP:   Recent Labs   Lab 10/14/24  0641   GLU 85      K 3.8      CO2 23   BUN 34*   CREATININE 0.8   CALCIUM 10.0   MG 2.2       Significant Diagnostics:  I have reviewed all pertinent imaging results/findings within the past 24 hours.

## 2024-10-15 NOTE — PROGRESS NOTES
"[facility] - Neurosurgery ([facility])  Adult Nutrition  Progress Note    SUMMARY       Recommendations    Advance to pureed diet when able.   RD to monitor and follow up.    Goals: Meet % EEN/EPN by RD follow up.  Nutrition Goal Status: progressing towards goal  Communication of RD Recs: other (comment) (POC)    Assessment and Plan    Nutrition Problem  Inadequate oral intake     Related to (etiology):   Inability to consume sufficient nutrients     Signs and Symptoms (as evidenced by):   NPO      Interventions/Recommendations (treatment strategy):  Collab of nutrition care w/ other providers     Nutrition Diagnosis Status:   Continues         Reason for Assessment    Reason For Assessment: RD follow-up  Diagnosis:  (Acute arterial ischemic stroke, multifocal, multiple vascular territories)  General Information Comments: Pt currently NPO for testing. NG tube present. When pt was on a pureed diet, she was consuming an average of 50-75% since the last f/u. Constipation and nausea noted 10/14.  Nutrition Discharge Planning: Pending medical course    Nutrition Risk Screen    Nutrition Risk Screen: difficulty chewing/swallowing    Nutrition/Diet History    Spiritual, Cultural Beliefs, Rastafari Practices, Values that Affect Care: no  Food Allergies: other (see comments) (PETER)  Factors Affecting Nutritional Intake: difficulty/impaired swallowing    Anthropometrics    Temp: 97.3 °F (36.3 °C)  Height Method: Stated  Height: 5' 2" (157.5 cm)  Height (inches): 62 in  Weight Method: Bed Scale  Weight: 72.6 kg (160 lb 0.9 oz)  Weight (lb): 160.06 lb  Ideal Body Weight (IBW), Female: 110 lb  % Ideal Body Weight, Female (lb): 145.51 %  BMI (Calculated): 29.3  BMI Grade: 25 - 29.9 - overweight       Lab/Procedures/Meds    Pertinent Labs Reviewed: reviewed  Pertinent Labs Comments: BUN 36, Glucose 114, Albumin 2.8  Pertinent Medications Reviewed: reviewed  Pertinent Medications Comments: Atorvastatin, famotidine, insulin, NaCl, " heparin        Estimated/Assessed Needs    Weight Used For Calorie Calculations: 72.6 kg (160 lb 0.9 oz)  Energy Calorie Requirements (kcal): 1469  Energy Need Method: [place]ard-St Jeor (MSJ x 1.2 PAL)  Protein Requirements: 73-87 (1.0-1.2 g/kg ABW)  Weight Used For Protein Calculations: 72.6 kg (160 lb 0.9 oz)  Fluid Requirements (mL): 1 ml/kcal or Per MD     RDA Method (mL): 1469         Nutrition Prescription Ordered    Current Diet Order: NPO    Evaluation of Received Nutrient/Fluid Intake    I/O: - 2,128.3 net I/O  Comments: LBM 10/13  % Intake of Estimated Energy Needs: 0 - 25 %  % Meal Intake: NPO    Nutrition Risk    Level of Risk/Frequency of Follow-up:  (1x/week)     Monitor and Evaluation    Food and Nutrient Intake: energy intake, food and beverage intake  Food and Nutrient Adminstration: diet order  Anthropometric Measurements: body mass index, weight change, weight  Biochemical Data, Medical Tests and Procedures: lipid profile, inflammatory profile, glucose/endocrine profile, gastrointestinal profile, electrolyte and renal panel  Nutrition-Focused Physical Findings: overall appearance     Nutrition Follow-Up    RD Follow-up?: Yes    Benson Wilkinson, Registration Eligible, Provisional LDN

## 2024-10-15 NOTE — PT/OT/SLP PROGRESS
Physical Therapy Treatment    Patient Name: Tammy Multani   MRN: [id number]    Therapy tech utilized for session to maximize physical performance and safety  Recommendations:     Discharge Recommendations: High Intensity Therapy  Discharge Equipment Recommendations: hospital bed, lift device, wheelchair  Barriers to Discharge: Increased level of assist and Decreased caregiver support  Safest Mobility Level with Nursing: Bed Level ROM - Pt only safe for OOB mobility with therapy staff at this time    Assessment:     Tammy Multani is a 64 y.o. female admitted with a medical diagnosis of Acute arterial ischemic stroke, multifocal, multiple vascular territories. She presents with the following impairments/functional limitations: weakness, impaired endurance, impaired sensation, impaired self care skills, impaired functional mobility, gait instability, impaired balance, decreased safety awareness, decreased lower extremity function, decreased upper extremity function, impaired fine motor, impaired coordination, impaired cognition, visual deficits, abnormal tone, decreased ROM. Pt presenting with HOB elevated and agreeable to completing therapy session. Pt with c/o abdominal pain throughout treatment and feeling of needing to have BM. Pt continues to require significant assistance to complete all visualized functional mobility 2/2 absent LLE motor activation, decreased postural stability, decreased activity tolerance, and impaired command following. Pt with good carry over observed with hemiparetic bed mobility techniques as previously taught during last session. Pt remains appropriate for high intensity therapy following discharge once medically stable. Pt would continue to benefit from skilled acute PT in order to address current deficits and progress functional mobility.     Rehab Prognosis: Good; patient continues to benefit from acute skilled PT services to address these deficits and reach maximum level of  "function.  Recent Surgery: * No surgery found *      Plan:     During this hospitalization, patient to be seen 4 x/week to address the identified rehab impairments via gait training, therapeutic activities, therapeutic exercises, neuromuscular re-education and progress toward the following goals:    Plan of Care Expires:  11/07/24    Subjective     Chief Complaint: Abdominal pain  Patient/Family Comments/Goals: "I need to use the bathroom I think."  Pain/Comfort:  Pain Rating 1:  (unrated)  Location - Orientation 1: generalized  Location 1: abdomen  Pain Addressed 1: Reposition, Distraction  Pain Rating Post-Intervention 1:  (unrated)    Objective:     Communicated with RN prior to session. Patient found HOB elevated with telemetry, PureWick, peripheral IV, NG tube, bed alarm, SCD upon PT entry to room.     General Precautions: Standard, aspiration, fall  Orthopedic Precautions: N/A  Braces: N/A    Functional Mobility:  Bed Mobility:  Verbal cues for sequencing and technique  Supine to Sit: maximal assistance of 2 persons for LE management and trunk management  Sit to Supine: total assistance of 2 persons for LE management and trunk management  Transfers:   Deferred 2/2 increased assistance required at EOB  Balance:   Static Sitting: Poor, able to maintain for 10 minute(s) with maximal assistance  Verbal and tactile cues provided for upright posture, increased cervical extension, maintenance of midline orientation, anterior trunk lean, core activation, command following, and improved used of BUE for support.  Dynamic Sitting: Poor: Patient unable to accept challenge or move without loss of balance, maximal assistance  Pt completed 3x20s lateral lean onto left elbow while seated at EOB to increase joint proprioception and weightbearing with emphasis on tricep activation  Pt completed 1x10 ea right UE multidirectional reaching with focus on weight shifts, UE ROM/strength, visual tracking, command following, core " activation, challenging stability limits, crossing midline, and anterior trunk leans.   Patient performed 1 set(s) of 10 repetitions of  the following seated exercises: ankle pumps, long arc quads, and marches for right LE. Patient required skilled PT for instruction of exercises and appropriate cues to perform exercises safely and appropriately.    PT completed PROM 1x5 to all major joints in LLE completed to reduce stiffness, maintain joint integrity, and prevent joint contractures while seated at EOB  Static Standing: not assessed this visit  Dynamic Standing: not assessed this visit    AM-PAC 6 CLICK MOBILITY  Turning over in bed (including adjusting bedclothes, sheets and blankets)?: 2  Sitting down on and standing up from a chair with arms (e.g., wheelchair, bedside commode, etc.): 1  Moving from lying on back to sitting on the side of the bed?: 1  Moving to and from a bed to a chair (including a wheelchair)?: 1  Need to walk in hospital room?: 1  Climbing 3-5 steps with a railing?: 1  Basic Mobility Total Score: 7     Therapeutic Activities and Exercises:  Patient educated on role of acute care PT and PT POC, safety while in hospital including calling nurse for mobility, and call light usage  Pt educated on importance of maximal participation in therapy session in order to reduce negative effects of prolonged sedentary positioning.   Answered all questions within PT scope of practice and addressed functional mobility concerns.    Patient left HOB elevated with all lines intact, call button in reach, RN notified, and bed alarm on.    GOALS:   Multidisciplinary Problems       Physical Therapy Goals          Problem: Physical Therapy    Goal Priority Disciplines Outcome Interventions   Physical Therapy Goal     PT, PT/OT Progressing    Description: Goals to be met by: 2024     Patient will increase functional independence with mobility by performin. Supine to sit with Moderate Assistance  2. Sit to  supine with Moderate Assistance  3. Bed to chair transfer with Maximum Assistance using LRAD  4. Sitting at edge of bed x5 minutes with Stand-by Assistance  5. Lower extremity exercise program x15 reps per handout, with independence    Hospital Bed - Patient requires a hospital bed for positioning of the body in ways that are not feasible with an ordinary bed. The patient requires special positioning for pain relief, limited mobility, and/or being unable to independently make changes in body position without the use of a hospital bed. Pillows and wedges will not be adequate for resolving these positional issues.     Wheelchair - Patient has a mobility limitation that significantly impairs their ability to participate in one or more mobility related activities of daily living in customary locations in the home. The mobility limitation cannot be sufficiently resolved by the use of a cane or walker. The use of a manual wheelchair will greatly improve the patient's ability to participate in MRADLs. The patient will use the wheelchair on a regular basis at home. They have expressed their willingness to use a manual wheelchair in the home, and have a caregiver who is available and willing to assist with the wheelchair if needed.                       Time Tracking:     PT Received On: 10/15/24  PT Start Time: 1108     PT Stop Time: 1135  PT Total Time (min): 27 min     Billable Minutes: Therapeutic Activity 15 and Neuromuscular Re-education 12      Treatment Type: Treatment  PT/PTA: PT     Number of PTA visits since last PT visit: 0     10/15/2024

## 2024-10-15 NOTE — PLAN OF CARE
POC updated and reviewed with the patient and daughter at the bedside. Questions regarding POC were encouraged and addressed. VSS, see flowsheets. Patient is AOX 4 at this time. Tele maintained per order.  NAEON. Fall and safety precautions maintained, no signs of injury noted during shift. Patient repositioned independently/ with assistance in bed for comfort. Upon exiting room, patient's bed locked in low position, side rails up x 3, bed alarm set, with call light within reach. Instructed patient to call staff for mobility, verbalized understanding. Stroke book and education reviewed at the bedside, see education flowsheets for details. No acute signs of distress noted at this time.     Problem: Skin Injury Risk Increased  Goal: Skin Health and Integrity  Outcome: Progressing     Problem: Stroke, Ischemic (Includes Transient Ischemic Attack)  Goal: Optimal Coping  Outcome: Progressing  Goal: Effective Bowel Elimination  Outcome: Progressing  Goal: Optimal Cerebral Tissue Perfusion  Outcome: Progressing  Goal: Optimal Cognitive Function  Outcome: Progressing  Goal: Improved Communication Skills  Outcome: Progressing  Goal: Optimal Functional Ability  Outcome: Progressing  Goal: Optimal Nutrition Intake  Outcome: Progressing  Goal: Effective Oxygenation and Ventilation  Outcome: Progressing  Goal: Improved Sensorimotor Function  Outcome: Progressing  Goal: Safe and Effective Swallow  Outcome: Progressing  Goal: Effective Urinary Elimination  Outcome: Progressing     Problem: Fall Injury Risk  Goal: Absence of Fall and Fall-Related Injury  Outcome: Progressing     Problem: Adult Inpatient Plan of Care  Goal: Plan of Care Review  Outcome: Progressing  Goal: Patient-Specific Goal (Individualized)  Description: Pt will maintain sbp below 220  Outcome: Progressing  Goal: Absence of Hospital-Acquired Illness or Injury  Outcome: Progressing  Goal: Optimal Comfort and Wellbeing  Outcome: Progressing  Goal: Readiness for  Transition of Care  Outcome: Progressing     Problem: Infection  Goal: Absence of Infection Signs and Symptoms  Outcome: Progressing     Problem: Diabetes Comorbidity  Goal: Blood Glucose Level Within Targeted Range  Outcome: Progressing     Problem: Acute Kidney Injury/Impairment  Goal: Fluid and Electrolyte Balance  Outcome: Progressing  Goal: Improved Oral Intake  Outcome: Progressing  Goal: Effective Renal Function  Outcome: Progressing     Problem: Restraint, Nonviolent  Goal: Absence of Harm or Injury  Outcome: Progressing

## 2024-10-15 NOTE — PROGRESS NOTES
"[facility]ack[facility] Long - Neurosurgery ([facility])  Vascular Neurology  Comprehensive Stroke Center  Progress Note    Assessment/Plan:     * Acute arterial ischemic stroke, multifocal, multiple vascular territories  63 y/o F transferred from [facility] for higher level of care secondary to acute KIARA stroke on CTA.  Briefly,the patient presented to the OSH overnight for slurred speech started at 1:30 a.m. in the morning.  She has a history of his stroke with left-sided deficits but is normally able to transfer and do a little walking. This morning, she had complete hemiplegia of the left side. Telestroke consult performed, concerning for LVO.  NIH 13. CTA Stroke MP performed and  was read as "acute R KIARA infarct". No TNK due to AC use. Patient accepted to ED as transfer for emergent vascular neurology evaluation. No endovascular intervention. History of atrial fibrillation per discussion with daughter. Was started on Eliquis on 10/6: hold given size of infarct, start ASA on 10/8. Currently on heparin drip given likely lack of absorption through NG tube in setting of Ileus. Given history of Afib, etiology likely embolic. Though not apparent on MRA, if R KIARA occlusion was proximal (supported by MRI) could account for R MCA-like picture.    Transient episodes of altered awareness: EEG negative, and no change in frequency observed by family w/Keppra so have stopped this medicine.     -Heparin drip   -high intensity statin  -SBP<220 in acute setting  -echo done, w/o evidence of intra-cardiac shunt, no LAE, but does have history of Afib  -PT/OT  -NPO    Ileus  Patient was seen by general surgery for some episodes of nausea and emesis and some constipation. KUB was concerning for SBO vs. Ileus. Follow up CT AP did not show evidence of mechanical obstruction, was more indicative of Ileus. XR gastrogaffin challenge ordered by general surgery.     Plan:   - place NPO. Continue NGT to LIWS   - monitor electrolytes daily, correct as " needed   - Eliquis switched to heparin as it might not have been fully absorbed considering Ileus   - IV magnesium one time for pain medication. Can try other IV pain medications if pain is not relieved by Iv mag.   - general surgery following, appreciate recs     Atrial fibrillation  Takes Eliquis 5mg BID and Coreg 6.25mg BID at home.  Initially Eliquis held given size of stroke; ASA 81mg QD in the interim. Eliquis was restarted on 10/10.       Plan:   -continue at home coreg regimen   -eliquis currently switched to heparin drip due to issues described in ileus section of the assessment and plan.     HTN (hypertension)  Home medication regimen as below  -lisinopril 40mg QD  -amlodipine 10mg QD    Plan:   -lisinopril and amlodipine were initially held in the setting of KAILYN. Was started on coreg in the context of Afib.   -continue coreg   -if patient's BP still elevated, consider adding back amlodipine and lisinopril     Acute focal neurological deficit  Episodes of transient altered mental status observed by daughter, sometimes with a brief left gaze. Given location of KIARA stroke, considered seizure. EEG done on 10/11, negative.     Plan:   - no longer on keppra regimen     KAILYN (acute kidney injury)  Cr was initially 1.6, up from a baseline of 1. Likely pre-renal KAILYN, due to decreased PO intake. Resolved with fluids.     Plan:   - Continue to monitor CMP.     Type 2 diabetes mellitus, with long-term current use of insulin  History of. Takes Levemir 35 units QHS at home. A1C 10%.    -goal 140-180 while admitted  -On Lantus 24 units QHS   -SSI prn   -continue to monitor glucose  -Endocrine consulted for A1c 10%; following, appreciate recs       Opioid use  History of. Prescribed oxycodone 15mg at home. Family concerned that medication is being diverted by family member, unclear how often patient takes. Reportedly is prescribed for neck pain.     Plan:   - holding oxycodone 5 mg BID   -monitor for signs of withdrawal,  as is unclear how often patient is taking    Left hemiplegia  See stroke    Plan:   - continue PT/OT/SLP     Hypokalemia  Resolved w/repletion.     Plan:   - Continue to monitor   - Correct as needed              10/7: DOTTIE gómez exam stable, echo today, NPO aside from meds per speech, barium study tmrw  10/08/2024 NAEON, stable neuro exam, went for the Br study, and was started on a diet. Endocrine consulted for the A1c 10%, pending recs.  10/09/2024 NAEON, Stable neuro exam, will restart Lisinopril 20 mg (half of her dome dose). Seen by endocrine, with adjustment of her insulin doses.  10/10/2024 NAEON, no change in her neuro exam, will restart Eliquis today, BP is better controlled.  10/11/2024 NAEON, stable neuro exam, still decreased PO intake. Patient gets some episodes of in attention; EEG was ordered. Endocrine adjusted her insulins. Accepted at [facility] in [place].   10/12/2024 NACAMACHOON, waiting on EEG interpretation (contacted epilepsy), started Keppra 500mg BID and will see if reduces transient alterations in awareness, neurologic examination stable  10/13/2024 NAEON, EEG w/encephalopathy (no seizure or discharges), stop Keppra, SBP ~150 to 160 so increased Coreg to 6.25mg BID  10/14/2024 General surgery was consulted overnight for constipation, episodes of nausea and emesis. KUB concerning for SBO vs. Ileus. F/u CT AP was more indicative of ileus, no evidence of mechanical obstruction was observed. Patient is NPO, can get some meds via NGT. Eliquis switched to heparin drip.   10/15/2024 Patient has still not had an actual bowel movement. Is distended, has some abdominal pain. General surgery following, is getting XR gastrogaffin challenge, will f/u results.     STROKE DOCUMENTATION   Acute Stroke Times   Last Known Normal Date:  (OSH)  Stroke Team Called Date: 10/06/24  Stroke Team Called Time:  (At OSH)  Stroke Team Arrival Date: 10/06/24  Stroke Team Arrival Time: 0608  CT Interpretation Time:  (CTA  done around 4:20am per documents that came to [facility] with patient)  Thrombolytic Therapy Recommended: No  Thrombectomy Recommended:  (Pending MRI Brain)    NIH Scale:  1a. Level of Consciousness: 0-->Alert, keenly responsive  1b. LOC Questions: 0-->Answers both questions correctly  1c. LOC Commands: 0-->Performs both tasks correctly  2. Best Gaze: 0-->Normal  3. Visual: 0-->No visual loss  4. Facial Palsy: 2-->Partial paralysis (total or near-total paralysis of lower face)  5a. Motor Arm, Left: 0-->No drift, limb holds 90 (or 45) degrees for full 10 secs  5b. Motor Arm, Right: 4-->No movement  6a. Motor Leg, Left: 4-->No movement  6b. Motor Leg, Right: 0-->No drift, leg holds 30 degree position for full 5 secs  7. Limb Ataxia: 0-->Absent  8. Sensory: 1-->Mild-to-moderate sensory loss, patient feels pinprick is less sharp or is dull on the affected side, or there is a loss of superficial pain with pinprick, but patient is aware of being touched  9. Best Language: 0-->No aphasia, normal  10. Dysarthria: 1-->Mild-to-moderate dysarthria, patient slurs at least some words and, at worst, can be understood with some difficulty  11. Extinction and Inattention (formerly Neglect): 0-->No abnormality  Total (NIH Stroke Scale): 12       Modified Honaker Score: 3  Diane Coma Scale:    ABCD2 Score:    [id number] Score:   HAS -BLED Score:   ICH Score:   Hunt & Shane Classification:      Hemorrhagic change of an Ischemic Stroke: Does this patient have an ischemic stroke with hemorrhagic changes? No     Neurologic Chief Complaint: infarcts in multiple vascular territories    Subjective:     Interval History: Patient is seen for follow-up neurological assessment and treatment recommendations: See hospital course.     HPI, Past Medical, Family, and Social History remains the same as documented in the initial encounter.     Review of Systems   Neurological:  Positive for weakness.   All other systems reviewed and are  negative.    Scheduled Meds:   atorvastatin  40 mg Per NG tube Daily    carvediloL  6.25 mg Per NG tube BID    famotidine  20 mg Per NG tube Daily    insulin glargine U-100  24 Units Subcutaneous Daily    magnesium sulfate IVPB  2 g Intravenous Once     Continuous Infusions:   0/9% NACL & POTASSIUM CHLORIDE 20 MEQ/L   Intravenous Continuous 100 mL/hr at 10/15/24 0516 New Bag at 10/15/24 0516    heparin (porcine) in D5W  0-40 Units/kg/hr (Adjusted) Intravenous Continuous 5.9 mL/hr at 10/15/24 1008 10 Units/kg/hr at 10/15/24 1008     PRN Meds:  Current Facility-Administered Medications:     acetaminophen, 650 mg, Per NG tube, Q8H PRN    dextrose 10%, 12.5 g, Intravenous, PRN    dextrose 10%, 25 g, Intravenous, PRN    glucagon (human recombinant), 1 mg, Intramuscular, PRN    hydrALAZINE, 10 mg, Intravenous, Q6H PRN    insulin aspart U-100, 0-10 Units, Subcutaneous, Q4H PRN    iohexol, 15 mL, Oral, PRN    prochlorperazine, 2.5 mg, Intravenous, Q6H PRN    sodium chloride 0.9%, 500 mL, Intravenous, PRN    sodium chloride 0.9%, 10 mL, Intravenous, PRN    Objective:     Vital Signs (Most Recent):  Temp: 97.3 °F (36.3 °C) (10/15/24 1557)  Pulse: 107 (10/15/24 1557)  Resp: 18 (10/15/24 1557)  BP: (!) 179/83 (10/15/24 1557)  SpO2: 97 % (10/15/24 1557)  BP Location: Left arm    Vital Signs Range (Last 24H):  Temp:  [97.2 °F (36.2 °C)-99.1 °F (37.3 °C)]   Pulse:  []   Resp:  [16-20]   BP: (117-179)/(68-84)   SpO2:  [94 %-99 %]   BP Location: Left arm       Physical Exam  Vitals reviewed.   Cardiovascular:      Rate and Rhythm: Normal rate.   Pulmonary:      Effort: Pulmonary effort is normal. No respiratory distress.   Abdominal:      General: There is distension.      Palpations: Abdomen is soft.      Tenderness: There is abdominal tenderness.   Musculoskeletal:      Right lower leg: No edema.      Left lower leg: No edema.   Skin:     General: Skin is warm and dry.          Neurological Exam:   LOC: alert  Attention  "Span: poor  Articulation: Dysarthria  Orientation: Person, Place, Time   Visual Fields: Full  EOM (CN III, IV, VI): Full/intact  Pupils (CN II, III): PERRL  Facial Sensation (CN V): Facial sensory loss  Facial Movement (CN VII): Upper facial weakness on the Left  Motor: Arm left  Plegia 0/5  Leg left  Paresis: 0/5  Arm right  Normal 5/5  Leg right Normal 5/5  Cerebellum: No evidence of appendicular or axial ataxia  Sensation: Jason-anesthesia left    Laboratory:  CMP:   Recent Labs   Lab 10/15/24  0758   CALCIUM 9.4   ALBUMIN 2.8*   PROT 6.6      K 4.1   CO2 24      BUN 36*   CREATININE 0.9   ALKPHOS 68   ALT 19   AST 31   BILITOT 1.0     CBC:   Recent Labs   Lab 10/15/24  0758   WBC 5.61  5.61   RBC 4.50  4.50   HGB 14.8  14.8   HCT 44.8  44.8     230   *  100*   MCH 32.9*  32.9*   MCHC 33.0  33.0     Lipid Panel: No results for input(s): "CHOL", "LDLCALC", "HDL", "TRIG" in the last 168 hours.  Hgb A1C:   No results for input(s): "HGBA1C" in the last 168 hours.    TSH: No results for input(s): "TSH" in the last 168 hours.    Diagnostic Results      Brain and Vessel Imaging   MRI/MRA Brain Ischemic Protocol: 10/6/24  Impression: Acute infarcts involving the right anterior circulation and posterior circulation as detailed above without evidence of intracranial hemorrhage or mass effect.  Embolic disease to be considered.     Chronic left parietal infarct. MRA demonstrates limited visualization of distal right KIARA branches in keeping with the territory of the acute infarct.  There is also narrowing of at least one proximal left M2 branch although no acute infarct in that territory.          Echo: 10/7/24  Cardiac Imaging     Left Ventricle: The left ventricle is normal in size. Normal wall thickness. There is concentric remodeling. Normal wall motion. There is hyperdynamic systolic function with a visually estimated ejection fraction of greater than 70%. There is normal diastolic " function.    Right Ventricle: Normal right ventricular cavity size. Wall thickness is normal. Systolic function is normal.    Left Atrium: Bubble study is likely negative, but full opacification of the RA is not well seen on these images which decreases sensitivity.    IVC/SVC: Normal venous pressure at 3 mmHg.    Arian Williamson MD  [facility]  Department of Vascular Neurology   [facility]

## 2024-10-16 PROBLEM — E87.5 HYPERKALEMIA: Status: ACTIVE | Noted: 2024-10-16

## 2024-10-16 PROBLEM — E87.0 HYPERNATREMIA: Status: ACTIVE | Noted: 2024-10-16

## 2024-10-16 PROBLEM — E87.6 HYPOKALEMIA: Status: RESOLVED | Noted: 2024-10-07 | Resolved: 2024-10-16

## 2024-10-16 LAB
ALBUMIN SERPL BCP-MCNC: 2.7 G/DL (ref 3.5–5.2)
ALP SERPL-CCNC: 73 U/L (ref 55–135)
ALT SERPL W/O P-5'-P-CCNC: 18 U/L (ref 10–44)
ANION GAP SERPL CALC-SCNC: 14 MMOL/L (ref 8–16)
APTT PPP: 32.7 SEC (ref 21–32)
APTT PPP: 34.5 SEC (ref 21–32)
APTT PPP: 36.9 SEC (ref 21–32)
AST SERPL-CCNC: 30 U/L (ref 10–40)
BASOPHILS # BLD AUTO: 0.12 K/UL (ref 0–0.2)
BASOPHILS NFR BLD: 0.8 % (ref 0–1.9)
BILIRUB SERPL-MCNC: 0.7 MG/DL (ref 0.1–1)
BUN SERPL-MCNC: 33 MG/DL (ref 8–23)
CALCIUM SERPL-MCNC: 9.5 MG/DL (ref 8.7–10.5)
CHLORIDE SERPL-SCNC: 110 MMOL/L (ref 95–110)
CO2 SERPL-SCNC: 23 MMOL/L (ref 23–29)
CREAT SERPL-MCNC: 0.9 MG/DL (ref 0.5–1.4)
DIFFERENTIAL METHOD BLD: ABNORMAL
EOSINOPHIL # BLD AUTO: 0.1 K/UL (ref 0–0.5)
EOSINOPHIL NFR BLD: 0.4 % (ref 0–8)
ERYTHROCYTE [DISTWIDTH] IN BLOOD BY AUTOMATED COUNT: 13 % (ref 11.5–14.5)
EST. GFR  (NO RACE VARIABLE): >60 ML/MIN/1.73 M^2
GLUCOSE SERPL-MCNC: 87 MG/DL (ref 70–110)
HCT VFR BLD AUTO: 45.9 % (ref 37–48.5)
HGB BLD-MCNC: 15.8 G/DL (ref 12–16)
IMM GRANULOCYTES # BLD AUTO: 0.35 K/UL (ref 0–0.04)
IMM GRANULOCYTES NFR BLD AUTO: 2.4 % (ref 0–0.5)
LYMPHOCYTES # BLD AUTO: 2.4 K/UL (ref 1–4.8)
LYMPHOCYTES NFR BLD: 16.6 % (ref 18–48)
MAGNESIUM SERPL-MCNC: 2.9 MG/DL (ref 1.6–2.6)
MCH RBC QN AUTO: 33.5 PG (ref 27–31)
MCHC RBC AUTO-ENTMCNC: 34.4 G/DL (ref 32–36)
MCV RBC AUTO: 98 FL (ref 82–98)
MONOCYTES # BLD AUTO: 1.5 K/UL (ref 0.3–1)
MONOCYTES NFR BLD: 10.6 % (ref 4–15)
NEUTROPHILS # BLD AUTO: 10 K/UL (ref 1.8–7.7)
NEUTROPHILS NFR BLD: 69.2 % (ref 38–73)
NRBC BLD-RTO: 0 /100 WBC
PHOSPHATE SERPL-MCNC: 2.7 MG/DL (ref 2.7–4.5)
PLATELET # BLD AUTO: 240 K/UL (ref 150–450)
PLATELET BLD QL SMEAR: ABNORMAL
PMV BLD AUTO: 11.7 FL (ref 9.2–12.9)
POCT GLUCOSE: 102 MG/DL (ref 70–110)
POCT GLUCOSE: 103 MG/DL (ref 70–110)
POCT GLUCOSE: 120 MG/DL (ref 70–110)
POCT GLUCOSE: 170 MG/DL (ref 70–110)
POCT GLUCOSE: 67 MG/DL (ref 70–110)
POCT GLUCOSE: 83 MG/DL (ref 70–110)
POCT GLUCOSE: 96 MG/DL (ref 70–110)
POCT GLUCOSE: 98 MG/DL (ref 70–110)
POTASSIUM SERPL-SCNC: 5.3 MMOL/L (ref 3.5–5.1)
PROT SERPL-MCNC: 6.7 G/DL (ref 6–8.4)
RBC # BLD AUTO: 4.71 M/UL (ref 4–5.4)
SODIUM SERPL-SCNC: 147 MMOL/L (ref 136–145)
WBC # BLD AUTO: 14.5 K/UL (ref 3.9–12.7)

## 2024-10-16 PROCEDURE — 63600175 PHARM REV CODE 636 W HCPCS: Mod: UD | Performed by: STUDENT IN AN ORGANIZED HEALTH CARE EDUCATION/TRAINING PROGRAM

## 2024-10-16 PROCEDURE — 25000003 PHARM REV CODE 250: Performed by: PHYSICIAN ASSISTANT

## 2024-10-16 PROCEDURE — 97112 NEUROMUSCULAR REEDUCATION: CPT

## 2024-10-16 PROCEDURE — 11000001 HC ACUTE MED/SURG PRIVATE ROOM

## 2024-10-16 PROCEDURE — 63600175 PHARM REV CODE 636 W HCPCS: Performed by: NURSE PRACTITIONER

## 2024-10-16 PROCEDURE — 97530 THERAPEUTIC ACTIVITIES: CPT

## 2024-10-16 PROCEDURE — 85730 THROMBOPLASTIN TIME PARTIAL: CPT

## 2024-10-16 PROCEDURE — 80053 COMPREHEN METABOLIC PANEL: CPT | Performed by: PSYCHIATRY & NEUROLOGY

## 2024-10-16 PROCEDURE — 99232 SBSQ HOSP IP/OBS MODERATE 35: CPT | Mod: ,,, | Performed by: NURSE PRACTITIONER

## 2024-10-16 PROCEDURE — 95992 CANALITH REPOSITIONING PROC: CPT

## 2024-10-16 PROCEDURE — 36415 COLL VENOUS BLD VENIPUNCTURE: CPT | Performed by: PSYCHIATRY & NEUROLOGY

## 2024-10-16 PROCEDURE — 97535 SELF CARE MNGMENT TRAINING: CPT

## 2024-10-16 PROCEDURE — 84100 ASSAY OF PHOSPHORUS: CPT | Performed by: PSYCHIATRY & NEUROLOGY

## 2024-10-16 PROCEDURE — 25000003 PHARM REV CODE 250

## 2024-10-16 PROCEDURE — 63600175 PHARM REV CODE 636 W HCPCS: Mod: UD

## 2024-10-16 PROCEDURE — 85730 THROMBOPLASTIN TIME PARTIAL: CPT | Mod: 91 | Performed by: PSYCHIATRY & NEUROLOGY

## 2024-10-16 PROCEDURE — 99233 SBSQ HOSP IP/OBS HIGH 50: CPT | Mod: GC,,, | Performed by: PSYCHIATRY & NEUROLOGY

## 2024-10-16 PROCEDURE — 83735 ASSAY OF MAGNESIUM: CPT | Performed by: PSYCHIATRY & NEUROLOGY

## 2024-10-16 PROCEDURE — 36415 COLL VENOUS BLD VENIPUNCTURE: CPT

## 2024-10-16 PROCEDURE — 85025 COMPLETE CBC W/AUTO DIFF WBC: CPT | Performed by: PSYCHIATRY & NEUROLOGY

## 2024-10-16 RX ORDER — ROSUVASTATIN CALCIUM 5 MG/1
5 TABLET, COATED ORAL NIGHTLY
Status: ON HOLD | COMMUNITY
Start: 2024-09-06 | End: 2024-10-23 | Stop reason: HOSPADM

## 2024-10-16 RX ORDER — METOCLOPRAMIDE HYDROCHLORIDE 5 MG/ML
5 INJECTION INTRAMUSCULAR; INTRAVENOUS ONCE
Status: COMPLETED | OUTPATIENT
Start: 2024-10-16 | End: 2024-10-16

## 2024-10-16 RX ORDER — HYDROCHLOROTHIAZIDE 50 MG/1
50 TABLET ORAL EVERY MORNING
Status: ON HOLD | COMMUNITY
Start: 2024-09-06 | End: 2024-10-23 | Stop reason: HOSPADM

## 2024-10-16 RX ORDER — INSULIN DETEMIR 100 [IU]/ML
35 INJECTION, SOLUTION SUBCUTANEOUS NIGHTLY
Status: ON HOLD | COMMUNITY
Start: 2024-09-06 | End: 2024-10-23 | Stop reason: HOSPADM

## 2024-10-16 RX ORDER — LISINOPRIL 40 MG/1
40 TABLET ORAL EVERY MORNING
Status: ON HOLD | COMMUNITY
Start: 2024-09-06 | End: 2024-10-23 | Stop reason: HOSPADM

## 2024-10-16 RX ORDER — CARVEDILOL 6.25 MG/1
6.25 TABLET ORAL 2 TIMES DAILY WITH MEALS
Status: ON HOLD | COMMUNITY
Start: 2024-09-06 | End: 2024-10-23 | Stop reason: HOSPADM

## 2024-10-16 RX ORDER — OXYCODONE HYDROCHLORIDE 15 MG/1
15 TABLET ORAL EVERY 12 HOURS PRN
COMMUNITY
Start: 2024-09-26

## 2024-10-16 RX ORDER — ATORVASTATIN CALCIUM 40 MG/1
40 TABLET, FILM COATED ORAL NIGHTLY
Status: ON HOLD | COMMUNITY
Start: 2024-06-03 | End: 2024-10-16

## 2024-10-16 RX ORDER — BLOOD-GLUCOSE SENSOR
EACH MISCELLANEOUS
COMMUNITY
Start: 2024-09-06

## 2024-10-16 RX ORDER — GABAPENTIN 600 MG/1
600 TABLET ORAL 3 TIMES DAILY
COMMUNITY
Start: 2024-09-26

## 2024-10-16 RX ORDER — ZOLPIDEM TARTRATE 5 MG/1
5 TABLET ORAL NIGHTLY PRN
COMMUNITY
Start: 2024-09-26

## 2024-10-16 RX ORDER — APIXABAN 5 MG/1
5 TABLET, FILM COATED ORAL 2 TIMES DAILY
COMMUNITY
Start: 2024-09-06

## 2024-10-16 RX ORDER — TIZANIDINE 4 MG/1
4 TABLET ORAL NIGHTLY
COMMUNITY
Start: 2024-09-26

## 2024-10-16 RX ORDER — NAPROXEN SODIUM 220 MG/1
81 TABLET, FILM COATED ORAL DAILY
Status: ON HOLD | COMMUNITY
End: 2024-10-23 | Stop reason: HOSPADM

## 2024-10-16 RX ORDER — LANCETS 30 GAUGE
EACH MISCELLANEOUS
COMMUNITY
Start: 2024-09-06

## 2024-10-16 RX ORDER — INSULIN GLARGINE 100 [IU]/ML
10 INJECTION, SOLUTION SUBCUTANEOUS DAILY
Status: DISCONTINUED | OUTPATIENT
Start: 2024-10-16 | End: 2024-10-16

## 2024-10-16 RX ADMIN — HEPARIN SODIUM AND DEXTROSE 13 UNITS/KG/HR: 10000; 5 INJECTION INTRAVENOUS at 06:10

## 2024-10-16 RX ADMIN — DEXTROSE MONOHYDRATE 125 ML: 100 INJECTION, SOLUTION INTRAVENOUS at 12:10

## 2024-10-16 RX ADMIN — METOCLOPRAMIDE 5 MG: 5 INJECTION, SOLUTION INTRAMUSCULAR; INTRAVENOUS at 09:10

## 2024-10-16 RX ADMIN — SODIUM CHLORIDE AND POTASSIUM CHLORIDE: .9; .15 SOLUTION INTRAVENOUS at 06:10

## 2024-10-16 RX ADMIN — CARVEDILOL 6.25 MG: 6.25 TABLET, FILM COATED ORAL at 09:10

## 2024-10-16 RX ADMIN — FAMOTIDINE 20 MG: 20 TABLET ORAL at 09:10

## 2024-10-16 RX ADMIN — SODIUM CHLORIDE AND POTASSIUM CHLORIDE: .9; .15 SOLUTION INTRAVENOUS at 05:10

## 2024-10-16 RX ADMIN — HEPARIN SODIUM AND DEXTROSE 11 UNITS/KG/HR: 10000; 5 INJECTION INTRAVENOUS at 03:10

## 2024-10-16 RX ADMIN — HEPARIN SODIUM AND DEXTROSE 12 UNITS/KG/HR: 10000; 5 INJECTION INTRAVENOUS at 01:10

## 2024-10-16 RX ADMIN — ATORVASTATIN CALCIUM 40 MG: 40 TABLET, FILM COATED ORAL at 09:10

## 2024-10-16 NOTE — PLAN OF CARE
Problem: Skin Injury Risk Increased  Goal: Skin Health and Integrity  Outcome: Progressing     Problem: Stroke, Ischemic (Includes Transient Ischemic Attack)  Goal: Optimal Coping  Outcome: Progressing  Goal: Effective Bowel Elimination  Outcome: Progressing  Goal: Optimal Cerebral Tissue Perfusion  Outcome: Progressing  Goal: Optimal Cognitive Function  Outcome: Progressing  Goal: Improved Communication Skills  Outcome: Progressing  Goal: Effective Oxygenation and Ventilation  Outcome: Progressing  Goal: Effective Urinary Elimination  Outcome: Progressing     Problem: Fall Injury Risk  Goal: Absence of Fall and Fall-Related Injury  Outcome: Progressing     Problem: Adult Inpatient Plan of Care  Goal: Plan of Care Review  Outcome: Progressing  Goal: Patient-Specific Goal (Individualized)  Description: Pt will maintain sbp below 220  Outcome: Progressing  Goal: Absence of Hospital-Acquired Illness or Injury  Outcome: Progressing  Goal: Optimal Comfort and Wellbeing  Outcome: Progressing  Goal: Readiness for Transition of Care  Outcome: Progressing     Problem: Infection  Goal: Absence of Infection Signs and Symptoms  Outcome: Progressing     Problem: Diabetes Comorbidity  Goal: Blood Glucose Level Within Targeted Range  Outcome: Progressing     Problem: Acute Kidney Injury/Impairment  Goal: Effective Renal Function  Outcome: Progressing     Problem: Restraint, Nonviolent  Goal: Absence of Harm or Injury  Outcome: Progressing

## 2024-10-16 NOTE — PROGRESS NOTES
Zackary Long - Neurosurgery ([facility])  General Surgery  Progress Note    Subjective:     History of Present Illness:  Tammy Multani is a 64 y.o. lady with PMH DM, HTN, prior stroke with left sided deficits, afib on eliquis admitted to the neurology service since 10/6 for an KIARA stroke. Symptoms included slurred speech and hemiplegia of her left side. She had been tolerating a diet but in the past day has had a few episodes of nausea with nonbloody nonbilious thin emesis that she describes appears like spit. Last documented bowel movement on 8/11 but reports a loose watery movement earlier today. Mildly hypokalemic, remainder of labs bland. KUB with evidence of dilated large and small bowel. General surgery consulted for recs regarding ileus vs SBO.     Preciously has had c section and a lap radha.           Post-Op Info:  * No surgery found *         Interval History: NAEON. Afebrile. HDS. Pain is controlled with current regimen. Denies n/v. No ROBF. Low output from NG tube overnight though canister with feculent appearing fluid. Abdomen is soft, non-distended and without pain/tenderness. Will continue with Ng decompression and bowel rest given overall improving exam.     Medications:  Continuous Infusions:   0/9% NACL & POTASSIUM CHLORIDE 20 MEQ/L   Intravenous Continuous 100 mL/hr at 10/15/24 0516 New Bag at 10/15/24 0516    heparin (porcine) in D5W  0-40 Units/kg/hr (Adjusted) Intravenous Continuous 5.9 mL/hr at 10/14/24 2348 10 Units/kg/hr at 10/14/24 2348     Scheduled Meds:   atorvastatin  40 mg Per NG tube Daily    carvediloL  6.25 mg Per NG tube BID    famotidine  20 mg Per NG tube Daily    insulin aspart U-100  12 Units Subcutaneous TIDWM    insulin glargine U-100  24 Units Subcutaneous Daily     PRN Meds:  Current Facility-Administered Medications:     acetaminophen, 650 mg, Per NG tube, Q8H PRN    bisacodyL, 10 mg, Rectal, Daily PRN    dextrose 10%, 12.5 g, Intravenous, PRN    dextrose 10%, 25 g,  Intravenous, PRN    glucagon (human recombinant), 1 mg, Intramuscular, PRN    hydrALAZINE, 10 mg, Intravenous, Q6H PRN    insulin aspart U-100, 0-10 Units, Subcutaneous, Q4H PRN    iohexol, 15 mL, Oral, PRN    ondansetron, 4 mg, Per NG tube, Q12H PRN    sodium chloride 0.9%, 500 mL, Intravenous, PRN    sodium chloride 0.9%, 10 mL, Intravenous, PRN     Review of patient's allergies indicates:  No Known Allergies  Objective:     Vital Signs (Most Recent):  Temp: 97.9 °F (36.6 °C) (10/15/24 0450)  Pulse: 92 (10/15/24 0450)  Resp: 18 (10/15/24 0450)  BP: 128/70 (10/15/24 0450)  SpO2: 97 % (10/15/24 0450) Vital Signs (24h Range):  Temp:  [97.3 °F (36.3 °C)-99.1 °F (37.3 °C)] 97.9 °F (36.6 °C)  Pulse:  [] 92  Resp:  [16-20] 18  SpO2:  [94 %-97 %] 97 %  BP: (117-158)/(69-94) 128/70     Weight: 72.6 kg (160 lb 0.9 oz)  Body mass index is 29.27 kg/m².    Intake/Output - Last 3 Shifts         10/13 0700  10/14 0659 10/14 0700  10/15 0659 10/15 0700  10/16 0659    P.O.  0     I.V. (mL/kg)       NG/GT  60     Total Intake(mL/kg)  60 (0.8)     Urine (mL/kg/hr)  300 (0.2)     Drains 750 300     Stool  0     Total Output 750 600     Net -750 -540            Urine Occurrence  1 x     Stool Occurrence 0 x 0 x              Physical Exam  Vitals and nursing note reviewed.   Constitutional:       General: She is not in acute distress.     Appearance: She is ill-appearing. She is not diaphoretic.      Comments: Room air  NGT to LIWS   HENT:      Head: Normocephalic and atraumatic.      Mouth/Throat:      Mouth: Mucous membranes are moist.      Pharynx: Oropharynx is clear.   Eyes:      Extraocular Movements: Extraocular movements intact.      Conjunctiva/sclera: Conjunctivae normal.   Cardiovascular:      Rate and Rhythm: Normal rate.   Pulmonary:      Effort: Pulmonary effort is normal. No respiratory distress.   Abdominal:      General: Abdomen is flat. There is no distension.      Palpations: Abdomen is soft.      Tenderness:  non-tender. There is no guarding or rebound.      Comments: Well healed abdominal incisions. Resolution of tenderness on AM exam. No rebound or guarding. Abdomen is notably soft and non-distended   Musculoskeletal:      Right lower leg: No edema.      Left lower leg: No edema.   Skin:     General: Skin is warm and dry.   Neurological:      Mental Status: She is alert.      Comments: Left sided weakness          Significant Labs:  I have reviewed all pertinent lab results within the past 24 hours.  CBC:   Recent Labs   Lab 10/14/24  1105   WBC 5.87   RBC 5.21   HGB 16.8*   HCT 50.1*      MCV 96   MCH 32.2*   MCHC 33.5     BMP:   Recent Labs   Lab 10/14/24  0641   GLU 85      K 3.8      CO2 23   BUN 34*   CREATININE 0.8   CALCIUM 10.0   MG 2.2       Significant Diagnostics:  I have reviewed all pertinent imaging results/findings within the past 24 hours.    Medications:  Continuous Infusions:   0/9% NACL & POTASSIUM CHLORIDE 20 MEQ/L   Intravenous Continuous 100 mL/hr at 10/16/24 0612 New Bag at 10/16/24 0612    heparin (porcine) in D5W  0-40 Units/kg/hr (Adjusted) Intravenous Continuous 6.5 mL/hr at 10/16/24 0355 11 Units/kg/hr at 10/16/24 0355     Scheduled Meds:   atorvastatin  40 mg Per NG tube Daily    carvediloL  6.25 mg Per NG tube BID    famotidine  20 mg Per NG tube Daily     PRN Meds:  Current Facility-Administered Medications:     acetaminophen, 650 mg, Per NG tube, Q8H PRN    dextrose 10%, 12.5 g, Intravenous, PRN    dextrose 10%, 25 g, Intravenous, PRN    glucagon (human recombinant), 1 mg, Intramuscular, PRN    hydrALAZINE, 10 mg, Intravenous, Q6H PRN    insulin aspart U-100, 0-10 Units, Subcutaneous, Q4H PRN    iohexol, 15 mL, Oral, PRN    sodium chloride 0.9%, 500 mL, Intravenous, PRN    sodium chloride 0.9%, 10 mL, Intravenous, PRN     Review of patient's allergies indicates:  No Known Allergies  Objective:     Vital Signs (Most Recent):  Temp: 97.5 °F (36.4 °C) (10/16/24  0758)  Pulse: 94 (10/16/24 0758)  Resp: 18 (10/16/24 0758)  BP: (!) 165/85 (10/16/24 0907)  SpO2: 96 % (10/16/24 0758) Vital Signs (24h Range):  Temp:  [97.3 °F (36.3 °C)-98.5 °F (36.9 °C)] 97.5 °F (36.4 °C)  Pulse:  [] 94  Resp:  [16-19] 18  SpO2:  [93 %-97 %] 96 %  BP: (138-180)/(82-91) 165/85     Weight: 72.6 kg (160 lb 0.9 oz)  Body mass index is 29.27 kg/m².    Intake/Output - Last 3 Shifts         10/14 0700  10/15 0659 10/15 0700  10/16 0659 10/16 0700  10/17 0659    P.O. 0 0     NG/GT 60      Total Intake(mL/kg) 60 (0.8) 0 (0)     Urine (mL/kg/hr) 300 (0.2) 1200 (0.7)     Drains 300 900     Stool 0 0     Total Output 600 2100     Net -540 -2100            Urine Occurrence 1 x      Stool Occurrence 0 x 1 x              Physical Exam     Significant Labs:  I have reviewed all pertinent lab results within the past 24 hours.    Significant Diagnostics:  I have reviewed all pertinent imaging results/findings within the past 24 hours.  Assessment/Plan:     * Acute arterial ischemic stroke, multifocal, multiple vascular territories  Tammy Multani is a 64 y.o. lady admitted with an KIARA stroke now with concerns for SBO vs ileus. She is undergoing conservative management with bowel rest, NGT decompression    - NPO  - NGT  - Gastrograffin challenge today with KUBs at 4 and 8 hours  - replete electrolytes; K>4, Phos>3, Mag>2  - eliquis is likely not being absorbed if she has an ileus or SBO, would consider switching to a heparin drip  - Serial abdominal exams  - Remainder of care per primary team  - Please contact general surgery with any questions, concerns, or clinical status changes      Ileus  Tammy Multani is a 64 y.o. lady admitted with an KIARA stroke now with concerns for SBO vs ileus. She is undergoing non-operative management with bowel rest, NGT decompression. Overall clinical course is improving with resolution of pain/tenderness, soft and non-distended abromen though still with dark brown,  feculent appearing fluid from NG tube. Will continue with Ng decompression and bowel rest. GGC with 2x Xrays without contrast in the colon though now in small bowel.     - NPO with mIVF   - NGT  - replete electrolytes; K>4, Phos>3, Mag>2  - eliquis is likely not being absorbed if she has an ileus or SBO, would consider switching to a heparin drip  - Serial abdominal exams  - Remainder of care per primary team  - Please contact general surgery with any questions, concerns, or clinical status changes          Srikanth Vincent MD  General Surgery

## 2024-10-16 NOTE — ASSESSMENT & PLAN NOTE
Patient was seen by general surgery for some episodes of nausea and emesis and some constipation. KUB was concerning for SBO vs. Ileus. Follow up CT AP did not show evidence of mechanical obstruction, was more indicative of Ileus. XR gastrogaffin challenge ordered by general surgery.     Plan:   - place NPO. Continue NGT to LIWS   - monitor electrolytes daily, correct as needed   - Eliquis switched to heparin as it might not have been fully absorbed considering Ileus    - general surgery following, appreciate recs

## 2024-10-16 NOTE — SUBJECTIVE & OBJECTIVE
Interval History: NAEON. Afebrile. HDS. Pain is controlled with current regimen. Denies n/v. No ROBF. Low output from NG tube overnight though canister with feculent appearing fluid. Abdomen is soft, non-distended and without pain/tenderness. Will continue with Ng decompression and bowel rest given overall improving exam.     Medications:  Continuous Infusions:   0/9% NACL & POTASSIUM CHLORIDE 20 MEQ/L   Intravenous Continuous 100 mL/hr at 10/15/24 0516 New Bag at 10/15/24 0516    heparin (porcine) in D5W  0-40 Units/kg/hr (Adjusted) Intravenous Continuous 5.9 mL/hr at 10/14/24 2348 10 Units/kg/hr at 10/14/24 2348     Scheduled Meds:   atorvastatin  40 mg Per NG tube Daily    carvediloL  6.25 mg Per NG tube BID    famotidine  20 mg Per NG tube Daily    insulin aspart U-100  12 Units Subcutaneous TIDWM    insulin glargine U-100  24 Units Subcutaneous Daily     PRN Meds:  Current Facility-Administered Medications:     acetaminophen, 650 mg, Per NG tube, Q8H PRN    bisacodyL, 10 mg, Rectal, Daily PRN    dextrose 10%, 12.5 g, Intravenous, PRN    dextrose 10%, 25 g, Intravenous, PRN    glucagon (human recombinant), 1 mg, Intramuscular, PRN    hydrALAZINE, 10 mg, Intravenous, Q6H PRN    insulin aspart U-100, 0-10 Units, Subcutaneous, Q4H PRN    iohexol, 15 mL, Oral, PRN    ondansetron, 4 mg, Per NG tube, Q12H PRN    sodium chloride 0.9%, 500 mL, Intravenous, PRN    sodium chloride 0.9%, 10 mL, Intravenous, PRN     Review of patient's allergies indicates:  No Known Allergies  Objective:     Vital Signs (Most Recent):  Temp: 97.9 °F (36.6 °C) (10/15/24 0450)  Pulse: 92 (10/15/24 0450)  Resp: 18 (10/15/24 0450)  BP: 128/70 (10/15/24 0450)  SpO2: 97 % (10/15/24 0450) Vital Signs (24h Range):  Temp:  [97.3 °F (36.3 °C)-99.1 °F (37.3 °C)] 97.9 °F (36.6 °C)  Pulse:  [] 92  Resp:  [16-20] 18  SpO2:  [94 %-97 %] 97 %  BP: (117-158)/(69-94) 128/70     Weight: 72.6 kg (160 lb 0.9 oz)  Body mass index is 29.27  kg/m².    Intake/Output - Last 3 Shifts         10/13 0700  10/14 0659 10/14 0700  10/15 0659 10/15 0700  10/16 0659    P.O.  0     I.V. (mL/kg)       NG/GT  60     Total Intake(mL/kg)  60 (0.8)     Urine (mL/kg/hr)  300 (0.2)     Drains 750 300     Stool  0     Total Output 750 600     Net -750 -540            Urine Occurrence  1 x     Stool Occurrence 0 x 0 x              Physical Exam  Vitals and nursing note reviewed.   Constitutional:       General: She is not in acute distress.     Appearance: She is ill-appearing. She is not diaphoretic.      Comments: Room air  NGT to CONORWS   HENT:      Head: Normocephalic and atraumatic.      Mouth/Throat:      Mouth: Mucous membranes are moist.      Pharynx: Oropharynx is clear.   Eyes:      Extraocular Movements: Extraocular movements intact.      Conjunctiva/sclera: Conjunctivae normal.   Cardiovascular:      Rate and Rhythm: Normal rate.   Pulmonary:      Effort: Pulmonary effort is normal. No respiratory distress.   Abdominal:      General: Abdomen is flat. There is no distension.      Palpations: Abdomen is soft.      Tenderness: non-tender. There is no guarding or rebound.      Comments: Well healed abdominal incisions. Resolution of tenderness on AM exam. No rebound or guarding. Abdomen is notably soft and non-distended   Musculoskeletal:      Right lower leg: No edema.      Left lower leg: No edema.   Skin:     General: Skin is warm and dry.   Neurological:      Mental Status: She is alert.      Comments: Left sided weakness          Significant Labs:  I have reviewed all pertinent lab results within the past 24 hours.  CBC:   Recent Labs   Lab 10/14/24  1105   WBC 5.87   RBC 5.21   HGB 16.8*   HCT 50.1*      MCV 96   MCH 32.2*   MCHC 33.5     BMP:   Recent Labs   Lab 10/14/24  0641   GLU 85      K 3.8      CO2 23   BUN 34*   CREATININE 0.8   CALCIUM 10.0   MG 2.2       Significant Diagnostics:  I have reviewed all pertinent imaging  results/findings within the past 24 hours.    Medications:  Continuous Infusions:   0/9% NACL & POTASSIUM CHLORIDE 20 MEQ/L   Intravenous Continuous 100 mL/hr at 10/16/24 0612 New Bag at 10/16/24 0612    heparin (porcine) in D5W  0-40 Units/kg/hr (Adjusted) Intravenous Continuous 6.5 mL/hr at 10/16/24 0355 11 Units/kg/hr at 10/16/24 0355     Scheduled Meds:   atorvastatin  40 mg Per NG tube Daily    carvediloL  6.25 mg Per NG tube BID    famotidine  20 mg Per NG tube Daily     PRN Meds:  Current Facility-Administered Medications:     acetaminophen, 650 mg, Per NG tube, Q8H PRN    dextrose 10%, 12.5 g, Intravenous, PRN    dextrose 10%, 25 g, Intravenous, PRN    glucagon (human recombinant), 1 mg, Intramuscular, PRN    hydrALAZINE, 10 mg, Intravenous, Q6H PRN    insulin aspart U-100, 0-10 Units, Subcutaneous, Q4H PRN    iohexol, 15 mL, Oral, PRN    sodium chloride 0.9%, 500 mL, Intravenous, PRN    sodium chloride 0.9%, 10 mL, Intravenous, PRN     Review of patient's allergies indicates:  No Known Allergies  Objective:     Vital Signs (Most Recent):  Temp: 97.5 °F (36.4 °C) (10/16/24 0758)  Pulse: 94 (10/16/24 0758)  Resp: 18 (10/16/24 0758)  BP: (!) 165/85 (10/16/24 0907)  SpO2: 96 % (10/16/24 0758) Vital Signs (24h Range):  Temp:  [97.3 °F (36.3 °C)-98.5 °F (36.9 °C)] 97.5 °F (36.4 °C)  Pulse:  [] 94  Resp:  [16-19] 18  SpO2:  [93 %-97 %] 96 %  BP: (138-180)/(82-91) 165/85     Weight: 72.6 kg (160 lb 0.9 oz)  Body mass index is 29.27 kg/m².    Intake/Output - Last 3 Shifts         10/14 0700  10/15 0659 10/15 0700  10/16 0659 10/16 0700  10/17 0659    P.O. 0 0     NG/GT 60      Total Intake(mL/kg) 60 (0.8) 0 (0)     Urine (mL/kg/hr) 300 (0.2) 1200 (0.7)     Drains 300 900     Stool 0 0     Total Output 600 2100     Net -540 -2100            Urine Occurrence 1 x      Stool Occurrence 0 x 1 x              Physical Exam     Significant Labs:  I have reviewed all pertinent lab results within the past 24  hours.    Significant Diagnostics:  I have reviewed all pertinent imaging results/findings within the past 24 hours.

## 2024-10-16 NOTE — PT/OT/SLP PROGRESS
Speech Language Pathology      Tammy Multani  MRN: [id number]    Patient not seen today secondary to Patient fatigue. Per chart review and daughter report, pt did not sleep well last night. SLP will attempt to arrange for another SLP to follow up this date. Will follow up tomorrow.

## 2024-10-16 NOTE — SUBJECTIVE & OBJECTIVE
Neurologic Chief Complaint: infarcts in multiple vascular territories    Subjective:     Interval History: Patient is seen for follow-up neurological assessment and treatment recommendations: See hospital course.     HPI, Past Medical, Family, and Social History remains the same as documented in the initial encounter.     Review of Systems   Neurological:  Positive for weakness.   All other systems reviewed and are negative.    Scheduled Meds:   atorvastatin  40 mg Per NG tube Daily    carvediloL  6.25 mg Per NG tube BID    famotidine  20 mg Per NG tube Daily     Continuous Infusions:   0/9% NACL & POTASSIUM CHLORIDE 20 MEQ/L   Intravenous Continuous 100 mL/hr at 10/16/24 0612 New Bag at 10/16/24 0612    heparin (porcine) in D5W  0-40 Units/kg/hr (Adjusted) Intravenous Continuous 7.1 mL/hr at 10/16/24 1352 12 Units/kg/hr at 10/16/24 1352     PRN Meds:  Current Facility-Administered Medications:     acetaminophen, 650 mg, Per NG tube, Q8H PRN    dextrose 10%, 12.5 g, Intravenous, PRN    dextrose 10%, 25 g, Intravenous, PRN    glucagon (human recombinant), 1 mg, Intramuscular, PRN    hydrALAZINE, 10 mg, Intravenous, Q6H PRN    insulin aspart U-100, 0-10 Units, Subcutaneous, Q4H PRN    iohexol, 15 mL, Oral, PRN    sodium chloride 0.9%, 500 mL, Intravenous, PRN    sodium chloride 0.9%, 10 mL, Intravenous, PRN    Objective:     Vital Signs (Most Recent):  Temp: 97.6 °F (36.4 °C) (10/16/24 1606)  Pulse: 91 (10/16/24 1606)  Resp: 18 (10/16/24 1606)  BP: 136/89 (10/16/24 1606)  SpO2: 98 % (10/16/24 1606)  BP Location: Left arm    Vital Signs Range (Last 24H):  Temp:  [97.5 °F (36.4 °C)-98.5 °F (36.9 °C)]   Pulse:  []   Resp:  [16-19]   BP: (136-180)/(75-91)   SpO2:  [93 %-98 %]   BP Location: Left arm       Physical Exam  Vitals reviewed.   Cardiovascular:      Rate and Rhythm: Normal rate.   Pulmonary:      Effort: Pulmonary effort is normal. No respiratory distress.   Abdominal:      General: There is distension.     "  Palpations: Abdomen is soft.      Tenderness: There is abdominal tenderness.   Musculoskeletal:      Right lower leg: No edema.      Left lower leg: No edema.   Skin:     General: Skin is warm and dry.          Neurological Exam:   LOC: alert  Attention Span: poor  Articulation: Dysarthria  Orientation: Person, Place, Time   Visual Fields: Full  EOM (CN III, IV, VI): Full/intact  Pupils (CN II, III): PERRL  Facial Sensation (CN V): Facial sensory loss  Facial Movement (CN VII): Upper facial weakness on the Left  Motor: Arm left  Plegia 0/5  Leg left  Paresis: 0/5  Arm right  Normal 5/5  Leg right Normal 5/5  Cerebellum: No evidence of appendicular or axial ataxia  Sensation: Jason-anesthesia left    Laboratory:  CMP:   Recent Labs   Lab 10/16/24  0449   CALCIUM 9.5   ALBUMIN 2.7*   PROT 6.7   *   K 5.3*   CO2 23      BUN 33*   CREATININE 0.9   ALKPHOS 73   ALT 18   AST 30   BILITOT 0.7     CBC:   Recent Labs   Lab 10/16/24  0449   WBC 14.50*   RBC 4.71   HGB 15.8   HCT 45.9      MCV 98   MCH 33.5*   MCHC 34.4     Lipid Panel: No results for input(s): "CHOL", "LDLCALC", "HDL", "TRIG" in the last 168 hours.  Hgb A1C:   No results for input(s): "HGBA1C" in the last 168 hours.    TSH: No results for input(s): "TSH" in the last 168 hours.    Diagnostic Results      Brain and Vessel Imaging   MRI/MRA Brain Ischemic Protocol: 10/6/24  Impression: Acute infarcts involving the right anterior circulation and posterior circulation as detailed above without evidence of intracranial hemorrhage or mass effect.  Embolic disease to be considered.     Chronic left parietal infarct. MRA demonstrates limited visualization of distal right KIARA branches in keeping with the territory of the acute infarct.  There is also narrowing of at least one proximal left M2 branch although no acute infarct in that territory.          Echo: 10/7/24  Cardiac Imaging     Left Ventricle: The left ventricle is normal in size. Normal " wall thickness. There is concentric remodeling. Normal wall motion. There is hyperdynamic systolic function with a visually estimated ejection fraction of greater than 70%. There is normal diastolic function.    Right Ventricle: Normal right ventricular cavity size. Wall thickness is normal. Systolic function is normal.    Left Atrium: Bubble study is likely negative, but full opacification of the RA is not well seen on these images which decreases sensitivity.    IVC/SVC: Normal venous pressure at 3 mmHg.

## 2024-10-16 NOTE — PLAN OF CARE
Updated clinicals faxed to [facility].  SW will f/u tomorrow re:  medical readiness.      Miladys Brown LMSW  [facility]  [phone]

## 2024-10-16 NOTE — SUBJECTIVE & OBJECTIVE
"Interval HPI:   Overnight events: Remains in 960A. Concerns now for SBO vs ileus- now NPO. BG at and below goal ranges on current SQ insulin regimen. Hypoglycemia noted overnight (BG 67). D10 bolus given. Diet NPO    Eating:   NPO  Nausea: No  Hypoglycemia and intervention: Yes, BG 67 overnight. D10 bolus given.  Fever: No  TPN and/or TF: No  If yes, type of TF/TPN and rate: n/a    BP (!) 165/85   Pulse 94   Temp 97.5 °F (36.4 °C) (Oral)   Resp 18   Ht 5' 2" (1.575 m)   Wt 72.6 kg (160 lb 0.9 oz)   LMP  (LMP Unknown) Comment: Doesn't  have periods anymore  SpO2 96%   Breastfeeding No   BMI 29.27 kg/m²     Labs Reviewed and Include    Recent Labs   Lab 10/16/24  0449   GLU 87   CALCIUM 9.5   ALBUMIN 2.7*   PROT 6.7   *   K 5.3*   CO2 23      BUN 33*   CREATININE 0.9   ALKPHOS 73   ALT 18   AST 30   BILITOT 0.7     Lab Results   Component Value Date    WBC 14.50 (H) 10/16/2024    HGB 15.8 10/16/2024    HCT 45.9 10/16/2024    MCV 98 10/16/2024     10/16/2024     No results for input(s): "TSH", "FREET4" in the last 168 hours.  Lab Results   Component Value Date    HGBA1C 10.0 (H) 10/08/2024       Nutritional status:   Body mass index is 29.27 kg/m².  Lab Results   Component Value Date    ALBUMIN 2.7 (L) 10/16/2024    ALBUMIN 2.8 (L) 10/15/2024    ALBUMIN 3.1 (L) 10/14/2024     No results found for: "PREALBUMIN"    Estimated Creatinine Clearance: 58.1 mL/min (based on SCr of 0.9 mg/dL).    Accu-Checks  Recent Labs     10/15/24  0000 10/15/24  0404 10/15/24  0837 10/15/24  1057 10/15/24  1600 10/15/24  2018 10/16/24  0001 10/16/24  0039 10/16/24  0400 10/16/24  0801   POCTGLUCOSE 90 119* 122* 125* 146* 93 67* 170* 96 83       Current Medications and/or Treatments Impacting Glycemic Control  Immunotherapy:    Immunosuppressants       None          Steroids:   Hormones (From admission, onward)      None          Pressors:    Autonomic Drugs (From admission, onward)      None      "     Hyperglycemia/Diabetes Medications:   Antihyperglycemics (From admission, onward)      Start     Stop Route Frequency Ordered    10/14/24 1317  insulin aspart U-100 pen 0-10 Units         -- SubQ Every 4 hours PRN 10/14/24 1217

## 2024-10-16 NOTE — PT/OT/SLP PROGRESS
"Occupational Therapy  Co- Treatment    Name: Tammy Multani  MRN: [id number]  Admitting Diagnosis:  Acute arterial ischemic stroke, multifocal, multiple vascular territories       Recommendations:     Discharge Recommendations: High Intensity Therapy  Discharge Equipment Recommendations:  lift device, hospital bed, wheelchair  Barriers to discharge:  None    Assessment:     Tammy Multani is a 64 y.o. female with a medical diagnosis of Acute arterial ischemic stroke, multifocal, multiple vascular territories.  She presents with some confusion with therapeutic activity/exercise directives due to altered mental status. Co-treatment to complete Maurice-Hallpike test and Eply maneuver d/t recent peripheral vestibular symptomology with R upward rotational nystagmus. Noted initial delay of 5s then ~10s upward rotational nystagmus. Still no active movement in L UE. Performance deficits affecting function are weakness, impaired endurance, impaired balance, decreased upper extremity function, decreased lower extremity function, impaired functional mobility, gait instability, impaired self care skills, impaired cognition, decreased safety awareness, impaired fine motor. Patient has demonstrated sufficient progression to warrant high intensity therapy evidenced by objectives noted below.     Rehab Prognosis:  Good; patient would benefit from acute skilled OT services to address these deficits and reach maximum level of function.       Plan:     Patient to be seen 4 x/week to address the above listed problems via self-care/home management, therapeutic activities, therapeutic exercises, neuromuscular re-education  Plan of Care Expires: 11/07/24  Plan of Care Reviewed with: patient, daughter    Subjective     Chief Complaint: "can I have some applesauce?" (Patient NPO)   Patient/Family Comments/goals: need to have bowel movement  Pain/Comfort:  Pain Rating 1: 0/10  Pain Rating Post-Intervention 1: 0/10    Objective: "   Co-treatment with PT performed for patient safety, education, and facilitation of treatment to maximize activity tolerance and progression towards goals from two skilled therapy disciplines.    Communicated with: nursing prior to session.  Patient found HOB elevated with telemetry, PureWick, bed alarm, NG tube, SCD, peripheral IV upon OT entry to room. Patient's daughter present in room during session.     General Precautions: Standard, aspiration, fall    Orthopedic Precautions:N/A  Braces: N/A  Respiratory Status: Room air     Occupational Performance:     Bed Mobility:    Patient completed Rolling/Turning to Left with  maximal assistance  Patient completed Rolling/Turning to Right with total assistance  Patient completed Supine to Sit with total assistance and 2 persons  Patient completed Sit to Supine with total assistance and 2 persons   Patient sat EOB for ~ 20 minutes with Moderate Assistance to Total Assistance  Increased cueing required for cervical extension for head control    Activities of Daily Living:  Grooming: maximal assistance for oral care for comfort d/t NPO status with rung oral swab    AMPA 6 Click ADL: 10    Treatment & Education:  Patient completed x10 R UE reaching exercise involving visual scanning,  multidirectional leaning, crossing midline, and contacting target.   Patient completed x5 lateral lean onto L elbow for weightbearing for proprioceptive input and postural control.    Patient engaged in visual scanning task with focus on reducing L inattention and appropriate word finding.  Patient completed x10 shoulder shruggs, requiring facilitation of L UE; no scapular movement palpated.    Patient educated on:   -purpose of OT and OT POC  -facilitation and education on proper body mechanics, energy conservation, and safety  -overall benefits of therapy     All questions answered within OT scope and to patient's satisfaction    Patient left HOB elevated with all lines intact, call button  in reach, bed alarm on, nursing notified, and daughter present. Nursing notified on removal of posey mitten on L flaccid hand to reduce unnecessary constriction of movement.     GOALS:   Multidisciplinary Problems       Occupational Therapy Goals          Problem: Occupational Therapy    Goal Priority Disciplines Outcome Interventions   Occupational Therapy Goal     OT, PT/OT Progressing    Description: Goals to be met by: 11/7/24     Patient will increase functional independence with ADLs by performing:    UE Dressing with Minimal Assistance.  LE Dressing with Moderate Assistance.  Grooming while seated with Set-up Assistance.  Toileting from bedside commode with Moderate Assistance for hygiene and clothing management.   Sitting at edge of bed x5 minutes with Contact Guard Assistance.  Supine to sit with Contact Guard Assistance.  Step transfer with Moderate Assistance    DME Justifications (see above for complete DME recommendations)    Bedside Commode- Patient has a mobility limitation that significantly impairs their ability to participate in one or more mobility related activities of daily living, including toileting. This deficit can be resolved by using a bedside commode. Patient demonstrates mobility limitations that will cause them to be confined to one room at home without bathroom access for up to 30 days. Using a bedside commode will greatly improve the patient's ability to participate in MRADLs.     Wheelchair-  Patient has a mobility limitation that significantly impairs their ability to participate in one or more mobility related activities of daily living in customary locations in the home. The mobility limitation cannot be sufficiently resolved by the use of a cane or walker. The use of a manual wheelchair will greatly improve the patient's ability to participate in MRADLs. The patient will use the wheelchair on a regular basis at home. They have expressed their willingness to use a manual  wheelchair in the home, and have a caregiver who is available and willing to assist with the wheelchair if needed.     Hospital Bed-  Patient requires a hospital bed for positioning of the body in ways that are not feasible with an ordinary bed. The patient requires special positioning for pain relief, limited mobility, and/or being unable to independently make changes in body position without the use of a hospital bed. Pillows and wedges will not be adequate for resolving these positional issues.                        Time Tracking:     OT Date of Treatment: 10/16/24  OT Start Time: 0905  OT Stop Time: 0943  OT Total Time (min): 38 min    Billable Minutes:Self Care/Home Management 15  Therapeutic Activity 8  Neuromuscular Re-education 15    OT/SUSANNE: OT     Number of SUSANNE visits since last OT visit: 0    10/16/2024

## 2024-10-16 NOTE — ASSESSMENT & PLAN NOTE
BG goal: 140-180  T2DM admitted with acute KIARA stroke    - Discontinue Lantus 24 units daily (BG below goal ranges and hypoglycemia noted overnight) Will reevaluate basal needs today.   - Continue Novolog Moderate dose correction with ISF 25 starting at 150    - POCT Glucose q 4 hrs while NPO   - Hypoglycemia protocol in place      ** Please notify Endocrine for any change and/or advance in diet**  ** Please call Endocrine for any BG related issues **     Discharge Planning:   TBD. Please notify endocrinology prior to discharge.

## 2024-10-16 NOTE — PLAN OF CARE
POC updated and reviewed with the patient and daughter at the bedside. Questions regarding POC were encouraged and addressed. VSS, see flowsheets. Patient is AOX2 at this time. Tele maintained per order. VFall and safety precautions maintained, no signs of injury noted during shift. Patient repositioned with assistance in bed for comfort. Upon exiting room, patient's bed locked in low position, side rails up x 3, bed alarm set, with call light within reach. Stroke book and education reviewed at the bedside, see education flowsheets for details. No acute signs of distress noted at this time.     POCT checked every 4 hours. Midnight POCT taken as 67 mg/dL. IV Dextrose 10% bolus given as per MAR. POCT rechecked after IV bolus as 170 mg/dL. Stroke team informed of hypoglycemia episode. Kept current IV maintenance drip. IV heparin drip continued as per nomogram currently running at 11 units/kg/hr. Next APTT blood draw at 0955. Observed patient had 2 episodes of NGT removal despite mittens and instruction. Restraint order up good for 24 hours until 10/17/2024 at 0247H. Kept patient on NPO and NGT connected to LIWS. No pain complaints made.    Problem: Skin Injury Risk Increased  Goal: Skin Health and Integrity  Outcome: Progressing     Problem: Stroke, Ischemic (Includes Transient Ischemic Attack)  Goal: Optimal Coping  Outcome: Progressing  Goal: Effective Bowel Elimination  Outcome: Progressing  Goal: Optimal Cerebral Tissue Perfusion  Outcome: Progressing  Goal: Optimal Cognitive Function  Outcome: Progressing  Goal: Improved Communication Skills  Outcome: Progressing  Goal: Optimal Functional Ability  Outcome: Progressing  Goal: Optimal Nutrition Intake  Outcome: Progressing  Goal: Effective Oxygenation and Ventilation  Outcome: Progressing  Goal: Improved Sensorimotor Function  Outcome: Progressing  Goal: Safe and Effective Swallow  Outcome: Progressing  Goal: Effective Urinary Elimination  Outcome: Progressing      Problem: Fall Injury Risk  Goal: Absence of Fall and Fall-Related Injury  Outcome: Progressing     Problem: Adult Inpatient Plan of Care  Goal: Plan of Care Review  Outcome: Progressing  Goal: Patient-Specific Goal (Individualized)  Description: Pt will maintain sbp below 220  Outcome: Progressing  Goal: Absence of Hospital-Acquired Illness or Injury  Outcome: Progressing  Goal: Optimal Comfort and Wellbeing  Outcome: Progressing  Goal: Readiness for Transition of Care  Outcome: Progressing     Problem: Infection  Goal: Absence of Infection Signs and Symptoms  Outcome: Progressing     Problem: Diabetes Comorbidity  Goal: Blood Glucose Level Within Targeted Range  Outcome: Progressing     Problem: Acute Kidney Injury/Impairment  Goal: Fluid and Electrolyte Balance  Outcome: Progressing  Goal: Improved Oral Intake  Outcome: Progressing  Goal: Effective Renal Function  Outcome: Progressing     Problem: Restraint, Nonviolent  Goal: Absence of Harm or Injury  Outcome: Progressing

## 2024-10-16 NOTE — PROGRESS NOTES
"[facility]ack[facility] Long - Neurosurgery ([facility])  Vascular Neurology  Comprehensive Stroke Center  Progress Note    Assessment/Plan:     * Acute arterial ischemic stroke, multifocal, multiple vascular territories  63 y/o F transferred from [facility] for higher level of care secondary to acute KIARA stroke on CTA.  Briefly,the patient presented to the OSH overnight for slurred speech started at 1:30 a.m. in the morning.  She has a history of his stroke with left-sided deficits but is normally able to transfer and do a little walking. This morning, she had complete hemiplegia of the left side. Telestroke consult performed, concerning for LVO.  NIH 13. CTA Stroke MP performed and  was read as "acute R KIARA infarct". No TNK due to AC use. Patient accepted to ED as transfer for emergent vascular neurology evaluation. No endovascular intervention. History of atrial fibrillation per discussion with daughter. Was started on Eliquis on 10/6: hold given size of infarct, start ASA on 10/8. Currently on heparin drip given likely lack of absorption through NG tube in setting of Ileus. Given history of Afib, etiology likely embolic. Though not apparent on MRA, if R KIARA occlusion was proximal (supported by MRI) could account for R MCA-like picture.    Transient episodes of altered awareness: EEG negative, and no change in frequency observed by family w/Keppra so have stopped this medicine.     -Heparin drip   -high intensity statin  -SBP<220 in acute setting  -echo done, w/o evidence of intra-cardiac shunt, no LAE, but does have history of Afib  -PT/OT  -NPO    Ileus  Patient was seen by general surgery for some episodes of nausea and emesis and some constipation. KUB was concerning for SBO vs. Ileus. Follow up CT AP did not show evidence of mechanical obstruction, was more indicative of Ileus. XR gastrogaffin challenge ordered by general surgery.     Plan:   - place NPO. Continue NGT to LIWS   - monitor electrolytes daily, correct as " needed   - Eliquis switched to heparin as it might not have been fully absorbed considering Ileus    - general surgery following, appreciate recs     Atrial fibrillation  Takes Eliquis 5mg BID and Coreg 6.25mg BID at home.  Initially Eliquis held given size of stroke; ASA 81mg QD in the interim. Eliquis was restarted on 10/10.       Plan:   -continue at home coreg regimen   -eliquis currently switched to heparin drip due to issues described in ileus section of the assessment and plan.     HTN (hypertension)  Home medication regimen as below  -lisinopril 40mg QD  -amlodipine 10mg QD    Plan:   -lisinopril and amlodipine were initially held in the setting of KAILYN. Was started on coreg in the context of Afib.   -continue coreg   -if patient's BP still elevated, consider adding back amlodipine and lisinopril     Acute focal neurological deficit  Episodes of transient altered mental status observed by daughter, sometimes with a brief left gaze. Given location of KIARA stroke, considered seizure. EEG done on 10/11, negative.     Plan:   - no longer on keppra regimen     Hypernatremia  Plan:   - continue to monitor   - correct as needed     Hyperkalemia  Plan:   - continue to monitor   - correct as needed     KAILYN (acute kidney injury)  Cr was initially 1.6, up from a baseline of 1. Likely pre-renal KAILYN, due to decreased PO intake. Resolved with fluids.     Plan:   - Continue to monitor CMP.     Type 2 diabetes mellitus, with long-term current use of insulin  History of. Takes Levemir 35 units QHS at home. A1C 10%.    -goal 140-180 while admitted  -SSI prn   -continue to monitor glucose  -Endocrine consulted for A1c 10%; following, appreciate recs       Opioid use  History of. Prescribed oxycodone 15mg at home. Family concerned that medication is being diverted by family member, unclear how often patient takes. Reportedly is prescribed for neck pain.     Plan:   - holding oxycodone 5 mg BID   -monitor for signs of withdrawal,  as is unclear how often patient is taking    Left hemiplegia  See stroke    Plan:   - continue PT/OT/SLP          10/7: NAEON, nuero exam stable, echo today, NPO aside from meds per speech, barium study tmrw  10/08/2024 NAEON, stable neuro exam, went for the Br study, and was started on a diet. Endocrine consulted for the A1c 10%, pending recs.  10/09/2024 NAEON, Stable neuro exam, will restart Lisinopril 20 mg (half of her dome dose). Seen by endocrine, with adjustment of her insulin doses.  10/10/2024 NAEON, no change in her neuro exam, will restart Eliquis today, BP is better controlled.  10/11/2024 NAEON, stable neuro exam, still decreased PO intake. Patient gets some episodes of in attention; EEG was ordered. Endocrine adjusted her insulins. Accepted at [facility] in [place].   10/12/2024 NAEON, waiting on EEG interpretation (contacted epilepsy), started Keppra 500mg BID and will see if reduces transient alterations in awareness, neurologic examination stable  10/13/2024 NAEON, EEG w/encephalopathy (no seizure or discharges), stop Keppra, SBP ~150 to 160 so increased Coreg to 6.25mg BID  10/14/2024 General surgery was consulted overnight for constipation, episodes of nausea and emesis. KUB concerning for SBO vs. Ileus. F/u CT AP was more indicative of ileus, no evidence of mechanical obstruction was observed. Patient is NPO, can get some meds via NGT. Eliquis switched to heparin drip.   10/15/2024 Patient has still not had an actual bowel movement. Is distended, has some abdominal pain. General surgery following, is getting XR gastrogaffin challenge, will f/u results.   10/16/2024 Still monitoring patient for bowel movements in the setting of Ileus. General surgery is following. Mild hyperkalemia and hypernatremia this morning; will continue to monitor.     STROKE DOCUMENTATION   Acute Stroke Times   Last Known Normal Date:  (OSH)  Stroke Team Called Date: 10/06/24  Stroke Team Called Time:  (At OSH)  Stroke  Team Arrival Date: 10/06/24  Stroke Team Arrival Time: 0608  CT Interpretation Time:  (CTA done around 4:20am per [place] [facility] [place]  Thrombolytic Therapy Recommended: No  Thrombectomy Recommended:  (Pending MRI Brain)    NIH Scale:  1a. Level of Consciousness: 1-->Not alert, but arousable by minor stimulation to obey, answer, or respond  1b. LOC Questions: 0-->Answers both questions correctly  1c. LOC Commands: 0-->Performs both tasks correctly  2. Best Gaze: 0-->Normal  3. Visual: 0-->No visual loss  4. Facial Palsy: 1-->Minor paralysis (flattened nasolabial fold, asymmetry on smiling)  5a. Motor Arm, Left: 4-->No movement  5b. Motor Arm, Right: 0-->No drift, limb holds 90 (or 45) degrees for full 10 secs  6a. Motor Leg, Left: 4-->No movement  6b. Motor Leg, Right: 0-->No drift, leg holds 30 degree position for full 5 secs  7. Limb Ataxia: 0-->Absent  8. Sensory: 1-->Mild-to-moderate sensory loss, patient feels pinprick is less sharp or is dull on the affected side, or there is a loss of superficial pain with pinprick, but patient is aware of being touched  9. Best Language: 0-->No aphasia, normal  10. Dysarthria: 1-->Mild-to-moderate dysarthria, patient slurs at least some words and, at worst, can be understood with some difficulty  11. Extinction and Inattention (formerly Neglect): 0-->No abnormality  Total (NIH Stroke Scale): 12       Modified Whatcom Score: 3  Diane Coma Scale:    ABCD2 Score:    [id number] Score:   HAS -BLED Score:   ICH Score:   Hunt & Shane Classification:      Hemorrhagic change of an Ischemic Stroke: Does this patient have an ischemic stroke with hemorrhagic changes? No     Neurologic Chief Complaint: infarcts in multiple vascular territories    Subjective:     Interval History: Patient is seen for follow-up neurological assessment and treatment recommendations: See hospital course.     HPI, Past Medical, Family, and Social History remains the same as documented in  the initial encounter.     Review of Systems   Neurological:  Positive for weakness.   All other systems reviewed and are negative.    Scheduled Meds:   atorvastatin  40 mg Per NG tube Daily    carvediloL  6.25 mg Per NG tube BID    famotidine  20 mg Per NG tube Daily     Continuous Infusions:   0/9% NACL & POTASSIUM CHLORIDE 20 MEQ/L   Intravenous Continuous 100 mL/hr at 10/16/24 0612 New Bag at 10/16/24 0612    heparin (porcine) in D5W  0-40 Units/kg/hr (Adjusted) Intravenous Continuous 7.1 mL/hr at 10/16/24 1352 12 Units/kg/hr at 10/16/24 1352     PRN Meds:  Current Facility-Administered Medications:     acetaminophen, 650 mg, Per NG tube, Q8H PRN    dextrose 10%, 12.5 g, Intravenous, PRN    dextrose 10%, 25 g, Intravenous, PRN    glucagon (human recombinant), 1 mg, Intramuscular, PRN    hydrALAZINE, 10 mg, Intravenous, Q6H PRN    insulin aspart U-100, 0-10 Units, Subcutaneous, Q4H PRN    iohexol, 15 mL, Oral, PRN    sodium chloride 0.9%, 500 mL, Intravenous, PRN    sodium chloride 0.9%, 10 mL, Intravenous, PRN    Objective:     Vital Signs (Most Recent):  Temp: 97.6 °F (36.4 °C) (10/16/24 1606)  Pulse: 91 (10/16/24 1606)  Resp: 18 (10/16/24 1606)  BP: 136/89 (10/16/24 1606)  SpO2: 98 % (10/16/24 1606)  BP Location: Left arm    Vital Signs Range (Last 24H):  Temp:  [97.5 °F (36.4 °C)-98.5 °F (36.9 °C)]   Pulse:  []   Resp:  [16-19]   BP: (136-180)/(75-91)   SpO2:  [93 %-98 %]   BP Location: Left arm       Physical Exam  Vitals reviewed.   Cardiovascular:      Rate and Rhythm: Normal rate.   Pulmonary:      Effort: Pulmonary effort is normal. No respiratory distress.   Abdominal:      General: There is distension.      Palpations: Abdomen is soft.      Tenderness: There is abdominal tenderness.   Musculoskeletal:      Right lower leg: No edema.      Left lower leg: No edema.   Skin:     General: Skin is warm and dry.          Neurological Exam:   LOC: alert  Attention Span: poor  Articulation:  "Dysarthria  Orientation: Person, Place, Time   Visual Fields: Full  EOM (CN III, IV, VI): Full/intact  Pupils (CN II, III): PERRL  Facial Sensation (CN V): Facial sensory loss  Facial Movement (CN VII): Upper facial weakness on the Left  Motor: Arm left  Plegia 0/5  Leg left  Paresis: 0/5  Arm right  Normal 5/5  Leg right Normal 5/5  Cerebellum: No evidence of appendicular or axial ataxia  Sensation: Jason-anesthesia left    Laboratory:  CMP:   Recent Labs   Lab 10/16/24  0449   CALCIUM 9.5   ALBUMIN 2.7*   PROT 6.7   *   K 5.3*   CO2 23      BUN 33*   CREATININE 0.9   ALKPHOS 73   ALT 18   AST 30   BILITOT 0.7     CBC:   Recent Labs   Lab 10/16/24  0449   WBC 14.50*   RBC 4.71   HGB 15.8   HCT 45.9      MCV 98   MCH 33.5*   MCHC 34.4     Lipid Panel: No results for input(s): "CHOL", "LDLCALC", "HDL", "TRIG" in the last 168 hours.  Hgb A1C:   No results for input(s): "HGBA1C" in the last 168 hours.    TSH: No results for input(s): "TSH" in the last 168 hours.    Diagnostic Results      Brain and Vessel Imaging   MRI/MRA Brain Ischemic Protocol: 10/6/24  Impression: Acute infarcts involving the right anterior circulation and posterior circulation as detailed above without evidence of intracranial hemorrhage or mass effect.  Embolic disease to be considered.     Chronic left parietal infarct. MRA demonstrates limited visualization of distal right KIARA branches in keeping with the territory of the acute infarct.  There is also narrowing of at least one proximal left M2 branch although no acute infarct in that territory.          Echo: 10/7/24  Cardiac Imaging     Left Ventricle: The left ventricle is normal in size. Normal wall thickness. There is concentric remodeling. Normal wall motion. There is hyperdynamic systolic function with a visually estimated ejection fraction of greater than 70%. There is normal diastolic function.    Right Ventricle: Normal right ventricular cavity size. Wall thickness is " normal. Systolic function is normal.    Left Atrium: Bubble study is likely negative, but full opacification of the RA is not well seen on these images which decreases sensitivity.    IVC/SVC: Normal venous pressure at 3 mmHg.    Arian Williamson MD  [facility]  Department of Vascular Neurology   [facility] Neurosurgery [facility])

## 2024-10-16 NOTE — PROGRESS NOTES
"Zackary Long - Neurosurgery ([facility])  Endocrinology  Progress Note    Admit Date: 10/6/2024     Reason for Consult: Management of T2DM, Hyperglycemia       Diabetes diagnosis year:  Over 5 years    Home Diabetes Medications:    -liraglutide  -Levemir 35 units QHS    How often checking glucose at home? Not checking       Diabetes Complications include:     Hyperglycemia    Complicating diabetes co morbidities:   History of CVA      HPI:   Patient is a 64 y.o. female with  DM2 , HTN who was transferred from [facility] for higher level of care secondary to acute KIARA stroke on CTA. Briefly,the patient presented to the OSH overnight for slurred speech started at 1:30 a.m. in the morning. She has a history of his stroke with left-sided deficits but is normally able to transfer and do a little walking.  Endocrine consulted for BG management.      Interval HPI:   Overnight events: Remains in 960A. Concerns now for SBO vs ileus- now NPO. BG at and below goal ranges on current SQ insulin regimen. Hypoglycemia noted overnight (BG 67). D10 bolus given. Diet NPO    Eating:   NPO  Nausea: No  Hypoglycemia and intervention: Yes, BG 67 overnight. D10 bolus given.  Fever: No  TPN and/or TF: No  If yes, type of TF/TPN and rate: n/a    BP (!) 165/85   Pulse 94   Temp 97.5 °F (36.4 °C) (Oral)   Resp 18   Ht 5' 2" (1.575 m)   Wt 72.6 kg (160 lb 0.9 oz)   LMP  (LMP Unknown) Comment: Doesn't  have periods anymore  SpO2 96%   Breastfeeding No   BMI 29.27 kg/m²     Labs Reviewed and Include    Recent Labs   Lab 10/16/24  0449   GLU 87   CALCIUM 9.5   ALBUMIN 2.7*   PROT 6.7   *   K 5.3*   CO2 23      BUN 33*   CREATININE 0.9   ALKPHOS 73   ALT 18   AST 30   BILITOT 0.7     Lab Results   Component Value Date    WBC 14.50 (H) 10/16/2024    HGB 15.8 10/16/2024    HCT 45.9 10/16/2024    MCV 98 10/16/2024     10/16/2024     No results for input(s): "TSH", "FREET4" in the last 168 hours.  Lab Results " "  Component Value Date    HGBA1C 10.0 (H) 10/08/2024       Nutritional status:   Body mass index is 29.27 kg/m².  Lab Results   Component Value Date    ALBUMIN 2.7 (L) 10/16/2024    ALBUMIN 2.8 (L) 10/15/2024    ALBUMIN 3.1 (L) 10/14/2024     No results found for: "PREALBUMIN"    Estimated Creatinine Clearance: 58.1 mL/min (based on SCr of 0.9 mg/dL).    Accu-Checks  Recent Labs     10/15/24  0000 10/15/24  0404 10/15/24  0837 10/15/24  1057 10/15/24  1600 10/15/24  2018 10/16/24  0001 10/16/24  0039 10/16/24  0400 10/16/24  0801   POCTGLUCOSE 90 119* 122* 125* 146* 93 67* 170* 96 83       Current Medications and/or Treatments Impacting Glycemic Control  Immunotherapy:    Immunosuppressants       None          Steroids:   Hormones (From admission, onward)      None          Pressors:    Autonomic Drugs (From admission, onward)      None          Hyperglycemia/Diabetes Medications:   Antihyperglycemics (From admission, onward)      Start     Stop Route Frequency Ordered    10/14/24 1317  insulin aspart U-100 pen 0-10 Units         -- SubQ Every 4 hours PRN 10/14/24 1217            ASSESSMENT and PLAN    Neuro  * Acute arterial ischemic stroke, multifocal, multiple vascular territories  Managed by primary team  Optimize BG control      Cardiac/Vascular  HTN (hypertension)  Managed by primary team  Optimize BG control      Endocrine  Type 2 diabetes mellitus, with long-term current use of insulin  BG goal: 140-180  T2DM admitted with acute KIARA stroke    - Discontinue Lantus 24 units daily (BG below goal ranges and hypoglycemia noted overnight) Will reevaluate basal needs today.   - Continue Novolog Moderate dose correction with ISF 25 starting at 150    - POCT Glucose q 4 hrs while NPO   - Hypoglycemia protocol in place      ** Please notify Endocrine for any change and/or advance in diet**  ** Please call Endocrine for any BG related issues **     Discharge Planning:   TBD. Please notify endocrinology prior to " discharge.            Mattie Ramsey NP  Endocrinology  Zackary Long - Neurosurgery ([facility])

## 2024-10-16 NOTE — ASSESSMENT & PLAN NOTE
History of. Takes Levemir 35 units QHS at home. A1C 10%.    -goal 140-180 while admitted  -SSI prn   -continue to monitor glucose  -Endocrine consulted for A1c 10%; following, appreciate recs

## 2024-10-16 NOTE — PT/OT/SLP PROGRESS
Physical Therapy Co-Treatment    Patient Name: Tammy Multani   MRN: [id number]    Co-treatment performed for this visit due to patient need for two skilled therapists to ensure patient and staff safety and to accommodate for patient activity tolerance/pain management   Recommendations:     Discharge Recommendations: High Intensity Therapy  Discharge Equipment Recommendations: hospital bed, lift device, wheelchair  Barriers to Discharge: Increased level of assist and Decreased caregiver support  Safest Mobility Level with Nursing: Bed Level ROM - Pt only safe for OOB mobility with therapy staff at this time    Assessment:     Tammy Multani is a 64 y.o. female admitted with a medical diagnosis of Acute arterial ischemic stroke, multifocal, multiple vascular territories. She presents with the following impairments/functional limitations: weakness, impaired endurance, impaired sensation, impaired self care skills, impaired functional mobility, gait instability, impaired balance, pain, decreased safety awareness, decreased lower extremity function, decreased upper extremity function, impaired fine motor, impaired coordination, impaired cognition, visual deficits, abnormal tone, decreased ROM. Pt presenting with HOB elevated and agreeable to completing therapy session. Focus of treatment on performing Maurice-Hallpike test and Epley maneuver for reduction of peripheral vestibular symptoms, improving truncal control and postural stability, and increasing L sided attention. Pt continues to require significant assistance to complete all visualized functional mobility at this time. Pt remains appropriate for high intensity therapy following discharge once medically stable. Pt would continue to benefit from skilled acute PT in order to address current deficits and progress functional mobility.     Rehab Prognosis: Good; patient continues to benefit from acute skilled PT services to address these deficits and reach maximum  "level of function.  Recent Surgery: * No surgery found *      Plan:     During this hospitalization, patient to be seen 4 x/week to address the identified rehab impairments via gait training, therapeutic activities, therapeutic exercises, neuromuscular re-education, canalith reposition procedure and progress toward the following goals:    Plan of Care Expires:  11/07/24    Subjective     Chief Complaint: Abdominal pain  Patient/Family Comments/Goals: "I want some applesauce."   Pain/Comfort:  Pain Rating 1: 0/10    Objective:     Communicated with RN prior to session. Patient found HOB elevated with telemetry, PureWick, bed alarm, NG tube, SCD, peripheral IV (B posey mitts) upon PT entry to room.     General Precautions: Standard, aspiration, fall  Orthopedic Precautions: N/A  Braces: N/A    C-spine Clearing Test    Alar Ligament: negative   Sharp-Jim test: negative     Modified VAS (Vertebral Artery Screen), in sitting (rotation, then extension):   R: negative  L: negative      POSITIONAL CANAL TESTING  Maurice Hallpike  Right :  positive    Canalith Repositioning  R Epley Maneuver: x1 rep completed, 10s R upward rotational nystagmus noted with ~5s delay    Functional Mobility:  Bed Mobility:  Verbal cues for sequencing and technique  Rolling Left:  maximal assistance  Rolling Right: total assistance  Scooting: total assistance  Supine to Sit: total assistance of 2 persons for LE management and trunk management  Sit to Supine: total assistance of 2 persons for LE management and trunk management  Transfers:   Not completed 2/2 absent LLE motor activation and decreased control   Balance:   Static Sitting: Poor, able to maintain for 15 minute(s) with mod-total assistance  Verbal and tactile cues provided for upright posture, increased cervical extension, maintenance of midline orientation, anterior trunk lean, core activation, command following, and improved used of BUE for support  Dynamic Sitting: Poor: Patient " unable to accept challenge or move without loss of balance, total assistance  Pt completed 1x10 ea right UE multidirectional reaching with focus on weight shifts, UE ROM/strength, visual tracking, command following, core activation, challenging stability limits, crossing midline, and anterior trunk leans.   Pt completed 5x10s lateral lean onto left elbow while seated at EOB to increase joint proprioception and weightbearing with emphasis on tricep activation  Pt completed scapular elevation and retraction activity while seated at EOB with tactile cues and manual assistance provided  Pt completed visual scanning task to increased L sided attention and encourage crossing midline consisting of object and color identification    AM-PAC 6 CLICK MOBILITY  Turning over in bed (including adjusting bedclothes, sheets and blankets)?: 2  Sitting down on and standing up from a chair with arms (e.g., wheelchair, bedside commode, etc.): 1  Moving from lying on back to sitting on the side of the bed?: 1  Moving to and from a bed to a chair (including a wheelchair)?: 1  Need to walk in hospital room?: 1  Climbing 3-5 steps with a railing?: 1  Basic Mobility Total Score: 7     Therapeutic Activities and Exercises:  Patient educated on role of acute care PT and PT POC, safety while in hospital including calling nurse for mobility, and call light usage  Pt educated on importance of maximal participation in therapy session in order to reduce negative effects of prolonged sedentary positioning.   Answered all questions within PT scope of practice and addressed functional mobility concerns.    Patient left HOB elevated with all lines intact, call button in reach, RN notified, bed alarm on, and daughter present.    GOALS:   Multidisciplinary Problems       Physical Therapy Goals          Problem: Physical Therapy    Goal Priority Disciplines Outcome Interventions   Physical Therapy Goal     PT, PT/OT Progressing    Description: Goals to  be met by: 2024     Patient will increase functional independence with mobility by performin. Supine to sit with Moderate Assistance  2. Sit to supine with Moderate Assistance  3. Bed to chair transfer with Maximum Assistance using LRAD  4. Sitting at edge of bed x5 minutes with Stand-by Assistance  5. Lower extremity exercise program x15 reps per handout, with independence    Hospital Bed - Patient requires a hospital bed for positioning of the body in ways that are not feasible with an ordinary bed. The patient requires special positioning for pain relief, limited mobility, and/or being unable to independently make changes in body position without the use of a hospital bed. Pillows and wedges will not be adequate for resolving these positional issues.     Wheelchair - Patient has a mobility limitation that significantly impairs their ability to participate in one or more mobility related activities of daily living in customary locations in the home. The mobility limitation cannot be sufficiently resolved by the use of a cane or walker. The use of a manual wheelchair will greatly improve the patient's ability to participate in MRADLs. The patient will use the wheelchair on a regular basis at home. They have expressed their willingness to use a manual wheelchair in the home, and have a caregiver who is available and willing to assist with the wheelchair if needed.                       Time Tracking:     PT Received On: 10/16/24  PT Start Time: 905     PT Stop Time: 943  PT Total Time (min): 38 min     Billable Minutes: Therapeutic Activity 23 and Neuromuscular Re-education 15      Treatment Type: Treatment  PT/PTA: PT     Number of PTA visits since last PT visit: 0     10/16/2024

## 2024-10-17 PROBLEM — G93.41 ENCEPHALOPATHY, METABOLIC: Status: ACTIVE | Noted: 2024-10-17

## 2024-10-17 PROBLEM — E87.5 HYPERKALEMIA: Status: RESOLVED | Noted: 2024-10-16 | Resolved: 2024-10-17

## 2024-10-17 LAB
ALBUMIN SERPL BCP-MCNC: 2.4 G/DL (ref 3.5–5.2)
ALP SERPL-CCNC: 88 U/L (ref 55–135)
ALT SERPL W/O P-5'-P-CCNC: 17 U/L (ref 10–44)
ANION GAP SERPL CALC-SCNC: 11 MMOL/L (ref 8–16)
APTT PPP: 38 SEC (ref 21–32)
APTT PPP: 64.8 SEC (ref 21–32)
APTT PPP: 65.8 SEC (ref 21–32)
AST SERPL-CCNC: 26 U/L (ref 10–40)
BASOPHILS # BLD AUTO: 0.09 K/UL (ref 0–0.2)
BASOPHILS NFR BLD: 0.7 % (ref 0–1.9)
BILIRUB SERPL-MCNC: 0.7 MG/DL (ref 0.1–1)
BUN SERPL-MCNC: 30 MG/DL (ref 8–23)
CALCIUM SERPL-MCNC: 8.7 MG/DL (ref 8.7–10.5)
CHLORIDE SERPL-SCNC: 119 MMOL/L (ref 95–110)
CO2 SERPL-SCNC: 22 MMOL/L (ref 23–29)
CREAT SERPL-MCNC: 0.8 MG/DL (ref 0.5–1.4)
DIFFERENTIAL METHOD BLD: ABNORMAL
EOSINOPHIL # BLD AUTO: 0 K/UL (ref 0–0.5)
EOSINOPHIL NFR BLD: 0.2 % (ref 0–8)
ERYTHROCYTE [DISTWIDTH] IN BLOOD BY AUTOMATED COUNT: 12.9 % (ref 11.5–14.5)
EST. GFR  (NO RACE VARIABLE): >60 ML/MIN/1.73 M^2
GLUCOSE SERPL-MCNC: 117 MG/DL (ref 70–110)
HCT VFR BLD AUTO: 40.8 % (ref 37–48.5)
HGB BLD-MCNC: 13.3 G/DL (ref 12–16)
IMM GRANULOCYTES # BLD AUTO: 0.34 K/UL (ref 0–0.04)
IMM GRANULOCYTES NFR BLD AUTO: 2.6 % (ref 0–0.5)
LYMPHOCYTES # BLD AUTO: 1.6 K/UL (ref 1–4.8)
LYMPHOCYTES NFR BLD: 12.6 % (ref 18–48)
MAGNESIUM SERPL-MCNC: 2.4 MG/DL (ref 1.6–2.6)
MCH RBC QN AUTO: 32.9 PG (ref 27–31)
MCHC RBC AUTO-ENTMCNC: 32.6 G/DL (ref 32–36)
MCV RBC AUTO: 101 FL (ref 82–98)
MONOCYTES # BLD AUTO: 1 K/UL (ref 0.3–1)
MONOCYTES NFR BLD: 7.8 % (ref 4–15)
NEUTROPHILS # BLD AUTO: 9.8 K/UL (ref 1.8–7.7)
NEUTROPHILS NFR BLD: 76.1 % (ref 38–73)
NRBC BLD-RTO: 0 /100 WBC
PHOSPHATE SERPL-MCNC: 2.5 MG/DL (ref 2.7–4.5)
PLATELET # BLD AUTO: 251 K/UL (ref 150–450)
PMV BLD AUTO: 11.1 FL (ref 9.2–12.9)
POCT GLUCOSE: 101 MG/DL (ref 70–110)
POCT GLUCOSE: 113 MG/DL (ref 70–110)
POCT GLUCOSE: 118 MG/DL (ref 70–110)
POCT GLUCOSE: 83 MG/DL (ref 70–110)
POTASSIUM SERPL-SCNC: 3.7 MMOL/L (ref 3.5–5.1)
PROT SERPL-MCNC: 5.8 G/DL (ref 6–8.4)
RBC # BLD AUTO: 4.04 M/UL (ref 4–5.4)
SODIUM SERPL-SCNC: 152 MMOL/L (ref 136–145)
WBC # BLD AUTO: 12.93 K/UL (ref 3.9–12.7)

## 2024-10-17 PROCEDURE — 25000003 PHARM REV CODE 250: Performed by: STUDENT IN AN ORGANIZED HEALTH CARE EDUCATION/TRAINING PROGRAM

## 2024-10-17 PROCEDURE — 97535 SELF CARE MNGMENT TRAINING: CPT

## 2024-10-17 PROCEDURE — 36573 INSJ PICC RS&I 5 YR+: CPT

## 2024-10-17 PROCEDURE — 85730 THROMBOPLASTIN TIME PARTIAL: CPT | Mod: 91 | Performed by: PSYCHIATRY & NEUROLOGY

## 2024-10-17 PROCEDURE — 97530 THERAPEUTIC ACTIVITIES: CPT

## 2024-10-17 PROCEDURE — 84100 ASSAY OF PHOSPHORUS: CPT | Performed by: PSYCHIATRY & NEUROLOGY

## 2024-10-17 PROCEDURE — 85730 THROMBOPLASTIN TIME PARTIAL: CPT | Performed by: PSYCHIATRY & NEUROLOGY

## 2024-10-17 PROCEDURE — 63600175 PHARM REV CODE 636 W HCPCS: Mod: UD | Performed by: STUDENT IN AN ORGANIZED HEALTH CARE EDUCATION/TRAINING PROGRAM

## 2024-10-17 PROCEDURE — 92526 ORAL FUNCTION THERAPY: CPT

## 2024-10-17 PROCEDURE — 36415 COLL VENOUS BLD VENIPUNCTURE: CPT | Mod: XB | Performed by: STUDENT IN AN ORGANIZED HEALTH CARE EDUCATION/TRAINING PROGRAM

## 2024-10-17 PROCEDURE — C1751 CATH, INF, PER/CENT/MIDLINE: HCPCS

## 2024-10-17 PROCEDURE — 63600175 PHARM REV CODE 636 W HCPCS: Mod: UD

## 2024-10-17 PROCEDURE — 36410 VNPNXR 3YR/> PHY/QHP DX/THER: CPT

## 2024-10-17 PROCEDURE — 80053 COMPREHEN METABOLIC PANEL: CPT | Performed by: PSYCHIATRY & NEUROLOGY

## 2024-10-17 PROCEDURE — 11000001 HC ACUTE MED/SURG PRIVATE ROOM

## 2024-10-17 PROCEDURE — 97129 THER IVNTJ 1ST 15 MIN: CPT

## 2024-10-17 PROCEDURE — 85730 THROMBOPLASTIN TIME PARTIAL: CPT | Mod: 91 | Performed by: STUDENT IN AN ORGANIZED HEALTH CARE EDUCATION/TRAINING PROGRAM

## 2024-10-17 PROCEDURE — 76937 US GUIDE VASCULAR ACCESS: CPT

## 2024-10-17 PROCEDURE — 97112 NEUROMUSCULAR REEDUCATION: CPT | Mod: CQ

## 2024-10-17 PROCEDURE — 97110 THERAPEUTIC EXERCISES: CPT | Mod: CQ

## 2024-10-17 PROCEDURE — 99233 SBSQ HOSP IP/OBS HIGH 50: CPT | Mod: GC,,, | Performed by: PSYCHIATRY & NEUROLOGY

## 2024-10-17 PROCEDURE — 85025 COMPLETE CBC W/AUTO DIFF WBC: CPT | Performed by: PSYCHIATRY & NEUROLOGY

## 2024-10-17 PROCEDURE — 25000003 PHARM REV CODE 250

## 2024-10-17 PROCEDURE — 83735 ASSAY OF MAGNESIUM: CPT | Performed by: PSYCHIATRY & NEUROLOGY

## 2024-10-17 PROCEDURE — 99232 SBSQ HOSP IP/OBS MODERATE 35: CPT | Mod: ,,, | Performed by: NURSE PRACTITIONER

## 2024-10-17 PROCEDURE — 36415 COLL VENOUS BLD VENIPUNCTURE: CPT | Performed by: PSYCHIATRY & NEUROLOGY

## 2024-10-17 RX ORDER — SODIUM CHLORIDE 0.9 % (FLUSH) 0.9 %
10 SYRINGE (ML) INJECTION EVERY 12 HOURS PRN
Status: DISCONTINUED | OUTPATIENT
Start: 2024-10-17 | End: 2024-10-28 | Stop reason: HOSPADM

## 2024-10-17 RX ORDER — CARVEDILOL 12.5 MG/1
12.5 TABLET ORAL 2 TIMES DAILY
Status: DISCONTINUED | OUTPATIENT
Start: 2024-10-17 | End: 2024-10-19

## 2024-10-17 RX ORDER — BISACODYL 10 MG/1
10 SUPPOSITORY RECTAL ONCE
Status: COMPLETED | OUTPATIENT
Start: 2024-10-17 | End: 2024-10-17

## 2024-10-17 RX ORDER — BISACODYL 5 MG
5 TABLET, DELAYED RELEASE (ENTERIC COATED) ORAL 2 TIMES DAILY
Status: DISCONTINUED | OUTPATIENT
Start: 2024-10-17 | End: 2024-10-18

## 2024-10-17 RX ADMIN — BISACODYL 10 MG: 10 SUPPOSITORY RECTAL at 11:10

## 2024-10-17 RX ADMIN — SODIUM CHLORIDE AND POTASSIUM CHLORIDE: .9; .15 SOLUTION INTRAVENOUS at 04:10

## 2024-10-17 RX ADMIN — HEPARIN SODIUM AND DEXTROSE 12 UNITS/KG/HR: 10000; 5 INJECTION INTRAVENOUS at 05:10

## 2024-10-17 RX ADMIN — ATORVASTATIN CALCIUM 40 MG: 40 TABLET, FILM COATED ORAL at 09:10

## 2024-10-17 RX ADMIN — BISACODYL 5 MG: 5 TABLET, COATED ORAL at 01:10

## 2024-10-17 RX ADMIN — BISACODYL 5 MG: 5 TABLET, COATED ORAL at 09:10

## 2024-10-17 RX ADMIN — FAMOTIDINE 20 MG: 20 TABLET ORAL at 09:10

## 2024-10-17 RX ADMIN — CARVEDILOL 12.5 MG: 12.5 TABLET, FILM COATED ORAL at 09:10

## 2024-10-17 NOTE — CONSULTS
JESSICA consulted for Midline insertion in real time using u/s guidance.     Indication: LONG TERM PVA  Gauge: 18  Location: RIGHT BRACHIAL  Length in cm: 10  Max dwell date: 11/15/2024  Lot #: [id number]    Image saved and uploaded to EMR

## 2024-10-17 NOTE — PT/OT/SLP PROGRESS
"Speech Language Pathology Treatment    Patient Name:  Tammy Multani   MRN:  [id number]  Admitting Diagnosis: Acute arterial ischemic stroke, multifocal, multiple vascular territories    Recommendations:                 General Recommendations:  Dysphagia therapy, Speech/language therapy, and Cognitive-linguistic therapy  Diet recommendations: Pt currently NPO due to ileus vs SBO.  NG tube still in place and set to suction.   Puree Diet - IDDSI Level 4, Liquid Diet Level: Mildly thick/Nectar thick liquids - IDDSI Level 2   Aspiration Precautions: 1 bite/sip at a time, Alternating bites/sips, Assistance with meals and Assistance with thickening liquids, Avoid talking while eating, Eliminate distractions, Feed only when awake/alert, Frequent oral care, HOB to 90 degrees, Meds crushed in puree, Meds whole buried in puree, Monitor for s/s of aspiration, Remain upright 30 minutes post meal, Small bites/sips, and Strict aspiration precautions   General Precautions: Standard, aspiration, fall, nectar thick  Communication strategies:  provide increased time to answer and go to room if call light pushed    Assessment:     Tammy Multani is a 64 y.o. female with an SLP diagnosis of Dysphagia, Dysarthria, Cognitive-Linguistic Impairment, and Visio-Spatial Impairment.      Subjective     "I need some water."     Pain/Comfort:  Pain Rating 1:  (no complaints of pain, but pt appearing uncomfortable)    Respiratory Status: Room air    Objective:     Has the patient been evaluated by SLP for swallowing?   Yes  Keep patient NPO? No   Current Respiratory Status:        Nurse gave clearance for SLP to see pt after being cleaned up from having very loose BM.  Nurse reported administering a small pill via straw sips of nectar thick water prior to SLP's entry to room.  During break between left neglect tasks, pt  requested a sip of water. Upon presentation of straw sip of nectar thick water, pt was noted to be holding small pill " administered earlier.  Pill and pooled thickened water was resting on right side of poster surface of tongue.  Finger sweep was provided to remove pill from oral cavity.  Nurse notified and given clearance to re-administer pill buried whole in small amount of applesauce.  Pt was able to clear pill from oral cavity in this manner, but after holding and need for cues to initiate swallow.  Pt also exhibiting holding and delayed initiation of swallow for nectar thick liquid straw sips.  Wet vocal quality and need to throat clear present likely due to spillover during prolonged holding of PO previously administered. Pt completed a simple left neglect letter cancellation task with 60% accuracy IND/100% given max cues to locate target items to left of midline.  Given visual aid to increase attention/visual scanning to left, pt was able to read short/simple sentences oriented on left side of p[age correctly.  Education was provided to pt and daughter regarding left neglect s/p stroke, compensatory strategies for increasing attention to left, aspiration precautions, and SLP treatment plan and POC.  Pt will benefit from ongoing reinforcement.     Goals:   Multidisciplinary Problems       SLP Goals          Problem: SLP    Goal Priority Disciplines Outcome   SLP Goal     SLP    Description: Speech Language Pathology Goals  Goals expected to be met by 10/14:  1. Pt will participate in Modified Barium Swallow Study to r/o aspiration and determine safest oral diet.   2. Pt will participate in speech/language/cognitive evaluation to establish further tx plan.   3.  Assess functional reading and writing skills  4.  [place] to month, year and place  5.  Respond to simple categorization tasks with 75% accuracy                                Plan:     Patient to be seen:  4 x/week   Plan of Care expires:  11/06/24  Plan of Care reviewed with:  patient, daughter   SLP Follow-Up:  Yes       Discharge recommendations:  High Intensity  Therapy     Time Tracking:     SLP Treatment Date:   10/17/24  Speech Start Time:  1400  Speech Stop Time:  1425     Speech Total Time (min):  25 min    Billable Minutes: Speech Therapy Individual 9, Treatment Swallowing Dysfunction 8, and Self Care/Home Management Training 8    10/17/2024

## 2024-10-17 NOTE — SUBJECTIVE & OBJECTIVE
Neurologic Chief Complaint: infarcts in multiple vascular territories    Subjective:     Interval History: Patient is seen for follow-up neurological assessment and treatment recommendations: See hospital course.     HPI, Past Medical, Family, and Social History remains the same as documented in the initial encounter.     Review of Systems   Neurological:  Positive for weakness.   All other systems reviewed and are negative.    Scheduled Meds:   atorvastatin  40 mg Per NG tube Daily    bisacodyL  5 mg Oral BID    carvediloL  12.5 mg Per NG tube BID    famotidine  20 mg Per NG tube Daily     Continuous Infusions:   0/9% NACL & POTASSIUM CHLORIDE 20 MEQ/L   Intravenous Continuous 100 mL/hr at 10/17/24 0442 New Bag at 10/17/24 0442    heparin (porcine) in D5W  0-40 Units/kg/hr (Adjusted) Intravenous Continuous 6.5 mL/hr at 10/17/24 0240 11 Units/kg/hr at 10/17/24 0240     PRN Meds:  Current Facility-Administered Medications:     acetaminophen, 650 mg, Per NG tube, Q8H PRN    dextrose 10%, 12.5 g, Intravenous, PRN    dextrose 10%, 25 g, Intravenous, PRN    glucagon (human recombinant), 1 mg, Intramuscular, PRN    hydrALAZINE, 10 mg, Intravenous, Q6H PRN    insulin aspart U-100, 0-10 Units, Subcutaneous, Q4H PRN    iohexol, 15 mL, Oral, PRN    sodium chloride 0.9%, 500 mL, Intravenous, PRN    sodium chloride 0.9%, 10 mL, Intravenous, PRN    Objective:     Vital Signs (Most Recent):  Temp: 97 °F (36.1 °C) (10/17/24 1153)  Pulse: 69 (10/17/24 1153)  Resp: 18 (10/17/24 1153)  BP: 121/62 (10/17/24 1153)  SpO2: (!) 93 % (10/17/24 1153)  BP Location: Left arm    Vital Signs Range (Last 24H):  Temp:  [97 °F (36.1 °C)-99.2 °F (37.3 °C)]   Pulse:  [69-91]   Resp:  [16-20]   BP: (121-195)/(62-97)   SpO2:  [93 %-98 %]   BP Location: Left arm       Physical Exam  Vitals reviewed.   Cardiovascular:      Rate and Rhythm: Normal rate.   Pulmonary:      Effort: Pulmonary effort is normal. No respiratory distress.   Abdominal:       "General: There is distension.      Palpations: Abdomen is soft.      Tenderness: There is abdominal tenderness.   Musculoskeletal:      Right lower leg: No edema.      Left lower leg: No edema.   Skin:     General: Skin is warm and dry.          Neurological Exam:   LOC: alert  Attention Span: poor  Articulation: Dysarthria  Orientation: Person, Place, Time   Visual Fields: Full  EOM (CN III, IV, VI): Full/intact  Pupils (CN II, III): PERRL  Facial Sensation (CN V): Facial sensory loss  Facial Movement (CN VII): Upper facial weakness on the Left  Motor: Arm left  Plegia 0/5  Leg left  Paresis: 0/5  Arm right  Normal 5/5  Leg right Normal 5/5  Cerebellum: No evidence of appendicular or axial ataxia  Sensation: Jason-anesthesia left    Laboratory:  CMP:   Recent Labs   Lab 10/17/24  1116   CALCIUM 8.7   ALBUMIN 2.4*   PROT 5.8*   *   K 3.7   CO2 22*   *   BUN 30*   CREATININE 0.8   ALKPHOS 88   ALT 17   AST 26   BILITOT 0.7     CBC:   Recent Labs   Lab 10/17/24  1116   WBC 12.93*   RBC 4.04   HGB 13.3   HCT 40.8      *   MCH 32.9*   MCHC 32.6     Lipid Panel: No results for input(s): "CHOL", "LDLCALC", "HDL", "TRIG" in the last 168 hours.  Hgb A1C:   No results for input(s): "HGBA1C" in the last 168 hours.    TSH: No results for input(s): "TSH" in the last 168 hours.    Diagnostic Results      Brain and Vessel Imaging   MRI/MRA Brain Ischemic Protocol: 10/6/24  Impression: Acute infarcts involving the right anterior circulation and posterior circulation as detailed above without evidence of intracranial hemorrhage or mass effect.  Embolic disease to be considered.     Chronic left parietal infarct. MRA demonstrates limited visualization of distal right KIARA branches in keeping with the territory of the acute infarct.  There is also narrowing of at least one proximal left M2 branch although no acute infarct in that territory.          Echo: 10/7/24  Cardiac Imaging     Left Ventricle: The left " ventricle is normal in size. Normal wall thickness. There is concentric remodeling. Normal wall motion. There is hyperdynamic systolic function with a visually estimated ejection fraction of greater than 70%. There is normal diastolic function.    Right Ventricle: Normal right ventricular cavity size. Wall thickness is normal. Systolic function is normal.    Left Atrium: Bubble study is likely negative, but full opacification of the RA is not well seen on these images which decreases sensitivity.    IVC/SVC: Normal venous pressure at 3 mmHg.

## 2024-10-17 NOTE — SUBJECTIVE & OBJECTIVE
Interval History: NAEON. Ongoing high volume NGT output of 1L thin gastric contents. Abdomen soft. No BM.    Medications:  Continuous Infusions:   0/9% NACL & POTASSIUM CHLORIDE 20 MEQ/L   Intravenous Continuous 100 mL/hr at 10/17/24 0442 New Bag at 10/17/24 0442    heparin (porcine) in D5W  0-40 Units/kg/hr (Adjusted) Intravenous Continuous 6.5 mL/hr at 10/17/24 0240 11 Units/kg/hr at 10/17/24 0240     Scheduled Meds:   atorvastatin  40 mg Per NG tube Daily    carvediloL  6.25 mg Per NG tube BID    famotidine  20 mg Per NG tube Daily     PRN Meds:  Current Facility-Administered Medications:     acetaminophen, 650 mg, Per NG tube, Q8H PRN    dextrose 10%, 12.5 g, Intravenous, PRN    dextrose 10%, 25 g, Intravenous, PRN    glucagon (human recombinant), 1 mg, Intramuscular, PRN    hydrALAZINE, 10 mg, Intravenous, Q6H PRN    insulin aspart U-100, 0-10 Units, Subcutaneous, Q4H PRN    iohexol, 15 mL, Oral, PRN    sodium chloride 0.9%, 500 mL, Intravenous, PRN    sodium chloride 0.9%, 10 mL, Intravenous, PRN     Review of patient's allergies indicates:  No Known Allergies  Objective:     Vital Signs (Most Recent):  Temp: 98 °F (36.7 °C) (10/17/24 0440)  Pulse: 90 (10/17/24 0440)  Resp: 18 (10/17/24 0440)  BP: (!) 158/75 (10/17/24 0440)  SpO2: 95 % (10/17/24 0440) Vital Signs (24h Range):  Temp:  [97.5 °F (36.4 °C)-98 °F (36.7 °C)] 98 °F (36.7 °C)  Pulse:  [82-94] 90  Resp:  [16-20] 18  SpO2:  [94 %-98 %] 95 %  BP: (136-169)/(75-97) 158/75     Weight: 72.6 kg (160 lb 0.9 oz)  Body mass index is 29.27 kg/m².    Intake/Output - Last 3 Shifts         10/15 0700  10/16 0659 10/16 0700  10/17 0659 10/17 0700  10/18 0659    P.O. 0      NG/GT       Total Intake(mL/kg) 0 (0)      Urine (mL/kg/hr) 1200 (0.7) 800 (0.5)     Drains 900 1000     Stool 0 0     Total Output 2100 1800     Net -2100 -1800            Stool Occurrence 1 x 0 x              Physical Exam  Vitals and nursing note reviewed.   Constitutional:       General: She  is not in acute distress.     Appearance: She is ill-appearing. She is not diaphoretic.      Comments: Room air  NGT to VLAD   HENT:      Head: Normocephalic and atraumatic.      Mouth/Throat:      Mouth: Mucous membranes are moist.      Pharynx: Oropharynx is clear.   Eyes:      Extraocular Movements: Extraocular movements intact.      Conjunctiva/sclera: Conjunctivae normal.   Cardiovascular:      Rate and Rhythm: Normal rate.   Pulmonary:      Effort: Pulmonary effort is normal. No respiratory distress.   Abdominal:      General: Abdomen is flat. There is no distension.      Palpations: Abdomen is soft.      Tenderness: There is abdominal tenderness. There is no guarding or rebound.      Comments: Well healed abdominal incisions  Mild TTP diffusely. No peritonitic signs   Musculoskeletal:      Right lower leg: No edema.      Left lower leg: No edema.   Skin:     General: Skin is warm and dry.   Neurological:      Mental Status: She is alert.      Comments: Left sided weakness          Significant Labs:  I have reviewed all pertinent lab results within the past 24 hours.    Significant Diagnostics:  I have reviewed all pertinent imaging results/findings within the past 24 hours.

## 2024-10-17 NOTE — ASSESSMENT & PLAN NOTE
BG goal: 140-180  T2DM admitted with acute KIARA stroke. Lantus d/c on 10/16 do to hypoglycemia.       - Continue Novolog Moderate dose correction with ISF 25 starting at 150    - POCT Glucose q 4 hrs while NPO   - Hypoglycemia protocol in place      ** Please notify Endocrine for any change and/or advance in diet**  ** Please call Endocrine for any BG related issues **     Discharge Planning:   TBD. Please notify endocrinology prior to discharge.

## 2024-10-17 NOTE — ASSESSMENT & PLAN NOTE
Tammy Multani is a 64 y.o. lady admitted with an KIARA stroke now with concerns for SBO vs ileus. She is undergoing non-operative management with bowel rest, NGT decompression. Overall clinical course is improving with resolution of pain/tenderness, soft and non-distended abromen though still with dark brown, feculent appearing fluid from NG tube. Will continue with Ng decompression and bowel rest. GGC with 2x Xrays without contrast in the colon though now in small bowel.     - KUB 10/17 demonstrates passage of contrast to sigmoid colon however clinically appears obstructed with high NGT output and no BM.   - Will discuss with staff regarding possibility for OR and timing.  - NPO with mIVF   - Continue NGT decompression.  - replete electrolytes; K>4, Phos>3, Mag>2  - eliquis is likely not being absorbed if she has an ileus or SBO, would consider switching to a heparin drip  - Serial abdominal exams  - Remainder of care per primary team  - Please contact general surgery with any questions, concerns, or clinical status changes

## 2024-10-17 NOTE — PROGRESS NOTES
"Zackary Long - Neurosurgery ([facility])  Endocrinology  Progress Note    Admit Date: 10/6/2024     Reason for Consult: Management of T2DM, Hyperglycemia       Diabetes diagnosis year:  Over 5 years    Home Diabetes Medications:    -liraglutide  -Levemir 35 units QHS    How often checking glucose at home? Not checking       Diabetes Complications include:     Hyperglycemia    Complicating diabetes co morbidities:   History of CVA      HPI:   Patient is a 64 y.o. female with  DM2 , HTN who was transferred from [facility] for higher level of care secondary to acute KIARA stroke on CTA. Briefly,the patient presented to the OSH overnight for slurred speech started at 1:30 a.m. in the morning. She has a history of his stroke with left-sided deficits but is normally able to transfer and do a little walking.  Endocrine consulted for BG management.      Interval HPI:   Overnight events: No acute events overnight. Patient in room 960/960 A. Blood glucose stable. BG at goal on current insulin regimen (SSI ). Steroid use- None   Renal function- Normal   Vasopressors-  None       Endocrine will continue to follow and manage insulin orders inpatient.     Diet NPO     Eating:   NPO  Nausea: No  Hypoglycemia and intervention: No  Fever: No  TPN and/or TF: No  If yes, type of TF/TPN and rate: n/a    BP (!) 195/95   Pulse 86   Temp 99.2 °F (37.3 °C) (Oral)   Resp 18   Ht 5' 2" (1.575 m)   Wt 72.6 kg (160 lb 0.9 oz)   LMP  (LMP Unknown) Comment: Doesn't  have periods anymore  SpO2 98%   Breastfeeding No   BMI 29.27 kg/m²     Labs Reviewed and Include    No results for input(s): "GLU", "CALCIUM", "ALBUMIN", "PROT", "NA", "K", "CO2", "CL", "BUN", "CREATININE", "ALKPHOS", "ALT", "AST", "BILITOT" in the last 24 hours.  Lab Results   Component Value Date    WBC 14.50 (H) 10/16/2024    HGB 15.8 10/16/2024    HCT 45.9 10/16/2024    MCV 98 10/16/2024     10/16/2024     No results for input(s): "TSH", "FREET4" in the last " "168 hours.  Lab Results   Component Value Date    HGBA1C 10.0 (H) 10/08/2024       Nutritional status:   Body mass index is 29.27 kg/m².  Lab Results   Component Value Date    ALBUMIN 2.7 (L) 10/16/2024    ALBUMIN 2.8 (L) 10/15/2024    ALBUMIN 3.1 (L) 10/14/2024     No results found for: "PREALBUMIN"    Estimated Creatinine Clearance: 58.1 mL/min (based on SCr of 0.9 mg/dL).    Accu-Checks  Recent Labs     10/16/24  0001 10/16/24  0039 10/16/24  0400 10/16/24  0801 10/16/24  1120 10/16/24  1603 10/16/24  2019 10/16/24  2356 10/17/24  0408 10/17/24  0806   POCTGLUCOSE 67* 170* 96 83 98 120* 103 102 101 83       Current Medications and/or Treatments Impacting Glycemic Control  Immunotherapy:    Immunosuppressants       None          Steroids:   Hormones (From admission, onward)      None          Pressors:    Autonomic Drugs (From admission, onward)      None          Hyperglycemia/Diabetes Medications:   Antihyperglycemics (From admission, onward)      Start     Stop Route Frequency Ordered    10/14/24 1317  insulin aspart U-100 pen 0-10 Units         -- SubQ Every 4 hours PRN 10/14/24 1217            ASSESSMENT and PLAN    Neuro  * Acute arterial ischemic stroke, multifocal, multiple vascular territories  Managed by primary team  Optimize BG control      Cardiac/Vascular  HTN (hypertension)  Managed by primary team  Optimize BG control      Endocrine  Type 2 diabetes mellitus, with long-term current use of insulin  BG goal: 140-180  T2DM admitted with acute KIARA stroke. Lantus d/c on 10/16 do to hypoglycemia.       - Continue Novolog Moderate dose correction with ISF 25 starting at 150    - POCT Glucose q 4 hrs while NPO   - Hypoglycemia protocol in place      ** Please notify Endocrine for any change and/or advance in diet**  ** Please call Endocrine for any BG related issues **     Discharge Planning:   TBD. Please notify endocrinology prior to discharge.            Mattie Ramsey, NP  Endocrinology  Zackary Long - " Neurosurgery ([facility])

## 2024-10-17 NOTE — PROGRESS NOTES
"Zackary Long - Neurosurgery ([facility])  Vascular Neurology  Comprehensive Stroke Center  Progress Note    Assessment/Plan:     * Acute arterial ischemic stroke, multifocal, multiple vascular territories  65 y/o F transferred from [facility] for higher level of care secondary to acute KIARA stroke on CTA.  Briefly,the patient presented to the OSH overnight for slurred speech started at 1:30 a.m. in the morning.  She has a history of his stroke with left-sided deficits but is normally able to transfer and do a little walking. This morning, she had complete hemiplegia of the left side. Telestroke consult performed, concerning for LVO.  NIH 13. CTA Stroke MP performed and  was read as "acute R KIARA infarct". No TNK due to AC use. Patient accepted to ED as transfer for emergent vascular neurology evaluation. No endovascular intervention. History of atrial fibrillation per discussion with daughter. Was started on Eliquis on 10/6: hold given size of infarct, start ASA on 10/8. Currently on heparin drip given likely lack of absorption through NG tube in setting of Ileus. Given history of Afib, etiology likely embolic. Though not apparent on MRA, if R KIARA occlusion was proximal (supported by MRI) could account for R MCA-like picture.    Transient episodes of altered awareness: EEG negative, and no change in frequency observed by family w/Keppra so have stopped this medicine.     -Heparin drip   -high intensity statin  -SBP<220 in acute setting  -echo done, w/o evidence of intra-cardiac shunt, no LAE, but does have history of Afib  -PT/OT  -NPO    Ileus  Patient was seen by general surgery for some episodes of nausea and emesis and some constipation. KUB was concerning for SBO vs. Ileus. Follow up CT AP did not show evidence of mechanical obstruction, was more indicative of Ileus. XR gastrogaffin challenge ordered by general surgery.     Plan:   - place NPO. Continue NGT to LIWS   - monitor electrolytes daily, correct as " needed   - Eliquis switched to heparin as it might not have been fully absorbed considering Ileus    - suppository ordered today, will f/u on effectiveness   - general surgery following, appreciate recs     Atrial fibrillation  Takes Eliquis 5mg BID and Coreg 6.25mg BID at home.  Initially Eliquis held given size of stroke; ASA 81mg QD in the interim. Eliquis was restarted on 10/10.       Plan:   -continue at home coreg regimen   -eliquis currently switched to heparin drip due to issues described in ileus section of the assessment and plan.     HTN (hypertension)  Home medication regimen as below  -lisinopril 40mg QD  -amlodipine 10mg QD    Plan:   -lisinopril and amlodipine were initially held in the setting of KAILYN. Was started on coreg in the context of Afib.   -continue coreg   -if patient's BP still elevated, consider adding back amlodipine and lisinopril     Acute focal neurological deficit  Episodes of transient altered mental status observed by daughter, sometimes with a brief left gaze. Given location of KIARA stroke, considered seizure. EEG done on 10/11, negative.     Plan:   - no longer on keppra regimen     Hypernatremia  Plan:   - continue to monitor   - repeat lab tonight, will begin correction if it continues to rise   - correct as needed     KAILYN (acute kidney injury)  Cr was initially 1.6, up from a baseline of 1. Likely pre-renal KAILYN, due to decreased PO intake. Resolved with fluids.     Plan:   - Continue to monitor CMP.     Type 2 diabetes mellitus, with long-term current use of insulin  History of. Takes Levemir 35 units QHS at home. A1C 10%.    -goal 140-180 while admitted  -SSI prn   -continue to monitor glucose  -Endocrine consulted for A1c 10%; following, appreciate recs       Opioid use  History of. Prescribed oxycodone 15mg at home. Family concerned that medication is being diverted by family member, unclear how often patient takes. Reportedly is prescribed for neck pain.     Plan:   -  holding oxycodone 5 mg BID   -monitor for signs of withdrawal, as is unclear how often patient is taking    Left hemiplegia  See stroke    Plan:   - continue PT/OT/SLP          10/7: DOTTIE gómez exam stable, echo today, NPO aside from meds per speech, barium study tmrw  10/08/2024 NAEON, stable neuro exam, went for the Br study, and was started on a diet. Endocrine consulted for the A1c 10%, pending recs.  10/09/2024 NAEON, Stable neuro exam, will restart Lisinopril 20 mg (half of her dome dose). Seen by endocrine, with adjustment of her insulin doses.  10/10/2024 NAEON, no change in her neuro exam, will restart Eliquis today, BP is better controlled.  10/11/2024 NAEON, stable neuro exam, still decreased PO intake. Patient gets some episodes of in attention; EEG was ordered. Endocrine adjusted her insulins. Accepted at [facility] in [place].   10/12/2024 DOTTIE, waiting on EEG interpretation (contacted epilepsy), started Keppra 500mg BID and will see if reduces transient alterations in awareness, neurologic examination stable  10/13/2024 NAEON, EEG w/encephalopathy (no seizure or discharges), stop Keppra, SBP ~150 to 160 so increased Coreg to 6.25mg BID  10/14/2024 General surgery was consulted overnight for constipation, episodes of nausea and emesis. KUB concerning for SBO vs. Ileus. F/u CT AP was more indicative of ileus, no evidence of mechanical obstruction was observed. Patient is NPO, can get some meds via NGT. Eliquis switched to heparin drip.   10/15/2024 Patient has still not had an actual bowel movement. Is distended, has some abdominal pain. General surgery following, is getting XR gastrogaffin challenge, will f/u results.   10/16/2024 Still monitoring patient for bowel movements in the setting of Ileus. General surgery is following. Mild hyperkalemia and hypernatremia this morning; will continue to monitor.   10/17/2024 Patient still has not had a bowel movement. Ileus not resolved on repeat imaging.  Suppository ordered earlier this morning, will monitor to see if improvement with suppository.     STROKE DOCUMENTATION   Acute Stroke Times   Last Known Normal Date:  (OSH)  Stroke Team Called Date: 10/06/24  Stroke Team Called Time:  (At OSH)  Stroke Team Arrival Date: 10/06/24  Stroke Team Arrival Time: 0608  CT Interpretation Time:  (CTA done around 4:20am per [place] [facility]  Thrombolytic Therapy Recommended: No  Thrombectomy Recommended:  (Pending MRI Brain)    NIH Scale:  1a. Level of Consciousness: 0-->Alert, keenly responsive  1b. LOC Questions: 0-->Answers both questions correctly  1c. LOC Commands: 0-->Performs both tasks correctly  2. Best Gaze: 0-->Normal  3. Visual: 0-->No visual loss  4. Facial Palsy: 1-->Minor paralysis (flattened nasolabial fold, asymmetry on smiling)  5a. Motor Arm, Left: 4-->No movement  5b. Motor Arm, Right: 0-->No drift, limb holds 90 (or 45) degrees for full 10 secs  6a. Motor Leg, Left: 4-->No movement  6b. Motor Leg, Right: 0-->No drift, leg holds 30 degree position for full 5 secs  7. Limb Ataxia: 0-->Absent  8. Sensory: 1-->Mild-to-moderate sensory loss, patient feels pinprick is less sharp or is dull on the affected side, or there is a loss of superficial pain with pinprick, but patient is aware of being touched  9. Best Language: 0-->No aphasia, normal  10. Dysarthria: 1-->Mild-to-moderate dysarthria, patient slurs at least some words and, at worst, can be understood with some difficulty  11. Extinction and Inattention (formerly Neglect): 0-->No abnormality  Total (NIH Stroke Scale): 11       Modified Waelder Score: 3  Diane Coma Scale:    ABCD2 Score:    [id number] Score:   HAS -BLED Score:   ICH Score:   Hunt & Shane Classification:      Hemorrhagic change of an Ischemic Stroke: Does this patient have an ischemic stroke with hemorrhagic changes? No     Neurologic Chief Complaint: infarcts in multiple vascular territories    Subjective:      Interval History: Patient is seen for follow-up neurological assessment and treatment recommendations: See hospital course.     HPI, Past Medical, Family, and Social History remains the same as documented in the initial encounter.     Review of Systems   Neurological:  Positive for weakness.   All other systems reviewed and are negative.    Scheduled Meds:   atorvastatin  40 mg Per NG tube Daily    bisacodyL  5 mg Oral BID    carvediloL  12.5 mg Per NG tube BID    famotidine  20 mg Per NG tube Daily     Continuous Infusions:   0/9% NACL & POTASSIUM CHLORIDE 20 MEQ/L   Intravenous Continuous 100 mL/hr at 10/17/24 0442 New Bag at 10/17/24 0442    heparin (porcine) in D5W  0-40 Units/kg/hr (Adjusted) Intravenous Continuous 6.5 mL/hr at 10/17/24 0240 11 Units/kg/hr at 10/17/24 0240     PRN Meds:  Current Facility-Administered Medications:     acetaminophen, 650 mg, Per NG tube, Q8H PRN    dextrose 10%, 12.5 g, Intravenous, PRN    dextrose 10%, 25 g, Intravenous, PRN    glucagon (human recombinant), 1 mg, Intramuscular, PRN    hydrALAZINE, 10 mg, Intravenous, Q6H PRN    insulin aspart U-100, 0-10 Units, Subcutaneous, Q4H PRN    iohexol, 15 mL, Oral, PRN    sodium chloride 0.9%, 500 mL, Intravenous, PRN    sodium chloride 0.9%, 10 mL, Intravenous, PRN    Objective:     Vital Signs (Most Recent):  Temp: 97 °F (36.1 °C) (10/17/24 1153)  Pulse: 69 (10/17/24 1153)  Resp: 18 (10/17/24 1153)  BP: 121/62 (10/17/24 1153)  SpO2: (!) 93 % (10/17/24 1153)  BP Location: Left arm    Vital Signs Range (Last 24H):  Temp:  [97 °F (36.1 °C)-99.2 °F (37.3 °C)]   Pulse:  [69-91]   Resp:  [16-20]   BP: (121-195)/(62-97)   SpO2:  [93 %-98 %]   BP Location: Left arm       Physical Exam  Vitals reviewed.   Cardiovascular:      Rate and Rhythm: Normal rate.   Pulmonary:      Effort: Pulmonary effort is normal. No respiratory distress.   Abdominal:      General: There is distension.      Palpations: Abdomen is soft.      Tenderness: There is  "abdominal tenderness.   Musculoskeletal:      Right lower leg: No edema.      Left lower leg: No edema.   Skin:     General: Skin is warm and dry.          Neurological Exam:   LOC: alert  Attention Span: poor  Articulation: Dysarthria  Orientation: Person, Place, Time   Visual Fields: Full  EOM (CN III, IV, VI): Full/intact  Pupils (CN II, III): PERRL  Facial Sensation (CN V): Facial sensory loss  Facial Movement (CN VII): Upper facial weakness on the Left  Motor: Arm left  Plegia 0/5  Leg left  Paresis: 0/5  Arm right  Normal 5/5  Leg right Normal 5/5  Cerebellum: No evidence of appendicular or axial ataxia  Sensation: Jason-anesthesia left    Laboratory:  CMP:   Recent Labs   Lab 10/17/24  1116   CALCIUM 8.7   ALBUMIN 2.4*   PROT 5.8*   *   K 3.7   CO2 22*   *   BUN 30*   CREATININE 0.8   ALKPHOS 88   ALT 17   AST 26   BILITOT 0.7     CBC:   Recent Labs   Lab 10/17/24  1116   WBC 12.93*   RBC 4.04   HGB 13.3   HCT 40.8      *   MCH 32.9*   MCHC 32.6     Lipid Panel: No results for input(s): "CHOL", "LDLCALC", "HDL", "TRIG" in the last 168 hours.  Hgb A1C:   No results for input(s): "HGBA1C" in the last 168 hours.    TSH: No results for input(s): "TSH" in the last 168 hours.    Diagnostic Results      Brain and Vessel Imaging   MRI/MRA Brain Ischemic Protocol: 10/6/24  Impression: Acute infarcts involving the right anterior circulation and posterior circulation as detailed above without evidence of intracranial hemorrhage or mass effect.  Embolic disease to be considered.     Chronic left parietal infarct. MRA demonstrates limited visualization of distal right KIARA branches in keeping with the territory of the acute infarct.  There is also narrowing of at least one proximal left M2 branch although no acute infarct in that territory.          Echo: 10/7/24  Cardiac Imaging     Left Ventricle: The left ventricle is normal in size. Normal wall thickness. There is concentric remodeling. Normal " wall motion. There is hyperdynamic systolic function with a visually estimated ejection fraction of greater than 70%. There is normal diastolic function.    Right Ventricle: Normal right ventricular cavity size. Wall thickness is normal. Systolic function is normal.    Left Atrium: Bubble study is likely negative, but full opacification of the RA is not well seen on these images which decreases sensitivity.    IVC/SVC: Normal venous pressure at 3 mmHg.    Arian Williamson MD  [facility]  Department of Vascular Neurology   [facility])

## 2024-10-17 NOTE — PLAN OF CARE
POC updated and reviewed with the patient and daughter at the bedside. Questions regarding POC were encouraged and addressed. VSS, see flowsheets. Patient is AOX 4 at this time. VISI *** for additional monitoring. NAEON. Fall and safety precautions maintained, no signs of injury noted during shift. Patient repositioned independently/ with assistance in bed for comfort. Upon exiting room, patient's bed locked in low position, side rails up x ***, bed alarm ***, with call light within reach. Instructed patient to call staff for mobility, verbalized understanding. Stroke book and education reviewed at the bedside, see education flowsheets for details. No acute signs of distress noted at this time.     Problem: Skin Injury Risk Increased  Goal: Skin Health and Integrity  Outcome: Progressing     Problem: Stroke, Ischemic (Includes Transient Ischemic Attack)  Goal: Optimal Coping  Outcome: Progressing  Goal: Effective Bowel Elimination  Outcome: Progressing  Goal: Optimal Cerebral Tissue Perfusion  Outcome: Progressing  Goal: Optimal Cognitive Function  Outcome: Progressing  Goal: Improved Communication Skills  Outcome: Progressing  Goal: Optimal Functional Ability  Outcome: Progressing  Goal: Optimal Nutrition Intake  Outcome: Progressing  Goal: Effective Oxygenation and Ventilation  Outcome: Progressing  Goal: Improved Sensorimotor Function  Outcome: Progressing  Goal: Safe and Effective Swallow  Outcome: Progressing  Goal: Effective Urinary Elimination  Outcome: Progressing     Problem: Fall Injury Risk  Goal: Absence of Fall and Fall-Related Injury  Outcome: Progressing     Problem: Adult Inpatient Plan of Care  Goal: Plan of Care Review  Outcome: Progressing  Goal: Patient-Specific Goal (Individualized)  Description: Pt will maintain sbp below 220  Outcome: Progressing  Goal: Absence of Hospital-Acquired Illness or Injury  Outcome: Progressing  Goal: Optimal Comfort and Wellbeing  Outcome: Progressing  Goal:  Readiness for Transition of Care  Outcome: Progressing     Problem: Infection  Goal: Absence of Infection Signs and Symptoms  Outcome: Progressing     Problem: Diabetes Comorbidity  Goal: Blood Glucose Level Within Targeted Range  Outcome: Progressing     Problem: Acute Kidney Injury/Impairment  Goal: Fluid and Electrolyte Balance  Outcome: Progressing  Goal: Improved Oral Intake  Outcome: Progressing  Goal: Effective Renal Function  Outcome: Progressing     Problem: Restraint, Nonviolent  Goal: Absence of Harm or Injury  Outcome: Progressing

## 2024-10-17 NOTE — CARE UPDATE
I have reviewed the chart of Tammy Multani who is hospitalized for the following:    Active Hospital Problems    Diagnosis    *Acute arterial ischemic stroke, multifocal, multiple vascular territories    Encephalopathy, metabolic     Noted to have transient alteration in mental status  Likely due to ileus  See treatment for ileus      Hypernatremia    Ileus    Acute focal neurological deficit    KAILYN (acute kidney injury)    Hypercoagulable state due to atrial fibrillation     History of atrial fibrillation per discussion with daughter. Was started on Eliquis on 10/6: hold given size of infarct, start ASA on 10/8. Transition again to Eliquis in 5-7 days       Chronic anticoagulation     History of atrial fibrillation per discussion with daughter. Was started on Eliquis on 10/6: hold given size of infarct, start ASA on 10/8. Transition again to Eliquis in 5-7 days       Hyponatremia     Monitor with daily labs      Reduced mobility     Therapy to evaluate and treat       Sensory loss    HTN (hypertension)    Left hemiplegia    Opioid use    Atrial fibrillation    Type 2 diabetes mellitus, with long-term current use of insulin        Jacque Whitfield NP  [facility]

## 2024-10-17 NOTE — PT/OT/SLP PROGRESS
Physical Therapy Treatment  Co Treatment with Occupational Therapy    Patient Name:  Tammy Multani   MRN:  [id number]    Recommendations:     Discharge Recommendations: High Intensity Therapy  Discharge Equipment Recommendations: hospital bed, lift device, wheelchair  Barriers to discharge:  Increased skilled assistance required    Assessment:     Tammy Multani is a 64 y.o. female admitted with a medical diagnosis of Acute arterial ischemic stroke, multifocal, multiple vascular territories.  She presents with the following impairments/functional limitations: weakness, impaired balance, decreased safety awareness, impaired endurance, impaired self care skills, impaired functional mobility, gait instability, decreased lower extremity function, decreased upper extremity function, decreased ROM, impaired coordination, impaired fine motor.    Pt met with HOB elevated and agreeable to session. Pt tolerates session well with emphasis on bed mobility, sitting balance, and therex. Pt continues to demo L side neglect and absent motor activation of LLE. Significant assistance x2 persons required for bed mobility and Mod-total A required with sitting balance. Pt will continue to benefit from skilled therapy services.    Rehab Prognosis: Good; patient would benefit from acute skilled PT services to address these deficits and reach maximum level of function.    Recent Surgery: * No surgery found *      Plan:     During this hospitalization, patient to be seen 4 x/week to address the identified rehab impairments via gait training, therapeutic activities, therapeutic exercises, neuromuscular re-education, canalith reposition procedure and progress toward the following goals:    Plan of Care Expires:  11/07/24    Subjective     Chief Complaint: none stated  Patient/Family Comments/goals: agreeable to session  Pain/Comfort:  Pain Rating 1: 0/10  Pain Rating Post-Intervention 1: 0/10      Objective:     Communicated with RN  (Mary) prior to session.  Patient found HOB elevated with bed alarm, telemetry, PureWick, NG tube, SCD, peripheral IV, pulse ox (continuous),daughter present upon PTA entry to room.     General Precautions: Standard, fall  Orthopedic Precautions: N/A  Braces: N/A  Respiratory Status: Room air     Functional Mobility:  Bed Mobility:     Rolling Left:  total assistance  Scooting: anteriorly to EOB total assistance  Supine to Sit: total assistance x2 persons for trunk/BLEs  Sit to Supine: total assistance x2 persons for Trunk/BLEs  Balance:   Sitting Balance at EOB:  Level of Assist Required: Maximum Assistance- CGA  Deviations noted: slouched posture, L anterior lean, posterior lean  Total Time Sitting EOB: 20 minutes      AM-PAC 6 CLICK MOBILITY  Turning over in bed (including adjusting bedclothes, sheets and blankets)?: 2  Sitting down on and standing up from a chair with arms (e.g., wheelchair, bedside commode, etc.): 1  Moving from lying on back to sitting on the side of the bed?: 1  Moving to and from a bed to a chair (including a wheelchair)?: 1  Need to walk in hospital room?: 1  Climbing 3-5 steps with a railing?: 1  Basic Mobility Total Score: 7       Treatment & Education:  Patient provided with daily orientation and goals of this PT session.     Activities completed at EOB:  X10 AP, LAQ, hip flexion (PROM on LLE)  X4 L lateral leans  Visual scanning, pt able to cross midline but can't maintain    Pt  educated to call for assistance and to transfer with hospital staff only.  Also, pt was educated on the effects of prolonged immobility and the importance of performing OOB activity and exercises to promote healing and reduce recovery time.    Co tx performed with OT due to patient need for 2 skilled therapist to ensure patient and staff safety and to accommondate for activity tolerance.    Patient left HOB elevated with all lines intact, call button in reach, bed alarm on, RN notified, and daughter  present.    GOALS:   Multidisciplinary Problems       Physical Therapy Goals          Problem: Physical Therapy    Goal Priority Disciplines Outcome Interventions   Physical Therapy Goal     PT, PT/OT Progressing    Description: Goals to be met by: 2024     Patient will increase functional independence with mobility by performin. Supine to sit with Moderate Assistance  2. Sit to supine with Moderate Assistance  3. Bed to chair transfer with Maximum Assistance using LRAD  4. Sitting at edge of bed x5 minutes with Stand-by Assistance  5. Lower extremity exercise program x15 reps per handout, with independence    Hospital Bed - Patient requires a hospital bed for positioning of the body in ways that are not feasible with an ordinary bed. The patient requires special positioning for pain relief, limited mobility, and/or being unable to independently make changes in body position without the use of a hospital bed. Pillows and wedges will not be adequate for resolving these positional issues.     Wheelchair - Patient has a mobility limitation that significantly impairs their ability to participate in one or more mobility related activities of daily living in customary locations in the home. The mobility limitation cannot be sufficiently resolved by the use of a cane or walker. The use of a manual wheelchair will greatly improve the patient's ability to participate in MRADLs. The patient will use the wheelchair on a regular basis at home. They have expressed their willingness to use a manual wheelchair in the home, and have a caregiver who is available and willing to assist with the wheelchair if needed.                       Time Tracking:     PT Received On: 10/17/24  PT Start Time: 856     PT Stop Time: 924  PT Total Time (min): 28 min     Billable Minutes: Therapeutic Exercise 13 and Neuromuscular Re-education 15    Treatment Type: Treatment  PT/PTA: PTA     Number of PTA visits since last PT visit: 1      10/17/2024

## 2024-10-17 NOTE — SUBJECTIVE & OBJECTIVE
"Interval HPI:   Overnight events: No acute events overnight. Patient in room 960/960 A. Blood glucose stable. BG at goal on current insulin regimen (SSI ). Steroid use- None   Renal function- Normal   Vasopressors-  None       Endocrine will continue to follow and manage insulin orders inpatient.     Diet NPO     Eating:   NPO  Nausea: No  Hypoglycemia and intervention: No  Fever: No  TPN and/or TF: No  If yes, type of TF/TPN and rate: n/a    BP (!) 195/95   Pulse 86   Temp 99.2 °F (37.3 °C) (Oral)   Resp 18   Ht 5' 2" (1.575 m)   Wt 72.6 kg (160 lb 0.9 oz)   LMP  (LMP Unknown) Comment: Doesn't  have periods anymore  SpO2 98%   Breastfeeding No   BMI 29.27 kg/m²     Labs Reviewed and Include    No results for input(s): "GLU", "CALCIUM", "ALBUMIN", "PROT", "NA", "K", "CO2", "CL", "BUN", "CREATININE", "ALKPHOS", "ALT", "AST", "BILITOT" in the last 24 hours.  Lab Results   Component Value Date    WBC 14.50 (H) 10/16/2024    HGB 15.8 10/16/2024    HCT 45.9 10/16/2024    MCV 98 10/16/2024     10/16/2024     No results for input(s): "TSH", "FREET4" in the last 168 hours.  Lab Results   Component Value Date    HGBA1C 10.0 (H) 10/08/2024       Nutritional status:   Body mass index is 29.27 kg/m².  Lab Results   Component Value Date    ALBUMIN 2.7 (L) 10/16/2024    ALBUMIN 2.8 (L) 10/15/2024    ALBUMIN 3.1 (L) 10/14/2024     No results found for: "PREALBUMIN"    Estimated Creatinine Clearance: 58.1 mL/min (based on SCr of 0.9 mg/dL).    Accu-Checks  Recent Labs     10/16/24  0001 10/16/24  0039 10/16/24  0400 10/16/24  0801 10/16/24  1120 10/16/24  1603 10/16/24  2019 10/16/24  2356 10/17/24  0408 10/17/24  0806   POCTGLUCOSE 67* 170* 96 83 98 120* 103 102 101 83       Current Medications and/or Treatments Impacting Glycemic Control  Immunotherapy:    Immunosuppressants       None          Steroids:   Hormones (From admission, onward)      None          Pressors:    Autonomic Drugs (From admission, onward) "      None          Hyperglycemia/Diabetes Medications:   Antihyperglycemics (From admission, onward)      Start     Stop Route Frequency Ordered    10/14/24 1317  insulin aspart U-100 pen 0-10 Units         -- SubQ Every 4 hours PRN 10/14/24 1217

## 2024-10-17 NOTE — ASSESSMENT & PLAN NOTE
Patient was seen by general surgery for some episodes of nausea and emesis and some constipation. KUB was concerning for SBO vs. Ileus. Follow up CT AP did not show evidence of mechanical obstruction, was more indicative of Ileus. XR gastrogaffin challenge ordered by general surgery.     Plan:   - place NPO. Continue NGT to LIWS   - monitor electrolytes daily, correct as needed   - Eliquis switched to heparin as it might not have been fully absorbed considering Ileus    - suppository ordered today, will f/u on effectiveness   - general surgery following, appreciate recs

## 2024-10-17 NOTE — PLAN OF CARE
Problem: Skin Injury Risk Increased  Goal: Skin Health and Integrity  Outcome: Progressing     Problem: Stroke, Ischemic (Includes Transient Ischemic Attack)  Goal: Optimal Coping  Outcome: Progressing  Goal: Effective Bowel Elimination  Outcome: Progressing  Goal: Optimal Cerebral Tissue Perfusion  Outcome: Progressing  Goal: Optimal Cognitive Function  Outcome: Progressing  Goal: Improved Communication Skills  Outcome: Progressing  Goal: Effective Oxygenation and Ventilation  Outcome: Progressing  Goal: Effective Urinary Elimination  Outcome: Progressing     Problem: Fall Injury Risk  Goal: Absence of Fall and Fall-Related Injury  Outcome: Progressing     Problem: Adult Inpatient Plan of Care  Goal: Plan of Care Review  Outcome: Progressing  Goal: Patient-Specific Goal (Individualized)  Description: Pt will maintain sbp below 220  Outcome: Progressing  Goal: Absence of Hospital-Acquired Illness or Injury  Outcome: Progressing  Goal: Optimal Comfort and Wellbeing  Outcome: Progressing  Goal: Readiness for Transition of Care  Outcome: Progressing     Problem: Infection  Goal: Absence of Infection Signs and Symptoms  Outcome: Progressing     Problem: Diabetes Comorbidity  Goal: Blood Glucose Level Within Targeted Range  Outcome: Progressing     Problem: Acute Kidney Injury/Impairment  Goal: Fluid and Electrolyte Balance  Outcome: Progressing  Goal: Improved Oral Intake  Outcome: Progressing  Goal: Effective Renal Function  Outcome: Progressing     Problem: Restraint, Nonviolent  Goal: Absence of Harm or Injury  Outcome: Progressing

## 2024-10-17 NOTE — PROGRESS NOTES
[facility] - Neurosurgery ([facility])  General Surgery  Progress Note    Subjective:     History of Present Illness:  Tammy Multani is a 64 y.o. lady with PMH DM, HTN, prior stroke with left sided deficits, afib on eliquis admitted to the neurology service since 10/6 for an KIARA stroke. Symptoms included slurred speech and hemiplegia of her left side. She had been tolerating a diet but in the past day has had a few episodes of nausea with nonbloody nonbilious thin emesis that she describes appears like spit. Last documented bowel movement on 8/11 but reports a loose watery movement earlier today. Mildly hypokalemic, remainder of labs bland. KUB with evidence of dilated large and small bowel. General surgery consulted for recs regarding ileus vs SBO.     Preciously has had c section and a lap radha.           Post-Op Info:  * No surgery found *         Interval History: NAEON. Ongoing high volume NGT output of 1L thin gastric contents. Abdomen soft. No BM.    Medications:  Continuous Infusions:   0/9% NACL & POTASSIUM CHLORIDE 20 MEQ/L   Intravenous Continuous 100 mL/hr at 10/17/24 0442 New Bag at 10/17/24 0442    heparin (porcine) in D5W  0-40 Units/kg/hr (Adjusted) Intravenous Continuous 6.5 mL/hr at 10/17/24 0240 11 Units/kg/hr at 10/17/24 0240     Scheduled Meds:   atorvastatin  40 mg Per NG tube Daily    carvediloL  6.25 mg Per NG tube BID    famotidine  20 mg Per NG tube Daily     PRN Meds:  Current Facility-Administered Medications:     acetaminophen, 650 mg, Per NG tube, Q8H PRN    dextrose 10%, 12.5 g, Intravenous, PRN    dextrose 10%, 25 g, Intravenous, PRN    glucagon (human recombinant), 1 mg, Intramuscular, PRN    hydrALAZINE, 10 mg, Intravenous, Q6H PRN    insulin aspart U-100, 0-10 Units, Subcutaneous, Q4H PRN    iohexol, 15 mL, Oral, PRN    sodium chloride 0.9%, 500 mL, Intravenous, PRN    sodium chloride 0.9%, 10 mL, Intravenous, PRN     Review of patient's allergies indicates:  No Known  Allergies  Objective:     Vital Signs (Most Recent):  Temp: 98 °F (36.7 °C) (10/17/24 0440)  Pulse: 90 (10/17/24 0440)  Resp: 18 (10/17/24 0440)  BP: (!) 158/75 (10/17/24 0440)  SpO2: 95 % (10/17/24 0440) Vital Signs (24h Range):  Temp:  [97.5 °F (36.4 °C)-98 °F (36.7 °C)] 98 °F (36.7 °C)  Pulse:  [82-94] 90  Resp:  [16-20] 18  SpO2:  [94 %-98 %] 95 %  BP: (136-169)/(75-97) 158/75     Weight: 72.6 kg (160 lb 0.9 oz)  Body mass index is 29.27 kg/m².    Intake/Output - Last 3 Shifts         10/15 0700  10/16 0659 10/16 0700  10/17 0659 10/17 0700  10/18 0659    P.O. 0      NG/GT       Total Intake(mL/kg) 0 (0)      Urine (mL/kg/hr) 1200 (0.7) 800 (0.5)     Drains 900 1000     Stool 0 0     Total Output 2100 1800     Net -2100 -1800            Stool Occurrence 1 x 0 x              Physical Exam  Vitals and nursing note reviewed.   Constitutional:       General: She is not in acute distress.     Appearance: She is ill-appearing. She is not diaphoretic.      Comments: Room air  NGT to LIWS   HENT:      Head: Normocephalic and atraumatic.      Mouth/Throat:      Mouth: Mucous membranes are moist.      Pharynx: Oropharynx is clear.   Eyes:      Extraocular Movements: Extraocular movements intact.      Conjunctiva/sclera: Conjunctivae normal.   Cardiovascular:      Rate and Rhythm: Normal rate.   Pulmonary:      Effort: Pulmonary effort is normal. No respiratory distress.   Abdominal:      General: Abdomen is flat. There is no distension.      Palpations: Abdomen is soft.      Tenderness: There is abdominal tenderness. There is no guarding or rebound.      Comments: Well healed abdominal incisions  Mild TTP diffusely. No peritonitic signs   Musculoskeletal:      Right lower leg: No edema.      Left lower leg: No edema.   Skin:     General: Skin is warm and dry.   Neurological:      Mental Status: She is alert.      Comments: Left sided weakness          Significant Labs:  I have reviewed all pertinent lab results within the  past 24 hours.    Significant Diagnostics:  I have reviewed all pertinent imaging results/findings within the past 24 hours.  Assessment/Plan:       Lori Multani is a 64 y.o. lady admitted with an KIARA stroke now with concerns for SBO vs ileus. She is undergoing non-operative management with bowel rest, NGT decompression. Overall clinical course is improving with resolution of pain/tenderness, soft and non-distended abromen though still with dark brown, feculent appearing fluid from NG tube. Will continue with Ng decompression and bowel rest. GGC with 2x Xrays without contrast in the colon though now in small bowel.     - KUB 10/17 demonstrates passage of contrast to sigmoid colon however clinically appears obstructed with high NGT output and no BM.   - Will discuss with staff regarding possibility for OR and timing.  - NPO with mIVF   - Continue NGT decompression.  - replete electrolytes; K>4, Phos>3, Mag>2  - eliquis is likely not being absorbed if she has an ileus or SBO, would consider switching to a heparin drip  - Serial abdominal exams  - Remainder of care per primary team  - Please contact general surgery with any questions, concerns, or clinical status changes          Ashlyn Chatman MD  General Surgery  [facility] - [facility]surgery ([facility])

## 2024-10-17 NOTE — PT/OT/SLP PROGRESS
"Occupational Therapy   Co-Treatment  co-treatment performed this date due to need for 2 skilled therapists in order to ensure pt safety, accomodate for pt activity tolerance, and maximize functional potential    Name: Tammy Multani  MRN: [id number]  Admitting Diagnosis:  Acute arterial ischemic stroke, multifocal, multiple vascular territories       Recommendations:     Discharge Recommendations: High Intensity Therapy  Discharge Equipment Recommendations:  lift device, hospital bed, wheelchair  Barriers to discharge:  None    Assessment:     Tammy uMltani is a 64 y.o. female with a medical diagnosis of Acute arterial ischemic stroke, multifocal, multiple vascular territories.  She presents with performance deficits affecting function are weakness, impaired balance, decreased safety awareness, impaired endurance, impaired self care skills, impaired functional mobility, gait instability, decreased lower extremity function, decreased upper extremity function, decreased ROM, impaired coordination, impaired fine motor. Pt tolerated tx well. Able to sit EOB for ~20mins with Max-CGA. Pt will continue to benefit from skilled OT services to address impairments listed above to maximize independence with ADLs and functional mobility to ensure safe return to PLOF.     Rehab Prognosis:  Good; patient would benefit from acute skilled OT services to address these deficits and reach maximum level of function.       Plan:     Patient to be seen 4 x/week to address the above listed problems via self-care/home management, therapeutic activities, therapeutic exercises, neuromuscular re-education  Plan of Care Expires: 11/07/24  Plan of Care Reviewed with: patient, daughter    Subjective     Chief Complaint: none  Patient/Family Comments/goals: "can I have some water?" (Pt NPO)  Pain/Comfort:  Pain Rating 1: 0/10  Pain Rating Post-Intervention 1: 0/10    Objective:     Communicated with: RN prior to session.  Patient found HOB " elevated with bed alarm, telemetry, PureWick, NG tube, SCD, peripheral IV upon OT entry to room.    General Precautions: Standard, airborne, fall    Orthopedic Precautions:N/A  Braces: N/A  Respiratory Status: Room air     Occupational Performance:     Bed Mobility:    Patient completed Rolling/Turning to Left with  total assistance  Patient completed Scooting/Bridging with total assistance and 2 persons  Patient completed Supine to Sit with total assistance and 2 persons  Patient completed Sit to Supine with total assistance and 2 persons     Functional Mobility/Transfers:  Functional Mobility: Pt sat EOB for ~20mins with Max A-CGA. (A) required 2/2 significant anterior lean. Able to hold herself upright for brief moments with CGA when she was distracted (looking at R UE while phlebotomist attempted a blood draw)   Able to participate in seated therex/ROM, WB, and visual scanning ax  Patient provided with the following neuro based interventions during functional mobility: verbal and manual cueing and education for optimal posture    Activities of Daily Living:  Feeding:  total oral swab around lips       AMPAC 6 Click ADL: 10    Treatment & Education:  Pt completed x4 lateral lean onto L elbow for weightbearing and proprioceptive input   Pt participated in L visual scanning to reduce inattention, pt able to look L but had difficulty sustaining   OT performed PROM of shoulder flexion, shoulder abduction, elbow flexion/extension, to LUE to promote joint integrity and prevent further edema/contractures     Pt and family educated on   Role of OT and OT POC  Utilizing the call bell to request for assistance with all functional mobility to ensure safety during hospital stay.    Pt verbalized understanding and all questions were addressed within the scope of OT.       Patient left HOB elevated with all lines intact, call button in reach, bed alarm on, and phlebotomist and RN present    GOALS:   Multidisciplinary Problems        Occupational Therapy Goals          Problem: Occupational Therapy    Goal Priority Disciplines Outcome Interventions   Occupational Therapy Goal     OT, PT/OT Progressing    Description: Goals to be met by: 11/7/24     Patient will increase functional independence with ADLs by performing:    UE Dressing with Minimal Assistance.  LE Dressing with Moderate Assistance.  Grooming while seated with Set-up Assistance.  Toileting from bedside commode with Moderate Assistance for hygiene and clothing management.   Sitting at edge of bed x5 minutes with Contact Guard Assistance.  Supine to sit with Contact Guard Assistance.  Step transfer with Moderate Assistance    DME Justifications (see above for complete DME recommendations)    Bedside Commode- Patient has a mobility limitation that significantly impairs their ability to participate in one or more mobility related activities of daily living, including toileting. This deficit can be resolved by using a bedside commode. Patient demonstrates mobility limitations that will cause them to be confined to one room at home without bathroom access for up to 30 days. Using a bedside commode will greatly improve the patient's ability to participate in MRADLs.     Wheelchair-  Patient has a mobility limitation that significantly impairs their ability to participate in one or more mobility related activities of daily living in customary locations in the home. The mobility limitation cannot be sufficiently resolved by the use of a cane or walker. The use of a manual wheelchair will greatly improve the patient's ability to participate in MRADLs. The patient will use the wheelchair on a regular basis at home. They have expressed their willingness to use a manual wheelchair in the home, and have a caregiver who is available and willing to assist with the wheelchair if needed.     Hospital Bed-  Patient requires a hospital bed for positioning of the body in ways that are not feasible  with an ordinary bed. The patient requires special positioning for pain relief, limited mobility, and/or being unable to independently make changes in body position without the use of a hospital bed. Pillows and wedges will not be adequate for resolving these positional issues.                        Time Tracking:     OT Date of Treatment: 10/17/24  OT Start Time: 0856  OT Stop Time: 0923  OT Total Time (min): 27 min    Billable Minutes:Therapeutic Activity 27    OT/SUSANNE: OT     Number of SUSANNE visits since last OT visit: 0    10/17/2024

## 2024-10-17 NOTE — ASSESSMENT & PLAN NOTE
Plan:   - continue to monitor   - repeat lab tonight, will begin correction if it continues to rise   - correct as needed

## 2024-10-18 LAB
ALBUMIN SERPL BCP-MCNC: 2.3 G/DL (ref 3.5–5.2)
ALP SERPL-CCNC: 148 U/L (ref 40–150)
ALT SERPL W/O P-5'-P-CCNC: 22 U/L (ref 10–44)
ANION GAP SERPL CALC-SCNC: 14 MMOL/L (ref 8–16)
APTT PPP: 42.1 SEC (ref 21–32)
APTT PPP: 46.9 SEC (ref 21–32)
APTT PPP: 58.1 SEC (ref 21–32)
AST SERPL-CCNC: 36 U/L (ref 10–40)
BASOPHILS # BLD AUTO: 0.07 K/UL (ref 0–0.2)
BASOPHILS # BLD AUTO: 0.07 K/UL (ref 0–0.2)
BASOPHILS NFR BLD: 0.5 % (ref 0–1.9)
BASOPHILS NFR BLD: 0.5 % (ref 0–1.9)
BILIRUB SERPL-MCNC: 0.6 MG/DL (ref 0.1–1)
BUN SERPL-MCNC: 27 MG/DL (ref 8–23)
CALCIUM SERPL-MCNC: 8.2 MG/DL (ref 8.7–10.5)
CHLORIDE SERPL-SCNC: 119 MMOL/L (ref 95–110)
CO2 SERPL-SCNC: 20 MMOL/L (ref 23–29)
CREAT SERPL-MCNC: 0.8 MG/DL (ref 0.5–1.4)
DIFFERENTIAL METHOD BLD: ABNORMAL
DIFFERENTIAL METHOD BLD: ABNORMAL
EOSINOPHIL # BLD AUTO: 0 K/UL (ref 0–0.5)
EOSINOPHIL # BLD AUTO: 0 K/UL (ref 0–0.5)
EOSINOPHIL NFR BLD: 0.2 % (ref 0–8)
EOSINOPHIL NFR BLD: 0.2 % (ref 0–8)
ERYTHROCYTE [DISTWIDTH] IN BLOOD BY AUTOMATED COUNT: 13.1 % (ref 11.5–14.5)
ERYTHROCYTE [DISTWIDTH] IN BLOOD BY AUTOMATED COUNT: 13.1 % (ref 11.5–14.5)
EST. GFR  (NO RACE VARIABLE): >60 ML/MIN/1.73 M^2
GLUCOSE SERPL-MCNC: 67 MG/DL (ref 70–110)
HCT VFR BLD AUTO: 38.8 % (ref 37–48.5)
HCT VFR BLD AUTO: 38.8 % (ref 37–48.5)
HGB BLD-MCNC: 12 G/DL (ref 12–16)
HGB BLD-MCNC: 12 G/DL (ref 12–16)
IMM GRANULOCYTES # BLD AUTO: 0.37 K/UL (ref 0–0.04)
IMM GRANULOCYTES # BLD AUTO: 0.37 K/UL (ref 0–0.04)
IMM GRANULOCYTES NFR BLD AUTO: 2.8 % (ref 0–0.5)
IMM GRANULOCYTES NFR BLD AUTO: 2.8 % (ref 0–0.5)
LYMPHOCYTES # BLD AUTO: 1.7 K/UL (ref 1–4.8)
LYMPHOCYTES # BLD AUTO: 1.7 K/UL (ref 1–4.8)
LYMPHOCYTES NFR BLD: 12.6 % (ref 18–48)
LYMPHOCYTES NFR BLD: 12.6 % (ref 18–48)
MAGNESIUM SERPL-MCNC: 2.2 MG/DL (ref 1.6–2.6)
MCH RBC QN AUTO: 32.7 PG (ref 27–31)
MCH RBC QN AUTO: 32.7 PG (ref 27–31)
MCHC RBC AUTO-ENTMCNC: 30.9 G/DL (ref 32–36)
MCHC RBC AUTO-ENTMCNC: 30.9 G/DL (ref 32–36)
MCV RBC AUTO: 106 FL (ref 82–98)
MCV RBC AUTO: 106 FL (ref 82–98)
MONOCYTES # BLD AUTO: 0.9 K/UL (ref 0.3–1)
MONOCYTES # BLD AUTO: 0.9 K/UL (ref 0.3–1)
MONOCYTES NFR BLD: 7 % (ref 4–15)
MONOCYTES NFR BLD: 7 % (ref 4–15)
NEUTROPHILS # BLD AUTO: 10.1 K/UL (ref 1.8–7.7)
NEUTROPHILS # BLD AUTO: 10.1 K/UL (ref 1.8–7.7)
NEUTROPHILS NFR BLD: 76.9 % (ref 38–73)
NEUTROPHILS NFR BLD: 76.9 % (ref 38–73)
NRBC BLD-RTO: 0 /100 WBC
NRBC BLD-RTO: 0 /100 WBC
PHOSPHATE SERPL-MCNC: 2.8 MG/DL (ref 2.7–4.5)
PLATELET # BLD AUTO: 235 K/UL (ref 150–450)
PLATELET # BLD AUTO: 235 K/UL (ref 150–450)
PMV BLD AUTO: 11.9 FL (ref 9.2–12.9)
PMV BLD AUTO: 11.9 FL (ref 9.2–12.9)
POCT GLUCOSE: 135 MG/DL (ref 70–110)
POCT GLUCOSE: 163 MG/DL (ref 70–110)
POCT GLUCOSE: 215 MG/DL (ref 70–110)
POCT GLUCOSE: 228 MG/DL (ref 70–110)
POTASSIUM SERPL-SCNC: 4.9 MMOL/L (ref 3.5–5.1)
PROT SERPL-MCNC: 6.1 G/DL (ref 6–8.4)
RBC # BLD AUTO: 3.67 M/UL (ref 4–5.4)
RBC # BLD AUTO: 3.67 M/UL (ref 4–5.4)
SODIUM SERPL-SCNC: 149 MMOL/L (ref 136–145)
SODIUM SERPL-SCNC: 150 MMOL/L (ref 136–145)
SODIUM SERPL-SCNC: 153 MMOL/L (ref 136–145)
WBC # BLD AUTO: 13.12 K/UL (ref 3.9–12.7)
WBC # BLD AUTO: 13.12 K/UL (ref 3.9–12.7)

## 2024-10-18 PROCEDURE — 80053 COMPREHEN METABOLIC PANEL: CPT | Performed by: PHYSICIAN ASSISTANT

## 2024-10-18 PROCEDURE — 25000003 PHARM REV CODE 250: Performed by: STUDENT IN AN ORGANIZED HEALTH CARE EDUCATION/TRAINING PROGRAM

## 2024-10-18 PROCEDURE — 85730 THROMBOPLASTIN TIME PARTIAL: CPT | Mod: 91 | Performed by: STUDENT IN AN ORGANIZED HEALTH CARE EDUCATION/TRAINING PROGRAM

## 2024-10-18 PROCEDURE — 84100 ASSAY OF PHOSPHORUS: CPT | Performed by: PHYSICIAN ASSISTANT

## 2024-10-18 PROCEDURE — 11000001 HC ACUTE MED/SURG PRIVATE ROOM

## 2024-10-18 PROCEDURE — 97112 NEUROMUSCULAR REEDUCATION: CPT

## 2024-10-18 PROCEDURE — 36415 COLL VENOUS BLD VENIPUNCTURE: CPT

## 2024-10-18 PROCEDURE — 92526 ORAL FUNCTION THERAPY: CPT

## 2024-10-18 PROCEDURE — 85730 THROMBOPLASTIN TIME PARTIAL: CPT

## 2024-10-18 PROCEDURE — 99233 SBSQ HOSP IP/OBS HIGH 50: CPT | Mod: GC,,, | Performed by: STUDENT IN AN ORGANIZED HEALTH CARE EDUCATION/TRAINING PROGRAM

## 2024-10-18 PROCEDURE — 97535 SELF CARE MNGMENT TRAINING: CPT

## 2024-10-18 PROCEDURE — 83735 ASSAY OF MAGNESIUM: CPT | Performed by: PHYSICIAN ASSISTANT

## 2024-10-18 PROCEDURE — 99232 SBSQ HOSP IP/OBS MODERATE 35: CPT | Mod: ,,, | Performed by: PHYSICIAN ASSISTANT

## 2024-10-18 PROCEDURE — 97530 THERAPEUTIC ACTIVITIES: CPT | Mod: CQ

## 2024-10-18 PROCEDURE — 84295 ASSAY OF SERUM SODIUM: CPT | Mod: 91

## 2024-10-18 PROCEDURE — 25000003 PHARM REV CODE 250

## 2024-10-18 PROCEDURE — 63600175 PHARM REV CODE 636 W HCPCS: Mod: UD | Performed by: STUDENT IN AN ORGANIZED HEALTH CARE EDUCATION/TRAINING PROGRAM

## 2024-10-18 PROCEDURE — 97110 THERAPEUTIC EXERCISES: CPT | Mod: CQ

## 2024-10-18 PROCEDURE — 84295 ASSAY OF SERUM SODIUM: CPT | Performed by: STUDENT IN AN ORGANIZED HEALTH CARE EDUCATION/TRAINING PROGRAM

## 2024-10-18 PROCEDURE — 85025 COMPLETE CBC W/AUTO DIFF WBC: CPT | Performed by: PHYSICIAN ASSISTANT

## 2024-10-18 PROCEDURE — 92507 TX SP LANG VOICE COMM INDIV: CPT

## 2024-10-18 PROCEDURE — 63600175 PHARM REV CODE 636 W HCPCS: Mod: UD

## 2024-10-18 RX ORDER — DEXTROSE MONOHYDRATE 50 MG/ML
INJECTION, SOLUTION INTRAVENOUS CONTINUOUS
Status: ACTIVE | OUTPATIENT
Start: 2024-10-18 | End: 2024-10-18

## 2024-10-18 RX ORDER — BISACODYL 10 MG/1
10 SUPPOSITORY RECTAL 2 TIMES DAILY
Status: DISCONTINUED | OUTPATIENT
Start: 2024-10-18 | End: 2024-10-23

## 2024-10-18 RX ORDER — POLYETHYLENE GLYCOL 3350 17 G/17G
17 POWDER, FOR SOLUTION ORAL 2 TIMES DAILY
Status: DISCONTINUED | OUTPATIENT
Start: 2024-10-18 | End: 2024-10-19

## 2024-10-18 RX ORDER — SODIUM CHLORIDE AND POTASSIUM CHLORIDE 150; 900 MG/100ML; MG/100ML
INJECTION, SOLUTION INTRAVENOUS CONTINUOUS
Status: DISCONTINUED | OUTPATIENT
Start: 2024-10-18 | End: 2024-10-20

## 2024-10-18 RX ADMIN — BISACODYL 10 MG: 10 SUPPOSITORY RECTAL at 09:10

## 2024-10-18 RX ADMIN — POTASSIUM CHLORIDE: 2 INJECTION, SOLUTION, CONCENTRATE INTRAVENOUS at 11:10

## 2024-10-18 RX ADMIN — INSULIN ASPART 2 UNITS: 100 INJECTION, SOLUTION INTRAVENOUS; SUBCUTANEOUS at 10:10

## 2024-10-18 RX ADMIN — INSULIN ASPART 2 UNITS: 100 INJECTION, SOLUTION INTRAVENOUS; SUBCUTANEOUS at 12:10

## 2024-10-18 RX ADMIN — DEXTROSE MONOHYDRATE: 50 INJECTION, SOLUTION INTRAVENOUS at 11:10

## 2024-10-18 RX ADMIN — POLYETHYLENE GLYCOL 3350 17 G: 17 POWDER, FOR SOLUTION ORAL at 06:10

## 2024-10-18 RX ADMIN — FAMOTIDINE 20 MG: 20 TABLET ORAL at 06:10

## 2024-10-18 RX ADMIN — SODIUM CHLORIDE AND POTASSIUM CHLORIDE: .9; .15 SOLUTION INTRAVENOUS at 04:10

## 2024-10-18 RX ADMIN — BISACODYL 10 MG: 10 SUPPOSITORY RECTAL at 11:10

## 2024-10-18 RX ADMIN — ATORVASTATIN CALCIUM 40 MG: 40 TABLET, FILM COATED ORAL at 06:10

## 2024-10-18 RX ADMIN — CARVEDILOL 12.5 MG: 12.5 TABLET, FILM COATED ORAL at 09:10

## 2024-10-18 RX ADMIN — POLYETHYLENE GLYCOL 3350 17 G: 17 POWDER, FOR SOLUTION ORAL at 08:10

## 2024-10-18 RX ADMIN — CARVEDILOL 12.5 MG: 12.5 TABLET, FILM COATED ORAL at 06:10

## 2024-10-18 NOTE — PLAN OF CARE
Problem: Occupational Therapy  Goal: Occupational Therapy Goal  Description: Goals to be met by: 11/7/24     Patient will increase functional independence with ADLs by performing:    UE Dressing with Minimal Assistance.  LE Dressing with Moderate Assistance.  Grooming while seated with Set-up Assistance.  Toileting from bedside commode with Moderate Assistance for hygiene and clothing management.   Sitting at edge of bed x5 minutes with Contact Guard Assistance.  Supine to sit with Contact Guard Assistance.  Step transfer with Moderate Assistance    DME Justifications (see above for complete DME recommendations)    Bedside Commode- Patient has a mobility limitation that significantly impairs their ability to participate in one or more mobility related activities of daily living, including toileting. This deficit can be resolved by using a bedside commode. Patient demonstrates mobility limitations that will cause them to be confined to one room at home without bathroom access for up to 30 days. Using a bedside commode will greatly improve the patient's ability to participate in MRADLs.     Wheelchair-  Patient has a mobility limitation that significantly impairs their ability to participate in one or more mobility related activities of daily living in customary locations in the home. The mobility limitation cannot be sufficiently resolved by the use of a cane or walker. The use of a manual wheelchair will greatly improve the patient's ability to participate in MRADLs. The patient will use the wheelchair on a regular basis at home. They have expressed their willingness to use a manual wheelchair in the home, and have a caregiver who is available and willing to assist with the wheelchair if needed.     Hospital Bed-  Patient requires a hospital bed for positioning of the body in ways that are not feasible with an ordinary bed. The patient requires special positioning for pain relief, limited mobility, and/or being  unable to independently make changes in body position without the use of a hospital bed. Pillows and wedges will not be adequate for resolving these positional issues.   Outcome: Progressing     Goals remain appropriate

## 2024-10-18 NOTE — PLAN OF CARE
Problem: Skin Injury Risk Increased  Goal: Skin Health and Integrity  Outcome: Progressing     Problem: Stroke, Ischemic (Includes Transient Ischemic Attack)  Goal: Optimal Coping  Outcome: Progressing  Goal: Effective Bowel Elimination  Outcome: Progressing  Goal: Optimal Cerebral Tissue Perfusion  Outcome: Progressing  Goal: Optimal Cognitive Function  Outcome: Progressing  Goal: Improved Communication Skills  Outcome: Progressing  Goal: Optimal Functional Ability  Outcome: Progressing  Goal: Optimal Nutrition Intake  Outcome: Progressing  Goal: Effective Oxygenation and Ventilation  Outcome: Progressing  Goal: Improved Sensorimotor Function  Outcome: Progressing  Goal: Safe and Effective Swallow  Outcome: Progressing  Goal: Effective Urinary Elimination  Outcome: Progressing     Problem: Fall Injury Risk  Goal: Absence of Fall and Fall-Related Injury  Outcome: Progressing     Problem: Adult Inpatient Plan of Care  Goal: Plan of Care Review  Outcome: Progressing  Goal: Patient-Specific Goal (Individualized)  Description: Pt will maintain sbp below 220  Outcome: Progressing  Goal: Absence of Hospital-Acquired Illness or Injury  Outcome: Progressing  Goal: Optimal Comfort and Wellbeing  Outcome: Progressing  Goal: Readiness for Transition of Care  Outcome: Progressing     Problem: Infection  Goal: Absence of Infection Signs and Symptoms  Outcome: Progressing     Problem: Diabetes Comorbidity  Goal: Blood Glucose Level Within Targeted Range  Outcome: Progressing     Problem: Acute Kidney Injury/Impairment  Goal: Fluid and Electrolyte Balance  Outcome: Progressing  Goal: Improved Oral Intake  Outcome: Progressing  Goal: Effective Renal Function  Outcome: Progressing     Problem: Restraint, Nonviolent  Goal: Absence of Harm or Injury  Outcome: Progressing           POC updated and reviewed with the patient and  daughter at the bedside. Questions regarding POC were encouraged and addressed. VSS, see flowsheets.  Patient is AOX 4 at this time. Tele maintained per order. NAEON. Fall and safety precautions maintained, no signs of injury noted during shift. Patient repositioned  with assistance in bed for comfort. Upon exiting room, patient's bed locked in low position, side rails up x 2, bed alarm on, with call light within reach. Instructed patient to call staff for mobility, verbalized understanding. Stroke book and education reviewed at the bedside, see education flowsheets for details. No acute signs of distress noted at this time.

## 2024-10-18 NOTE — PT/OT/SLP PROGRESS
"Physical Therapy Treatment  Co Treatment with Occupational Therapy    Patient Name:  Tammy Multani   MRN:  [id number]    Recommendations:     Discharge Recommendations: High Intensity Therapy  Discharge Equipment Recommendations: hospital bed, lift device, wheelchair  Barriers to discharge: Decreased caregiver support and Increased level of assist required    Assessment:     Tammy Multani is a 64 y.o. female admitted with a medical diagnosis of Acute arterial ischemic stroke, multifocal, multiple vascular territories.  She presents with the following impairments/functional limitations: weakness, impaired balance, decreased safety awareness, impaired endurance, impaired self care skills, impaired functional mobility, gait instability, decreased lower extremity function, decreased upper extremity function, decreased ROM, impaired coordination, impaired fine motor.    Pt met with HOB elevated and agreeable to session. Pt tolerates session well with emphasis on bed mobility, sitting balance, and therex. Pt requires Max A - Max A X2 persons with all bed mobility at this time. Pt with poor sitting balance requiring varying levels of A throughout session. (See below) Session limited d/t pt needing to have a BM. Pt will continue to benefit from skilled therapy services.    Rehab Prognosis: Good; patient would benefit from acute skilled PT services to address these deficits and reach maximum level of function.    Recent Surgery: * No surgery found *      Plan:     During this hospitalization, patient to be seen 4 x/week to address the identified rehab impairments via gait training, therapeutic activities, therapeutic exercises, neuromuscular re-education, canalith reposition procedure and progress toward the following goals:    Plan of Care Expires:  11/07/24    Subjective     Chief Complaint: "I need to have a BM"  Patient/Family Comments/goals: pt agreeable to session  Pain/Comfort:  Pain Rating 1: 0/10  Pain " Rating Post-Intervention 1: 0/10      Objective:     Communicated with RN (Alice) prior to session.  Patient found HOB elevated with bed alarm, telemetry, PureWick, SCD, peripheral IV, pulse ox (continuous), restraints (R arm restrained) upon PTA entry to room.     General Precautions: Standard, aspiration, fall  Orthopedic Precautions: N/A  Braces: N/A  Respiratory Status: Room air     Functional Mobility:  Bed Mobility:     Rolling Left:  maximal assistance  Scooting: anteriorly to EOB maximal assistance  Supine to Sit: maximal assistance x2 persons for Trunk/BLEs  Sit to Supine: maximal assistance x2 persons for Trunk/BLEs  Transfers:     Sit to Stand: deferred at this time d/t pt with poor sitting balance and needing to have a BM  Balance:   Sitting Balance at EOB:  Level of Assist Required:  CGA- Min regressing to Max A d/t reaching for bedrail at foot of bed with RUE  Deviations noted: L anterior Lean, severe R anterior lean   Total Time Sitting EOB: ~15 minutes      AM-PAC 6 CLICK MOBILITY  Turning over in bed (including adjusting bedclothes, sheets and blankets)?: 2  Sitting down on and standing up from a chair with arms (e.g., wheelchair, bedside commode, etc.): 1  Moving from lying on back to sitting on the side of the bed?: 2  Moving to and from a bed to a chair (including a wheelchair)?: 1  Need to walk in hospital room?: 1  Climbing 3-5 steps with a railing?: 1  Basic Mobility Total Score: 8       Treatment & Education:  Patient provided with daily orientation and goals of this PT session.     Activities completed at EOB:  X10 LAQ, hip flexion (PROM on LLE)  X5 LAQ with RLE assisting LLE    Pt educated to call for assistance and to transfer with hospital staff only.  Also, pt was educated on the effects of prolonged immobility and the importance of performing OOB activity and exercises to promote healing and reduce recovery time.    Co tx performed with OT due to patient need for 2 skilled therapist to  ensure patient and staff safety and to accommondate for activity tolerance.    Patient left HOB elevated with all lines intact, call button in reach, bed alarm on, restraints reapplied at end of session, RN notified, and daughter present.    GOALS:   Multidisciplinary Problems       Physical Therapy Goals          Problem: Physical Therapy    Goal Priority Disciplines Outcome Interventions   Physical Therapy Goal     PT, PT/OT Progressing    Description: Goals to be met by: 2024     Patient will increase functional independence with mobility by performin. Supine to sit with Moderate Assistance  2. Sit to supine with Moderate Assistance  3. Bed to chair transfer with Maximum Assistance using LRAD  4. Sitting at edge of bed x5 minutes with Stand-by Assistance  5. Lower extremity exercise program x15 reps per handout, with independence    Hospital Bed - Patient requires a hospital bed for positioning of the body in ways that are not feasible with an ordinary bed. The patient requires special positioning for pain relief, limited mobility, and/or being unable to independently make changes in body position without the use of a hospital bed. Pillows and wedges will not be adequate for resolving these positional issues.     Wheelchair - Patient has a mobility limitation that significantly impairs their ability to participate in one or more mobility related activities of daily living in customary locations in the home. The mobility limitation cannot be sufficiently resolved by the use of a cane or walker. The use of a manual wheelchair will greatly improve the patient's ability to participate in MRADLs. The patient will use the wheelchair on a regular basis at home. They have expressed their willingness to use a manual wheelchair in the home, and have a caregiver who is available and willing to assist with the wheelchair if needed.                       Time Tracking:     PT Received On: 10/18/24  PT Start Time:  0932     PT Stop Time: 0956  PT Total Time (min): 24 min     Billable Minutes: Therapeutic Activity 8 and Therapeutic Exercise 16    Treatment Type: Treatment  PT/PTA: PTA     Number of PTA visits since last PT visit: 2     10/18/2024

## 2024-10-18 NOTE — ASSESSMENT & PLAN NOTE
"65 y/o F transferred from [facility] for higher level of care secondary to acute KIARA stroke on CTA.  Briefly,the patient presented to the OSH overnight for slurred speech started at 1:30 a.m. in the morning.  She has a history of his stroke with left-sided deficits but is normally able to transfer and do a little walking. This morning, she had complete hemiplegia of the left side. Telestroke consult performed, concerning for LVO.  NIH 13. CTA Stroke MP performed and  was read as "acute R KIARA infarct". No TNK due to AC use. Patient accepted to ED as transfer for emergent vascular neurology evaluation. No endovascular intervention. History of atrial fibrillation per discussion with daughter. Was started on Eliquis on 10/6, currently on heparin drip given likely lack of absorption through NG tube in setting of Ileus. Given history of Afib, etiology likely embolic. Though not apparent on MRA, if R KIARA occlusion was proximal (supported by MRI) could account for R MCA-like picture.    Transient episodes of altered awareness: EEG negative, and no change in frequency observed by family w/Keppra so have stopped this medicine.     -Heparin drip   -high intensity statin  -SBP<220 in acute setting  -PT/OT  -NPO in setting of ileus   " Spoke with Tabatha @ [facility] regarding patient consult for Dr. Black @ [facility] who is patient's transplant surgeon. Information provided to Tabatha & she states she will page MD and someone should be returning call soon.

## 2024-10-18 NOTE — PT/OT/SLP PROGRESS
"Speech Language Pathology Treatment    Patient Name:  Tammy Multani   MRN:  [id number]  Admitting Diagnosis: Acute arterial ischemic stroke, multifocal, multiple vascular territories    Recommendations:                 General Recommendations:  Dysphagia therapy, Speech/language therapy, and Cognitive-linguistic therapy  Diet recommendations:   (pt currently NPO due to NG tube in place set to suction. Pt may benefit from reassessment of swallowing abilities/safety by SLP services prior to resuming PO intake.),   Aspiration Precautions: HOB to 90 degrees, Meds crushed in puree, Meds whole buried in puree, Monitor for s/s of aspiration, and Strict aspiration precautions   General Precautions: Standard, aspiration, fall  Communication strategies:  provide increased time to answer and go to room if call light pushed    Assessment:     Tammy Multani is a 64 y.o. female with an SLP diagnosis of Dysphagia, Dysarthria, Cognitive-Linguistic Impairment, and Visio-Spatial Impairment.      Subjective     "[facility]." Pt was not oriented to correct hospital    Pain/Comfort:  Pain Rating 1:  (no complaints, but appears uncomfortable and feels like she needs to have a BM)    Respiratory Status: Room air    Objective:     Has the patient been evaluated by SLP for swallowing?   Yes  Keep patient NPO? No   Current Respiratory Status:        Pt remains NPO with NG tube in place. NG tube continues to remove thin bilious fluid. No plans for surgical intervention documented at this time. Nurse gave clearance for SLP to work with pt and offer ice chips and nectar thick water to swallow for comfort.  Pt was agreeable to participate in ST session.  Pt with wet vocal quality noted prior to PO trials as pt does not appear to be managing secretions regularly, likely related to presence of NG tube for prolonged period of time.  Volitional cough is not productive. Pt unable to elicit a dry swallow upon command, but laryngeal " pumping was palpated.  Pt accepted an ice chip, but exhibiting overt coughing before initiation of swallow.  No further PO trials given as reported continues to feel urge to have a BM, and also did not tolerate ice chip trials.      Pt was oriented to person, place/hospital (but did not orient to [facility] without external aid), situation, month, and year.  Pt completed concrete convergent categorization tasks with 80% accuracy IND/100% given cues.  Pt completed an abstract convergent categorization task with mod cues. Pt exhibited mild perseveration due to decreased ability to shift attention between tasks.      Education was provided to pt and daughter regarding role of SLP, ongoing swallowing assessment and monitoring of PO tolerance, cognitive-linguistic tx tasks, and SLP treatment plan and POC.  Pt will benefit from continued reinforcement.     Goals:   Multidisciplinary Problems       SLP Goals          Problem: SLP    Goal Priority Disciplines Outcome   SLP Goal     SLP    Description: Speech Language Pathology Goals  Goals expected to be met by 10/14:  1. Pt will participate in Modified Barium Swallow Study to r/o aspiration and determine safest oral diet.   2. Pt will participate in speech/language/cognitive evaluation to establish further tx plan.   3.  Assess functional reading and writing skills  4.  Spring to month, year and place  5.  Respond to simple categorization tasks with 75% accuracy                                Plan:     Patient to be seen:  4 x/week   Plan of Care expires:  11/06/24  Plan of Care reviewed with:  patient, daughter   SLP Follow-Up:  Yes       Discharge recommendations:  High Intensity Therapy     Time Tracking:     SLP Treatment Date:   10/18/24  Speech Start Time:  1138  Speech Stop Time:  1159     Speech Total Time (min):  21 min    Billable Minutes: Speech Therapy Individual 7, Treatment Swallowing Dysfunction 6, and Self Care/Home Management Training 8    10/18/2024

## 2024-10-18 NOTE — ASSESSMENT & PLAN NOTE
Patient was seen by general surgery for some episodes of nausea and emesis and some constipation. KUB was concerning for SBO vs. Ileus. Follow up CT AP did not show evidence of mechanical obstruction, was more indicative of Ileus. XR gastrogaffin challenge ordered by general surgery.     Plan:   - place NPO. Continue NGT to LIWS   - monitor electrolytes daily, correct as needed   - Eliquis switched to heparin as it might not have been fully absorbed considering Ileus    - scheduled suppository regimen, will f/u on effectiveness   - general surgery following, appreciate recs

## 2024-10-18 NOTE — ASSESSMENT & PLAN NOTE
Plan:   - D5W to correct, will collect labs to monitor rate of correction   - will continue to monitor for correction after D5W infusion finishes

## 2024-10-18 NOTE — PT/OT/SLP PROGRESS
Occupational Therapy   Co-Treatment    Name: Tammy Multani  MRN: [id number]  Admitting Diagnosis:  Acute arterial ischemic stroke, multifocal, multiple vascular territories       Recommendations:     Discharge Recommendations: High Intensity Therapy  Discharge Equipment Recommendations:  hospital bed, lift device, wheelchair  Barriers to discharge:   (increased (A) required)    Assessment:     Tammy Multani is a 64 y.o. female with a medical diagnosis of Acute arterial ischemic stroke, multifocal, multiple vascular territories.  She presents with fair participation and motivation. Performance deficits affecting function are weakness, impaired endurance, impaired self care skills, impaired functional mobility, impaired balance, decreased safety awareness, decreased lower extremity function, decreased upper extremity function, impaired cognition. Co-treatment performed due to patient's multiple deficits requiring two skilled therapists to safely address patient's ability to complete ADLs and functional mobility in addition to accommodating for patient's activity tolerance while in acute care setting.      Rehab Prognosis:  Fair; patient would benefit from acute skilled OT services to address these deficits and reach maximum level of function.       Plan:     Patient to be seen 4 x/week to address the above listed problems via self-care/home management, therapeutic activities, therapeutic exercises, neuromuscular re-education, cognitive retraining  Plan of Care Expires: 11/07/24  Plan of Care Reviewed with: patient, daughter    Subjective     Chief Complaint: none stated  Patient/Family Comments/goals: Pt reported that she needed to have a BM.  Pain/Comfort:  Pain Rating 1:  (did not report)  Pain Rating Post-Intervention 1:  (did not report)    Objective:     Communicated with: RN prior to session.  Patient found supine with telemetry, NG tube, PureWick, peripheral IV, pulse ox (continuous), restraints (family  present) upon OT entry to room.    General Precautions: Standard, fall, NPO, aspiration    Orthopedic Precautions:N/A  Braces: N/A       Occupational Performance:     Bed Mobility:    Patient completed Rolling/Turning to Left with  maximal assistance  Patient completed Scooting/Bridging with maximal assistance forward on EOB & dependent drawsheet transfer up HOB while supine  Patient completed Supine to Sit with maximal assistance and 2 persons  Patient completed Sit to Supine with maximal assistance and 2 persons     Activities of Daily Living:  Grooming: moderate assistance with washing face while seated EOB      [facility] 6 Click ADL: 7    Treatment & Education:  Pt required CGA-Min (A) for a majority of time & Max (A) towards the end for postural control while seated EOB.  Provided verbal & physical cues to facilitate postural control. Provided LUE weight bearing while seated EOB.  Provided LUE PROM in all planes x 10 reps each while seated EOB.  Pt had no further questions & when asked whether there were any concerns pt reported none.        Patient left supine with all lines intact, call button in reach, bed alarm on, restraints reapplied at end of session, RN notified, and daughter present    GOALS:   Multidisciplinary Problems       Occupational Therapy Goals          Problem: Occupational Therapy    Goal Priority Disciplines Outcome Interventions   Occupational Therapy Goal     OT, PT/OT Progressing    Description: Goals to be met by: 11/7/24     Patient will increase functional independence with ADLs by performing:    UE Dressing with Minimal Assistance.  LE Dressing with Moderate Assistance.  Grooming while seated with Set-up Assistance.  Toileting from bedside commode with Moderate Assistance for hygiene and clothing management.   Sitting at edge of bed x5 minutes with Contact Guard Assistance.  Supine to sit with Contact Guard Assistance.  Step transfer with Moderate Assistance    DME Justifications (see  above for complete DME recommendations)    Bedside Commode- Patient has a mobility limitation that significantly impairs their ability to participate in one or more mobility related activities of daily living, including toileting. This deficit can be resolved by using a bedside commode. Patient demonstrates mobility limitations that will cause them to be confined to one room at home without bathroom access for up to 30 days. Using a bedside commode will greatly improve the patient's ability to participate in MRADLs.     Wheelchair-  Patient has a mobility limitation that significantly impairs their ability to participate in one or more mobility related activities of daily living in customary locations in the home. The mobility limitation cannot be sufficiently resolved by the use of a cane or walker. The use of a manual wheelchair will greatly improve the patient's ability to participate in MRADLs. The patient will use the wheelchair on a regular basis at home. They have expressed their willingness to use a manual wheelchair in the home, and have a caregiver who is available and willing to assist with the wheelchair if needed.     Hospital Bed-  Patient requires a hospital bed for positioning of the body in ways that are not feasible with an ordinary bed. The patient requires special positioning for pain relief, limited mobility, and/or being unable to independently make changes in body position without the use of a hospital bed. Pillows and wedges will not be adequate for resolving these positional issues.                        Time Tracking:     OT Date of Treatment: 10/18/24  OT Start Time: 0931  OT Stop Time: 0955  OT Total Time (min): 24 min    Billable Minutes:Self Care/Home Management 12  Neuromuscular Re-education 12    OT/SUSANNE: OT     Number of SUSANNE visits since last OT visit: 0    10/18/2024

## 2024-10-18 NOTE — PROGRESS NOTES
"Zackary Long - Neurosurgery ([facility]  Endocrinology  Progress Note    Admit Date: 10/6/2024     Reason for Consult: Management of T2DM, Hyperglycemia       Diabetes diagnosis year:  Over 5 years    Home Diabetes Medications:    -liraglutide  -Levemir 35 units QHS    How often checking glucose at home? Not checking       Diabetes Complications include:     Hyperglycemia    Complicating diabetes co morbidities:   History of CVA      HPI:   Patient is a 64 y.o. female with  DM2 , HTN who was transferred from [facility] for higher level of care secondary to acute KIARA stroke on CTA. Briefly,the patient presented to the OSH overnight for slurred speech started at 1:30 a.m. in the morning. She has a history of his stroke with left-sided deficits but is normally able to transfer and do a little walking.  Endocrine consulted for BG management.      Interval HPI:   No acute events overnight. Patient in room 960/960 A. Blood glucose stable. BG at and below goal on current insulin regimen (SSI ). Steroid use- None .      Renal function- Normal   Vasopressors-  None     Diet NPO     Eating:   NPO  Nausea: No  Hypoglycemia and intervention: No  Fever: No  TPN and/or TF: No       BP (!) 168/93   Pulse 71   Temp 97.1 °F (36.2 °C)   Resp 18   Ht 5' 2" (1.575 m)   Wt 72.6 kg (160 lb 0.9 oz)   LMP  (LMP Unknown) Comment: Doesn't  have periods anymore  SpO2 (!) 93%   Breastfeeding No   BMI 29.27 kg/m²     Labs Reviewed and Include    Recent Labs   Lab 10/18/24  0348   GLU 67*   CALCIUM 8.2*   ALBUMIN 2.3*   PROT 6.1   *   K 4.9   CO2 20*   *   BUN 27*   CREATININE 0.8   ALKPHOS 148   ALT 22   AST 36   BILITOT 0.6     Lab Results   Component Value Date    WBC 13.12 (H) 10/18/2024    WBC 13.12 (H) 10/18/2024    HGB 12.0 10/18/2024    HGB 12.0 10/18/2024    HCT 38.8 10/18/2024    HCT 38.8 10/18/2024     (H) 10/18/2024     (H) 10/18/2024     10/18/2024     10/18/2024     No results " "for input(s): "TSH", "FREET4" in the last 168 hours.  Lab Results   Component Value Date    HGBA1C 10.0 (H) 10/08/2024       Nutritional status:   Body mass index is 29.27 kg/m².  Lab Results   Component Value Date    ALBUMIN 2.3 (L) 10/18/2024    ALBUMIN 2.4 (L) 10/17/2024    ALBUMIN 2.7 (L) 10/16/2024     No results found for: "PREALBUMIN"    Estimated Creatinine Clearance: 65.4 mL/min (based on SCr of 0.8 mg/dL).    Accu-Checks  Recent Labs     10/16/24  0801 10/16/24  1120 10/16/24  1603 10/16/24  2019 10/16/24  2356 10/17/24  0408 10/17/24  0806 10/17/24  1155 10/17/24  1545 10/18/24  0811   POCTGLUCOSE 83 98 120* 103 102 101 83 113* 118* 135*       Current Medications and/or Treatments Impacting Glycemic Control  Immunotherapy:    Immunosuppressants       None          Steroids:   Hormones (From admission, onward)      None          Pressors:    Autonomic Drugs (From admission, onward)      None          Hyperglycemia/Diabetes Medications:   Antihyperglycemics (From admission, onward)      Start     Stop Route Frequency Ordered    10/14/24 1317  insulin aspart U-100 pen 0-10 Units         -- SubQ Every 4 hours PRN 10/14/24 1217            ASSESSMENT and PLAN    Neuro  * Acute arterial ischemic stroke, multifocal, multiple vascular territories  Managed by primary team  Optimize BG control      Cardiac/Vascular  HTN (hypertension)  Managed by primary team  Optimize BG control      Endocrine  Type 2 diabetes mellitus, with long-term current use of insulin  BG goal: 140-180  T2DM admitted with acute KIARA stroke. Lantus d/c on 10/16 do to hypoglycemia.     - Continue Novolog Moderate dose correction with ISF 25 starting at 150    - POCT Glucose q 4 hrs while NPO   - Hypoglycemia protocol in place      ** Please notify Endocrine for any change and/or advance in diet**  ** Please call Endocrine for any BG related issues **     Discharge Planning:   TBD. Please notify endocrinology prior to " discharge.            Hilton Rodriguez PA-C  Endocrinology  [facility] Long - Neurosurgery ([facility])

## 2024-10-18 NOTE — PROGRESS NOTES
Zackary Long - Neurosurgery ([facility])  General Surgery  Progress Note    Subjective:     History of Present Illness:  Tammy Multani is a 64 y.o. lady with PMH DM, HTN, prior stroke with left sided deficits, afib on eliquis admitted to the neurology service since 10/6 for an KIARA stroke. Symptoms included slurred speech and hemiplegia of her left side. She had been tolerating a diet but in the past day has had a few episodes of nausea with nonbloody nonbilious thin emesis that she describes appears like spit. Last documented bowel movement on 8/11 but reports a loose watery movement earlier today. Mildly hypokalemic, remainder of labs bland. KUB with evidence of dilated large and small bowel. General surgery consulted for recs regarding ileus vs SBO.     Preciously has had c section and a lap radha.           Post-Op Info:  * No surgery found *         Interval History: NAEON. AF. HDS. Pt pulled out NGT ON; replaced and put to suxtion this AM. NGT output 450cc/ON of thin bilious fluid    Medications:  Continuous Infusions:   0/9% NACL & POTASSIUM CHLORIDE 20 MEQ/L   Intravenous Continuous 100 mL/hr at 10/18/24 0458 New Bag at 10/18/24 0458    heparin (porcine) in D5W  0-40 Units/kg/hr (Adjusted) Intravenous Continuous 5.9 mL/hr at 10/17/24 2259 10 Units/kg/hr at 10/17/24 2259     Scheduled Meds:   atorvastatin  40 mg Per NG tube Daily    bisacodyL  10 mg Rectal BID    carvediloL  12.5 mg Per NG tube BID    famotidine  20 mg Per NG tube Daily     PRN Meds:  Current Facility-Administered Medications:     acetaminophen, 650 mg, Per NG tube, Q8H PRN    dextrose 10%, 12.5 g, Intravenous, PRN    dextrose 10%, 25 g, Intravenous, PRN    glucagon (human recombinant), 1 mg, Intramuscular, PRN    hydrALAZINE, 10 mg, Intravenous, Q6H PRN    insulin aspart U-100, 0-10 Units, Subcutaneous, Q4H PRN    iohexol, 15 mL, Oral, PRN    sodium chloride 0.9%, 500 mL, Intravenous, PRN    sodium chloride 0.9%, 10 mL, Intravenous, PRN     Flushing PICC/Midline Protocol, , , Until Discontinued **AND** sodium chloride 0.9%, 10 mL, Intravenous, Q12H PRN     Review of patient's allergies indicates:  No Known Allergies  Objective:     Vital Signs (Most Recent):  Temp: 97.4 °F (36.3 °C) (10/18/24 0652)  Pulse: 79 (10/18/24 0652)  Resp: 18 (10/18/24 0652)  BP: (!) 142/80 (10/18/24 0652)  SpO2: 100 % (10/18/24 0652) Vital Signs (24h Range):  Temp:  [97 °F (36.1 °C)-99.2 °F (37.3 °C)] 97.4 °F (36.3 °C)  Pulse:  [65-86] 79  Resp:  [16-18] 18  SpO2:  [93 %-100 %] 100 %  BP: (121-195)/(62-95) 142/80     Weight: 72.6 kg (160 lb 0.9 oz)  Body mass index is 29.27 kg/m².    Intake/Output - Last 3 Shifts         10/16 0700  10/17 0659 10/17 0700  10/18 0659 10/18 0700  10/19 0659    P.O.       Total Intake(mL/kg)       Urine (mL/kg/hr) 800 (0.5) 800 (0.5)     Drains 1000 450     Stool 0 0     Total Output 1800 1250     Net -1800 -1250            Stool Occurrence 0 x 0 x              Physical Exam  Vitals and nursing note reviewed.   Constitutional:       General: She is not in acute distress.     Appearance: She is ill-appearing. She is not diaphoretic.      Comments: Room air  NGT to LIWS   HENT:      Head: Normocephalic and atraumatic.      Mouth/Throat:      Mouth: Mucous membranes are moist.      Pharynx: Oropharynx is clear.   Eyes:      Extraocular Movements: Extraocular movements intact.      Conjunctiva/sclera: Conjunctivae normal.   Cardiovascular:      Rate and Rhythm: Normal rate.   Pulmonary:      Effort: Pulmonary effort is normal. No respiratory distress.   Abdominal:      General: Abdomen is flat. There is no distension.      Palpations: Abdomen is soft.      Tenderness: There is abdominal tenderness. There is no guarding or rebound.      Comments: Well healed abdominal incisions  Mild TTP diffusely. No peritonitic signs   Musculoskeletal:      Right lower leg: No edema.      Left lower leg: No edema.   Skin:     General: Skin is warm and dry.    Neurological:      Mental Status: She is alert.      Comments: Left sided weakness          Significant Labs:  I have reviewed all pertinent lab results within the past 24 hours.    Significant Diagnostics:  I have reviewed all pertinent imaging results/findings within the past 24 hours.  Assessment/Plan:     * Acute arterial ischemic stroke, multifocal, multiple vascular territories  Tammy Multani is a 64 y.o. lady admitted with an KIARA stroke now with concerns for SBO vs ileus. She is undergoing conservative management with bowel rest, NGT decompression    - NPO  - NGT  - Gastrograffin challenge today with KUBs at 4 and 8 hours  - replete electrolytes; K>4, Phos>3, Mag>2  - eliquis is likely not being absorbed if she has an ileus or SBO, would consider switching to a heparin drip  - Serial abdominal exams  - Remainder of care per primary team  - Please contact general surgery with any questions, concerns, or clinical status changes      Ileus  Tammy Multani is a 64 y.o. lady admitted with an KIARA stroke now with concerns for SBO vs ileus. She is undergoing non-operative management with bowel rest, NGT decompression. Overall clinical course is improving with resolution of pain/tenderness, soft and non-distended abromen though still with dark brown, feculent appearing fluid from NG tube. Will continue with Ng decompression and bowel rest. GGC with 2x Xrays without contrast in the colon though now in small bowel.     - KUB 10/17 demonstrates passage of contrast to sigmoid colon however clinically appears obstructed with high NGT output and no BM.   - Will discuss with staff regarding possibility for OR and timing.  - NPO with mIVF   - Continue NGT decompression.  - replete electrolytes; K>4, Phos>3, Mag>2  - eliquis is likely not being absorbed if she has an ileus or SBO, would consider switching to a heparin drip  - Serial abdominal exams  - Remainder of care per primary team  - Please contact general  surgery with any questions, concerns, or clinical status changes          Meghan Vasquez MD  General Surgery  [facility] - Neurosurgery ([facility])

## 2024-10-18 NOTE — SUBJECTIVE & OBJECTIVE
Interval History: NAEON. AF. HDS. Pt pulled out NGT ON; replaced and put to suxtion this AM. NGT output 450cc/ON of thin bilious fluid    Medications:  Continuous Infusions:   0/9% NACL & POTASSIUM CHLORIDE 20 MEQ/L   Intravenous Continuous 100 mL/hr at 10/18/24 0458 New Bag at 10/18/24 0458    heparin (porcine) in D5W  0-40 Units/kg/hr (Adjusted) Intravenous Continuous 5.9 mL/hr at 10/17/24 2259 10 Units/kg/hr at 10/17/24 2259     Scheduled Meds:   atorvastatin  40 mg Per NG tube Daily    bisacodyL  10 mg Rectal BID    carvediloL  12.5 mg Per NG tube BID    famotidine  20 mg Per NG tube Daily     PRN Meds:  Current Facility-Administered Medications:     acetaminophen, 650 mg, Per NG tube, Q8H PRN    dextrose 10%, 12.5 g, Intravenous, PRN    dextrose 10%, 25 g, Intravenous, PRN    glucagon (human recombinant), 1 mg, Intramuscular, PRN    hydrALAZINE, 10 mg, Intravenous, Q6H PRN    insulin aspart U-100, 0-10 Units, Subcutaneous, Q4H PRN    iohexol, 15 mL, Oral, PRN    sodium chloride 0.9%, 500 mL, Intravenous, PRN    sodium chloride 0.9%, 10 mL, Intravenous, PRN    Flushing PICC/Midline Protocol, , , Until Discontinued **AND** sodium chloride 0.9%, 10 mL, Intravenous, Q12H PRN     Review of patient's allergies indicates:  No Known Allergies  Objective:     Vital Signs (Most Recent):  Temp: 97.4 °F (36.3 °C) (10/18/24 0652)  Pulse: 79 (10/18/24 0652)  Resp: 18 (10/18/24 0652)  BP: (!) 142/80 (10/18/24 0652)  SpO2: 100 % (10/18/24 0652) Vital Signs (24h Range):  Temp:  [97 °F (36.1 °C)-99.2 °F (37.3 °C)] 97.4 °F (36.3 °C)  Pulse:  [65-86] 79  Resp:  [16-18] 18  SpO2:  [93 %-100 %] 100 %  BP: (121-195)/(62-95) 142/80     Weight: 72.6 kg (160 lb 0.9 oz)  Body mass index is 29.27 kg/m².    Intake/Output - Last 3 Shifts         10/16 0700  10/17 0659 10/17 0700  10/18 0659 10/18 0700  10/19 0659    P.O.       Total Intake(mL/kg)       Urine (mL/kg/hr) 800 (0.5) 800 (0.5)     Drains 1000 450     Stool 0 0     Total Output  1800 1250     Net -1800 -1250            Stool Occurrence 0 x 0 x              Physical Exam  Vitals and nursing note reviewed.   Constitutional:       General: She is not in acute distress.     Appearance: She is ill-appearing. She is not diaphoretic.      Comments: Room air  NGT to VLAD   HENT:      Head: Normocephalic and atraumatic.      Mouth/Throat:      Mouth: Mucous membranes are moist.      Pharynx: Oropharynx is clear.   Eyes:      Extraocular Movements: Extraocular movements intact.      Conjunctiva/sclera: Conjunctivae normal.   Cardiovascular:      Rate and Rhythm: Normal rate.   Pulmonary:      Effort: Pulmonary effort is normal. No respiratory distress.   Abdominal:      General: Abdomen is flat. There is no distension.      Palpations: Abdomen is soft.      Tenderness: There is abdominal tenderness. There is no guarding or rebound.      Comments: Well healed abdominal incisions  Mild TTP diffusely. No peritonitic signs   Musculoskeletal:      Right lower leg: No edema.      Left lower leg: No edema.   Skin:     General: Skin is warm and dry.   Neurological:      Mental Status: She is alert.      Comments: Left sided weakness          Significant Labs:  I have reviewed all pertinent lab results within the past 24 hours.    Significant Diagnostics:  I have reviewed all pertinent imaging results/findings within the past 24 hours.

## 2024-10-18 NOTE — SUBJECTIVE & OBJECTIVE
Neurologic Chief Complaint: infarcts in multiple vascular territories    Subjective:     Interval History: Patient is seen for follow-up neurological assessment and treatment recommendations: See hospital course.     HPI, Past Medical, Family, and Social History remains the same as documented in the initial encounter.     Review of Systems   Neurological:  Positive for weakness.   All other systems reviewed and are negative.    Scheduled Meds:   atorvastatin  40 mg Per NG tube Daily    bisacodyL  10 mg Rectal BID    carvediloL  12.5 mg Per NG tube BID    famotidine  20 mg Per NG tube Daily    polyethylene glycol  17 g Per NG tube BID     Continuous Infusions:   0/9% NACL & POTASSIUM CHLORIDE 20 MEQ/L   Intravenous Continuous        D5W   Intravenous Continuous 100 mL/hr at 10/18/24 1134 New Bag at 10/18/24 1134    heparin (porcine) in D5W  0-40 Units/kg/hr (Adjusted) Intravenous Continuous 5.9 mL/hr at 10/17/24 2259 10 Units/kg/hr at 10/17/24 2259     PRN Meds:  Current Facility-Administered Medications:     acetaminophen, 650 mg, Per NG tube, Q8H PRN    dextrose 10%, 12.5 g, Intravenous, PRN    dextrose 10%, 25 g, Intravenous, PRN    glucagon (human recombinant), 1 mg, Intramuscular, PRN    hydrALAZINE, 10 mg, Intravenous, Q6H PRN    insulin aspart U-100, 0-10 Units, Subcutaneous, Q4H PRN    iohexol, 15 mL, Oral, PRN    sodium chloride 0.9%, 500 mL, Intravenous, PRN    sodium chloride 0.9%, 10 mL, Intravenous, PRN    Flushing PICC/Midline Protocol, , , Until Discontinued **AND** sodium chloride 0.9%, 10 mL, Intravenous, Q12H PRN    Objective:     Vital Signs (Most Recent):  Temp: 97.8 °F (36.6 °C) (10/18/24 1145)  Pulse: 73 (10/18/24 1145)  Resp: 18 (10/18/24 1145)  BP: (!) 177/88 (10/18/24 1145)  SpO2: 100 % (10/18/24 1145)  BP Location: Left arm    Vital Signs Range (Last 24H):  Temp:  [97.1 °F (36.2 °C)-98.3 °F (36.8 °C)]   Pulse:  [61-85]   Resp:  [16-18]   BP: (137-177)/(71-93)   SpO2:  [93 %-100 %]   BP  "Location: Left arm       Physical Exam  Vitals reviewed.   Cardiovascular:      Rate and Rhythm: Normal rate.   Pulmonary:      Effort: Pulmonary effort is normal. No respiratory distress.   Abdominal:      General: There is distension.      Palpations: Abdomen is soft.      Tenderness: There is abdominal tenderness.   Musculoskeletal:      Right lower leg: No edema.      Left lower leg: No edema.   Skin:     General: Skin is warm and dry.          Neurological Exam:   LOC: alert  Attention Span: poor  Articulation: Dysarthria  Orientation: Person, Place, Time   Visual Fields: Full  EOM (CN III, IV, VI): Full/intact  Pupils (CN II, III): PERRL  Facial Sensation (CN V): Facial sensory loss  Facial Movement (CN VII): Upper facial weakness on the Left  Motor: Arm left  Plegia 0/5  Leg left  Paresis: 0/5  Arm right  Normal 5/5  Leg right Normal 5/5  Cerebellum: No evidence of appendicular or axial ataxia  Sensation: Jason-anesthesia left    Laboratory:  CMP:   Recent Labs   Lab 10/18/24  0348 10/18/24  1419   CALCIUM 8.2*  --    ALBUMIN 2.3*  --    PROT 6.1  --    * 149*   K 4.9  --    CO2 20*  --    *  --    BUN 27*  --    CREATININE 0.8  --    ALKPHOS 148  --    ALT 22  --    AST 36  --    BILITOT 0.6  --      CBC:   Recent Labs   Lab 10/18/24  0348   WBC 13.12*  13.12*   RBC 3.67*  3.67*   HGB 12.0  12.0   HCT 38.8  38.8     235   *  106*   MCH 32.7*  32.7*   MCHC 30.9*  30.9*     Lipid Panel: No results for input(s): "CHOL", "LDLCALC", "HDL", "TRIG" in the last 168 hours.  Hgb A1C:   No results for input(s): "HGBA1C" in the last 168 hours.    TSH: No results for input(s): "TSH" in the last 168 hours.    Diagnostic Results      Brain and Vessel Imaging   MRI/MRA Brain Ischemic Protocol: 10/6/24  Impression: Acute infarcts involving the right anterior circulation and posterior circulation as detailed above without evidence of intracranial hemorrhage or mass effect.  Embolic disease to " be considered.     Chronic left parietal infarct. MRA demonstrates limited visualization of distal right KIARA branches in keeping with the territory of the acute infarct.  There is also narrowing of at least one proximal left M2 branch although no acute infarct in that territory.          Echo: 10/7/24  Cardiac Imaging     Left Ventricle: The left ventricle is normal in size. Normal wall thickness. There is concentric remodeling. Normal wall motion. There is hyperdynamic systolic function with a visually estimated ejection fraction of greater than 70%. There is normal diastolic function.    Right Ventricle: Normal right ventricular cavity size. Wall thickness is normal. Systolic function is normal.    Left Atrium: Bubble study is likely negative, but full opacification of the RA is not well seen on these images which decreases sensitivity.    IVC/SVC: Normal venous pressure at 3 mmHg.

## 2024-10-18 NOTE — ASSESSMENT & PLAN NOTE
History of. Prescribed oxycodone 15mg at home. Family concerned that medication is being diverted by family member, unclear how often patient takes. Reportedly is prescribed for neck pain.     Plan:   - holding oxycodone at the moment

## 2024-10-18 NOTE — SUBJECTIVE & OBJECTIVE
"Interval HPI:   No acute events overnight. Patient in room 960/960 A. Blood glucose stable. BG at and below goal on current insulin regimen (SSI ). Steroid use- None .      Renal function- Normal   Vasopressors-  None     Diet NPO     Eating:   NPO  Nausea: No  Hypoglycemia and intervention: No  Fever: No  TPN and/or TF: No       BP (!) 168/93   Pulse 71   Temp 97.1 °F (36.2 °C)   Resp 18   Ht 5' 2" (1.575 m)   Wt 72.6 kg (160 lb 0.9 oz)   LMP  (LMP Unknown) Comment: Doesn't  have periods anymore  SpO2 (!) 93%   Breastfeeding No   BMI 29.27 kg/m²     Labs Reviewed and Include    Recent Labs   Lab 10/18/24  0348   GLU 67*   CALCIUM 8.2*   ALBUMIN 2.3*   PROT 6.1   *   K 4.9   CO2 20*   *   BUN 27*   CREATININE 0.8   ALKPHOS 148   ALT 22   AST 36   BILITOT 0.6     Lab Results   Component Value Date    WBC 13.12 (H) 10/18/2024    WBC 13.12 (H) 10/18/2024    HGB 12.0 10/18/2024    HGB 12.0 10/18/2024    HCT 38.8 10/18/2024    HCT 38.8 10/18/2024     (H) 10/18/2024     (H) 10/18/2024     10/18/2024     10/18/2024     No results for input(s): "TSH", "FREET4" in the last 168 hours.  Lab Results   Component Value Date    HGBA1C 10.0 (H) 10/08/2024       Nutritional status:   Body mass index is 29.27 kg/m².  Lab Results   Component Value Date    ALBUMIN 2.3 (L) 10/18/2024    ALBUMIN 2.4 (L) 10/17/2024    ALBUMIN 2.7 (L) 10/16/2024     No results found for: "PREALBUMIN"    Estimated Creatinine Clearance: 65.4 mL/min (based on SCr of 0.8 mg/dL).    Accu-Checks  Recent Labs     10/16/24  0801 10/16/24  1120 10/16/24  1603 10/16/24  2019 10/16/24  2356 10/17/24  0408 10/17/24  0806 10/17/24  1155 10/17/24  1545 10/18/24  0811   POCTGLUCOSE 83 98 120* 103 102 101 83 113* 118* 135*       Current Medications and/or Treatments Impacting Glycemic Control  Immunotherapy:    Immunosuppressants       None          Steroids:   Hormones (From admission, onward)      None          Pressors:  "   Autonomic Drugs (From admission, onward)      None          Hyperglycemia/Diabetes Medications:   Antihyperglycemics (From admission, onward)      Start     Stop Route Frequency Ordered    10/14/24 1317  insulin aspart U-100 pen 0-10 Units         -- SubQ Every 4 hours PRN 10/14/24 1217

## 2024-10-18 NOTE — PROGRESS NOTES
"Zackary Long - Neurosurgery ([facility])  Vascular Neurology  Comprehensive Stroke Center  Progress Note    Assessment/Plan:     * Acute arterial ischemic stroke, multifocal, multiple vascular territories  63 y/o F transferred from [facility] for higher level of care secondary to acute KIARA stroke on CTA.  Briefly,the patient presented to the OSH overnight for slurred speech started at 1:30 a.m. in the morning.  She has a history of his stroke with left-sided deficits but is normally able to transfer and do a little walking. This morning, she had complete hemiplegia of the left side. Telestroke consult performed, concerning for LVO.  NIH 13. CTA Stroke MP performed and  was read as "acute R KIARA infarct". No TNK due to AC use. Patient accepted to ED as transfer for emergent vascular neurology evaluation. No endovascular intervention. History of atrial fibrillation per discussion with daughter. Was started on Eliquis on 10/6, currently on heparin drip given likely lack of absorption through NG tube in setting of Ileus. Given history of Afib, etiology likely embolic. Though not apparent on MRA, if R KIARA occlusion was proximal (supported by MRI) could account for R MCA-like picture.    Transient episodes of altered awareness: EEG negative, and no change in frequency observed by family w/Keppra so have stopped this medicine.     -Heparin drip   -high intensity statin  -SBP<220 in acute setting  -PT/OT  -NPO in setting of ileus     Ileus  Patient was seen by general surgery for some episodes of nausea and emesis and some constipation. KUB was concerning for SBO vs. Ileus. Follow up CT AP did not show evidence of mechanical obstruction, was more indicative of Ileus. XR gastrogaffin challenge ordered by general surgery.     Plan:   - place NPO. Continue NGT to LIWS   - monitor electrolytes daily, correct as needed   - Eliquis switched to heparin as it might not have been fully absorbed considering Ileus    - scheduled " suppository regimen, will f/u on effectiveness   - general surgery following, appreciate recs     Atrial fibrillation  Takes Eliquis 5mg BID and Coreg 6.25mg BID at home.  Initially Eliquis held given size of stroke; ASA 81mg QD in the interim. Eliquis was restarted on 10/10.       Plan:   -continue at home coreg regimen   -eliquis currently switched to heparin drip due to issues described in ileus section of the assessment and plan.     HTN (hypertension)  Home medication regimen as below  -lisinopril 40mg QD  -amlodipine 10mg QD    Plan:   -lisinopril and amlodipine were initially held in the setting of KAILYN. Was started on coreg in the context of Afib.   -continue coreg   -if patient's BP still elevated, consider adding back amlodipine and lisinopril     Acute focal neurological deficit  Episodes of transient altered mental status observed by daughter, sometimes with a brief left gaze. Given location of KIARA stroke, considered seizure. EEG done on 10/11, negative.     Plan:   - no longer on keppra regimen     Hypernatremia  Plan:   - D5W to correct, will collect labs to monitor rate of correction   - will continue to monitor for correction after D5W infusion finishes     KAILYN (acute kidney injury)  Cr was initially 1.6, up from a baseline of 1. Likely pre-renal KAILYN, due to decreased PO intake. Resolved with fluids.     Plan:   - Continue to monitor CMP.     Type 2 diabetes mellitus, with long-term current use of insulin  History of. Takes Levemir 35 units QHS at home. A1C 10%.    -goal 140-180 while admitted  -SSI prn   -continue to monitor glucose  -Endocrine consulted for A1c 10%; following, appreciate recs       Opioid use  History of. Prescribed oxycodone 15mg at home. Family concerned that medication is being diverted by family member, unclear how often patient takes. Reportedly is prescribed for neck pain.     Plan:   - holding oxycodone at the moment     Left hemiplegia  See stroke    Plan:   - continue  PT/OT/SLP          10/7: DOTTIE, nuero exam stable, echo today, NPO aside from meds per speech, barium study tmrw  10/08/2024 NAYOLANDA, stable neuro exam, went for the Br study, and was started on a diet. Endocrine consulted for the A1c 10%, pending recs.  10/09/2024 NAEON, Stable neuro exam, will restart Lisinopril 20 mg (half of her dome dose). Seen by endocrine, with adjustment of her insulin doses.  10/10/2024 NAEON, no change in her neuro exam, will restart Eliquis today, BP is better controlled.  10/11/2024 NAEON, stable neuro exam, still decreased PO intake. Patient gets some episodes of in attention; EEG was ordered. Endocrine adjusted her insulins. Accepted at [facility] in [place].   10/12/2024 DOTTIE, waiting on EEG interpretation (contacted epilepsy), started Keppra 500mg BID and will see if reduces transient alterations in awareness, neurologic examination stable  10/13/2024 NAEON, EEG w/encephalopathy (no seizure or discharges), stop Keppra, SBP ~150 to 160 so increased Coreg to 6.25mg BID  10/14/2024 General surgery was consulted overnight for constipation, episodes of nausea and emesis. KUB concerning for SBO vs. Ileus. F/u CT AP was more indicative of ileus, no evidence of mechanical obstruction was observed. Patient is NPO, can get some meds via NGT. Eliquis switched to heparin drip.   10/15/2024 Patient has still not had an actual bowel movement. Is distended, has some abdominal pain. General surgery following, is getting XR gastrogaffin challenge, will f/u results.   10/16/2024 Still monitoring patient for bowel movements in the setting of Ileus. General surgery is following. Mild hyperkalemia and hypernatremia this morning; will continue to monitor.   10/17/2024 Patient still has not had a bowel movement. Ileus not resolved on repeat imaging. Suppository ordered earlier this morning, will monitor to see if improvement with suppository.   10/18/2024 Patient still had not had a solid bowel movement  due to her ileus, had a number of watery movements yesterday but nothing substantive. Suppositories now scheduled. General surgery following.     STROKE DOCUMENTATION   Acute Stroke Times   Last Known Normal Date:  (OSH)  Stroke Team Called Date: 10/06/24  Stroke Team Called Time:  (At OSH)  Stroke Team Arrival Date: 10/06/24  Stroke Team Arrival Time: 0608  CT Interpretation Time:  (CTA done around 4:20am per documents that came to [facility] with patient)  Thrombolytic Therapy Recommended: No  Thrombectomy Recommended:  (Pending MRI Brain)    NIH Scale:  1a. Level of Consciousness: 1-->Not alert, but arousable by minor stimulation to obey, answer, or respond  1b. LOC Questions: 0-->Answers both questions correctly  1c. LOC Commands: 0-->Performs both tasks correctly  2. Best Gaze: 0-->Normal  3. Visual: 0-->No visual loss  4. Facial Palsy: 1-->Minor paralysis (flattened nasolabial fold, asymmetry on smiling)  5a. Motor Arm, Left: 4-->No movement  5b. Motor Arm, Right: 0-->No drift, limb holds 90 (or 45) degrees for full 10 secs  6a. Motor Leg, Left: 4-->No movement  6b. Motor Leg, Right: 0-->No drift, leg holds 30 degree position for full 5 secs  7. Limb Ataxia: 0-->Absent  8. Sensory: 1-->Mild-to-moderate sensory loss, patient feels pinprick is less sharp or is dull on the affected side, or there is a loss of superficial pain with pinprick, but patient is aware of being touched  9. Best Language: 0-->No aphasia, normal  10. Dysarthria: 1-->Mild-to-moderate dysarthria, patient slurs at least some words and, at worst, can be understood with some difficulty  11. Extinction and Inattention (formerly Neglect): 0-->No abnormality  Total (NIH Stroke Scale): 12       Modified [place] Score: 3  [place] Coma Scale:    ABCD2 Score:    [id number] Score:   HAS -BLED Score:   ICH Score:   Hunt & Shane Classification:      Hemorrhagic change of an Ischemic Stroke: Does this patient have an ischemic stroke with hemorrhagic changes? No      Neurologic Chief Complaint: infarcts in multiple vascular territories    Subjective:     Interval History: Patient is seen for follow-up neurological assessment and treatment recommendations: See hospital course.     HPI, Past Medical, Family, and Social History remains the same as documented in the initial encounter.     Review of Systems   Neurological:  Positive for weakness.   All other systems reviewed and are negative.    Scheduled Meds:   atorvastatin  40 mg Per NG tube Daily    bisacodyL  10 mg Rectal BID    carvediloL  12.5 mg Per NG tube BID    famotidine  20 mg Per NG tube Daily    polyethylene glycol  17 g Per NG tube BID     Continuous Infusions:   0/9% NACL & POTASSIUM CHLORIDE 20 MEQ/L   Intravenous Continuous        D5W   Intravenous Continuous 100 mL/hr at 10/18/24 1134 New Bag at 10/18/24 1134    heparin (porcine) in D5W  0-40 Units/kg/hr (Adjusted) Intravenous Continuous 5.9 mL/hr at 10/17/24 2259 10 Units/kg/hr at 10/17/24 2259     PRN Meds:  Current Facility-Administered Medications:     acetaminophen, 650 mg, Per NG tube, Q8H PRN    dextrose 10%, 12.5 g, Intravenous, PRN    dextrose 10%, 25 g, Intravenous, PRN    glucagon (human recombinant), 1 mg, Intramuscular, PRN    hydrALAZINE, 10 mg, Intravenous, Q6H PRN    insulin aspart U-100, 0-10 Units, Subcutaneous, Q4H PRN    iohexol, 15 mL, Oral, PRN    sodium chloride 0.9%, 500 mL, Intravenous, PRN    sodium chloride 0.9%, 10 mL, Intravenous, PRN    Flushing PICC/Midline Protocol, , , Until Discontinued **AND** sodium chloride 0.9%, 10 mL, Intravenous, Q12H PRN    Objective:     Vital Signs (Most Recent):  Temp: 97.8 °F (36.6 °C) (10/18/24 1145)  Pulse: 73 (10/18/24 1145)  Resp: 18 (10/18/24 1145)  BP: (!) 177/88 (10/18/24 1145)  SpO2: 100 % (10/18/24 1145)  BP Location: Left arm    Vital Signs Range (Last 24H):  Temp:  [97.1 °F (36.2 °C)-98.3 °F (36.8 °C)]   Pulse:  [61-85]   Resp:  [16-18]   BP: (137-177)/(71-93)   SpO2:  [93 %-100 %]   BP  "Location: Left arm       Physical Exam  Vitals reviewed.   Cardiovascular:      Rate and Rhythm: Normal rate.   Pulmonary:      Effort: Pulmonary effort is normal. No respiratory distress.   Abdominal:      General: There is distension.      Palpations: Abdomen is soft.      Tenderness: There is abdominal tenderness.   Musculoskeletal:      Right lower leg: No edema.      Left lower leg: No edema.   Skin:     General: Skin is warm and dry.          Neurological Exam:   LOC: alert  Attention Span: poor  Articulation: Dysarthria  Orientation: Person, Place, Time   Visual Fields: Full  EOM (CN III, IV, VI): Full/intact  Pupils (CN II, III): PERRL  Facial Sensation (CN V): Facial sensory loss  Facial Movement (CN VII): Upper facial weakness on the Left  Motor: Arm left  Plegia 0/5  Leg left  Paresis: 0/5  Arm right  Normal 5/5  Leg right Normal 5/5  Cerebellum: No evidence of appendicular or axial ataxia  Sensation: Jason-anesthesia left    Laboratory:  CMP:   Recent Labs   Lab 10/18/24  0348 10/18/24  1419   CALCIUM 8.2*  --    ALBUMIN 2.3*  --    PROT 6.1  --    * 149*   K 4.9  --    CO2 20*  --    *  --    BUN 27*  --    CREATININE 0.8  --    ALKPHOS 148  --    ALT 22  --    AST 36  --    BILITOT 0.6  --      CBC:   Recent Labs   Lab 10/18/24  0348   WBC 13.12*  13.12*   RBC 3.67*  3.67*   HGB 12.0  12.0   HCT 38.8  38.8     235   *  106*   MCH 32.7*  32.7*   MCHC 30.9*  30.9*     Lipid Panel: No results for input(s): "CHOL", "LDLCALC", "HDL", "TRIG" in the last 168 hours.  Hgb A1C:   No results for input(s): "HGBA1C" in the last 168 hours.    TSH: No results for input(s): "TSH" in the last 168 hours.    Diagnostic Results      Brain and Vessel Imaging   MRI/MRA Brain Ischemic Protocol: 10/6/24  Impression: Acute infarcts involving the right anterior circulation and posterior circulation as detailed above without evidence of intracranial hemorrhage or mass effect.  Embolic disease to " be considered.     Chronic left parietal infarct. MRA demonstrates limited visualization of distal right KIARA branches in keeping with the territory of the acute infarct.  There is also narrowing of at least one proximal left M2 branch although no acute infarct in that territory.          Echo: 10/7/24  Cardiac Imaging     Left Ventricle: The left ventricle is normal in size. Normal wall thickness. There is concentric remodeling. Normal wall motion. There is hyperdynamic systolic function with a visually estimated ejection fraction of greater than 70%. There is normal diastolic function.    Right Ventricle: Normal right ventricular cavity size. Wall thickness is normal. Systolic function is normal.    Left Atrium: Bubble study is likely negative, but full opacification of the RA is not well seen on these images which decreases sensitivity.    IVC/SVC: Normal venous pressure at 3 mmHg.    Arian Williamson MD  Comprehensive Stroke Center  Department of Vascular Neurology   [facility])

## 2024-10-19 LAB
ALBUMIN SERPL BCP-MCNC: 2.2 G/DL (ref 3.5–5.2)
ALP SERPL-CCNC: 88 U/L (ref 40–150)
ALT SERPL W/O P-5'-P-CCNC: 23 U/L (ref 10–44)
ANION GAP SERPL CALC-SCNC: 10 MMOL/L (ref 8–16)
APTT PPP: 30.3 SEC (ref 21–32)
APTT PPP: 55.8 SEC (ref 21–32)
AST SERPL-CCNC: 28 U/L (ref 10–40)
BASOPHILS # BLD AUTO: 0.07 K/UL (ref 0–0.2)
BASOPHILS # BLD AUTO: 0.07 K/UL (ref 0–0.2)
BASOPHILS NFR BLD: 0.4 % (ref 0–1.9)
BASOPHILS NFR BLD: 0.4 % (ref 0–1.9)
BILIRUB SERPL-MCNC: 0.5 MG/DL (ref 0.1–1)
BUN SERPL-MCNC: 19 MG/DL (ref 8–23)
CALCIUM SERPL-MCNC: 8.1 MG/DL (ref 8.7–10.5)
CHLORIDE SERPL-SCNC: 116 MMOL/L (ref 95–110)
CO2 SERPL-SCNC: 22 MMOL/L (ref 23–29)
CREAT SERPL-MCNC: 0.8 MG/DL (ref 0.5–1.4)
DIFFERENTIAL METHOD BLD: ABNORMAL
DIFFERENTIAL METHOD BLD: ABNORMAL
EOSINOPHIL # BLD AUTO: 0.1 K/UL (ref 0–0.5)
EOSINOPHIL # BLD AUTO: 0.1 K/UL (ref 0–0.5)
EOSINOPHIL NFR BLD: 0.3 % (ref 0–8)
EOSINOPHIL NFR BLD: 0.3 % (ref 0–8)
ERYTHROCYTE [DISTWIDTH] IN BLOOD BY AUTOMATED COUNT: 12.8 % (ref 11.5–14.5)
ERYTHROCYTE [DISTWIDTH] IN BLOOD BY AUTOMATED COUNT: 12.8 % (ref 11.5–14.5)
EST. GFR  (NO RACE VARIABLE): >60 ML/MIN/1.73 M^2
GLUCOSE SERPL-MCNC: 205 MG/DL (ref 70–110)
HCT VFR BLD AUTO: 38.4 % (ref 37–48.5)
HCT VFR BLD AUTO: 38.4 % (ref 37–48.5)
HGB BLD-MCNC: 12.7 G/DL (ref 12–16)
HGB BLD-MCNC: 12.7 G/DL (ref 12–16)
IMM GRANULOCYTES # BLD AUTO: 0.46 K/UL (ref 0–0.04)
IMM GRANULOCYTES # BLD AUTO: 0.46 K/UL (ref 0–0.04)
IMM GRANULOCYTES NFR BLD AUTO: 2.8 % (ref 0–0.5)
IMM GRANULOCYTES NFR BLD AUTO: 2.8 % (ref 0–0.5)
LYMPHOCYTES # BLD AUTO: 2 K/UL (ref 1–4.8)
LYMPHOCYTES # BLD AUTO: 2 K/UL (ref 1–4.8)
LYMPHOCYTES NFR BLD: 11.9 % (ref 18–48)
LYMPHOCYTES NFR BLD: 11.9 % (ref 18–48)
MAGNESIUM SERPL-MCNC: 2 MG/DL (ref 1.6–2.6)
MCH RBC QN AUTO: 33 PG (ref 27–31)
MCH RBC QN AUTO: 33 PG (ref 27–31)
MCHC RBC AUTO-ENTMCNC: 33.1 G/DL (ref 32–36)
MCHC RBC AUTO-ENTMCNC: 33.1 G/DL (ref 32–36)
MCV RBC AUTO: 100 FL (ref 82–98)
MCV RBC AUTO: 100 FL (ref 82–98)
MONOCYTES # BLD AUTO: 1 K/UL (ref 0.3–1)
MONOCYTES # BLD AUTO: 1 K/UL (ref 0.3–1)
MONOCYTES NFR BLD: 6.1 % (ref 4–15)
MONOCYTES NFR BLD: 6.1 % (ref 4–15)
NEUTROPHILS # BLD AUTO: 13.1 K/UL (ref 1.8–7.7)
NEUTROPHILS # BLD AUTO: 13.1 K/UL (ref 1.8–7.7)
NEUTROPHILS NFR BLD: 78.5 % (ref 38–73)
NEUTROPHILS NFR BLD: 78.5 % (ref 38–73)
NRBC BLD-RTO: 0 /100 WBC
NRBC BLD-RTO: 0 /100 WBC
PHOSPHATE SERPL-MCNC: 2.4 MG/DL (ref 2.7–4.5)
PLATELET # BLD AUTO: 244 K/UL (ref 150–450)
PLATELET # BLD AUTO: 244 K/UL (ref 150–450)
PMV BLD AUTO: 10.9 FL (ref 9.2–12.9)
PMV BLD AUTO: 10.9 FL (ref 9.2–12.9)
POCT GLUCOSE: 211 MG/DL (ref 70–110)
POCT GLUCOSE: 214 MG/DL (ref 70–110)
POTASSIUM SERPL-SCNC: 3 MMOL/L (ref 3.5–5.1)
PROT SERPL-MCNC: 5.5 G/DL (ref 6–8.4)
RBC # BLD AUTO: 3.85 M/UL (ref 4–5.4)
RBC # BLD AUTO: 3.85 M/UL (ref 4–5.4)
SODIUM SERPL-SCNC: 147 MMOL/L (ref 136–145)
SODIUM SERPL-SCNC: 148 MMOL/L (ref 136–145)
WBC # BLD AUTO: 16.66 K/UL (ref 3.9–12.7)
WBC # BLD AUTO: 16.66 K/UL (ref 3.9–12.7)

## 2024-10-19 PROCEDURE — 11000001 HC ACUTE MED/SURG PRIVATE ROOM

## 2024-10-19 PROCEDURE — 80053 COMPREHEN METABOLIC PANEL: CPT | Performed by: PHYSICIAN ASSISTANT

## 2024-10-19 PROCEDURE — 99233 SBSQ HOSP IP/OBS HIGH 50: CPT | Mod: ,,, | Performed by: STUDENT IN AN ORGANIZED HEALTH CARE EDUCATION/TRAINING PROGRAM

## 2024-10-19 PROCEDURE — 36415 COLL VENOUS BLD VENIPUNCTURE: CPT | Performed by: STUDENT IN AN ORGANIZED HEALTH CARE EDUCATION/TRAINING PROGRAM

## 2024-10-19 PROCEDURE — 84100 ASSAY OF PHOSPHORUS: CPT | Performed by: PHYSICIAN ASSISTANT

## 2024-10-19 PROCEDURE — 63600175 PHARM REV CODE 636 W HCPCS: Mod: UD

## 2024-10-19 PROCEDURE — 25000003 PHARM REV CODE 250: Mod: UD

## 2024-10-19 PROCEDURE — 36415 COLL VENOUS BLD VENIPUNCTURE: CPT | Performed by: PHYSICIAN ASSISTANT

## 2024-10-19 PROCEDURE — 85025 COMPLETE CBC W/AUTO DIFF WBC: CPT | Performed by: PHYSICIAN ASSISTANT

## 2024-10-19 PROCEDURE — 85730 THROMBOPLASTIN TIME PARTIAL: CPT | Mod: 91 | Performed by: STUDENT IN AN ORGANIZED HEALTH CARE EDUCATION/TRAINING PROGRAM

## 2024-10-19 PROCEDURE — 25000003 PHARM REV CODE 250: Performed by: STUDENT IN AN ORGANIZED HEALTH CARE EDUCATION/TRAINING PROGRAM

## 2024-10-19 PROCEDURE — 83735 ASSAY OF MAGNESIUM: CPT | Performed by: PHYSICIAN ASSISTANT

## 2024-10-19 RX ORDER — INSULIN ASPART 100 [IU]/ML
0-10 INJECTION, SOLUTION INTRAVENOUS; SUBCUTANEOUS
Status: DISCONTINUED | OUTPATIENT
Start: 2024-10-20 | End: 2024-10-19

## 2024-10-19 RX ORDER — POTASSIUM CHLORIDE 20 MEQ/1
40 TABLET, EXTENDED RELEASE ORAL
Status: COMPLETED | OUTPATIENT
Start: 2024-10-19 | End: 2024-10-19

## 2024-10-19 RX ORDER — CARVEDILOL 12.5 MG/1
12.5 TABLET ORAL 2 TIMES DAILY
Status: DISCONTINUED | OUTPATIENT
Start: 2024-10-19 | End: 2024-10-28 | Stop reason: HOSPADM

## 2024-10-19 RX ORDER — ATORVASTATIN CALCIUM 40 MG/1
40 TABLET, FILM COATED ORAL DAILY
Status: DISCONTINUED | OUTPATIENT
Start: 2024-10-19 | End: 2024-10-28 | Stop reason: HOSPADM

## 2024-10-19 RX ORDER — INSULIN ASPART 100 [IU]/ML
0-10 INJECTION, SOLUTION INTRAVENOUS; SUBCUTANEOUS
Status: DISCONTINUED | OUTPATIENT
Start: 2024-10-19 | End: 2024-10-28 | Stop reason: HOSPADM

## 2024-10-19 RX ORDER — FAMOTIDINE 20 MG/1
20 TABLET, FILM COATED ORAL DAILY
Status: DISCONTINUED | OUTPATIENT
Start: 2024-10-19 | End: 2024-10-28 | Stop reason: HOSPADM

## 2024-10-19 RX ORDER — POLYETHYLENE GLYCOL 3350 17 G/17G
17 POWDER, FOR SOLUTION ORAL 2 TIMES DAILY
Status: DISCONTINUED | OUTPATIENT
Start: 2024-10-19 | End: 2024-10-23

## 2024-10-19 RX ADMIN — POTASSIUM CHLORIDE: 2 INJECTION, SOLUTION, CONCENTRATE INTRAVENOUS at 11:10

## 2024-10-19 RX ADMIN — APIXABAN 5 MG: 5 TABLET, FILM COATED ORAL at 09:10

## 2024-10-19 RX ADMIN — POTASSIUM CHLORIDE 40 MEQ: 1500 TABLET, EXTENDED RELEASE ORAL at 01:10

## 2024-10-19 RX ADMIN — INSULIN ASPART 2 UNITS: 100 INJECTION, SOLUTION INTRAVENOUS; SUBCUTANEOUS at 10:10

## 2024-10-19 RX ADMIN — POTASSIUM CHLORIDE: 2 INJECTION, SOLUTION, CONCENTRATE INTRAVENOUS at 02:10

## 2024-10-19 RX ADMIN — FAMOTIDINE 20 MG: 20 TABLET ORAL at 08:10

## 2024-10-19 RX ADMIN — POLYETHYLENE GLYCOL 3350 17 G: 17 POWDER, FOR SOLUTION ORAL at 08:10

## 2024-10-19 RX ADMIN — HEPARIN SODIUM AND DEXTROSE 8 UNITS/KG/HR: 10000; 5 INJECTION INTRAVENOUS at 02:10

## 2024-10-19 RX ADMIN — ATORVASTATIN CALCIUM 40 MG: 40 TABLET, FILM COATED ORAL at 08:10

## 2024-10-19 RX ADMIN — CARVEDILOL 12.5 MG: 12.5 TABLET, FILM COATED ORAL at 08:10

## 2024-10-19 RX ADMIN — BISACODYL 10 MG: 10 SUPPOSITORY RECTAL at 08:10

## 2024-10-19 RX ADMIN — CARVEDILOL 12.5 MG: 12.5 TABLET, FILM COATED ORAL at 09:10

## 2024-10-19 NOTE — ASSESSMENT & PLAN NOTE
Takes Eliquis 5mg BID and Coreg 6.25mg BID at home.  Initially Eliquis held given size of stroke; ASA 81mg QD in the interim. Eliquis was restarted on 10/10.       Plan:   -continue at home coreg regimen   -eliquis switched to heparin drip due to issues described in ileus section of the assessment and plan.   -Heparin gtt turned off today. Eliquis resumed.

## 2024-10-19 NOTE — ASSESSMENT & PLAN NOTE
"63 y/o F transferred from [facility] for higher level of care secondary to acute KIARA stroke on CTA.  Briefly,the patient presented to the OSH overnight for slurred speech started at 1:30 a.m. in the morning.  She has a history of his stroke with left-sided deficits but is normally able to transfer and do a little walking. This morning, she had complete hemiplegia of the left side. Telestroke consult performed, concerning for LVO.  NIH 13. CTA Stroke MP performed and  was read as "acute R KIARA infarct". No TNK due to AC use. Patient accepted to ED as transfer for emergent vascular neurology evaluation. No endovascular intervention. History of atrial fibrillation per discussion with daughter. Was started on Eliquis on 10/6, currently on heparin drip given likely lack of absorption through NG tube in setting of Ileus. Given history of Afib, etiology likely embolic. Though not apparent on MRA, if R KIARA occlusion was proximal (supported by MRI) could account for R MCA-like picture. Transient episodes of altered awareness: EEG negative, and no change in frequency observed by family w/Keppra so have stopped this medicine.     Patient stable overnight. LBM yesterday. Gen Surg ok with advancing diet to clear liquids. Heparin gtt stopped, started on Eliquis. Awaiting placement.    -Eliquis 5 mg BID   -Atorvastatin 40 mg daily  -SBP<180  -PT/OT-Current recs for high intensity therapy  -Diet advanced to clear liquids today  "

## 2024-10-19 NOTE — PROGRESS NOTES
"Zackary Long - Neurosurgery ([facility])  Vascular Neurology  Comprehensive Stroke Center  Progress Note    Assessment/Plan:     * Acute arterial ischemic stroke, multifocal, multiple vascular territories  65 y/o F transferred from [facility] for higher level of care secondary to acute KIARA stroke on CTA.  Briefly,the patient presented to the OSH overnight for slurred speech started at 1:30 a.m. in the morning.  She has a history of his stroke with left-sided deficits but is normally able to transfer and do a little walking. This morning, she had complete hemiplegia of the left side. Telestroke consult performed, concerning for LVO.  NIH 13. CTA Stroke MP performed and  was read as "acute R KIARA infarct". No TNK due to AC use. Patient accepted to ED as transfer for emergent vascular neurology evaluation. No endovascular intervention. History of atrial fibrillation per discussion with daughter. Was started on Eliquis on 10/6, currently on heparin drip given likely lack of absorption through NG tube in setting of Ileus. Given history of Afib, etiology likely embolic. Though not apparent on MRA, if R KIARA occlusion was proximal (supported by MRI) could account for R MCA-like picture. Transient episodes of altered awareness: EEG negative, and no change in frequency observed by family w/Keppra so have stopped this medicine.     Patient stable overnight. LBM yesterday. Gen Surg ok with advancing diet to clear liquids. Heparin gtt stopped, started on Eliquis. Awaiting placement.    -Eliquis 5 mg BID   -Atorvastatin 40 mg daily  -SBP<180  -PT/OT-Current recs for high intensity therapy  -Diet advanced to clear liquids today    Hypernatremia  Plan:   - D5W to correct, will collect labs to monitor rate of correction   - will continue to monitor for correction after D5W infusion finishes   -Na 148 this am    Ileus  Patient was seen by general surgery for some episodes of nausea and emesis and some constipation. KUB was " concerning for SBO vs. Ileus. Follow up CT AP did not show evidence of mechanical obstruction, was more indicative of Ileus. XR gastrogaffin challenge ordered by general surgery.     Plan:   - Was NPO. Approved to advance diet by Gen Surg today. NGT d/c'd.   - monitor electrolytes daily, correct as needed   - Eliquis switched to heparin as it might not have been fully absorbed considering Ileus. Resumed Eliquis today.    - scheduled suppository regimen, will f/u on effectiveness   - general surgery following, appreciate recs     Acute focal neurological deficit  Episodes of transient altered mental status observed by daughter, sometimes with a brief left gaze. Given location of KIARA stroke, considered seizure. EEG done on 10/11, negative.     Plan:   - no longer on keppra regimen     KAILYN (acute kidney injury)  Cr was initially 1.6, up from a baseline of 1. Likely pre-renal KAILYN, due to decreased PO intake. Resolved with fluids.     Plan:   - Continue to monitor CMP.   -SCr 0.8 today    Type 2 diabetes mellitus, with long-term current use of insulin  History of. Takes Levemir 35 units QHS at home. A1C 10%.    -goal 140-180 while admitted  -SSI prn   -continue to monitor glucose  -Endocrine consulted for A1c 10%; following, appreciate recs   -24° glucose       Atrial fibrillation  Takes Eliquis 5mg BID and Coreg 6.25mg BID at home.  Initially Eliquis held given size of stroke; ASA 81mg QD in the interim. Eliquis was restarted on 10/10.       Plan:   -continue at home coreg regimen   -eliquis switched to heparin drip due to issues described in ileus section of the assessment and plan.   -Heparin gtt turned off today. Eliquis resumed.    Opioid use  History of. Prescribed oxycodone 15mg at home. Family concerned that medication is being diverted by family member, unclear how often patient takes. Reportedly is prescribed for neck pain.     Plan:   - holding oxycodone at the moment due to ileus    Left hemiplegia  See  stroke    Plan:   - continue PT/OT/SLP   -Current recs for high intensity therapy    HTN (hypertension)  Home medication regimen as below  -lisinopril 40mg QD  -amlodipine 10mg QD    Plan:   -lisinopril and amlodipine were initially held in the setting of KAILYN. Was started on coreg in the context of Afib.   -continue coreg   -if patient's BP still elevated, consider adding back amlodipine and lisinopril   -Goal SBP<180 for now  24° -186         10/7: DOTTIE, nuero exam stable, echo today, NPO aside from meds per speech, barium study tmrw  10/08/2024 NACAMACHOON, stable neuro exam, went for the Br study, and was started on a diet. Endocrine consulted for the A1c 10%, pending recs.  10/09/2024 DOTTIE, Stable neuro exam, will restart Lisinopril 20 mg (half of her dome dose). Seen by endocrine, with adjustment of her insulin doses.  10/10/2024 DOTTIE, no change in her neuro exam, will restart Eliquis today, BP is better controlled.  10/11/2024 NAYOLANDA, stable neuro exam, still decreased PO intake. Patient gets some episodes of in attention; EEG was ordered. Endocrine adjusted her insulins. Accepted at [facility] in [place].   10/12/2024 DOTTIE, waiting on EEG interpretation (contacted epilepsy), started Keppra 500mg BID and will see if reduces transient alterations in awareness, neurologic examination stable  10/13/2024 NAEON, EEG w/encephalopathy (no seizure or discharges), stop Keppra, SBP ~150 to 160 so increased Coreg to 6.25mg BID  10/14/2024 General surgery was consulted overnight for constipation, episodes of nausea and emesis. KUB concerning for SBO vs. Ileus. F/u CT AP was more indicative of ileus, no evidence of mechanical obstruction was observed. Patient is NPO, can get some meds via NGT. Eliquis switched to heparin drip.   10/15/2024 Patient has still not had an actual bowel movement. Is distended, has some abdominal pain. General surgery following, is getting XR gastrogaffin challenge, will f/u results.    10/16/2024 Still monitoring patient for bowel movements in the setting of Ileus. General surgery is following. Mild hyperkalemia and hypernatremia this morning; will continue to monitor.   10/17/2024 Patient still has not had a bowel movement. Ileus not resolved on repeat imaging. Suppository ordered earlier this morning, will monitor to see if improvement with suppository.   10/18/2024 Patient still had not had a solid bowel movement due to her ileus, had a number of watery movements yesterday but nothing substantive. Suppositories now scheduled. General surgery following.   10/19/2024 Patient stable overnight. LBM yesterday. Gen Surg ok with advancing diet to clear liquids. Heparin gtt stopped, started on Eliquis. Awaiting placement.    STROKE DOCUMENTATION   Acute Stroke Times   Last Known Normal Date:  (OSH)  Stroke Team Called Date: 10/06/24  Stroke Team Called Time:  (At OSH)  Stroke Team Arrival Date: 10/06/24  Stroke Team Arrival Time: 0608  CT Interpretation Time:  (CTA done around 4:20am per documents that came to [facility] [place])  Thrombolytic Therapy Recommended: No  Thrombectomy Recommended:  (Pending MRI Brain)    NIH Scale:  1a. Level of Consciousness: 0-->Alert, keenly responsive  1b. LOC Questions: 0-->Answers both questions correctly  1c. LOC Commands: 0-->Performs both tasks correctly  2. Best Gaze: 0-->Normal  3. Visual: 0-->No visual loss  4. Facial Palsy: 1-->Minor paralysis (flattened nasolabial fold, asymmetry on smiling)  5a. Motor Arm, Left: 4-->No movement  5b. Motor Arm, Right: 0-->No drift, limb holds 90 (or 45) degrees for full 10 secs  6a. Motor Leg, Left: 4-->No movement  6b. Motor Leg, Right: 0-->No drift, leg holds 30 degree position for full 5 secs  7. Limb Ataxia: 0-->Absent  8. Sensory: 1-->Mild-to-moderate sensory loss, patient feels pinprick is less sharp or is dull on the affected side, or there is a loss of superficial pain with pinprick, but patient is aware of being  touched  9. Best Language: 0-->No aphasia, normal  10. Dysarthria: 1-->Mild-to-moderate dysarthria, patient slurs at least some words and, at worst, can be understood with some difficulty  11. Extinction and Inattention (formerly Neglect): 0-->No abnormality  Total (NIH Stroke Scale): 11       Modified Danny Score: 3  Diane Coma Scale:15   ABCD2 Score:    [id number] Score:   HAS -BLED Score:   ICH Score:   Hunt & Shane Classification:      Hemorrhagic change of an Ischemic Stroke: Does this patient have an ischemic stroke with hemorrhagic changes? No     Neurologic Chief Complaint: Infarcts in multiple vascular territories    Subjective:     Interval History: Patient is seen for follow-up neurological assessment and treatment recommendations: See Hospital Course    HPI, Past Medical, Family, and Social History remains the same as documented in the initial encounter.     Review of Systems   Neurological:  Positive for facial asymmetry, weakness and numbness.   Psychiatric/Behavioral:  Negative for confusion.    All other systems reviewed and are negative.    Scheduled Meds:   apixaban  5 mg Oral BID    atorvastatin  40 mg Oral Daily    bisacodyL  10 mg Rectal BID    carvediloL  12.5 mg Oral BID    famotidine  20 mg Oral Daily    polyethylene glycol  17 g Oral BID     Continuous Infusions:   0/9% NACL & POTASSIUM CHLORIDE 20 MEQ/L   Intravenous Continuous 100 mL/hr at 10/19/24 0235 New Bag at 10/19/24 0235     PRN Meds:  Current Facility-Administered Medications:     acetaminophen, 650 mg, Per NG tube, Q8H PRN    dextrose 10%, 12.5 g, Intravenous, PRN    dextrose 10%, 25 g, Intravenous, PRN    glucagon (human recombinant), 1 mg, Intramuscular, PRN    hydrALAZINE, 10 mg, Intravenous, Q6H PRN    insulin aspart U-100, 0-10 Units, Subcutaneous, Q4H PRN    iohexol, 15 mL, Oral, PRN    sodium chloride 0.9%, 500 mL, Intravenous, PRN    sodium chloride 0.9%, 10 mL, Intravenous, PRN    Flushing PICC/Midline Protocol, , ,  Until Discontinued **AND** sodium chloride 0.9%, 10 mL, Intravenous, Q12H PRN    Objective:     Vital Signs (Most Recent):  Temp: 98.4 °F (36.9 °C) (10/19/24 1142)  Pulse: 78 (10/19/24 1142)  Resp: 18 (10/19/24 1142)  BP: 123/60 (10/19/24 1142)  SpO2: 96 % (10/19/24 1142)  BP Location: Left arm    Vital Signs Range (Last 24H):  Temp:  [97.9 °F (36.6 °C)-99 °F (37.2 °C)]   Pulse:  [65-81]   Resp:  [16-20]   BP: (123-186)/(60-88)   SpO2:  [96 %-100 %]   BP Location: Left arm       Physical Exam  Vitals and nursing note reviewed.   Eyes:      General:         Right eye: No discharge.         Left eye: No discharge.      Conjunctiva/sclera: Conjunctivae normal.   Cardiovascular:      Rate and Rhythm: Normal rate.   Pulmonary:      Effort: Pulmonary effort is normal. No respiratory distress.   Abdominal:      General: There is no distension.   Musculoskeletal:      Cervical back: Normal range of motion and neck supple.   Skin:     General: Skin is warm and dry.   Neurological:      Mental Status: She is alert and oriented to person, place, and time.      Cranial Nerves: Cranial nerve deficit present.      Motor: Weakness present.   Psychiatric:         Mood and Affect: Mood normal.          Neurological Exam:   LOC: alert  Language: No aphasia  Articulation: Dysarthria    Laboratory:  CMP:   Recent Labs   Lab 10/19/24  0545   CALCIUM 8.1*   ALBUMIN 2.2*   PROT 5.5*   *   K 3.0*   CO2 22*   *   BUN 19   CREATININE 0.8   ALKPHOS 88   ALT 23   AST 28   BILITOT 0.5     CBC:   Recent Labs   Lab 10/19/24  0545   WBC 16.66*  16.66*   RBC 3.85*  3.85*   HGB 12.7  12.7   HCT 38.4  38.4     244   *  100*   MCH 33.0*  33.0*   MCHC 33.1  33.1       Diagnostic Results     Brain and Vessel Imaging   MRI/MRA Brain Ischemic Protocol: 10/6/24  Impression: Acute infarcts involving the right anterior circulation and posterior circulation as detailed above without evidence of intracranial hemorrhage or mass  effect.  Embolic disease to be considered.     Chronic left parietal infarct. MRA demonstrates limited visualization of distal right KIARA branches in keeping with the territory of the acute infarct.  There is also narrowing of at least one proximal left M2 branch although no acute infarct in that territory.          Echo: 10/7/24  Cardiac Imaging     Left Ventricle: The left ventricle is normal in size. Normal wall thickness. There is concentric remodeling. Normal wall motion. There is hyperdynamic systolic function with a visually estimated ejection fraction of greater than 70%. There is normal diastolic function.    Right Ventricle: Normal right ventricular cavity size. Wall thickness is normal. Systolic function is normal.    Left Atrium: Bubble study is likely negative, but full opacification of the RA is not well seen on these images which decreases sensitivity.    IVC/SVC: Normal venous pressure at 3 mmHg.      Corine Vora DNP  Comprehensive Stroke Center  Department of Vascular Neurology   [facility] \[facility]\"")

## 2024-10-19 NOTE — ASSESSMENT & PLAN NOTE
Home medication regimen as below  -lisinopril 40mg QD  -amlodipine 10mg QD    Plan:   -lisinopril and amlodipine were initially held in the setting of KAILYN. Was started on coreg in the context of Afib.   -continue coreg   -if patient's BP still elevated, consider adding back amlodipine and lisinopril   -Goal SBP<180 for now  24° -186

## 2024-10-19 NOTE — ASSESSMENT & PLAN NOTE
Tammy Multani is a 64 y.o. lady admitted with an KIARA stroke now with concerns for SBO vs ileus. She is undergoing conservative management with bowel rest, NGT decompression    - okay for clear liquid diet today, can advance diet as tolerated  - imperative to maintain bowel regimen: daily suppository, enema PRN, Miralax BID  - NGT removed this morning at bedside  - replete electrolytes; K>4, Phos>3, Mag>2  - Remainder of care per primary team  - Please contact general surgery with any questions, concerns, or clinical status changes

## 2024-10-19 NOTE — SUBJECTIVE & OBJECTIVE
Interval History: NAEON. Continues to have bowel movements. Denies nausea and vomiting.    Medications:  Continuous Infusions:   0/9% NACL & POTASSIUM CHLORIDE 20 MEQ/L   Intravenous Continuous 100 mL/hr at 10/19/24 0235 New Bag at 10/19/24 0235    heparin (porcine) in D5W  0-40 Units/kg/hr (Adjusted) Intravenous Continuous 4.7 mL/hr at 10/19/24 0237 8 Units/kg/hr at 10/19/24 0237     Scheduled Meds:   atorvastatin  40 mg Per NG tube Daily    bisacodyL  10 mg Rectal BID    carvediloL  12.5 mg Per NG tube BID    famotidine  20 mg Per NG tube Daily    polyethylene glycol  17 g Per NG tube BID     PRN Meds:  Current Facility-Administered Medications:     acetaminophen, 650 mg, Per NG tube, Q8H PRN    dextrose 10%, 12.5 g, Intravenous, PRN    dextrose 10%, 25 g, Intravenous, PRN    glucagon (human recombinant), 1 mg, Intramuscular, PRN    hydrALAZINE, 10 mg, Intravenous, Q6H PRN    insulin aspart U-100, 0-10 Units, Subcutaneous, Q4H PRN    iohexol, 15 mL, Oral, PRN    sodium chloride 0.9%, 500 mL, Intravenous, PRN    sodium chloride 0.9%, 10 mL, Intravenous, PRN    Flushing PICC/Midline Protocol, , , Until Discontinued **AND** sodium chloride 0.9%, 10 mL, Intravenous, Q12H PRN     Review of patient's allergies indicates:  No Known Allergies  Objective:     Vital Signs (Most Recent):  Temp: 97.9 °F (36.6 °C) (10/18/24 2317)  Pulse: 73 (10/19/24 0256)  Resp: 20 (10/18/24 2317)  BP: 128/60 (10/18/24 2317)  SpO2: 98 % (10/18/24 1920) Vital Signs (24h Range):  Temp:  [97.1 °F (36.2 °C)-99 °F (37.2 °C)] 97.9 °F (36.6 °C)  Pulse:  [61-91] 73  Resp:  [16-20] 20  SpO2:  [93 %-100 %] 98 %  BP: (128-186)/(60-93) 128/60     Weight: 72.6 kg (160 lb 0.9 oz)  Body mass index is 29.27 kg/m².    Intake/Output - Last 3 Shifts         10/17 0700  10/18 0659 10/18 0700  10/19 0659 10/19 0700  10/20 0659    Urine (mL/kg/hr) 800 (0.5)      Drains 450      Stool 0      Total Output 1250      Net -1250             Urine Occurrence  3 x      Stool Occurrence 0 x 2 x              Physical Exam  Vitals and nursing note reviewed.   Constitutional:       General: She is not in acute distress.     Appearance: She is ill-appearing. She is not diaphoretic.      Comments: Room air  NGT to VLAD   HENT:      Head: Normocephalic and atraumatic.      Mouth/Throat:      Mouth: Mucous membranes are moist.      Pharynx: Oropharynx is clear.   Eyes:      Extraocular Movements: Extraocular movements intact.      Conjunctiva/sclera: Conjunctivae normal.   Cardiovascular:      Rate and Rhythm: Normal rate.   Pulmonary:      Effort: Pulmonary effort is normal. No respiratory distress.   Abdominal:      General: Abdomen is flat. There is no distension.      Palpations: Abdomen is soft.      Tenderness: There is abdominal tenderness (improved from prior). There is no guarding or rebound.      Comments: Well healed abdominal incisions  Abdomen much softer today than yesterday  NGT with thin output   Musculoskeletal:      Right lower leg: No edema.      Left lower leg: No edema.   Skin:     General: Skin is warm and dry.   Neurological:      Mental Status: She is alert.      Comments: Left sided weakness          Significant Labs:  I have reviewed all pertinent lab results within the past 24 hours.  CBC:   Recent Labs   Lab 10/19/24  0545   WBC 16.66*  16.66*   RBC 3.85*  3.85*   HGB 12.7  12.7   HCT 38.4  38.4     244   *  100*   MCH 33.0*  33.0*   MCHC 33.1  33.1     BMP:   Recent Labs   Lab 10/19/24  0545   *   *   K 3.0*   *   CO2 22*   BUN 19   CREATININE 0.8   CALCIUM 8.1*   MG 2.0       Significant Diagnostics:  I have reviewed all pertinent imaging results/findings within the past 24 hours.

## 2024-10-19 NOTE — ASSESSMENT & PLAN NOTE
Patient was seen by general surgery for some episodes of nausea and emesis and some constipation. KUB was concerning for SBO vs. Ileus. Follow up CT AP did not show evidence of mechanical obstruction, was more indicative of Ileus. XR gastrogaffin challenge ordered by general surgery.     Plan:   - Was NPO. Approved to advance diet by Gen Surg today. NGT d/c'd.   - monitor electrolytes daily, correct as needed   - Eliquis switched to heparin as it might not have been fully absorbed considering Ileus. Resumed Eliquis today.    - scheduled suppository regimen, will f/u on effectiveness   - general surgery following, appreciate recs

## 2024-10-19 NOTE — SUBJECTIVE & OBJECTIVE
Neurologic Chief Complaint: Infarcts in multiple vascular territories    Subjective:     Interval History: Patient is seen for follow-up neurological assessment and treatment recommendations: See Hospital Course    HPI, Past Medical, Family, and Social History remains the same as documented in the initial encounter.     Review of Systems   Neurological:  Positive for facial asymmetry, weakness and numbness.   Psychiatric/Behavioral:  Negative for confusion.    All other systems reviewed and are negative.    Scheduled Meds:   apixaban  5 mg Oral BID    atorvastatin  40 mg Oral Daily    bisacodyL  10 mg Rectal BID    carvediloL  12.5 mg Oral BID    famotidine  20 mg Oral Daily    polyethylene glycol  17 g Oral BID     Continuous Infusions:   0/9% NACL & POTASSIUM CHLORIDE 20 MEQ/L   Intravenous Continuous 100 mL/hr at 10/19/24 0235 New Bag at 10/19/24 0235     PRN Meds:  Current Facility-Administered Medications:     acetaminophen, 650 mg, Per NG tube, Q8H PRN    dextrose 10%, 12.5 g, Intravenous, PRN    dextrose 10%, 25 g, Intravenous, PRN    glucagon (human recombinant), 1 mg, Intramuscular, PRN    hydrALAZINE, 10 mg, Intravenous, Q6H PRN    insulin aspart U-100, 0-10 Units, Subcutaneous, Q4H PRN    iohexol, 15 mL, Oral, PRN    sodium chloride 0.9%, 500 mL, Intravenous, PRN    sodium chloride 0.9%, 10 mL, Intravenous, PRN    Flushing PICC/Midline Protocol, , , Until Discontinued **AND** sodium chloride 0.9%, 10 mL, Intravenous, Q12H PRN    Objective:     Vital Signs (Most Recent):  Temp: 98.4 °F (36.9 °C) (10/19/24 1142)  Pulse: 78 (10/19/24 1142)  Resp: 18 (10/19/24 1142)  BP: 123/60 (10/19/24 1142)  SpO2: 96 % (10/19/24 1142)  BP Location: Left arm    Vital Signs Range (Last 24H):  Temp:  [97.9 °F (36.6 °C)-99 °F (37.2 °C)]   Pulse:  [65-81]   Resp:  [16-20]   BP: (123-186)/(60-88)   SpO2:  [96 %-100 %]   BP Location: Left arm       Physical Exam  Vitals and nursing note reviewed.   Eyes:      General:          Right eye: No discharge.         Left eye: No discharge.      Conjunctiva/sclera: Conjunctivae normal.   Cardiovascular:      Rate and Rhythm: Normal rate.   Pulmonary:      Effort: Pulmonary effort is normal. No respiratory distress.   Abdominal:      General: There is no distension.   Musculoskeletal:      Cervical back: Normal range of motion and neck supple.   Skin:     General: Skin is warm and dry.   Neurological:      Mental Status: She is alert and oriented to person, place, and time.      Cranial Nerves: Cranial nerve deficit present.      Motor: Weakness present.   Psychiatric:         Mood and Affect: Mood normal.          Neurological Exam:   LOC: alert  Language: No aphasia  Articulation: Dysarthria    Laboratory:  CMP:   Recent Labs   Lab 10/19/24  0545   CALCIUM 8.1*   ALBUMIN 2.2*   PROT 5.5*   *   K 3.0*   CO2 22*   *   BUN 19   CREATININE 0.8   ALKPHOS 88   ALT 23   AST 28   BILITOT 0.5     CBC:   Recent Labs   Lab 10/19/24  0545   WBC 16.66*  16.66*   RBC 3.85*  3.85*   HGB 12.7  12.7   HCT 38.4  38.4     244   *  100*   MCH 33.0*  33.0*   MCHC 33.1  33.1       Diagnostic Results     Brain and Vessel Imaging   MRI/MRA Brain Ischemic Protocol: 10/6/24  Impression: Acute infarcts involving the right anterior circulation and posterior circulation as detailed above without evidence of intracranial hemorrhage or mass effect.  Embolic disease to be considered.     Chronic left parietal infarct. MRA demonstrates limited visualization of distal right KIARA branches in keeping with the territory of the acute infarct.  There is also narrowing of at least one proximal left M2 branch although no acute infarct in that territory.          Echo: 10/7/24  Cardiac Imaging     Left Ventricle: The left ventricle is normal in size. Normal wall thickness. There is concentric remodeling. Normal wall motion. There is hyperdynamic systolic function with a visually estimated ejection  fraction of greater than 70%. There is normal diastolic function.    Right Ventricle: Normal right ventricular cavity size. Wall thickness is normal. Systolic function is normal.    Left Atrium: Bubble study is likely negative, but full opacification of the RA is not well seen on these images which decreases sensitivity.    IVC/SVC: Normal venous pressure at 3 mmHg.

## 2024-10-19 NOTE — ASSESSMENT & PLAN NOTE
History of. Prescribed oxycodone 15mg at home. Family concerned that medication is being diverted by family member, unclear how often patient takes. Reportedly is prescribed for neck pain.     Plan:   - holding oxycodone at the moment due to ileus

## 2024-10-19 NOTE — ASSESSMENT & PLAN NOTE
Plan:   - D5W to correct, will collect labs to monitor rate of correction   - will continue to monitor for correction after D5W infusion finishes   -Na 148 this am

## 2024-10-19 NOTE — PROGRESS NOTES
[facility]ack[facility] Long - Neurosurgery ([facility])  General Surgery  Progress Note    Subjective:     History of Present Illness:  Tammy Multani is a 64 y.o. lady with PMH DM, HTN, prior stroke with left sided deficits, afib on eliquis admitted to the neurology service since 10/6 for an KIARA stroke. Symptoms included slurred speech and hemiplegia of her left side. She had been tolerating a diet but in the past day has had a few episodes of nausea with nonbloody nonbilious thin emesis that she describes appears like spit. Last documented bowel movement on 8/11 but reports a loose watery movement earlier today. Mildly hypokalemic, remainder of labs bland. KUB with evidence of dilated large and small bowel. General surgery consulted for recs regarding ileus vs SBO.     Preciously has had c section and a lap radha.           Post-Op Info:  * No surgery found *         Interval History: NAEON. Continues to have bowel movements. Denies nausea and vomiting.    Medications:  Continuous Infusions:   0/9% NACL & POTASSIUM CHLORIDE 20 MEQ/L   Intravenous Continuous 100 mL/hr at 10/19/24 0235 New Bag at 10/19/24 0235    heparin (porcine) in D5W  0-40 Units/kg/hr (Adjusted) Intravenous Continuous 4.7 mL/hr at 10/19/24 0237 8 Units/kg/hr at 10/19/24 0237     Scheduled Meds:   atorvastatin  40 mg Per NG tube Daily    bisacodyL  10 mg Rectal BID    carvediloL  12.5 mg Per NG tube BID    famotidine  20 mg Per NG tube Daily    polyethylene glycol  17 g Per NG tube BID     PRN Meds:  Current Facility-Administered Medications:     acetaminophen, 650 mg, Per NG tube, Q8H PRN    dextrose 10%, 12.5 g, Intravenous, PRN    dextrose 10%, 25 g, Intravenous, PRN    glucagon (human recombinant), 1 mg, Intramuscular, PRN    hydrALAZINE, 10 mg, Intravenous, Q6H PRN    insulin aspart U-100, 0-10 Units, Subcutaneous, Q4H PRN    iohexol, 15 mL, Oral, PRN    sodium chloride 0.9%, 500 mL, Intravenous, PRN    sodium chloride 0.9%, 10 mL, Intravenous, PRN     Flushing PICC/Midline Protocol, , , Until Discontinued **AND** sodium chloride 0.9%, 10 mL, Intravenous, Q12H PRN     Review of patient's allergies indicates:  No Known Allergies  Objective:     Vital Signs (Most Recent):  Temp: 97.9 °F (36.6 °C) (10/18/24 2317)  Pulse: 73 (10/19/24 0256)  Resp: 20 (10/18/24 2317)  BP: 128/60 (10/18/24 2317)  SpO2: 98 % (10/18/24 1920) Vital Signs (24h Range):  Temp:  [97.1 °F (36.2 °C)-99 °F (37.2 °C)] 97.9 °F (36.6 °C)  Pulse:  [61-91] 73  Resp:  [16-20] 20  SpO2:  [93 %-100 %] 98 %  BP: (128-186)/(60-93) 128/60     Weight: 72.6 kg (160 lb 0.9 oz)  Body mass index is 29.27 kg/m².    Intake/Output - Last 3 Shifts         10/17 0700  10/18 0659 10/18 0700  10/19 0659 10/19 0700  10/20 0659    Urine (mL/kg/hr) 800 (0.5)      Drains 450      Stool 0      Total Output 1250      Net -1250             Urine Occurrence  3 x     Stool Occurrence 0 x 2 x              Physical Exam  Vitals and nursing note reviewed.   Constitutional:       General: She is not in acute distress.     Appearance: She is ill-appearing. She is not diaphoretic.      Comments: Room air  NGT to VLAD   HENT:      Head: Normocephalic and atraumatic.      Mouth/Throat:      Mouth: Mucous membranes are moist.      Pharynx: Oropharynx is clear.   Eyes:      Extraocular Movements: Extraocular movements intact.      Conjunctiva/sclera: Conjunctivae normal.   Cardiovascular:      Rate and Rhythm: Normal rate.   Pulmonary:      Effort: Pulmonary effort is normal. No respiratory distress.   Abdominal:      General: Abdomen is flat. There is no distension.      Palpations: Abdomen is soft.      Tenderness: There is abdominal tenderness (improved from prior). There is no guarding or rebound.      Comments: Well healed abdominal incisions  Abdomen much softer today than yesterday  NGT with thin output   Musculoskeletal:      Right lower leg: No edema.      Left lower leg: No edema.   Skin:     General: Skin is warm and dry.    Neurological:      Mental Status: She is alert.      Comments: Left sided weakness          Significant Labs:  I have reviewed all pertinent lab results within the past 24 hours.  CBC:   Recent Labs   Lab 10/19/24  0545   WBC 16.66*  16.66*   RBC 3.85*  3.85*   HGB 12.7  12.7   HCT 38.4  38.4     244   *  100*   MCH 33.0*  33.0*   MCHC 33.1  33.1     BMP:   Recent Labs   Lab 10/19/24  0545   *   *   K 3.0*   *   CO2 22*   BUN 19   CREATININE 0.8   CALCIUM 8.1*   MG 2.0       Significant Diagnostics:  I have reviewed all pertinent imaging results/findings within the past 24 hours.  Assessment/Plan:     * Acute arterial ischemic stroke, multifocal, multiple vascular territories  Tammy Multani is a 64 y.o. lady admitted with an KIARA stroke now with concerns for SBO vs ileus. She is undergoing conservative management with bowel rest, NGT decompression    - okay for clear liquid diet today, can advance diet as tolerated  - imperative to maintain bowel regimen: daily suppository, enema PRN, Miralax BID  - NGT removed this morning at bedside  - replete electrolytes; K>4, Phos>3, Mag>2  - Remainder of care per primary team          Meghan Vasquez MD  General Surgery  [facility] - Neurosurgery ([facility])

## 2024-10-19 NOTE — ASSESSMENT & PLAN NOTE
History of. Takes Levemir 35 units QHS at home. A1C 10%.    -goal 140-180 while admitted  -SSI prn   -continue to monitor glucose  -Endocrine consulted for A1c 10%; following, appreciate recs   -24° glucose

## 2024-10-19 NOTE — CARE UPDATE
-Glucose Goal 140-180    -A1C:   Hemoglobin A1C   Date Value Ref Range Status   10/08/2024 10.0 (H) 4.0 - 5.6 % Final     Comment:     ADA Screening Guidelines:  5.7-6.4%  Consistent with prediabetes  >or=6.5%  Consistent with diabetes    High levels of fetal hemoglobin interfere with the HbA1C  assay. Heterozygous hemoglobin variants (HbS, HgC, etc)do  not significantly interfere with this assay.   However, presence of multiple variants may affect accuracy.           -HOME REGIMEN:     -GLUCOSE TREND FOR THE PAST 24HRS:   Recent Labs   Lab 10/17/24  1155 10/17/24  1545 10/18/24  0811 10/18/24  1148 10/18/24  1651 10/18/24  2144   POCTGLUCOSE 113* 118* 135* 163* 228* 215*         -NO HYPOGYCEMIAS NOTED     - Diet  Diet NPO    T2DM admitted with acute KIARA stroke. Lantus d/c on 10/16 do to hypoglycemia.      - Continue Novolog Moderate dose correction with ISF 25 starting at 150    - POCT Glucose q 4 hrs while NPO   - Hypoglycemia protocol in place      ** Please notify Endocrine for any change and/or advance in diet**  ** Please call Endocrine for any BG related issues **     Discharge Planning:   TBD. Please notify endocrinology prior to discharge.

## 2024-10-19 NOTE — PLAN OF CARE
Problem: Skin Injury Risk Increased  Goal: Skin Health and Integrity  Outcome: Progressing     Problem: Stroke, Ischemic (Includes Transient Ischemic Attack)  Goal: Optimal Coping  Outcome: Progressing  Goal: Effective Bowel Elimination  Outcome: Progressing  Goal: Optimal Cerebral Tissue Perfusion  Outcome: Progressing  Goal: Optimal Cognitive Function  Outcome: Progressing  Goal: Improved Communication Skills  Outcome: Progressing  Goal: Optimal Functional Ability  Outcome: Progressing  Goal: Optimal Nutrition Intake  Outcome: Progressing  Goal: Effective Oxygenation and Ventilation  Outcome: Progressing  Goal: Improved Sensorimotor Function  Outcome: Progressing  Goal: Safe and Effective Swallow  Outcome: Progressing  Goal: Effective Urinary Elimination  Outcome: Progressing     Problem: Fall Injury Risk  Goal: Absence of Fall and Fall-Related Injury  Outcome: Progressing     Problem: Adult Inpatient Plan of Care  Goal: Plan of Care Review  Outcome: Progressing  Goal: Patient-Specific Goal (Individualized)  Description: Pt will maintain sbp below 220  Outcome: Progressing  Goal: Absence of Hospital-Acquired Illness or Injury  Outcome: Progressing  Goal: Optimal Comfort and Wellbeing  Outcome: Progressing  Goal: Readiness for Transition of Care  Outcome: Progressing     Problem: Infection  Goal: Absence of Infection Signs and Symptoms  Outcome: Progressing     Problem: Diabetes Comorbidity  Goal: Blood Glucose Level Within Targeted Range  Outcome: Progressing     Problem: Acute Kidney Injury/Impairment  Goal: Fluid and Electrolyte Balance  Outcome: Progressing  Goal: Improved Oral Intake  Outcome: Progressing  Goal: Effective Renal Function  Outcome: Progressing     Problem: Restraint, Nonviolent  Goal: Absence of Harm or Injury  Outcome: Progressing         POC updated and reviewed with the patient and daughter at the bedside. Questions regarding POC were encouraged and addressed. VSS, see flowsheets. Patient  is AOX 3 at this time. Tele maintained per order.  NAEON. Fall and safety precautions maintained, no signs of injury noted during shift. Patient repositioned  with assistance in bed for comfort. Upon exiting room, patient's bed locked in low position, side rails up x 3, bed alarm on, with call light within reach. Instructed patient to call staff for mobility, verbalized understanding. Stroke book and education reviewed at the bedside, see education flowsheets for details. No acute signs of distress noted at this time.

## 2024-10-19 NOTE — ASSESSMENT & PLAN NOTE
Cr was initially 1.6, up from a baseline of 1. Likely pre-renal KAILYN, due to decreased PO intake. Resolved with fluids.     Plan:   - Continue to monitor CMP.   -SCr 0.8 today

## 2024-10-20 PROBLEM — M79.89 LEFT UPPER EXTREMITY SWELLING: Status: ACTIVE | Noted: 2024-10-20

## 2024-10-20 LAB
ALBUMIN SERPL BCP-MCNC: 2.2 G/DL (ref 3.5–5.2)
ALP SERPL-CCNC: 88 U/L (ref 40–150)
ALT SERPL W/O P-5'-P-CCNC: 25 U/L (ref 10–44)
ANION GAP SERPL CALC-SCNC: 11 MMOL/L (ref 8–16)
ANION GAP SERPL CALC-SCNC: 9 MMOL/L (ref 8–16)
AST SERPL-CCNC: 29 U/L (ref 10–40)
BASOPHILS # BLD AUTO: 0.09 K/UL (ref 0–0.2)
BASOPHILS NFR BLD: 0.6 % (ref 0–1.9)
BILIRUB SERPL-MCNC: 0.5 MG/DL (ref 0.1–1)
BUN SERPL-MCNC: 12 MG/DL (ref 8–23)
BUN SERPL-MCNC: 14 MG/DL (ref 8–23)
CALCIUM SERPL-MCNC: 8 MG/DL (ref 8.7–10.5)
CALCIUM SERPL-MCNC: 8.5 MG/DL (ref 8.7–10.5)
CHLORIDE SERPL-SCNC: 119 MMOL/L (ref 95–110)
CHLORIDE SERPL-SCNC: 121 MMOL/L (ref 95–110)
CO2 SERPL-SCNC: 15 MMOL/L (ref 23–29)
CO2 SERPL-SCNC: 18 MMOL/L (ref 23–29)
CREAT SERPL-MCNC: 0.7 MG/DL (ref 0.5–1.4)
CREAT SERPL-MCNC: 0.8 MG/DL (ref 0.5–1.4)
DIFFERENTIAL METHOD BLD: ABNORMAL
EOSINOPHIL # BLD AUTO: 0.2 K/UL (ref 0–0.5)
EOSINOPHIL NFR BLD: 1 % (ref 0–8)
ERYTHROCYTE [DISTWIDTH] IN BLOOD BY AUTOMATED COUNT: 12.8 % (ref 11.5–14.5)
EST. GFR  (NO RACE VARIABLE): >60 ML/MIN/1.73 M^2
EST. GFR  (NO RACE VARIABLE): >60 ML/MIN/1.73 M^2
GLUCOSE SERPL-MCNC: 173 MG/DL (ref 70–110)
GLUCOSE SERPL-MCNC: 235 MG/DL (ref 70–110)
HCT VFR BLD AUTO: 41.1 % (ref 37–48.5)
HGB BLD-MCNC: 13.2 G/DL (ref 12–16)
IMM GRANULOCYTES # BLD AUTO: 0.26 K/UL (ref 0–0.04)
IMM GRANULOCYTES NFR BLD AUTO: 1.7 % (ref 0–0.5)
LYMPHOCYTES # BLD AUTO: 1.9 K/UL (ref 1–4.8)
LYMPHOCYTES NFR BLD: 12.3 % (ref 18–48)
MAGNESIUM SERPL-MCNC: 1.9 MG/DL (ref 1.6–2.6)
MCH RBC QN AUTO: 32.5 PG (ref 27–31)
MCHC RBC AUTO-ENTMCNC: 32.1 G/DL (ref 32–36)
MCV RBC AUTO: 101 FL (ref 82–98)
MONOCYTES # BLD AUTO: 1 K/UL (ref 0.3–1)
MONOCYTES NFR BLD: 6.6 % (ref 4–15)
NEUTROPHILS # BLD AUTO: 11.9 K/UL (ref 1.8–7.7)
NEUTROPHILS NFR BLD: 77.8 % (ref 38–73)
NRBC BLD-RTO: 0 /100 WBC
PHOSPHATE SERPL-MCNC: 2 MG/DL (ref 2.7–4.5)
PLATELET # BLD AUTO: 243 K/UL (ref 150–450)
PMV BLD AUTO: 11.1 FL (ref 9.2–12.9)
POCT GLUCOSE: 209 MG/DL (ref 70–110)
POCT GLUCOSE: 233 MG/DL (ref 70–110)
POCT GLUCOSE: 272 MG/DL (ref 70–110)
POTASSIUM SERPL-SCNC: 3.9 MMOL/L (ref 3.5–5.1)
POTASSIUM SERPL-SCNC: 4.7 MMOL/L (ref 3.5–5.1)
PROT SERPL-MCNC: 5.7 G/DL (ref 6–8.4)
RBC # BLD AUTO: 4.06 M/UL (ref 4–5.4)
SODIUM SERPL-SCNC: 145 MMOL/L (ref 136–145)
SODIUM SERPL-SCNC: 148 MMOL/L (ref 136–145)
WBC # BLD AUTO: 15.35 K/UL (ref 3.9–12.7)

## 2024-10-20 PROCEDURE — 99233 SBSQ HOSP IP/OBS HIGH 50: CPT | Mod: ,,, | Performed by: STUDENT IN AN ORGANIZED HEALTH CARE EDUCATION/TRAINING PROGRAM

## 2024-10-20 PROCEDURE — 85025 COMPLETE CBC W/AUTO DIFF WBC: CPT | Performed by: PHYSICIAN ASSISTANT

## 2024-10-20 PROCEDURE — 83735 ASSAY OF MAGNESIUM: CPT | Performed by: PHYSICIAN ASSISTANT

## 2024-10-20 PROCEDURE — 25000003 PHARM REV CODE 250: Performed by: STUDENT IN AN ORGANIZED HEALTH CARE EDUCATION/TRAINING PROGRAM

## 2024-10-20 PROCEDURE — 80053 COMPREHEN METABOLIC PANEL: CPT | Performed by: PHYSICIAN ASSISTANT

## 2024-10-20 PROCEDURE — 36415 COLL VENOUS BLD VENIPUNCTURE: CPT | Performed by: PHYSICIAN ASSISTANT

## 2024-10-20 PROCEDURE — 80048 BASIC METABOLIC PNL TOTAL CA: CPT | Mod: XB | Performed by: STUDENT IN AN ORGANIZED HEALTH CARE EDUCATION/TRAINING PROGRAM

## 2024-10-20 PROCEDURE — 63600175 PHARM REV CODE 636 W HCPCS: Performed by: STUDENT IN AN ORGANIZED HEALTH CARE EDUCATION/TRAINING PROGRAM

## 2024-10-20 PROCEDURE — 36415 COLL VENOUS BLD VENIPUNCTURE: CPT | Performed by: STUDENT IN AN ORGANIZED HEALTH CARE EDUCATION/TRAINING PROGRAM

## 2024-10-20 PROCEDURE — 84100 ASSAY OF PHOSPHORUS: CPT | Performed by: PHYSICIAN ASSISTANT

## 2024-10-20 PROCEDURE — 11000001 HC ACUTE MED/SURG PRIVATE ROOM

## 2024-10-20 RX ORDER — POTASSIUM CHLORIDE 20 MEQ/1
40 TABLET, EXTENDED RELEASE ORAL ONCE
Status: COMPLETED | OUTPATIENT
Start: 2024-10-20 | End: 2024-10-20

## 2024-10-20 RX ORDER — SODIUM CHLORIDE, SODIUM LACTATE, POTASSIUM CHLORIDE, CALCIUM CHLORIDE 600; 310; 30; 20 MG/100ML; MG/100ML; MG/100ML; MG/100ML
INJECTION, SOLUTION INTRAVENOUS CONTINUOUS
Status: DISCONTINUED | OUTPATIENT
Start: 2024-10-20 | End: 2024-10-21

## 2024-10-20 RX ADMIN — BISACODYL 10 MG: 10 SUPPOSITORY RECTAL at 09:10

## 2024-10-20 RX ADMIN — CARVEDILOL 12.5 MG: 12.5 TABLET, FILM COATED ORAL at 09:10

## 2024-10-20 RX ADMIN — POLYETHYLENE GLYCOL 3350 17 G: 17 POWDER, FOR SOLUTION ORAL at 09:10

## 2024-10-20 RX ADMIN — APIXABAN 5 MG: 5 TABLET, FILM COATED ORAL at 09:10

## 2024-10-20 RX ADMIN — FAMOTIDINE 20 MG: 20 TABLET ORAL at 09:10

## 2024-10-20 RX ADMIN — SODIUM CHLORIDE, POTASSIUM CHLORIDE, SODIUM LACTATE AND CALCIUM CHLORIDE: 600; 310; 30; 20 INJECTION, SOLUTION INTRAVENOUS at 11:10

## 2024-10-20 RX ADMIN — POTASSIUM CHLORIDE 40 MEQ: 1500 TABLET, EXTENDED RELEASE ORAL at 12:10

## 2024-10-20 RX ADMIN — ATORVASTATIN CALCIUM 40 MG: 40 TABLET, FILM COATED ORAL at 09:10

## 2024-10-20 RX ADMIN — INSULIN ASPART 2 UNITS: 100 INJECTION, SOLUTION INTRAVENOUS; SUBCUTANEOUS at 09:10

## 2024-10-20 RX ADMIN — SODIUM CHLORIDE, POTASSIUM CHLORIDE, SODIUM LACTATE AND CALCIUM CHLORIDE: 600; 310; 30; 20 INJECTION, SOLUTION INTRAVENOUS at 10:10

## 2024-10-20 NOTE — SUBJECTIVE & OBJECTIVE
Neurologic Chief Complaint: Infarcts in multiple vascular territories    Subjective:     Interval History: Patient is seen for follow-up neurological assessment and treatment recommendations: See Hospital Course    HPI, Past Medical, Family, and Social History remains the same as documented in the initial encounter.     Review of Systems   Constitutional:  Negative for fever.   HENT:  Positive for trouble swallowing.    Respiratory:  Positive for cough. Negative for shortness of breath.    Gastrointestinal:  Negative for abdominal pain, nausea and vomiting.   Musculoskeletal:         LUE swelling   Neurological:  Positive for facial asymmetry, weakness and numbness.   All other systems reviewed and are negative.    Scheduled Meds:   apixaban  5 mg Oral BID    atorvastatin  40 mg Oral Daily    bisacodyL  10 mg Rectal BID    carvediloL  12.5 mg Oral BID    famotidine  20 mg Oral Daily    polyethylene glycol  17 g Oral BID     Continuous Infusions:   lactated ringers   Intravenous Continuous 100 mL/hr at 10/20/24 1052 New Bag at 10/20/24 1052     PRN Meds:  Current Facility-Administered Medications:     acetaminophen, 650 mg, Per NG tube, Q8H PRN    dextrose 10%, 12.5 g, Intravenous, PRN    dextrose 10%, 25 g, Intravenous, PRN    glucagon (human recombinant), 1 mg, Intramuscular, PRN    hydrALAZINE, 10 mg, Intravenous, Q6H PRN    insulin aspart U-100, 0-10 Units, Subcutaneous, QID (AC + HS) PRN    iohexol, 15 mL, Oral, PRN    sodium chloride 0.9%, 500 mL, Intravenous, PRN    sodium chloride 0.9%, 10 mL, Intravenous, PRN    Flushing PICC/Midline Protocol, , , Until Discontinued **AND** sodium chloride 0.9%, 10 mL, Intravenous, Q12H PRN    Objective:     Vital Signs (Most Recent):  Temp: 97.6 °F (36.4 °C) (10/20/24 1302)  Pulse: 78 (10/20/24 1302)  Resp: 18 (10/20/24 1302)  BP: (!) 147/68 (10/20/24 1302)  SpO2: 98 % (10/20/24 1302)  BP Location: Left arm    Vital Signs Range (Last 24H):  Temp:  [97.5 °F  (36.4 °C)-98.7 °F (37.1 °C)]   Pulse:  [63-78]   Resp:  [17-20]   BP: (140-182)/(68-92)   SpO2:  [95 %-98 %]   BP Location: Left arm       Physical Exam  Vitals and nursing note reviewed.   Constitutional:       General: She is awake.      Appearance: She is ill-appearing.   Eyes:      General:         Right eye: No discharge.         Left eye: No discharge.      Conjunctiva/sclera: Conjunctivae normal.   Cardiovascular:      Rate and Rhythm: Normal rate.   Pulmonary:      Effort: Pulmonary effort is normal. No respiratory distress.   Abdominal:      General: There is no distension.   Skin:     General: Skin is warm and dry.          Neurological:      Mental Status: She is disoriented.      Sensory: Sensory deficit present.      Motor: Weakness present.          Neurological Exam:   LOC: alert  Language: No aphasia  Articulation: Dysarthria    Laboratory:  CMP:   Recent Labs   Lab 10/20/24  0239   CALCIUM 8.5*   ALBUMIN 2.2*   PROT 5.7*   *   K 3.9   CO2 18*   *   BUN 14   CREATININE 0.7   ALKPHOS 88   ALT 25   AST 29   BILITOT 0.5     CBC:   Recent Labs   Lab 10/20/24  0239   WBC 15.35*   RBC 4.06   HGB 13.2   HCT 41.1      *   MCH 32.5*   MCHC 32.1       Diagnostic Results     Brain and Vessel Imaging   MRI/MRA Brain Ischemic Protocol: 10/6/24  Impression: Acute infarcts involving the right anterior circulation and posterior circulation as detailed above without evidence of intracranial hemorrhage or mass effect.  Embolic disease to be considered.     Chronic left parietal infarct. MRA demonstrates limited visualization of distal right KIARA branches in keeping with the territory of the acute infarct.  There is also narrowing of at least one proximal left M2 branch although no acute infarct in that territory.          Echo: 10/7/24  Cardiac Imaging     Left Ventricle: The left ventricle is normal in size. Normal wall thickness. There is concentric remodeling. Normal wall motion. There is  hyperdynamic systolic function with a visually estimated ejection fraction of greater than 70%. There is normal diastolic function.    Right Ventricle: Normal right ventricular cavity size. Wall thickness is normal. Systolic function is normal.    Left Atrium: Bubble study is likely negative, but full opacification of the RA is not well seen on these images which decreases sensitivity.    IVC/SVC: Normal venous pressure at 3 mmHg.

## 2024-10-20 NOTE — ASSESSMENT & PLAN NOTE
-Patient noted to have LUE swelling and discomfort this am (10/20/2024).  -LUE US pending  -LUE elevated   Bedside and Verbal shift change report given to Sameera Wang RN (oncoming nurse) by Lucas Sotomayor RN (offgoing nurse). Report included the following information SBAR, Kardex, Procedure Summary, Intake/Output, MAR, Accordion, Recent Results and Med Rec Status.

## 2024-10-20 NOTE — ASSESSMENT & PLAN NOTE
Home medication regimen as below  -lisinopril 40mg QD  -amlodipine 10mg QD    Plan:   -lisinopril and amlodipine were initially held in the setting of KAILYN. Was started on coreg in the context of Afib.   -if patient's BP still elevated, consider adding back amlodipine and lisinopril   -Goal SBP<180 for now  24° -182      carvediloL tablet 12.5 mg, Oral, BID    hydrALAZINE injection 10 mg, Intravenous, Q6H PRN

## 2024-10-20 NOTE — ASSESSMENT & PLAN NOTE
RESOLVED  Cr was initially 1.6, up from a baseline of 1. Likely pre-renal KAILYN, due to decreased PO intake. Resolved with fluids.     Plan:   - Continue to monitor CMP.   -SCr has been stable since 10/14/24

## 2024-10-20 NOTE — ASSESSMENT & PLAN NOTE
Patient was seen by general surgery for some episodes of nausea and emesis and some constipation. KUB was concerning for SBO vs. Ileus. Follow up CT AP did not show evidence of mechanical obstruction, was more indicative of Ileus. XR gastrogaffin challenge ordered by general surgery.     Plan:   - Was NPO. Approved to advance diet as tolerated by Gen Surg on 10/19. NGT d/c'd. Patient was started on a Clear liquid diet and tolerated it well. Advanced to previous diet recommendation of puree, nectar thick today (10/20).  - monitor electrolytes daily, correct as needed   - Eliquis switched to heparin as it might not have been fully absorbed considering Ileus. Resumed Eliquis 10/19.    - scheduled suppository regimen, f/u on effectiveness and escalate prn  - general surgery following, appreciate recs

## 2024-10-20 NOTE — ASSESSMENT & PLAN NOTE
Takes Eliquis 5mg BID and Coreg 6.25mg BID at home.  Initially Eliquis held given size of stroke; ASA 81mg QD in the interim. Eliquis was restarted on 10/10.       Plan:   -continue at home coreg regimen   -eliquis switched to heparin drip due to issues described in ileus section of the assessment and plan.   -Heparin gtt turned off 10/19. Eliquis resumed on 10/19.  -24° HR 63-78

## 2024-10-20 NOTE — PLAN OF CARE
Problem: Stroke, Ischemic (Includes Transient Ischemic Attack)  Goal: Optimal Cerebral Tissue Perfusion  Outcome: Progressing  Intervention: Protect and Optimize Cerebral Perfusion  Flowsheets (Taken 10/20/2024 0341)  Cerebral Perfusion Promotion: blood pressure monitored     Problem: Adult Inpatient Plan of Care  Goal: Patient-Specific Goal (Individualized)  Description: Pt will maintain sbp below 220  Outcome: Progressing  Flowsheets (Taken 10/20/2024 0341)  Individualized Care Needs: care clustered  Anxieties, Fears or Concerns: none     NaCl with KCl @ 100 cc/hr. VSS. Bed in lowest position, side rails x 3, and call light in use.

## 2024-10-20 NOTE — ASSESSMENT & PLAN NOTE
History of. Prescribed oxycodone 15mg at home. Family concerned that medication is being diverted by family member, unclear how often patient takes. Reportedly is prescribed for neck pain.     Plan:   - holding oxycodone due to ileus

## 2024-10-20 NOTE — ASSESSMENT & PLAN NOTE
Plan:   - Initially given D5W to correct, labs to monitor rate of correction   - Was on NS w/KCl but becoming acidotic on labs.  -Na remains 148 this am  -IVFs changed to LR

## 2024-10-20 NOTE — ASSESSMENT & PLAN NOTE
"65 y/o F transferred from [facility] for higher level of care secondary to acute KIARA stroke on CTA.  Briefly,the patient presented to the OSH overnight for slurred speech started at 1:30 a.m. in the morning.  She has a history of his stroke with left-sided deficits but is normally able to transfer and do a little walking. This morning, she had complete hemiplegia of the left side. Telestroke consult performed, concerning for LVO.  NIH 13. CTA Stroke MP performed and  was read as "acute R KIARA infarct". No TNK due to AC use. Patient accepted to ED as transfer for emergent vascular neurology evaluation. No endovascular intervention. History of atrial fibrillation per discussion with daughter. Was started on Eliquis on 10/6, currently on heparin drip given likely lack of absorption through NG tube in setting of Ileus. Given history of Afib, etiology likely embolic. Though not apparent on MRA, if R KIARA occlusion was proximal (supported by MRI) could account for R MCA-like picture. Transient episodes of altered awareness: EEG negative, and no change in frequency observed by family w/Keppra so have stopped this medicine.     NAEON. Neuro exam remains stable. Patient with audible upper respiratory sounds and cough. CPT ordered. Patient noted to be increasingly acidotic on labs. IVFs changed from NS to LR. Patient awaiting placement.    -Eliquis 5 mg BID   -Atorvastatin 40 mg daily  -SBP<180  -PT/OT-Current recs for high intensity therapy  -Diet advanced to clear liquids 10/19  "

## 2024-10-20 NOTE — ASSESSMENT & PLAN NOTE
History of. Takes Levemir 35 units QHS at home. A1C 10%.    -goal 140-180 while admitted  -SSI prn   -continue to monitor glucose  -Endocrine consulted for A1c 10%; following, appreciate recs   -24° glucose 173-211        dextrose 10% bolus 125 mL 125 mL, 12.5 g, Intravenous, PRN    dextrose 10% bolus 250 mL 250 mL, 25 g, Intravenous, PRN    glucagon (human recombinant) injection 1 mg, 1 mg, Intramuscular, PRN    insulin aspart U-100 pen 0-10 Units, 0-10 Units, Subcutaneous, QID (AC + HS) PRN

## 2024-10-20 NOTE — CARE UPDATE
-Glucose Goal 140-180    -A1C:   Hemoglobin A1C   Date Value Ref Range Status   10/08/2024 10.0 (H) 4.0 - 5.6 % Final     Comment:     ADA Screening Guidelines:  5.7-6.4%  Consistent with prediabetes  >or=6.5%  Consistent with diabetes    High levels of fetal hemoglobin interfere with the HbA1C  assay. Heterozygous hemoglobin variants (HbS, HgC, etc)do  not significantly interfere with this assay.   However, presence of multiple variants may affect accuracy.           -HOME REGIMEN:     -GLUCOSE TREND FOR THE PAST 24HRS:   Recent Labs   Lab 10/18/24  0811 10/18/24  1148 10/18/24  1651 10/18/24  2144 10/19/24  1140 10/19/24  2155   POCTGLUCOSE 135* 163* 228* 215* 214* 211*         -NO HYPOGYCEMIAS NOTED     - Diet  Diet Clear Liquid Nectar Thick (Mildly Thick)    T2DM admitted with acute KIARA stroke. Mild elevations with liquid diet      - Continue Novolog Moderate dose correction with ISF 25 starting at 150    - POCT Glucose q 4 hrs   - Hypoglycemia protocol in place      ** Please notify Endocrine for any change and/or advance in diet**  ** Please call Endocrine for any BG related issues **     Discharge Planning:   TBD. Please notify endocrinology prior to discharge.

## 2024-10-20 NOTE — PLAN OF CARE
Problem: Skin Injury Risk Increased  Goal: Skin Health and Integrity  Outcome: Progressing     Problem: Stroke, Ischemic (Includes Transient Ischemic Attack)  Goal: Optimal Coping  Outcome: Progressing  Goal: Effective Bowel Elimination  Outcome: Progressing

## 2024-10-20 NOTE — PROGRESS NOTES
"Zackary Long - Neurosurgery ([facility])  Vascular Neurology  Comprehensive Stroke Center  Progress Note    Assessment/Plan:     * Acute arterial ischemic stroke, multifocal, multiple vascular territories  65 y/o F transferred from [facility] for higher level of care secondary to acute KIARA stroke on CTA.  Briefly,the patient presented to the OSH overnight for slurred speech started at 1:30 a.m. in the morning.  She has a history of his stroke with left-sided deficits but is normally able to transfer and do a little walking. This morning, she had complete hemiplegia of the left side. Telestroke consult performed, concerning for LVO.  NIH 13. CTA Stroke MP performed and  was read as "acute R KIARA infarct". No TNK due to AC use. Patient accepted to ED as transfer for emergent vascular neurology evaluation. No endovascular intervention. History of atrial fibrillation per discussion with daughter. Was started on Eliquis on 10/6, currently on heparin drip given likely lack of absorption through NG tube in setting of Ileus. Given history of Afib, etiology likely embolic. Though not apparent on MRA, if R KIARA occlusion was proximal (supported by MRI) could account for R MCA-like picture. Transient episodes of altered awareness: EEG negative, and no change in frequency observed by family w/Keppra so have stopped this medicine.     NAEON. Neuro exam remains stable. Patient with audible upper respiratory sounds and cough. CPT ordered. Patient noted to be increasingly acidotic on labs. IVFs changed from NS to LR. Patient awaiting placement.    -Eliquis 5 mg BID   -Atorvastatin 40 mg daily  -SBP<180  -PT/OT-Current recs for high intensity therapy  -Diet advanced to clear liquids 10/19    Left upper extremity swelling  -Patient noted to have LUE swelling and discomfort this am (10/20/2024).  -LUE US pending  -LUE elevated    Hypernatremia  Plan:   - Initially given D5W to correct, labs to monitor rate of correction   - Was on " NS w/KCl but becoming acidotic on labs.  -Na remains 148 this am  -IVFs changed to LR    Ileus  Patient was seen by general surgery for some episodes of nausea and emesis and some constipation. KUB was concerning for SBO vs. Ileus. Follow up CT AP did not show evidence of mechanical obstruction, was more indicative of Ileus. XR gastrogaffin challenge ordered by general surgery.     Plan:   - Was NPO. Approved to advance diet as tolerated by Gen Surg on 10/19. NGT d/c'd. Patient was started on a Clear liquid diet and tolerated it well. Advanced to previous diet recommendation of puree, nectar thick today (10/20).  - monitor electrolytes daily, correct as needed   - Eliquis switched to heparin as it might not have been fully absorbed considering Ileus. Resumed Eliquis 10/19.    - scheduled suppository regimen, f/u on effectiveness and escalate prn  - general surgery following, appreciate recs     Acute focal neurological deficit  Episodes of transient altered mental status observed by daughter, sometimes with a brief left gaze. Given location of KIARA stroke, considered seizure. EEG done on 10/11, negative.     Plan:   - no longer on keppra regimen     KAILYN (acute kidney injury)  RESOLVED  Cr was initially 1.6, up from a baseline of 1. Likely pre-renal KAILYN, due to decreased PO intake. Resolved with fluids.     Plan:   - Continue to monitor CMP.   -SCr has been stable since 10/14/24    Type 2 diabetes mellitus, with long-term current use of insulin  History of. Takes Levemir 35 units QHS at home. A1C 10%.    -goal 140-180 while admitted  -SSI prn   -continue to monitor glucose  -Endocrine consulted for A1c 10%; following, appreciate recs   -24° glucose 173-211        dextrose 10% bolus 125 mL 125 mL, 12.5 g, Intravenous, PRN    dextrose 10% bolus 250 mL 250 mL, 25 g, Intravenous, PRN    glucagon (human recombinant) injection 1 mg, 1 mg, Intramuscular, PRN    insulin aspart U-100 pen 0-10 Units, 0-10 Units, Subcutaneous,  QID (AC + HS) PRN    Atrial fibrillation  Takes Eliquis 5mg BID and Coreg 6.25mg BID at home.  Initially Eliquis held given size of stroke; ASA 81mg QD in the interim. Eliquis was restarted on 10/10.       Plan:   -continue at home coreg regimen   -eliquis switched to heparin drip due to issues described in ileus section of the assessment and plan.   -Heparin gtt turned off 10/19. Eliquis resumed on 10/19.  -24° HR 63-78    Opioid use  History of. Prescribed oxycodone 15mg at home. Family concerned that medication is being diverted by family member, unclear how often patient takes. Reportedly is prescribed for neck pain.     Plan:   - holding oxycodone due to ileus    Left hemiplegia  See stroke    Plan:   - continue PT/OT/SLP   -Current recs for high intensity therapy    HTN (hypertension)  Home medication regimen as below  -lisinopril 40mg QD  -amlodipine 10mg QD    Plan:   -lisinopril and amlodipine were initially held in the setting of KAILYN. Was started on coreg in the context of Afib.   -if patient's BP still elevated, consider adding back amlodipine and lisinopril   -Goal SBP<180 for now  24° -182      carvediloL tablet 12.5 mg, Oral, BID    hydrALAZINE injection 10 mg, Intravenous, Q6H PRN         10/7: gómez SINCLAIR exam stable, echo today, NPO aside from meds per speech, barium study tmrw  10/08/2024 DOTTIE, stable neuro exam, went for the Br study, and was started on a diet. Endocrine consulted for the A1c 10%, pending recs.  10/09/2024 DOTTIE, Stable neuro exam, will restart Lisinopril 20 mg (half of her dome dose). Seen by endocrine, with adjustment of her insulin doses.  10/10/2024 DOTTIE, no change in her neuro exam, will restart Eliquis today, BP is better controlled.  10/11/2024 DOTTIE, stable neuro exam, still decreased PO intake. Patient gets some episodes of in attention; EEG was ordered. Endocrine adjusted her insulins. Accepted at [facility] in [place].   10/12/2024 DOTTIE, waiting on EEG  interpretation (contacted epilepsy), started Keppra 500mg BID and will see if reduces transient alterations in awareness, neurologic examination stable  10/13/2024 NAEON, EEG w/encephalopathy (no seizure or discharges), stop Keppra, SBP ~150 to 160 so increased Coreg to 6.25mg BID  10/14/2024 General surgery was consulted overnight for constipation, episodes of nausea and emesis. KUB concerning for SBO vs. Ileus. F/u CT AP was more indicative of ileus, no evidence of mechanical obstruction was observed. Patient is NPO, can get some meds via NGT. Eliquis switched to heparin drip.   10/15/2024 Patient has still not had an actual bowel movement. Is distended, has some abdominal pain. General surgery following, is getting XR gastrogaffin challenge, will f/u results.   10/16/2024 Still monitoring patient for bowel movements in the setting of Ileus. General surgery is following. Mild hyperkalemia and hypernatremia this morning; will continue to monitor.   10/17/2024 Patient still has not had a bowel movement. Ileus not resolved on repeat imaging. Suppository ordered earlier this morning, will monitor to see if improvement with suppository.   10/18/2024 Patient still had not had a solid bowel movement due to her ileus, had a number of watery movements yesterday but nothing substantive. Suppositories now scheduled. General surgery following.   10/19/2024 Patient stable overnight. LBM yesterday. Gen Surg ok with advancing diet to clear liquids. Heparin gtt stopped, started on Eliquis. Awaiting placement.  10/20/2024 NAEON. Neuro exam remains stable. Patient with audible upper respiratory sounds and cough. CPT ordered. Patient noted to be increasingly acidotic on labs. IVFs changed from NS to LR. Patient awaiting placement.    STROKE DOCUMENTATION   Acute Stroke Times   Last Known Normal Date:  (OSH)  Stroke Team Called Date: 10/06/24  Stroke Team Called Time:  (At OSH)  Stroke Team Arrival Date: 10/06/24  Stroke Team Arrival  Time: 0608  CT Interpretation Time:  (CTA done around 4:20am per documents that came to [facility] with patient)  Thrombolytic Therapy Recommended: No  Thrombectomy Recommended:  (Pending MRI Brain)    NIH Scale:  1a. Level of Consciousness: 0-->Alert, keenly responsive  1b. LOC Questions: 0-->Answers both questions correctly  1c. LOC Commands: 0-->Performs both tasks correctly  2. Best Gaze: 0-->Normal  3. Visual: 0-->No visual loss  4. Facial Palsy: 1-->Minor paralysis (flattened nasolabial fold, asymmetry on smiling)  5a. Motor Arm, Left: 4-->No movement  5b. Motor Arm, Right: 0-->No drift, limb holds 90 (or 45) degrees for full 10 secs  6a. Motor Leg, Left: 3-->No effort against gravity, leg falls to bed immediately  6b. Motor Leg, Right: 1-->Drift, leg falls by the end of the 5-sec period but does not hit bed  7. Limb Ataxia: 0-->Absent  8. Sensory: 0-->Normal, no sensory loss  9. Best Language: 0-->No aphasia, normal  10. Dysarthria: 1-->Mild-to-moderate dysarthria, patient slurs at least some words and, at worst, can be understood with some difficulty  11. Extinction and Inattention (formerly Neglect): 0-->No abnormality  Total (NIH Stroke Scale): 10       Modified Danny Score: 3  Diane Coma Scale:14   ABCD2 Score:    [id number] Score:   HAS -BLED Score:   ICH Score:   Hunt & Shane Classification:      Hemorrhagic change of an Ischemic Stroke: Does this patient have an ischemic stroke with hemorrhagic changes? No     Neurologic Chief Complaint: Infarcts in multiple vascular territories    Subjective:     Interval History: Patient is seen for follow-up neurological assessment and treatment recommendations: See Hospital Course    HPI, Past Medical, Family, and Social History remains the same as documented in the initial encounter.     Review of Systems   Constitutional:  Negative for fever.   HENT:  Positive for trouble swallowing.    Respiratory:  Positive for cough. Negative for shortness of breath.     Gastrointestinal:  Negative for abdominal pain, nausea and vomiting.   Musculoskeletal:         LUE swelling   Neurological:  Positive for facial asymmetry, weakness and numbness.   All other systems reviewed and are negative.    Scheduled Meds:   apixaban  5 mg Oral BID    atorvastatin  40 mg Oral Daily    bisacodyL  10 mg Rectal BID    carvediloL  12.5 mg Oral BID    famotidine  20 mg Oral Daily    polyethylene glycol  17 g Oral BID     Continuous Infusions:   lactated ringers   Intravenous Continuous 100 mL/hr at 10/20/24 1052 New Bag at 10/20/24 1052     PRN Meds:  Current Facility-Administered Medications:     acetaminophen, 650 mg, Per NG tube, Q8H PRN    dextrose 10%, 12.5 g, Intravenous, PRN    dextrose 10%, 25 g, Intravenous, PRN    glucagon (human recombinant), 1 mg, Intramuscular, PRN    hydrALAZINE, 10 mg, Intravenous, Q6H PRN    insulin aspart U-100, 0-10 Units, Subcutaneous, QID (AC + HS) PRN    iohexol, 15 mL, Oral, PRN    sodium chloride 0.9%, 500 mL, Intravenous, PRN    sodium chloride 0.9%, 10 mL, Intravenous, PRN    Flushing PICC/Midline Protocol, , , Until Discontinued **AND** sodium chloride 0.9%, 10 mL, Intravenous, Q12H PRN    Objective:     Vital Signs (Most Recent):  Temp: 97.6 °F (36.4 °C) (10/20/24 1302)  Pulse: 78 (10/20/24 1302)  Resp: 18 (10/20/24 1302)  BP: (!) 147/68 (10/20/24 1302)  SpO2: 98 % (10/20/24 1302)  BP Location: Left arm    Vital Signs Range (Last 24H):  Temp:  [97.5 °F (36.4 °C)-98.7 °F (37.1 °C)]   Pulse:  [63-78]   Resp:  [17-20]   BP: (140-182)/(68-92)   SpO2:  [95 %-98 %]   BP Location: Left arm       Physical Exam  Vitals and nursing note reviewed.   Constitutional:       General: She is awake.      Appearance: She is ill-appearing.   Eyes:      General:         Right eye: No discharge.         Left eye: No discharge.      Conjunctiva/sclera: Conjunctivae normal.   Cardiovascular:      Rate and Rhythm: Normal rate.   Pulmonary:      Effort: Pulmonary effort is  normal. No respiratory distress.   Abdominal:      General: There is no distension.   Skin:     General: Skin is warm and dry.          Neurological:      Mental Status: She is disoriented.      Sensory: Sensory deficit present.      Motor: Weakness present.          Neurological Exam:   LOC: alert  Language: No aphasia  Articulation: Dysarthria    Laboratory:  CMP:   Recent Labs   Lab 10/20/24  0239   CALCIUM 8.5*   ALBUMIN 2.2*   PROT 5.7*   *   K 3.9   CO2 18*   *   BUN 14   CREATININE 0.7   ALKPHOS 88   ALT 25   AST 29   BILITOT 0.5     CBC:   Recent Labs   Lab 10/20/24  0239   WBC 15.35*   RBC 4.06   HGB 13.2   HCT 41.1      *   MCH 32.5*   MCHC 32.1       Diagnostic Results     Brain and Vessel Imaging   MRI/MRA Brain Ischemic Protocol: 10/6/24  Impression: Acute infarcts involving the right anterior circulation and posterior circulation as detailed above without evidence of intracranial hemorrhage or mass effect.  Embolic disease to be considered.     Chronic left parietal infarct. MRA demonstrates limited visualization of distal right KIARA branches in keeping with the territory of the acute infarct.  There is also narrowing of at least one proximal left M2 branch although no acute infarct in that territory.          Echo: 10/7/24  Cardiac Imaging     Left Ventricle: The left ventricle is normal in size. Normal wall thickness. There is concentric remodeling. Normal wall motion. There is hyperdynamic systolic function with a visually estimated ejection fraction of greater than 70%. There is normal diastolic function.    Right Ventricle: Normal right ventricular cavity size. Wall thickness is normal. Systolic function is normal.    Left Atrium: Bubble study is likely negative, but full opacification of the RA is not well seen on these images which decreases sensitivity.    IVC/SVC: Normal venous pressure at 3 mmHg.     Corine Vora DNP  [facility]  Department of  Vascular Neurology   Zackary Long - Neurosurgery ([facility])

## 2024-10-21 LAB
ALBUMIN SERPL BCP-MCNC: 2.1 G/DL (ref 3.5–5.2)
ALP SERPL-CCNC: 77 U/L (ref 40–150)
ALT SERPL W/O P-5'-P-CCNC: 33 U/L (ref 10–44)
ANION GAP SERPL CALC-SCNC: 8 MMOL/L (ref 8–16)
AST SERPL-CCNC: 31 U/L (ref 10–40)
BASOPHILS # BLD AUTO: 0.04 K/UL (ref 0–0.2)
BASOPHILS NFR BLD: 0.3 % (ref 0–1.9)
BILIRUB SERPL-MCNC: 0.5 MG/DL (ref 0.1–1)
BUN SERPL-MCNC: 10 MG/DL (ref 8–23)
CALCIUM SERPL-MCNC: 8.4 MG/DL (ref 8.7–10.5)
CHLORIDE SERPL-SCNC: 115 MMOL/L (ref 95–110)
CO2 SERPL-SCNC: 20 MMOL/L (ref 23–29)
CREAT SERPL-MCNC: 0.8 MG/DL (ref 0.5–1.4)
DIFFERENTIAL METHOD BLD: ABNORMAL
EOSINOPHIL # BLD AUTO: 0.2 K/UL (ref 0–0.5)
EOSINOPHIL NFR BLD: 1.4 % (ref 0–8)
ERYTHROCYTE [DISTWIDTH] IN BLOOD BY AUTOMATED COUNT: 12.8 % (ref 11.5–14.5)
EST. GFR  (NO RACE VARIABLE): >60 ML/MIN/1.73 M^2
GLUCOSE SERPL-MCNC: 221 MG/DL (ref 70–110)
HCT VFR BLD AUTO: 37.7 % (ref 37–48.5)
HGB BLD-MCNC: 12.8 G/DL (ref 12–16)
IMM GRANULOCYTES # BLD AUTO: 0.17 K/UL (ref 0–0.04)
IMM GRANULOCYTES NFR BLD AUTO: 1.2 % (ref 0–0.5)
LYMPHOCYTES # BLD AUTO: 2 K/UL (ref 1–4.8)
LYMPHOCYTES NFR BLD: 14.4 % (ref 18–48)
MAGNESIUM SERPL-MCNC: 1.7 MG/DL (ref 1.6–2.6)
MCH RBC QN AUTO: 32.8 PG (ref 27–31)
MCHC RBC AUTO-ENTMCNC: 34 G/DL (ref 32–36)
MCV RBC AUTO: 97 FL (ref 82–98)
MONOCYTES # BLD AUTO: 0.8 K/UL (ref 0.3–1)
MONOCYTES NFR BLD: 5.8 % (ref 4–15)
NEUTROPHILS # BLD AUTO: 10.5 K/UL (ref 1.8–7.7)
NEUTROPHILS NFR BLD: 76.9 % (ref 38–73)
NRBC BLD-RTO: 0 /100 WBC
PHOSPHATE SERPL-MCNC: 2 MG/DL (ref 2.7–4.5)
PLATELET # BLD AUTO: 206 K/UL (ref 150–450)
PMV BLD AUTO: 11 FL (ref 9.2–12.9)
POCT GLUCOSE: 116 MG/DL (ref 70–110)
POCT GLUCOSE: 164 MG/DL (ref 70–110)
POCT GLUCOSE: 202 MG/DL (ref 70–110)
POCT GLUCOSE: 209 MG/DL (ref 70–110)
POTASSIUM SERPL-SCNC: 3.7 MMOL/L (ref 3.5–5.1)
PROT SERPL-MCNC: 5.4 G/DL (ref 6–8.4)
RBC # BLD AUTO: 3.9 M/UL (ref 4–5.4)
SODIUM SERPL-SCNC: 143 MMOL/L (ref 136–145)
WBC # BLD AUTO: 13.71 K/UL (ref 3.9–12.7)

## 2024-10-21 PROCEDURE — 80053 COMPREHEN METABOLIC PANEL: CPT | Performed by: PHYSICIAN ASSISTANT

## 2024-10-21 PROCEDURE — 97530 THERAPEUTIC ACTIVITIES: CPT | Mod: CQ

## 2024-10-21 PROCEDURE — 25000003 PHARM REV CODE 250: Performed by: STUDENT IN AN ORGANIZED HEALTH CARE EDUCATION/TRAINING PROGRAM

## 2024-10-21 PROCEDURE — 85025 COMPLETE CBC W/AUTO DIFF WBC: CPT | Performed by: PHYSICIAN ASSISTANT

## 2024-10-21 PROCEDURE — 92526 ORAL FUNCTION THERAPY: CPT

## 2024-10-21 PROCEDURE — 63600175 PHARM REV CODE 636 W HCPCS: Performed by: PHYSICIAN ASSISTANT

## 2024-10-21 PROCEDURE — 63600175 PHARM REV CODE 636 W HCPCS: Performed by: STUDENT IN AN ORGANIZED HEALTH CARE EDUCATION/TRAINING PROGRAM

## 2024-10-21 PROCEDURE — 11000001 HC ACUTE MED/SURG PRIVATE ROOM

## 2024-10-21 PROCEDURE — 99233 SBSQ HOSP IP/OBS HIGH 50: CPT | Mod: GC,,, | Performed by: STUDENT IN AN ORGANIZED HEALTH CARE EDUCATION/TRAINING PROGRAM

## 2024-10-21 PROCEDURE — 97535 SELF CARE MNGMENT TRAINING: CPT

## 2024-10-21 PROCEDURE — 97112 NEUROMUSCULAR REEDUCATION: CPT

## 2024-10-21 PROCEDURE — 84100 ASSAY OF PHOSPHORUS: CPT | Performed by: PHYSICIAN ASSISTANT

## 2024-10-21 PROCEDURE — 36415 COLL VENOUS BLD VENIPUNCTURE: CPT | Performed by: PHYSICIAN ASSISTANT

## 2024-10-21 PROCEDURE — 83735 ASSAY OF MAGNESIUM: CPT | Performed by: PHYSICIAN ASSISTANT

## 2024-10-21 RX ORDER — INSULIN ASPART 100 [IU]/ML
3 INJECTION, SOLUTION INTRAVENOUS; SUBCUTANEOUS
Status: DISCONTINUED | OUTPATIENT
Start: 2024-10-21 | End: 2024-10-25

## 2024-10-21 RX ADMIN — ATORVASTATIN CALCIUM 40 MG: 40 TABLET, FILM COATED ORAL at 08:10

## 2024-10-21 RX ADMIN — FAMOTIDINE 20 MG: 20 TABLET ORAL at 08:10

## 2024-10-21 RX ADMIN — POLYETHYLENE GLYCOL 3350 17 G: 17 POWDER, FOR SOLUTION ORAL at 09:10

## 2024-10-21 RX ADMIN — INSULIN ASPART 4 UNITS: 100 INJECTION, SOLUTION INTRAVENOUS; SUBCUTANEOUS at 07:10

## 2024-10-21 RX ADMIN — INSULIN ASPART 4 UNITS: 100 INJECTION, SOLUTION INTRAVENOUS; SUBCUTANEOUS at 11:10

## 2024-10-21 RX ADMIN — INSULIN ASPART 2 UNITS: 100 INJECTION, SOLUTION INTRAVENOUS; SUBCUTANEOUS at 04:10

## 2024-10-21 RX ADMIN — CARVEDILOL 12.5 MG: 12.5 TABLET, FILM COATED ORAL at 09:10

## 2024-10-21 RX ADMIN — INSULIN ASPART 3 UNITS: 100 INJECTION, SOLUTION INTRAVENOUS; SUBCUTANEOUS at 04:10

## 2024-10-21 RX ADMIN — INSULIN ASPART 3 UNITS: 100 INJECTION, SOLUTION INTRAVENOUS; SUBCUTANEOUS at 11:10

## 2024-10-21 RX ADMIN — CARVEDILOL 12.5 MG: 12.5 TABLET, FILM COATED ORAL at 08:10

## 2024-10-21 RX ADMIN — APIXABAN 5 MG: 5 TABLET, FILM COATED ORAL at 09:10

## 2024-10-21 RX ADMIN — SODIUM CHLORIDE, POTASSIUM CHLORIDE, SODIUM LACTATE AND CALCIUM CHLORIDE: 600; 310; 30; 20 INJECTION, SOLUTION INTRAVENOUS at 05:10

## 2024-10-21 RX ADMIN — APIXABAN 5 MG: 5 TABLET, FILM COATED ORAL at 08:10

## 2024-10-21 NOTE — PLAN OF CARE
10/21/24 1526   Post-Acute Status   Post-Acute Authorization Placement   Post-Acute Placement Status Pending payor review/awaiting authorization (if required)   Discharge Delays (!) Change in Medical Condition   Discharge Plan   Discharge Plan A Rehab     SW sent updated notes to [facility] and requested they submit auth, as patient is med ready.      Miladys Brown LMSW  [facility]  [phone]

## 2024-10-21 NOTE — PROGRESS NOTES
"Zackayr Long - Neurosurgery ([facility])  Vascular Neurology  Comprehensive Stroke Center  Progress Note    Assessment/Plan:     * Acute arterial ischemic stroke, multifocal, multiple vascular territories  63 y/o F transferred from [facility] for higher level of care secondary to acute KIARA stroke on CTA.  Briefly,the patient presented to the OSH overnight for slurred speech started at 1:30 a.m. in the morning.  She has a history of his stroke with left-sided deficits but is normally able to transfer and do a little walking. This morning, she had complete hemiplegia of the left side. Telestroke consult performed, concerning for LVO.  NIH 13. CTA Stroke MP performed and  was read as "acute R KIARA infarct". No TNK due to AC use. Patient accepted to ED as transfer for emergent vascular neurology evaluation. No endovascular intervention. History of atrial fibrillation per discussion with daughter. Was started on Eliquis on 10/6, currently on heparin drip given likely lack of absorption through NG tube in setting of Ileus. Given history of Afib, etiology likely embolic. Though not apparent on MRA, if R KIARA occlusion was proximal (supported by MRI) could account for R MCA-like picture. Transient episodes of altered awareness: EEG negative, and no change in frequency observed by family w/Keppra so have stopped this medicine.     NAEON. Neuro exam remains stable. Patient awaiting placement.    -Eliquis 5 mg BID   -Atorvastatin 40 mg daily  -SBP<180  -PT/OT-Current recs for high intensity therapy  -Diet advanced to clear liquids 10/19    Left upper extremity swelling  -Patient noted to have LUE swelling and discomfort this am (10/20/2024).  -LUE US pending  -LUE elevated    Hypernatremia  Plan:   - Initially given D5W to correct, labs to monitor rate of correction   - Was on NS w/KCl but becoming acidotic on labs.  -Na remains 148 this am  -IVFs changed to LR    Ileus  Patient was seen by general surgery for some " episodes of nausea and emesis and some constipation. KUB was concerning for SBO vs. Ileus. Follow up CT AP did not show evidence of mechanical obstruction, was more indicative of Ileus. XR gastrogaffin challenge ordered by general surgery.     Plan:   - Was NPO. Approved to advance diet as tolerated by Gen Surg on 10/19. NGT d/c'd. Patient was started on a Clear liquid diet and tolerated it well. Advanced to previous diet recommendation of puree, nectar thick today (10/20). SLP to re-eval 10/21  - monitor electrolytes daily, correct as needed   - Eliquis switched to heparin as it might not have been fully absorbed considering Ileus. Resumed Eliquis 10/19.    - scheduled suppository regimen, f/u on effectiveness and escalate prn  - general surgery following, appreciate recs     Acute focal neurological deficit  Episodes of transient altered mental status observed by daughter, sometimes with a brief left gaze. Given location of KIARA stroke, considered seizure. EEG done on 10/11, negative.     Plan:   - no longer on keppra regimen     KAILYN (acute kidney injury)  RESOLVED  Cr was initially 1.6, up from a baseline of 1. Likely pre-renal KAILYN, due to decreased PO intake. Resolved with fluids.     Plan:   - Continue to monitor CMP.   -SCr has been stable since 10/14/24    Type 2 diabetes mellitus, with long-term current use of insulin  History of. Takes Levemir 35 units QHS at home. A1C 10%.    -goal 140-180 while admitted  -SSI prn   -continue to monitor glucose  -Endocrine consulted for A1c 10%; following, appreciate recs   -24° glucose 173-211        dextrose 10% bolus 125 mL 125 mL, 12.5 g, Intravenous, PRN    dextrose 10% bolus 250 mL 250 mL, 25 g, Intravenous, PRN    glucagon (human recombinant) injection 1 mg, 1 mg, Intramuscular, PRN    insulin aspart U-100 pen 0-10 Units, 0-10 Units, Subcutaneous, QID (AC + HS) PRN    Atrial fibrillation  Takes Eliquis 5mg BID and Coreg 6.25mg BID at home.  Initially Eliquis held  given size of stroke; ASA 81mg QD in the interim. Eliquis was restarted on 10/10.       Plan:   -continue at home coreg regimen   -eliquis switched to heparin drip due to issues described in ileus section of the assessment and plan.   -Heparin gtt turned off 10/19. Eliquis resumed on 10/19.  -24° HR 63-78    Opioid use  History of. Prescribed oxycodone 15mg at home. Family concerned that medication is being diverted by family member, unclear how often patient takes. Reportedly is prescribed for neck pain.     Plan:   - holding oxycodone due to ileus    Left hemiplegia  See stroke    Plan:   - continue PT/OT/SLP   -Current recs for high intensity therapy    HTN (hypertension)  Home medication regimen as below  -lisinopril 40mg QD  -amlodipine 10mg QD    Plan:   -lisinopril and amlodipine were initially held in the setting of KAILYN. Was started on coreg in the context of Afib.   -if patient's BP still elevated, consider adding back amlodipine and lisinopril   -Goal SBP<180 for now  24° -182      carvediloL tablet 12.5 mg, Oral, BID    hydrALAZINE injection 10 mg, Intravenous, Q6H PRN         10/7: gómez SINCLAIR exam stable, echo today, NPO aside from meds per speech, barium study tmrw  10/08/2024 DOTTIE, stable neuro exam, went for the Br study, and was started on a diet. Endocrine consulted for the A1c 10%, pending recs.  10/09/2024 DOTTIE, Stable neuro exam, will restart Lisinopril 20 mg (half of her dome dose). Seen by endocrine, with adjustment of her insulin doses.  10/10/2024 DOTTIE, no change in her neuro exam, will restart Eliquis today, BP is better controlled.  10/11/2024 DOTTIE, stable neuro exam, still decreased PO intake. Patient gets some episodes of in attention; EEG was ordered. Endocrine adjusted her insulins. Accepted at [facility] in [place].   10/12/2024 DOTTIE, waiting on EEG interpretation (contacted epilepsy), started Keppra 500mg BID and will see if reduces transient alterations in awareness,  neurologic examination stable  10/13/2024 NAEON, EEG w/encephalopathy (no seizure or discharges), stop Keppra, SBP ~150 to 160 so increased Coreg to 6.25mg BID  10/14/2024 General surgery was consulted overnight for constipation, episodes of nausea and emesis. KUB concerning for SBO vs. Ileus. F/u CT AP was more indicative of ileus, no evidence of mechanical obstruction was observed. Patient is NPO, can get some meds via NGT. Eliquis switched to heparin drip.   10/15/2024 Patient has still not had an actual bowel movement. Is distended, has some abdominal pain. General surgery following, is getting XR gastrogaffin challenge, will f/u results.   10/16/2024 Still monitoring patient for bowel movements in the setting of Ileus. General surgery is following. Mild hyperkalemia and hypernatremia this morning; will continue to monitor.   10/17/2024 Patient still has not had a bowel movement. Ileus not resolved on repeat imaging. Suppository ordered earlier this morning, will monitor to see if improvement with suppository.   10/18/2024 Patient still had not had a solid bowel movement due to her ileus, had a number of watery movements yesterday but nothing substantive. Suppositories now scheduled. General surgery following.   10/19/2024 Patient stable overnight. LBM yesterday. Gen Surg ok with advancing diet to clear liquids. Heparin gtt stopped, started on Eliquis. Awaiting placement.  10/20/2024 NAEON. Neuro exam remains stable. Patient with audible upper respiratory sounds and cough. CPT ordered. Patient noted to be increasingly acidotic on labs. IVFs changed from NS to LR. Patient awaiting placement.  10/21/24 NAEON. Neuro exam remains stable. Not oriented to time. Family says patient's exam stable compared to yesterday. SLP to re-evaluate, on diet level 4. Placement pending    STROKE DOCUMENTATION   Acute Stroke Times   Last Known Normal Date:  (OSH)  Stroke Team Called Date: 10/06/24  Stroke Team Called Time:  (At  OS)  Stroke Team Arrival Date: 10/06/24  Stroke Team Arrival Time: 0608  CT Interpretation Time:  (CTA done around 4:20am per documents that came to [facility] with patient)  Thrombolytic Therapy Recommended: No  Thrombectomy Recommended:  (Pending MRI Brain)    NIH Scale:  1a. Level of Consciousness: 0-->Alert, keenly responsive  1b. LOC Questions: 0-->Answers both questions correctly  1c. LOC Commands: 0-->Performs both tasks correctly  2. Best Gaze: 0-->Normal  3. Visual: 0-->No visual loss  4. Facial Palsy: 0-->Normal symmetrical movements  5a. Motor Arm, Left: 0-->No drift, limb holds 90 (or 45) degrees for full 10 secs  5b. Motor Arm, Right: 0-->No drift, limb holds 90 (or 45) degrees for full 10 secs  6a. Motor Leg, Left: 0-->No drift, leg holds 30 degree position for full 5 secs  6b. Motor Leg, Right: 0-->No drift, leg holds 30 degree position for full 5 secs  7. Limb Ataxia: 0-->Absent  8. Sensory: 0-->Normal, no sensory loss  9. Best Language: 0-->No aphasia, normal  10. Dysarthria: 0-->Normal  11. Extinction and Inattention (formerly Neglect): 0-->No abnormality  Total (NIH Stroke Scale): 0       Modified [place] Score: 3  Lesage Coma Scale:14   ABCD2 Score:    [id number] Score:   HAS -BLED Score:   ICH Score:   Hunt & Shane Classification:      Hemorrhagic change of an Ischemic Stroke: Does this patient have an ischemic stroke with hemorrhagic changes? No     Neurologic Chief Complaint: Infarcts in multiple vascular territories    Subjective:     Interval History: Patient is seen for follow-up neurological assessment and treatment recommendations: See Hospital Course    HPI, Past Medical, Family, and Social History remains the same as documented in the initial encounter.     Review of Systems   Constitutional:  Negative for fever.   HENT:  Positive for trouble swallowing.    Respiratory:  Positive for cough. Negative for shortness of breath.    Gastrointestinal:  Negative for abdominal pain, nausea and  vomiting.   Musculoskeletal:         LUE swelling   Neurological:  Positive for facial asymmetry, weakness and numbness.   All other systems reviewed and are negative.    Scheduled Meds:   apixaban  5 mg Oral BID    atorvastatin  40 mg Oral Daily    bisacodyL  10 mg Rectal BID    carvediloL  12.5 mg Oral BID    famotidine  20 mg Oral Daily    insulin aspart U-100  3 Units Subcutaneous TIDWM    polyethylene glycol  17 g Oral BID     Continuous Infusions:      PRN Meds:  Current Facility-Administered Medications:     acetaminophen, 650 mg, Per NG tube, Q8H PRN    dextrose 10%, 12.5 g, Intravenous, PRN    dextrose 10%, 25 g, Intravenous, PRN    glucagon (human recombinant), 1 mg, Intramuscular, PRN    hydrALAZINE, 10 mg, Intravenous, Q6H PRN    insulin aspart U-100, 0-10 Units, Subcutaneous, QID (AC + HS) PRN    iohexol, 15 mL, Oral, PRN    sodium chloride 0.9%, 500 mL, Intravenous, PRN    sodium chloride 0.9%, 10 mL, Intravenous, PRN    Flushing PICC/Midline Protocol, , , Until Discontinued **AND** sodium chloride 0.9%, 10 mL, Intravenous, Q12H PRN    Objective:     Vital Signs (Most Recent):  Temp: 98.2 °F (36.8 °C) (10/21/24 0720)  Pulse: 70 (10/21/24 0941)  Resp: 18 (10/21/24 0720)  BP: (!) 175/82 (10/21/24 0818)  SpO2: 97 % (10/21/24 0720)  BP Location: Right arm    Vital Signs Range (Last 24H):  Temp:  [97.6 °F (36.4 °C)-98.4 °F (36.9 °C)]   Pulse:  [63-78]   Resp:  [16-20]   BP: (147-180)/(68-85)   SpO2:  [95 %-98 %]   BP Location: Right arm       Physical Exam  Vitals and nursing note reviewed.   Constitutional:       General: She is awake.      Appearance: She is ill-appearing.   Eyes:      General:         Right eye: No discharge.         Left eye: No discharge.      Extraocular Movements: Extraocular movements intact.      Conjunctiva/sclera: Conjunctivae normal.   Cardiovascular:      Rate and Rhythm: Normal rate.   Pulmonary:      Effort: Pulmonary effort is normal. No respiratory distress.   Abdominal:       General: There is no distension.   Musculoskeletal:      Comments: LUE swelling   Skin:     General: Skin is warm and dry.          Neurological:      Mental Status: She is disoriented.      Cranial Nerves: Facial asymmetry present.      Sensory: Sensory deficit present.      Motor: Weakness present.            Neurological Exam:   LOC: alert  Language: No aphasia  Articulation: Dysarthria    Laboratory:  CMP:   Recent Labs   Lab 10/21/24  0627   CALCIUM 8.4*   ALBUMIN 2.1*   PROT 5.4*      K 3.7   CO2 20*   *   BUN 10   CREATININE 0.8   ALKPHOS 77   ALT 33   AST 31   BILITOT 0.5     CBC:   Recent Labs   Lab 10/21/24  0627   WBC 13.71*   RBC 3.90*   HGB 12.8   HCT 37.7      MCV 97   MCH 32.8*   MCHC 34.0       Diagnostic Results     Brain and Vessel Imaging   MRI/MRA Brain Ischemic Protocol: 10/6/24  Impression: Acute infarcts involving the right anterior circulation and posterior circulation as detailed above without evidence of intracranial hemorrhage or mass effect.  Embolic disease to be considered.     Chronic left parietal infarct. MRA demonstrates limited visualization of distal right KIARA branches in keeping with the territory of the acute infarct.  There is also narrowing of at least one proximal left M2 branch although no acute infarct in that territory.          Echo: 10/7/24  Cardiac Imaging     Left Ventricle: The left ventricle is normal in size. Normal wall thickness. There is concentric remodeling. Normal wall motion. There is hyperdynamic systolic function with a visually estimated ejection fraction of greater than 70%. There is normal diastolic function.    Right Ventricle: Normal right ventricular cavity size. Wall thickness is normal. Systolic function is normal.    Left Atrium: Bubble study is likely negative, but full opacification of the RA is not well seen on these images which decreases sensitivity.    IVC/SVC: Normal venous pressure at 3 mmHg.    Vikas Chaudhari,  DO  [facility]  Department of Vascular Neurology   Zackary Long - Neurosurgery ([facility])

## 2024-10-21 NOTE — CARE UPDATE
-Glucose Goal 140-180    -A1C:   Hemoglobin A1C   Date Value Ref Range Status   10/08/2024 10.0 (H) 4.0 - 5.6 % Final     Comment:     ADA Screening Guidelines:  5.7-6.4%  Consistent with prediabetes  >or=6.5%  Consistent with diabetes    High levels of fetal hemoglobin interfere with the HbA1C  assay. Heterozygous hemoglobin variants (HbS, HgC, etc)do  not significantly interfere with this assay.   However, presence of multiple variants may affect accuracy.           -HOME REGIMEN:     -GLUCOSE TREND FOR THE PAST 24HRS:   Recent Labs   Lab 10/19/24  1140 10/19/24  2155 10/20/24  1301 10/20/24  1621 10/20/24  2137 10/21/24  0714   POCTGLUCOSE 214* 211* 272* 233* 209* 202*         -NO HYPOGYCEMIAS NOTED     - Diet  Diet Pureed (IDDSI Level 4) Nectar Thick (Mildly Thick); Standard Tray    T2DM admitted with acute KIARA stroke. Mild elevations with pureed diet. Will add mealtime insulin     - Start Novolog 3 units with meals.  - Continue Novolog Moderate dose correction with ISF 25 starting at 150    - POCT Glucose q 4 hrs   - Hypoglycemia protocol in place      ** Please notify Endocrine for any change and/or advance in diet**  ** Please call Endocrine for any BG related issues **     Discharge Planning:   TBD. Please notify endocrinology prior to discharge.

## 2024-10-21 NOTE — PLAN OF CARE
Problem: Stroke, Ischemic (Includes Transient Ischemic Attack)  Goal: Optimal Cerebral Tissue Perfusion  Outcome: Progressing  Intervention: Protect and Optimize Cerebral Perfusion  Flowsheets (Taken 10/21/2024 0324)  Cerebral Perfusion Promotion: blood pressure monitored     Problem: Adult Inpatient Plan of Care  Goal: Patient-Specific Goal (Individualized)  Description: Pt will maintain sbp below 220  Outcome: Progressing  Flowsheets (Taken 10/21/2024 0324)  Individualized Care Needs: care clustered  Anxieties, Fears or Concerns: none     Lactated ringer's @100 cc/hr. VSS. Bed in lowest position, side rails x 3, and call light in use.

## 2024-10-21 NOTE — PT/OT/SLP PROGRESS
"Speech Language Pathology Treatment    Patient Name:  Tammy Multani   MRN:  [id number]  Admitting Diagnosis: Acute arterial ischemic stroke, multifocal, multiple vascular territories    Recommendations:                 General Recommendations:  Dysphagia therapy, Speech/language therapy, and Cognitive-linguistic therapy  Diet recommendations:  Puree Diet - IDDSI Level 4, Liquid Diet Level: Mildly thick/Nectar thick liquids - IDDSI Level 2   Aspiration Precautions: 1 bite/sip at a time, Alternating bites/sips, Assistance with meals, Check for pocketing/oral residue, Feed only when awake/alert, Frequent oral care, HOB to 90 degrees, Monitor for s/s of aspiration, Small bites/sips, and Strict aspiration precautions   General Precautions: Standard, pudding thick, nectar thick, aspiration  Communication strategies:  provide increased time to answer and go to room if call light pushed    Assessment:     Tammy Multani is a 64 y.o. female with an SLP diagnosis of Dysphagia, Cognitive-Linguistic Impairment, and Visio-Spatial Impairment.    Subjective     "My stomach hurts."      Pain/Comfort:  Pain Rating 1: 0/10    Respiratory Status: Room air    Objective:     Has the patient been evaluated by SLP for swallowing?   Yes  Keep patient NPO? No   Current Respiratory Status:    Room Air    Pt seen at the bedside this date for re-evaluation of swallow function s/p NGT removal 10/19 and MD clearance for resuming PO. Pt agreeable to evaluation, and HOB raised prior to PO intake. Pt trialed tsp of mildly thick liquids x3 with one instance of a delayed cough on the first trial where she was holding the bolus in her mouth for a prolonged period. SLP notes swallow initiation was more timely on the second two trials, and she did not demonstrate any other overt signs of aspiration. Pt accepted puree x2 before declining more PO 2/2 abdominal pain. SLP notified RN of pt c/o pain upon exit. No overt signs of aspiration noted on " puree trials. SLP educated pt and daughter re: diet recs, aspiration precautions, and poc to which they both verbalized understanding. Recommending continued puree diet with mildly thick liquids and strict aspiration precautions. SLP to follow for diet tolerance/advancement and cognitive-linguistic therapy.    Goals:   Multidisciplinary Problems       SLP Goals          Problem: SLP    Goal Priority Disciplines Outcome   SLP Goal     SLP    Description: Speech Language Pathology Goals  Goals expected to be met by 10/14:  1. Pt will participate in Modified Barium Swallow Study to r/o aspiration and determine safest oral diet.   2. Pt will participate in speech/language/cognitive evaluation to establish further tx plan.   3.  Assess functional reading and writing skills  4.  Ione to month, year and place  5.  Respond to simple categorization tasks with 75% accuracy                                Plan:     Patient to be seen:  4 x/week   Plan of Care expires:  11/06/24  Plan of Care reviewed with:  patient, daughter   SLP Follow-Up:  Yes       Discharge recommendations:  High Intensity Therapy     Time Tracking:     SLP Treatment Date:   10/21/24  Speech Start Time:  1007  Speech Stop Time:  1022     Speech Total Time (min):  15 min    Billable Minutes: Treatment Swallowing Dysfunction 7 and Self Care/Home Management Training 8    10/21/2024

## 2024-10-21 NOTE — PLAN OF CARE
Problem: Skin Injury Risk Increased  Goal: Skin Health and Integrity  Outcome: Progressing     Problem: Stroke, Ischemic (Includes Transient Ischemic Attack)  Goal: Optimal Coping  Outcome: Progressing  Goal: Effective Bowel Elimination  Outcome: Progressing  Goal: Optimal Cerebral Tissue Perfusion  Outcome: Progressing  Goal: Optimal Cognitive Function  Outcome: Progressing  Goal: Improved Communication Skills  Outcome: Progressing  Goal: Optimal Functional Ability  Outcome: Progressing  Goal: Optimal Nutrition Intake  Outcome: Progressing  Goal: Effective Oxygenation and Ventilation  Outcome: Progressing  Goal: Improved Sensorimotor Function  Outcome: Progressing  Goal: Safe and Effective Swallow  Outcome: Progressing  Goal: Effective Urinary Elimination  Outcome: Progressing     Problem: Fall Injury Risk  Goal: Absence of Fall and Fall-Related Injury  Outcome: Progressing     Problem: Adult Inpatient Plan of Care  Goal: Plan of Care Review  Outcome: Progressing  Goal: Patient-Specific Goal (Individualized)  Description: Pt will maintain sbp below 220  Outcome: Progressing  Goal: Absence of Hospital-Acquired Illness or Injury  Outcome: Progressing  Goal: Optimal Comfort and Wellbeing  Outcome: Progressing  Goal: Readiness for Transition of Care  Outcome: Progressing     Problem: Infection  Goal: Absence of Infection Signs and Symptoms  Outcome: Progressing     Problem: Diabetes Comorbidity  Goal: Blood Glucose Level Within Targeted Range  Outcome: Progressing

## 2024-10-21 NOTE — ASSESSMENT & PLAN NOTE
Patient was seen by general surgery for some episodes of nausea and emesis and some constipation. KUB was concerning for SBO vs. Ileus. Follow up CT AP did not show evidence of mechanical obstruction, was more indicative of Ileus. XR gastrogaffin challenge ordered by general surgery.     Plan:   - Was NPO. Approved to advance diet as tolerated by Gen Surg on 10/19. NGT d/c'd. Patient was started on a Clear liquid diet and tolerated it well. Advanced to previous diet recommendation of puree, nectar thick today (10/20). SLP to re-eval 10/21  - monitor electrolytes daily, correct as needed   - Eliquis switched to heparin as it might not have been fully absorbed considering Ileus. Resumed Eliquis 10/19.    - scheduled suppository regimen, f/u on effectiveness and escalate prn  - general surgery following, appreciate recs

## 2024-10-21 NOTE — PT/OT/SLP PROGRESS
Physical Therapy Treatment    Patient Name:  Tammy Multani   MRN:  [id number]    Recommendations:     Discharge Recommendations: High Intensity Therapy  Discharge Equipment Recommendations: hospital bed, lift device, wheelchair  Barriers to discharge: Decreased caregiver support and Increased level of assist required    Assessment:     Tammy Multani is a 64 y.o. female admitted with a medical diagnosis of Acute arterial ischemic stroke, multifocal, multiple vascular territories.  She presents with the following impairments/functional limitations: weakness, impaired endurance, impaired self care skills, impaired functional mobility, gait instability, decreased lower extremity function, decreased upper extremity function, visual deficits, impaired balance, decreased safety awareness, impaired cognition.    Pt met with HOB elevated and agreeable to session. Pt pleasantly confused stating multiple times that she had to pee requiring redirection to task and to use the Pure wick throughout session. Pt tolerates session well with emphasis on bed mobility, transfers, sitting balance, and therex. Pt continues to require Max A x2 persons with bed mobility. Attempted sit to stand transfer x2 trials with total A x 2 persons with pt clearing 0% of hips from EOB. Pt continues to demo no active muscle contraction of LLE.  Session terminated after sit to stand trials d/t pt stating that she needed to have a BM. Pt will continue to benefit from skilled therapy services.    Rehab Prognosis: Good; patient would benefit from acute skilled PT services to address these deficits and reach maximum level of function.    Recent Surgery: * No surgery found *      Plan:     During this hospitalization, patient to be seen 4 x/week to address the identified rehab impairments via gait training, therapeutic activities, therapeutic exercises, neuromuscular re-education, canalith reposition procedure and progress toward the following  "goals:    Plan of Care Expires:  11/07/24    Subjective     Chief Complaint: "I have to have a BM"  Patient/Family Comments/goals: pt agreeable to session  Pain/Comfort:  Pain Rating 1: 0/10  Pain Rating Post-Intervention 1: 0/10      Objective:     Communicated with RN (Boris) prior to session.  Patient found HOB elevated with telemetry, PureWick, bed alarm upon PTA entry to room.     General Precautions: Standard, fall, aspiration  Orthopedic Precautions: N/A  Braces: N/A  Respiratory Status: Room air     Functional Mobility:  Bed Mobility:     Scooting: anteriorly to EOB maximal assistance  Supine to Sit: maximal assistance x2 persons for Trunk/BLEs  Sit to Supine: maximal assistance x2 persons for Trunk/BLEs  Transfers:     Sit to Stand:  x2 trials from EOB total assistance x2 persons no AD/JAN HHA  Pt unable to clear hips from EOB  Balance:   Sitting Balance at EOB:  Level of Assist Required: Moderate Assistance- Maximal Assistance  Deviations noted:  slouched posture, L anterior lean, posterior lean  Total Time Sitting EOB: ~12 minutes    AM-PAC 6 CLICK MOBILITY  Turning over in bed (including adjusting bedclothes, sheets and blankets)?: 2  Sitting down on and standing up from a chair with arms (e.g., wheelchair, bedside commode, etc.): 1  Moving from lying on back to sitting on the side of the bed?: 2  Moving to and from a bed to a chair (including a wheelchair)?: 1  Need to walk in hospital room?: 1  Climbing 3-5 steps with a railing?: 1  Basic Mobility Total Score: 8       Treatment & Education:  Patient provided with daily orientation and goals of this PT session.     Activities completed at EOB:  PROM LLE  x10 LAQ, hip flexion  AROM RLE x10 LAQ, hip flexion  PTA provided skilled instruction  for good form and technique and verbal and tactile cueing for continuation of and attention to task.     Pt educated to call for assistance and to transfer with hospital staff only.  Also, pt was educated on the " effects of prolonged immobility and the importance of performing OOB activity and exercises to promote healing and reduce recovery time.    Patient left HOB elevated with all lines intact, call button in reach, bed alarm on, RN notified, and daughter present.    GOALS:   Multidisciplinary Problems       Physical Therapy Goals          Problem: Physical Therapy    Goal Priority Disciplines Outcome Interventions   Physical Therapy Goal     PT, PT/OT Progressing    Description: Goals to be met by: 2024     Patient will increase functional independence with mobility by performin. Supine to sit with Moderate Assistance  2. Sit to supine with Moderate Assistance  3. Bed to chair transfer with Maximum Assistance using LRAD  4. Sitting at edge of bed x5 minutes with Stand-by Assistance  5. Lower extremity exercise program x15 reps per handout, with independence    Hospital Bed - Patient requires a hospital bed for positioning of the body in ways that are not feasible with an ordinary bed. The patient requires special positioning for pain relief, limited mobility, and/or being unable to independently make changes in body position without the use of a hospital bed. Pillows and wedges will not be adequate for resolving these positional issues.     Wheelchair - Patient has a mobility limitation that significantly impairs their ability to participate in one or more mobility related activities of daily living in customary locations in the home. The mobility limitation cannot be sufficiently resolved by the use of a cane or walker. The use of a manual wheelchair will greatly improve the patient's ability to participate in MRADLs. The patient will use the wheelchair on a regular basis at home. They have expressed their willingness to use a manual wheelchair in the home, and have a caregiver who is available and willing to assist with the wheelchair if needed.                       Time Tracking:     PT Received On:  10/21/24  PT Start Time: 0954     PT Stop Time: 1012  PT Total Time (min): 18 min     Billable Minutes: Therapeutic Activity 18    Treatment Type: Treatment  PT/PTA: PTA     Number of PTA visits since last PT visit: 3     10/21/2024

## 2024-10-21 NOTE — SUBJECTIVE & OBJECTIVE
Neurologic Chief Complaint: Infarcts in multiple vascular territories    Subjective:     Interval History: Patient is seen for follow-up neurological assessment and treatment recommendations: See Hospital Course    HPI, Past Medical, Family, and Social History remains the same as documented in the initial encounter.     Review of Systems   Constitutional:  Negative for fever.   HENT:  Positive for trouble swallowing.    Respiratory:  Positive for cough. Negative for shortness of breath.    Gastrointestinal:  Negative for abdominal pain, nausea and vomiting.   Musculoskeletal:         LUE swelling   Neurological:  Positive for facial asymmetry, weakness and numbness.   All other systems reviewed and are negative.    Scheduled Meds:   apixaban  5 mg Oral BID    atorvastatin  40 mg Oral Daily    bisacodyL  10 mg Rectal BID    carvediloL  12.5 mg Oral BID    famotidine  20 mg Oral Daily    insulin aspart U-100  3 Units Subcutaneous TIDWM    polyethylene glycol  17 g Oral BID     Continuous Infusions:      PRN Meds:  Current Facility-Administered Medications:     acetaminophen, 650 mg, Per NG tube, Q8H PRN    dextrose 10%, 12.5 g, Intravenous, PRN    dextrose 10%, 25 g, Intravenous, PRN    glucagon (human recombinant), 1 mg, Intramuscular, PRN    hydrALAZINE, 10 mg, Intravenous, Q6H PRN    insulin aspart U-100, 0-10 Units, Subcutaneous, QID (AC + HS) PRN    iohexol, 15 mL, Oral, PRN    sodium chloride 0.9%, 500 mL, Intravenous, PRN    sodium chloride 0.9%, 10 mL, Intravenous, PRN    Flushing PICC/Midline Protocol, , , Until Discontinued **AND** sodium chloride 0.9%, 10 mL, Intravenous, Q12H PRN    Objective:     Vital Signs (Most Recent):  Temp: 98.2 °F (36.8 °C) (10/21/24 0720)  Pulse: 70 (10/21/24 0941)  Resp: 18 (10/21/24 0720)  BP: (!) 175/82 (10/21/24 0818)  SpO2: 97 % (10/21/24 0720)  BP Location: Right arm    Vital Signs Range (Last 24H):  Temp:  [97.6 °F (36.4 °C)-98.4 °F (36.9 °C)]   Pulse:  [63-78]   Resp:   [16-20]   BP: (147-180)/(68-85)   SpO2:  [95 %-98 %]   BP Location: Right arm       Physical Exam  Vitals and nursing note reviewed.   Constitutional:       General: She is awake.      Appearance: She is ill-appearing.   Eyes:      General:         Right eye: No discharge.         Left eye: No discharge.      Extraocular Movements: Extraocular movements intact.      Conjunctiva/sclera: Conjunctivae normal.   Cardiovascular:      Rate and Rhythm: Normal rate.   Pulmonary:      Effort: Pulmonary effort is normal. No respiratory distress.   Abdominal:      General: There is no distension.   Musculoskeletal:      Comments: LUE swelling   Skin:     General: Skin is warm and dry.          Neurological:      Mental Status: She is disoriented.      Cranial Nerves: Facial asymmetry present.      Sensory: Sensory deficit present.      Motor: Weakness present.            Neurological Exam:   LOC: alert  Language: No aphasia  Articulation: Dysarthria    Laboratory:  CMP:   Recent Labs   Lab 10/21/24  0627   CALCIUM 8.4*   ALBUMIN 2.1*   PROT 5.4*      K 3.7   CO2 20*   *   BUN 10   CREATININE 0.8   ALKPHOS 77   ALT 33   AST 31   BILITOT 0.5     CBC:   Recent Labs   Lab 10/21/24  0627   WBC 13.71*   RBC 3.90*   HGB 12.8   HCT 37.7      MCV 97   MCH 32.8*   MCHC 34.0       Diagnostic Results     Brain and Vessel Imaging   MRI/MRA Brain Ischemic Protocol: 10/6/24  Impression: Acute infarcts involving the right anterior circulation and posterior circulation as detailed above without evidence of intracranial hemorrhage or mass effect.  Embolic disease to be considered.     Chronic left parietal infarct. MRA demonstrates limited visualization of distal right KIARA branches in keeping with the territory of the acute infarct.  There is also narrowing of at least one proximal left M2 branch although no acute infarct in that territory.          Echo: 10/7/24  Cardiac Imaging     Left Ventricle: The left ventricle is  normal in size. Normal wall thickness. There is concentric remodeling. Normal wall motion. There is hyperdynamic systolic function with a visually estimated ejection fraction of greater than 70%. There is normal diastolic function.    Right Ventricle: Normal right ventricular cavity size. Wall thickness is normal. Systolic function is normal.    Left Atrium: Bubble study is likely negative, but full opacification of the RA is not well seen on these images which decreases sensitivity.    IVC/SVC: Normal venous pressure at 3 mmHg.

## 2024-10-21 NOTE — PT/OT/SLP PROGRESS
Occupational Therapy   Treatment    Name: Tammy Multani  MRN: [id number]  Admitting Diagnosis:  Acute arterial ischemic stroke, multifocal, multiple vascular territories       Recommendations:     Discharge Recommendations: High Intensity Therapy  Discharge Equipment Recommendations:  wheelchair, lift device, hospital bed  Barriers to discharge:  None    Assessment:     Tammy Multani is a 64 y.o. female with a medical diagnosis of Acute arterial ischemic stroke, multifocal, multiple vascular territories. Patient seen at bedside for leukotaping of L glenohumeral joint to stabilize joint, promote proper positioning, and reduce shoulder subluxation. Tape initialed and dated; family and RN (Boris) educated on purpose of tape, as well as removal recommendations; tape to be removed 10/24/24, or when it starts to peel off, whichever comes first. Performance deficits affecting function are weakness, impaired endurance, impaired self care skills, impaired functional mobility, gait instability, decreased lower extremity function, decreased upper extremity function, visual deficits, impaired balance, decreased safety awareness, impaired cognition. Patient has demonstrated sufficient progression to warrant high intensity therapy evidenced by objectives noted below.     Rehab Prognosis:  Good; patient would benefit from acute skilled OT services to address these deficits and reach maximum level of function.       Plan:     Patient to be seen 4 x/week to address the above listed problems via self-care/home management, therapeutic activities, therapeutic exercises, neuromuscular re-education  Plan of Care Expires: 11/07/24  Plan of Care Reviewed with: patient, daughter    Subjective     Chief Complaint: c/o scapular pain  Patient/Family Comments/goals: get better  Pain/Comfort:  Pain Rating 1: other (see comments) (unrated)  Location - Side 1: Left  Location 1: scapula  Pain Addressed 1: Reposition, Distraction  Pain  Rating Post-Intervention 1: 0/10    Objective:   Additional Staff Present: Therapy tech utilized during session due to patient complexity and required physical assistance to ensure patient safety    Communicated with: nursing prior to session.  Patient found HOB elevated with telemetry, PureWick, bed alarm upon OT entry to room.    General Precautions: Standard, fall, aspiration    Orthopedic Precautions:N/A  Braces: N/A  Respiratory Status: Room air     Occupational Performance:     Bed Mobility:    Patient completed Rolling/Turning to Left with  total assistance  Patient completed Rolling/Turning to Right with total assistance  Patient completed Supine to Sit with maximal assistance and 2 persons  Patient completed Sit to Supine with maximal assistance and 2 persons   Patient sat EOB for ~ 25 minutes with Moderate Assistance to Maximum Assistance    Activities of Daily Living:  Feeding:  maximal assistance to suction mouth d/t coughing  Toileting: dependence on Purwick; cleaning at bed level      [facility] 6 Click ADL: 9    Treatment & Education:  Extending time spent taping L shoulder    Patient educated on:   -purpose of OT and OT POC  -facilitation and education on proper body mechanics, energy conservation, and safety  -importance of early mobility and out of bed activities with staff assist  -overall benefits of therapy     All questions answered within OT scope and to patient's satisfaction    Patient left HOB elevated with all lines intact, call button in reach, bed alarm on, nursing notified, and family present    GOALS:   Multidisciplinary Problems       Occupational Therapy Goals          Problem: Occupational Therapy    Goal Priority Disciplines Outcome Interventions   Occupational Therapy Goal     OT, PT/OT Progressing    Description: Goals to be met by: 11/7/24     Patient will increase functional independence with ADLs by performing:    UE Dressing with Minimal Assistance.  LE Dressing with Moderate  Assistance.  Grooming while seated with Set-up Assistance.  Toileting from bedside commode with Moderate Assistance for hygiene and clothing management.   Sitting at edge of bed x5 minutes with Contact Guard Assistance.  Supine to sit with Contact Guard Assistance.  Step transfer with Moderate Assistance    DME Justifications (see above for complete DME recommendations)    Bedside Commode- Patient has a mobility limitation that significantly impairs their ability to participate in one or more mobility related activities of daily living, including toileting. This deficit can be resolved by using a bedside commode. Patient demonstrates mobility limitations that will cause them to be confined to one room at home without bathroom access for up to 30 days. Using a bedside commode will greatly improve the patient's ability to participate in MRADLs.     Wheelchair-  Patient has a mobility limitation that significantly impairs their ability to participate in one or more mobility related activities of daily living in customary locations in the home. The mobility limitation cannot be sufficiently resolved by the use of a cane or walker. The use of a manual wheelchair will greatly improve the patient's ability to participate in MRADLs. The patient will use the wheelchair on a regular basis at home. They have expressed their willingness to use a manual wheelchair in the home, and have a caregiver who is available and willing to assist with the wheelchair if needed.     Hospital Bed-  Patient requires a hospital bed for positioning of the body in ways that are not feasible with an ordinary bed. The patient requires special positioning for pain relief, limited mobility, and/or being unable to independently make changes in body position without the use of a hospital bed. Pillows and wedges will not be adequate for resolving these positional issues.                        Time Tracking:     OT Date of Treatment: 10/21/24  OT Start  Time: 1349  OT Stop Time: 1426  OT Total Time (min): 37 min    Billable Minutes:Neuromuscular Re-education 37    OT/SUSANNE: OT     Number of SUSANNE visits since last OT visit: 0    10/21/2024

## 2024-10-22 LAB
ALBUMIN SERPL BCP-MCNC: 2 G/DL (ref 3.5–5.2)
ALP SERPL-CCNC: 69 U/L (ref 40–150)
ALT SERPL W/O P-5'-P-CCNC: 31 U/L (ref 10–44)
ANION GAP SERPL CALC-SCNC: 10 MMOL/L (ref 8–16)
AST SERPL-CCNC: 38 U/L (ref 10–40)
BASOPHILS # BLD AUTO: 0.03 K/UL (ref 0–0.2)
BASOPHILS NFR BLD: 0.3 % (ref 0–1.9)
BILIRUB SERPL-MCNC: 0.4 MG/DL (ref 0.1–1)
BUN SERPL-MCNC: 10 MG/DL (ref 8–23)
CALCIUM SERPL-MCNC: 8.1 MG/DL (ref 8.7–10.5)
CHLORIDE SERPL-SCNC: 113 MMOL/L (ref 95–110)
CO2 SERPL-SCNC: 20 MMOL/L (ref 23–29)
CREAT SERPL-MCNC: 0.6 MG/DL (ref 0.5–1.4)
DIFFERENTIAL METHOD BLD: ABNORMAL
EOSINOPHIL # BLD AUTO: 0.1 K/UL (ref 0–0.5)
EOSINOPHIL NFR BLD: 1.4 % (ref 0–8)
ERYTHROCYTE [DISTWIDTH] IN BLOOD BY AUTOMATED COUNT: 12.7 % (ref 11.5–14.5)
EST. GFR  (NO RACE VARIABLE): >60 ML/MIN/1.73 M^2
GLUCOSE SERPL-MCNC: 133 MG/DL (ref 70–110)
HCT VFR BLD AUTO: 34.4 % (ref 37–48.5)
HGB BLD-MCNC: 11.7 G/DL (ref 12–16)
IMM GRANULOCYTES # BLD AUTO: 0.13 K/UL (ref 0–0.04)
IMM GRANULOCYTES NFR BLD AUTO: 1.4 % (ref 0–0.5)
LYMPHOCYTES # BLD AUTO: 1.9 K/UL (ref 1–4.8)
LYMPHOCYTES NFR BLD: 20.3 % (ref 18–48)
MAGNESIUM SERPL-MCNC: 1.7 MG/DL (ref 1.6–2.6)
MCH RBC QN AUTO: 33.3 PG (ref 27–31)
MCHC RBC AUTO-ENTMCNC: 34 G/DL (ref 32–36)
MCV RBC AUTO: 98 FL (ref 82–98)
MONOCYTES # BLD AUTO: 0.4 K/UL (ref 0.3–1)
MONOCYTES NFR BLD: 4.7 % (ref 4–15)
NEUTROPHILS # BLD AUTO: 6.8 K/UL (ref 1.8–7.7)
NEUTROPHILS NFR BLD: 71.9 % (ref 38–73)
NRBC BLD-RTO: 0 /100 WBC
PHOSPHATE SERPL-MCNC: 2.7 MG/DL (ref 2.7–4.5)
PLATELET # BLD AUTO: 174 K/UL (ref 150–450)
PMV BLD AUTO: 11.5 FL (ref 9.2–12.9)
POCT GLUCOSE: 137 MG/DL (ref 70–110)
POCT GLUCOSE: 146 MG/DL (ref 70–110)
POCT GLUCOSE: 155 MG/DL (ref 70–110)
POCT GLUCOSE: 163 MG/DL (ref 70–110)
POTASSIUM SERPL-SCNC: 4.2 MMOL/L (ref 3.5–5.1)
PROT SERPL-MCNC: 5.1 G/DL (ref 6–8.4)
RBC # BLD AUTO: 3.51 M/UL (ref 4–5.4)
SODIUM SERPL-SCNC: 143 MMOL/L (ref 136–145)
WBC # BLD AUTO: 9.4 K/UL (ref 3.9–12.7)

## 2024-10-22 PROCEDURE — 80053 COMPREHEN METABOLIC PANEL: CPT | Performed by: PHYSICIAN ASSISTANT

## 2024-10-22 PROCEDURE — 84100 ASSAY OF PHOSPHORUS: CPT | Performed by: PHYSICIAN ASSISTANT

## 2024-10-22 PROCEDURE — 97112 NEUROMUSCULAR REEDUCATION: CPT

## 2024-10-22 PROCEDURE — 25000003 PHARM REV CODE 250: Performed by: STUDENT IN AN ORGANIZED HEALTH CARE EDUCATION/TRAINING PROGRAM

## 2024-10-22 PROCEDURE — 36415 COLL VENOUS BLD VENIPUNCTURE: CPT | Performed by: PHYSICIAN ASSISTANT

## 2024-10-22 PROCEDURE — 83735 ASSAY OF MAGNESIUM: CPT | Performed by: PHYSICIAN ASSISTANT

## 2024-10-22 PROCEDURE — 85025 COMPLETE CBC W/AUTO DIFF WBC: CPT | Performed by: PHYSICIAN ASSISTANT

## 2024-10-22 PROCEDURE — 97530 THERAPEUTIC ACTIVITIES: CPT

## 2024-10-22 PROCEDURE — 97535 SELF CARE MNGMENT TRAINING: CPT

## 2024-10-22 PROCEDURE — 92526 ORAL FUNCTION THERAPY: CPT

## 2024-10-22 PROCEDURE — 99233 SBSQ HOSP IP/OBS HIGH 50: CPT | Mod: GC,,, | Performed by: STUDENT IN AN ORGANIZED HEALTH CARE EDUCATION/TRAINING PROGRAM

## 2024-10-22 PROCEDURE — 92507 TX SP LANG VOICE COMM INDIV: CPT

## 2024-10-22 PROCEDURE — 11000001 HC ACUTE MED/SURG PRIVATE ROOM

## 2024-10-22 RX ADMIN — APIXABAN 5 MG: 5 TABLET, FILM COATED ORAL at 08:10

## 2024-10-22 RX ADMIN — INSULIN ASPART 2 UNITS: 100 INJECTION, SOLUTION INTRAVENOUS; SUBCUTANEOUS at 07:10

## 2024-10-22 RX ADMIN — APIXABAN 5 MG: 5 TABLET, FILM COATED ORAL at 09:10

## 2024-10-22 RX ADMIN — CARVEDILOL 12.5 MG: 12.5 TABLET, FILM COATED ORAL at 08:10

## 2024-10-22 RX ADMIN — ATORVASTATIN CALCIUM 40 MG: 40 TABLET, FILM COATED ORAL at 08:10

## 2024-10-22 RX ADMIN — BISACODYL 10 MG: 10 SUPPOSITORY RECTAL at 09:10

## 2024-10-22 RX ADMIN — INSULIN ASPART 2 UNITS: 100 INJECTION, SOLUTION INTRAVENOUS; SUBCUTANEOUS at 11:10

## 2024-10-22 RX ADMIN — CARVEDILOL 12.5 MG: 12.5 TABLET, FILM COATED ORAL at 09:10

## 2024-10-22 RX ADMIN — INSULIN ASPART 3 UNITS: 100 INJECTION, SOLUTION INTRAVENOUS; SUBCUTANEOUS at 11:10

## 2024-10-22 RX ADMIN — FAMOTIDINE 20 MG: 20 TABLET ORAL at 08:10

## 2024-10-22 RX ADMIN — INSULIN ASPART 3 UNITS: 100 INJECTION, SOLUTION INTRAVENOUS; SUBCUTANEOUS at 07:10

## 2024-10-22 RX ADMIN — INSULIN ASPART 3 UNITS: 100 INJECTION, SOLUTION INTRAVENOUS; SUBCUTANEOUS at 04:10

## 2024-10-22 RX ADMIN — POLYETHYLENE GLYCOL 3350 17 G: 17 POWDER, FOR SOLUTION ORAL at 09:10

## 2024-10-22 NOTE — PT/OT/SLP PROGRESS
"Occupational Therapy   Treatment    Co-treatment with PT due to pt's requiring 2 skilled therapists to safely perform mobility this date    Name: Tammy Multani  MRN: [id number]  Admitting Diagnosis:  Acute arterial ischemic stroke, multifocal, multiple vascular territories       Recommendations:     Discharge Recommendations: High Intensity Therapy  Discharge Equipment Recommendations:  wheelchair, lift device, hospital bed  Barriers to discharge:  Other (Comment) (increased (A) with ADLs and mobility)    Assessment:     Tammy Multani is a 64 y.o. female with a medical diagnosis of Acute arterial ischemic stroke, multifocal, multiple vascular territories.  Pt tolerated session well and without incident, but she continues to require significant assistance to perform self-care tasks and mobility.  She presents with the following.  Performance deficits affecting function are weakness, impaired endurance, impaired self care skills, impaired functional mobility, gait instability, visual deficits, impaired balance, decreased coordination, decreased upper extremity function, decreased lower extremity function, decreased ROM, impaired fine motor, abnormal tone, decreased safety awareness, impaired sensation, impaired coordination, impaired cognition.     Rehab Prognosis:  Good; patient would benefit from acute skilled OT services to address these deficits and reach maximum level of function.       Plan:     Patient to be seen 4 x/week to address the above listed problems via self-care/home management, therapeutic activities, therapeutic exercises, neuromuscular re-education  Plan of Care Expires: 11/07/24  Plan of Care Reviewed with: patient, daughter    Subjective     Chief Complaint: none  Patient/Family Comments/goals: "I need to lay down.  I'm tired."  Pain/Comfort:  Pain Rating 1: 0/10  Pain Rating Post-Intervention 1: 0/10    Objective:     Communicated with: nurse and PT prior to session.  Patient found HOB " elevated with SCD, telemetry, bed alarm, PureWick, Other (comments) (midline cath) with her daughter present upon OT entry to room.    General Precautions: Standard, fall, aspiration, nectar thick, vision impaired, pureed diet    Orthopedic Precautions:N/A  Braces: N/A  Respiratory Status: Room air     Occupational Performance:     Bed Mobility:    Patient completed Supine to Sit to the L with total assistance and 2 persons  Pt scooted to EOB to place her feet on the floor with Max A of 2 persons   Patient completed Sit to Supine with total assistance and 2 persons   Pt scooted to HOB while supine with HOB flat with total assistance of 2 persons via draw sheet     Functional Mobility/Transfers:  Not performed due to pt fatigue and her requiring Max A for sitting balance EOB.     Activities of Daily Living:  Deferred.  Pt said she had an urge to have a BM, but she was unable to void during session.    [facility] 6 Click ADL: 9    Treatment & Education:  - While seated EOB, pt performed L lateral leans onto her LUE x 5 reps for proprioceptive input to her affected LUE and for core strengthening while returning herself to midline.  During lateral leans, pt was able to reach across midline with her R hand and touch her L hand with cueing.      - While seated EOB, pt was able to cross midline and reach and touch writing therapist's hand in her L visual field using her R hand, requiring cueing to perform.  PT provided pt posterior trunk support.     - Pt required cueing to extend her trunk and neck for upright posture while seated EOB.    - Upon OT entrance, pt's L wrist was completely bent into flexed position.  Writing therapist propped her LUE onto a pillow for positioning and to facilitate attending to her affected LUE.    Pt and her daughter edu on role of OT, POC, safety when performing self care tasks, benefit of performing EOB activity, and safety when performing functional transfers and mobility.  - White board  updated  - Self care tasks completed-- as noted above      Patient left HOB elevated with all lines intact, call button in reach, bed alarm on, and her daughter present    GOALS:   Multidisciplinary Problems       Occupational Therapy Goals          Problem: Occupational Therapy    Goal Priority Disciplines Outcome Interventions   Occupational Therapy Goal     OT, PT/OT Progressing    Description: Goals to be met by: 11/7/24     Patient will increase functional independence with ADLs by performing:    UE Dressing with Minimal Assistance.  LE Dressing with Moderate Assistance.  Grooming while seated with Set-up Assistance.  Toileting from bedside commode with Moderate Assistance for hygiene and clothing management.   Sitting at edge of bed x5 minutes with Contact Guard Assistance.  Supine to sit with Contact Guard Assistance.  Step transfer with Moderate Assistance    DME Justifications (see above for complete DME recommendations)    Bedside Commode- Patient has a mobility limitation that significantly impairs their ability to participate in one or more mobility related activities of daily living, including toileting. This deficit can be resolved by using a bedside commode. Patient demonstrates mobility limitations that will cause them to be confined to one room at home without bathroom access for up to 30 days. Using a bedside commode will greatly improve the patient's ability to participate in MRADLs.     Wheelchair-  Patient has a mobility limitation that significantly impairs their ability to participate in one or more mobility related activities of daily living in customary locations in the home. The mobility limitation cannot be sufficiently resolved by the use of a cane or walker. The use of a manual wheelchair will greatly improve the patient's ability to participate in MRADLs. The patient will use the wheelchair on a regular basis at home. They have expressed their willingness to use a manual wheelchair in  the home, and have a caregiver who is available and willing to assist with the wheelchair if needed.     Hospital Bed-  Patient requires a hospital bed for positioning of the body in ways that are not feasible with an ordinary bed. The patient requires special positioning for pain relief, limited mobility, and/or being unable to independently make changes in body position without the use of a hospital bed. Pillows and wedges will not be adequate for resolving these positional issues.                        Time Tracking:     OT Date of Treatment: 10/22/24  OT Start Time: 1450  OT Stop Time: 1507  OT Total Time (min): 17 min    Billable Minutes:Neuromuscular Re-education 17 min    OT/SUSANNE: OT     Number of SUSANNE visits since last OT visit: 0    10/22/2024

## 2024-10-22 NOTE — CARE UPDATE
-Glucose Goal 140-180    -A1C:   Hemoglobin A1C   Date Value Ref Range Status   10/08/2024 10.0 (H) 4.0 - 5.6 % Final     Comment:     ADA Screening Guidelines:  5.7-6.4%  Consistent with prediabetes  >or=6.5%  Consistent with diabetes    High levels of fetal hemoglobin interfere with the HbA1C  assay. Heterozygous hemoglobin variants (HbS, HgC, etc)do  not significantly interfere with this assay.   However, presence of multiple variants may affect accuracy.           -HOME REGIMEN:     -GLUCOSE TREND FOR THE PAST 24HRS:   Recent Labs   Lab 10/20/24  2137 10/21/24  0714 10/21/24  1120 10/21/24  1654 10/21/24  2130 10/22/24  0749   POCTGLUCOSE 209* 202* 209* 164* 116* 155*         -NO HYPOGYCEMIAS NOTED     - Diet  Diet Pureed (IDDSI Level 4) Nectar Thick (Mildly Thick); Standard Tray    T2DM admitted with acute KIARA stroke.   Blood sugar improving yesterday with mealtime insulin.     - Continue Novolog 3 units with meals.  - Continue Novolog Moderate dose correction with ISF 25 starting at 150    - POCT Glucose AC/HS  - Hypoglycemia protocol in place      ** Please notify Endocrine for any change and/or advance in diet**  ** Please call Endocrine for any BG related issues **     Discharge Planning:   TBD. Please notify endocrinology prior to discharge.

## 2024-10-22 NOTE — PT/OT/SLP PROGRESS
"Speech Language Pathology Treatment    Patient Name:  Tammy Multani   MRN:  [id number]  Admitting Diagnosis: Acute arterial ischemic stroke, multifocal, multiple vascular territories    Recommendations:                 General Recommendations:  Dysphagia therapy, Speech/language therapy, and Cognitive-linguistic therapy  Diet recommendations:  Puree Diet - IDDSI Level 4, Liquid Diet Level: Mildly thick/Nectar thick liquids - IDDSI Level 2   Aspiration Precautions: 1 bite/sip at a time, Alternating bites/sips, Assistance with meals and Assistance with thickening liquids, Avoid talking while eating, Eliminate distractions, Feed only when awake/alert, Frequent oral care, HOB to 90 degrees, Meds crushed in puree, Monitor for s/s of aspiration, Remain upright 30 minutes post meal, Small bites/sips, and Strict aspiration precautions   General Precautions: Standard, aspiration, fall, nectar thick, pureed diet, vision impaired  Communication strategies:  provide increased time to answer and go to room if call light pushed    Assessment:     Tammy Multani is a 64 y.o. female with an SLP diagnosis of Dysphagia, Cognitive-Linguistic Impairment, and Visio-Spatial Impairment.      Subjective     "I'm still here." Pt joked when SLP greeted pt upon entry.     Pain/Comfort:       Respiratory Status: Room air    Objective:     Has the patient been evaluated by SLP for swallowing?   Yes  Keep patient NPO? No   Current Respiratory Status:        Pt seen for follow up to ensure diet tolerance and engage pt in cognitive-linguistic tx. Pt displaying improved affect and initiation, as she ws more interactive, expressive, and displaying a sense of humor.  PO intake continues to still be limited to mostly nectar thick liquids, Boost, pudding, and applesauce.  Pt agreeable to accept self fed sips of Boost x 5, 1/2 tsp applesauce x 1, and full tsp x 2.  Overt cough present after initial full tsp puree, but no other instances of " coughing observed with remaining subsequent trials. Timing of initiation of swallow appeared to be improved on this date.  Pt did not wish to accept any further PO intake.  SLP recommends pt continue on current diet at this time. Pt completed a word fluency task listing 8 items in a concrete category in one minute.  During an untimed divergent naming task, pt was able to state 5 items in concrete category with 73% accuracy IND/93% given cues.  Pt displayed decreased cognitive flexibility during this divergent naming task.  Education provided to pt and daughter regarding diet recommendations, increasing nutrition intake, ongoing swallowing assessment to determine when diet advancement may be appropriate, cognitive-linguistic tx tasks, and SLP treatment plan and POC.  Pt will benefit from continued reinforcement.     Goals:   Multidisciplinary Problems       SLP Goals          Problem: SLP    Goal Priority Disciplines Outcome   SLP Goal     SLP    Description: Speech Language Pathology Goals  Updated goals expected to be met 10/29:  1. Pt will participate in ongoing speech/language/cognitive evaluation.  2.  Assess writing skills.    3.  [place] to month, year and place.   4. Pt will complete moderate level convergent categorization tasks with 70% accuracy  5. Pt will list an average of 9 items in a category in one minute given min cues.   6. Pt will complete simple left neglect tasks with 70% accuracy given mod cues.                 Goals expected to be met by 10/14:  1. Pt will participate in Modified Barium Swallow Study to r/o aspiration and determine safest oral diet. Goal met  2. Pt will participate in speech/language/cognitive evaluation to establish further tx plan. Goal met/ongoing  3.  Assess functional reading and writing skills.  Goal met for reading abilities  4.  [place] to month, year and place. ongoing  5.  Respond to simple categorization tasks with 75% accuracy. Goal met for concrete convergent  categorization tasks.                                 Plan:     Patient to be seen:  4 x/week   Plan of Care expires:  11/06/24  Plan of Care reviewed with:  patient, daughter   SLP Follow-Up:  Yes       Discharge recommendations:  High Intensity Therapy     Time Tracking:     SLP Treatment Date:   10/22/24  Speech Start Time:  1419  Speech Stop Time:  1441     Speech Total Time (min):  22 min    Billable Minutes: Speech Therapy Individual 7, Treatment Swallowing Dysfunction 7, and Self Care/Home Management Training 8    10/22/2024

## 2024-10-22 NOTE — PT/OT/SLP PROGRESS
Physical Therapy Co-Treatment    Patient Name: Tammy Multani   MRN: [id number]    Co-treatment performed for this visit due to patient need for two skilled therapists to ensure patient and staff safety and to accommodate for patient activity tolerance/pain management   Recommendations:     Discharge Recommendations: High Intensity Therapy  Discharge Equipment Recommendations: hospital bed, lift device, wheelchair  Barriers to Discharge: Increased level of assist  Safest Mobility Level with Nursing: Bed Level ROM - Pt only safe for OOB mobility with therapy staff at this time    Assessment:     Tammy Multani is a 64 y.o. female admitted with a medical diagnosis of Acute arterial ischemic stroke, multifocal, multiple vascular territories. She presents with the following impairments/functional limitations: weakness, impaired endurance, impaired self care skills, impaired functional mobility, gait instability, impaired balance, pain, decreased safety awareness, decreased lower extremity function, decreased upper extremity function, impaired fine motor, impaired coordination, impaired cognition, impaired sensation, visual deficits, abnormal tone, decreased ROM. Pt presenting with HOB elevated and agreeable to completing therapy session. Pt verbalizing significant fatigue throughout treatment with frequent requests to return supine. Focus of treatment on sitting tolerance, increased L sided attention, and postural control while seated at EOB. Pt continues to require increased assistance to complete all visualized functional mobility and remains appropriate for high intensity therapy following discharge once medically stable. Pt would continue to benefit from skilled acute PT in order to address current deficits and progress functional mobility.     Rehab Prognosis: Good; patient continues to benefit from acute skilled PT services to address these deficits and reach maximum level of function.  Recent Surgery: * No  "surgery found *      Plan:     During this hospitalization, patient to be seen 4 x/week to address the identified rehab impairments via gait training, therapeutic activities, therapeutic exercises, neuromuscular re-education and progress toward the following goals:    Plan of Care Expires:  11/07/24    Subjective     Chief Complaint: Fatigue  Patient/Family Comments/Goals: "I need to lay down y'all. I'm tired."  Pain/Comfort:  Pain Rating 1: 0/10    Objective:     Communicated with RN prior to session. Patient found HOB elevated with telemetry, SCD, bed alarm, PureWick upon PT entry to room.     General Precautions: Standard, fall, aspiration  Orthopedic Precautions: N/A  Braces: N/A    Functional Mobility:  Bed Mobility:  Verbal cues for sequencing and technique  Rolling Left:  moderate assistance  Rolling Right: total assistance  Scooting: maximal assistance  Supine to Sit: total assistance of 2 persons for LE management and trunk management  Sit to Supine: total assistance of 2 persons for LE management and trunk management  Balance:   Static Sitting: Poor, able to maintain for 10 minute(s) with maximal assistance  Verbal and tactile cues provided for upright posture, increased cervical extension, maintenance of midline orientation, anterior trunk lean, core activation, command following, and improved used of BUE for support.  Dynamic Sitting: Poor: Patient unable to accept challenge or move without loss of balance, total assistance  Pt completed 1x5 lateral lean onto left elbow while seated at EOB to increase joint proprioception and weightbearing with emphasis on tricep activation  Pt completed several visual tasks while seated at EOB to improve crossing midline via self tactile stimulation  Pt completed 3x15s trunk extensor stretch while seated at EOB     AM-PAC 6 CLICK MOBILITY  Turning over in bed (including adjusting bedclothes, sheets and blankets)?: 2  Sitting down on and standing up from a chair with " arms (e.g., wheelchair, bedside commode, etc.): 1  Moving from lying on back to sitting on the side of the bed?: 1  Moving to and from a bed to a chair (including a wheelchair)?: 1  Need to walk in hospital room?: 1  Climbing 3-5 steps with a railing?: 1  Basic Mobility Total Score: 7     Therapeutic Activities and Exercises:  Patient educated on role of acute care PT and PT POC, safety while in hospital including calling nurse for mobility, and call light usage  Pt educated on importance of maximal participation in therapy session in order to reduce negative effects of prolonged sedentary positioning.   Answered all questions within PT scope of practice and addressed functional mobility concerns.    Patient left HOB elevated with all lines intact, call button in reach, RN notified, bed alarm on, and daughter present.    GOALS:   Multidisciplinary Problems       Physical Therapy Goals          Problem: Physical Therapy    Goal Priority Disciplines Outcome Interventions   Physical Therapy Goal     PT, PT/OT Progressing    Description: Goals to be met by: 2024     Patient will increase functional independence with mobility by performin. Supine to sit with Moderate Assistance  2. Sit to supine with Moderate Assistance  3. Bed to chair transfer with Maximum Assistance using LRAD  4. Sitting at edge of bed x5 minutes with Stand-by Assistance  5. Lower extremity exercise program x15 reps per handout, with independence    Hospital Bed - Patient requires a hospital bed for positioning of the body in ways that are not feasible with an ordinary bed. The patient requires special positioning for pain relief, limited mobility, and/or being unable to independently make changes in body position without the use of a hospital bed. Pillows and wedges will not be adequate for resolving these positional issues.     Wheelchair - Patient has a mobility limitation that significantly impairs their ability to participate in one  or more mobility related activities of daily living in customary locations in the home. The mobility limitation cannot be sufficiently resolved by the use of a cane or walker. The use of a manual wheelchair will greatly improve the patient's ability to participate in MRADLs. The patient will use the wheelchair on a regular basis at home. They have expressed their willingness to use a manual wheelchair in the home, and have a caregiver who is available and willing to assist with the wheelchair if needed.                       Time Tracking:     PT Received On: 10/22/24  PT Start Time: 1449     PT Stop Time: 1507  PT Total Time (min): 18 min     Billable Minutes: Therapeutic Activity 18      Treatment Type: Treatment  PT/PTA: PT     Number of PTA visits since last PT visit: 0     10/22/2024

## 2024-10-22 NOTE — SUBJECTIVE & OBJECTIVE
Neurologic Chief Complaint: Infarcts in multiple vascular territories    Subjective:     Interval History: Patient is seen for follow-up neurological assessment and treatment recommendations: See Hospital Course    HPI, Past Medical, Family, and Social History remains the same as documented in the initial encounter.     Review of Systems   Constitutional:  Negative for fever.   HENT:  Positive for trouble swallowing.    Respiratory:  Positive for cough. Negative for shortness of breath.    Gastrointestinal:  Negative for abdominal pain, nausea and vomiting.   Musculoskeletal:         LUE swelling   Neurological:  Positive for facial asymmetry, weakness and numbness.   All other systems reviewed and are negative.    Scheduled Meds:   apixaban  5 mg Oral BID    atorvastatin  40 mg Oral Daily    bisacodyL  10 mg Rectal BID    carvediloL  12.5 mg Oral BID    famotidine  20 mg Oral Daily    insulin aspart U-100  3 Units Subcutaneous TIDWM    polyethylene glycol  17 g Oral BID     Continuous Infusions:      PRN Meds:  Current Facility-Administered Medications:     acetaminophen, 650 mg, Per NG tube, Q8H PRN    dextrose 10%, 12.5 g, Intravenous, PRN    dextrose 10%, 25 g, Intravenous, PRN    glucagon (human recombinant), 1 mg, Intramuscular, PRN    hydrALAZINE, 10 mg, Intravenous, Q6H PRN    insulin aspart U-100, 0-10 Units, Subcutaneous, QID (AC + HS) PRN    iohexol, 15 mL, Oral, PRN    sodium chloride 0.9%, 500 mL, Intravenous, PRN    sodium chloride 0.9%, 10 mL, Intravenous, PRN    Flushing PICC/Midline Protocol, , , Until Discontinued **AND** sodium chloride 0.9%, 10 mL, Intravenous, Q12H PRN    Objective:     Vital Signs (Most Recent):  Temp: 98 °F (36.7 °C) (10/22/24 1100)  Pulse: 65 (10/22/24 1157)  Resp: 18 (10/22/24 1100)  BP: (!) 157/70 (10/22/24 1100)  SpO2: 100 % (10/22/24 1100)  BP Location: Left arm    Vital Signs Range (Last 24H):  Temp:  [97.3 °F (36.3 °C)-98.1 °F (36.7 °C)]   Pulse:  [53-72]   Resp:   [16-18]   BP: (131-159)/(64-74)   SpO2:  [96 %-100 %]   BP Location: Left arm       Physical Exam  Vitals and nursing note reviewed.   Constitutional:       General: She is awake.      Appearance: She is ill-appearing.   Eyes:      General:         Right eye: No discharge.         Left eye: No discharge.      Extraocular Movements: Extraocular movements intact.      Conjunctiva/sclera: Conjunctivae normal.   Cardiovascular:      Rate and Rhythm: Normal rate.   Pulmonary:      Effort: Pulmonary effort is normal. No respiratory distress.   Abdominal:      General: There is no distension.   Musculoskeletal:      Comments: LUE swelling   Skin:     General: Skin is warm and dry.          Neurological:      Mental Status: She is disoriented.      Cranial Nerves: Facial asymmetry present.      Sensory: Sensory deficit present.      Motor: Weakness present.            Neurological Exam:   LOC: alert  Language: No aphasia  Articulation: Dysarthria    Laboratory:  CMP:   Recent Labs   Lab 10/22/24  0452   CALCIUM 8.1*   ALBUMIN 2.0*   PROT 5.1*      K 4.2   CO2 20*   *   BUN 10   CREATININE 0.6   ALKPHOS 69   ALT 31   AST 38   BILITOT 0.4     CBC:   Recent Labs   Lab 10/22/24  0452   WBC 9.40   RBC 3.51*   HGB 11.7*   HCT 34.4*      MCV 98   MCH 33.3*   MCHC 34.0       Diagnostic Results     Brain and Vessel Imaging   MRI/MRA Brain Ischemic Protocol: 10/6/24  Impression: Acute infarcts involving the right anterior circulation and posterior circulation as detailed above without evidence of intracranial hemorrhage or mass effect.  Embolic disease to be considered.     Chronic left parietal infarct. MRA demonstrates limited visualization of distal right KIARA branches in keeping with the territory of the acute infarct.  There is also narrowing of at least one proximal left M2 branch although no acute infarct in that territory.          Echo: 10/7/24  Cardiac Imaging     Left Ventricle: The left ventricle is  normal in size. Normal wall thickness. There is concentric remodeling. Normal wall motion. There is hyperdynamic systolic function with a visually estimated ejection fraction of greater than 70%. There is normal diastolic function.    Right Ventricle: Normal right ventricular cavity size. Wall thickness is normal. Systolic function is normal.    Left Atrium: Bubble study is likely negative, but full opacification of the RA is not well seen on these images which decreases sensitivity.    IVC/SVC: Normal venous pressure at 3 mmHg.

## 2024-10-22 NOTE — ASSESSMENT & PLAN NOTE
Resolved      Patient was seen by general surgery for some episodes of nausea and emesis and some constipation. KUB was concerning for SBO vs. Ileus. Follow up CT AP did not show evidence of mechanical obstruction, was more indicative of Ileus. XR gastrogaffin challenge ordered by general surgery.     Plan:   - Was NPO. Approved to advance diet as tolerated by Gen Surg on 10/19. NGT d/c'd. Patient was started on a Clear liquid diet and tolerated it well. Advanced to previous diet recommendation of puree, nectar thick today (10/20). SLP to re-eval 10/21  - monitor electrolytes daily, correct as needed   - Eliquis switched to heparin as it might not have been fully absorbed considering Ileus. Resumed Eliquis 10/19.    - scheduled suppository regimen, f/u on effectiveness and escalate prn  - general surgery following, appreciate recs

## 2024-10-22 NOTE — PLAN OF CARE
Problem: Stroke, Ischemic (Includes Transient Ischemic Attack)  Goal: Optimal Cerebral Tissue Perfusion  Outcome: Progressing  Intervention: Protect and Optimize Cerebral Perfusion  Flowsheets (Taken 10/22/2024 7702)  Sensory Stimulation Regulation: care clustered  Cerebral Perfusion Promotion: blood pressure monitored     VSS. Bed in lowest position, side rails x 3, and call light in use.

## 2024-10-22 NOTE — PROGRESS NOTES
"Zackary Long - Neurosurgery ([facility])  Vascular Neurology  Comprehensive Stroke Center  Progress Note    Assessment/Plan:     * Acute arterial ischemic stroke, multifocal, multiple vascular territories  63 y/o F transferred from [facility] for higher level of care secondary to acute KIARA stroke on CTA.  Briefly,the patient presented to the OSH overnight for slurred speech started at 1:30 a.m. in the morning.  She has a history of his stroke with left-sided deficits but is normally able to transfer and do a little walking. This morning, she had complete hemiplegia of the left side. Telestroke consult performed, concerning for LVO.  NIH 13. CTA Stroke MP performed and  was read as "acute R KIARA infarct". No TNK due to AC use. Patient accepted to ED as transfer for emergent vascular neurology evaluation. No endovascular intervention. History of atrial fibrillation per discussion with daughter. Was started on Eliquis on 10/6, currently on heparin drip given likely lack of absorption through NG tube in setting of Ileus. Given history of Afib, etiology likely embolic. Though not apparent on MRA, if R KIARA occlusion was proximal (supported by MRI) could account for R MCA-like picture. Transient episodes of altered awareness: EEG negative, and no change in frequency observed by family w/Keppra so have stopped this medicine.     NAEON. Neuro exam remains stable. Patient with audible upper respiratory sounds and cough. CPT ordered. Patient noted to be increasingly acidotic on labs. IVFs changed from NS to LR. Patient awaiting placement.    -Eliquis 5 mg BID   -Atorvastatin 40 mg daily  -SBP<180  -PT/OT-Current recs for high intensity therapy  -Diet advanced to clear liquids 10/19    Left upper extremity swelling  -Patient noted to have LUE swelling and discomfort this am (10/20/2024).  -LUE US pending  -LUE elevated    Hypernatremia  Plan:   - Initially given D5W to correct, labs to monitor rate of correction   - Was on " NS w/KCl but becoming acidotic on labs.  -Na remains 148 this am  -IVFs changed to LR    Ileus  Resolved      Patient was seen by general surgery for some episodes of nausea and emesis and some constipation. KUB was concerning for SBO vs. Ileus. Follow up CT AP did not show evidence of mechanical obstruction, was more indicative of Ileus. XR gastrogaffin challenge ordered by general surgery.     Plan:   - Was NPO. Approved to advance diet as tolerated by Gen Surg on 10/19. NGT d/c'd. Patient was started on a Clear liquid diet and tolerated it well. Advanced to previous diet recommendation of puree, nectar thick today (10/20). SLP to re-eval 10/21  - monitor electrolytes daily, correct as needed   - Eliquis switched to heparin as it might not have been fully absorbed considering Ileus. Resumed Eliquis 10/19.    - scheduled suppository regimen, f/u on effectiveness and escalate prn  - general surgery following, appreciate recs     Acute focal neurological deficit  Episodes of transient altered mental status observed by daughter, sometimes with a brief left gaze. Given location of KIARA stroke, considered seizure. EEG done on 10/11, negative.     Plan:   - no longer on keppra regimen     KAILYN (acute kidney injury)  RESOLVED  Cr was initially 1.6, up from a baseline of 1. Likely pre-renal KAILYN, due to decreased PO intake. Resolved with fluids.     Plan:   - Continue to monitor CMP.   -SCr has been stable since 10/14/24    Type 2 diabetes mellitus, with long-term current use of insulin  History of. Takes Levemir 35 units QHS at home. A1C 10%.    -goal 140-180 while admitted  -SSI prn   -continue to monitor glucose  -Endocrine consulted for A1c 10%; following, appreciate recs   -24° glucose 173-211        dextrose 10% bolus 125 mL 125 mL, 12.5 g, Intravenous, PRN    dextrose 10% bolus 250 mL 250 mL, 25 g, Intravenous, PRN    glucagon (human recombinant) injection 1 mg, 1 mg, Intramuscular, PRN    insulin aspart U-100 pen 0-10  Units, 0-10 Units, Subcutaneous, QID (AC + HS) PRN    Atrial fibrillation  Takes Eliquis 5mg BID and Coreg 6.25mg BID at home.  Initially Eliquis held given size of stroke; ASA 81mg QD in the interim. Eliquis was restarted on 10/10.       Plan:   -continue at home coreg regimen   -eliquis switched to heparin drip due to issues described in ileus section of the assessment and plan.   -Heparin gtt turned off 10/19. Eliquis resumed on 10/19.  -24° HR 63-78    Opioid use  History of. Prescribed oxycodone 15mg at home. Family concerned that medication is being diverted by family member, unclear how often patient takes. Reportedly is prescribed for neck pain.     Plan:   - holding oxycodone due to ileus    Left hemiplegia  See stroke    Plan:   - continue PT/OT/SLP   -Current recs for high intensity therapy    HTN (hypertension)  Home medication regimen as below  -lisinopril 40mg QD  -amlodipine 10mg QD    Plan:   -lisinopril and amlodipine were initially held in the setting of KAILYN. Was started on coreg in the context of Afib.   -if patient's BP still elevated, consider adding back amlodipine and lisinopril   -Goal SBP<180 for now  24° -182      carvediloL tablet 12.5 mg, Oral, BID    hydrALAZINE injection 10 mg, Intravenous, Q6H PRN         10/7: gómez SINCLAIR exam stable, echo today, NPO aside from meds per speech, barium study tmrw  10/08/2024 NAYOLANDA, stable neuro exam, went for the Br study, and was started on a diet. Endocrine consulted for the A1c 10%, pending recs.  10/09/2024 RODERICKEON, Stable neuro exam, will restart Lisinopril 20 mg (half of her dome dose). Seen by endocrine, with adjustment of her insulin doses.  10/10/2024 DOTTIE, no change in her neuro exam, will restart Eliquis today, BP is better controlled.  10/11/2024 NAEON, stable neuro exam, still decreased PO intake. Patient gets some episodes of in attention; EEG was ordered. Endocrine adjusted her insulins. Accepted at [facility] in [place].    10/12/2024 NAEON, waiting on EEG interpretation (contacted epilepsy), started Keppra 500mg BID and will see if reduces transient alterations in awareness, neurologic examination stable  10/13/2024 NAEON, EEG w/encephalopathy (no seizure or discharges), stop Keppra, SBP ~150 to 160 so increased Coreg to 6.25mg BID  10/14/2024 General surgery was consulted overnight for constipation, episodes of nausea and emesis. KUB concerning for SBO vs. Ileus. F/u CT AP was more indicative of ileus, no evidence of mechanical obstruction was observed. Patient is NPO, can get some meds via NGT. Eliquis switched to heparin drip.   10/15/2024 Patient has still not had an actual bowel movement. Is distended, has some abdominal pain. General surgery following, is getting XR gastrogaffin challenge, will f/u results.   10/16/2024 Still monitoring patient for bowel movements in the setting of Ileus. General surgery is following. Mild hyperkalemia and hypernatremia this morning; will continue to monitor.   10/17/2024 Patient still has not had a bowel movement. Ileus not resolved on repeat imaging. Suppository ordered earlier this morning, will monitor to see if improvement with suppository.   10/18/2024 Patient still had not had a solid bowel movement due to her ileus, had a number of watery movements yesterday but nothing substantive. Suppositories now scheduled. General surgery following.   10/19/2024 Patient stable overnight. LBM yesterday. Gen Surg ok with advancing diet to clear liquids. Heparin gtt stopped, started on Eliquis. Awaiting placement.  10/20/2024 NAEON. Neuro exam remains stable. Patient with audible upper respiratory sounds and cough. CPT ordered. Patient noted to be increasingly acidotic on labs. IVFs changed from NS to LR. Patient awaiting placement.  10/21/24 NAEON. Neuro exam remains stable. Not oriented to time. Family says patient's exam stable compared to yesterday. SLP to re-evaluate, on diet level 4. Placement  pending  10/22/24 RODERICKEON. Neuro exam remains stable. Not oriented to time. Family says patient's exam stable compared to yesterday. SLP to re-evaluate, on diet level 4. Placement pending    STROKE DOCUMENTATION   Acute Stroke Times   Last Known Normal Date:  (OSH)  Stroke Team Called Date: 10/06/24  Stroke Team Called Time:  (At OSH)  Stroke Team Arrival Date: 10/06/24  Stroke Team Arrival Time: 0608  CT Interpretation Time:  (CTA done around 4:20am per documents that came to [facility] with patient)  Thrombolytic Therapy Recommended: No  Thrombectomy Recommended:  (Pending MRI Brain)    NIH Scale:  1a. Level of Consciousness: 0-->Alert, keenly responsive  1b. LOC Questions: 1-->Answers one question correctly  1c. LOC Commands: 0-->Performs both tasks correctly  2. Best Gaze: 0-->Normal  3. Visual: 1-->Partial hemianopia  4. Facial Palsy: 1-->Minor paralysis (flattened nasolabial fold, asymmetry on smiling)  5a. Motor Arm, Left: 4-->No movement  5b. Motor Arm, Right: 0-->No drift, limb holds 90 (or 45) degrees for full 10 secs  6a. Motor Leg, Left: 4-->No movement  6b. Motor Leg, Right: 0-->No drift, leg holds 30 degree position for full 5 secs  7. Limb Ataxia: 0-->Absent  8. Sensory: 1-->Mild-to-moderate sensory loss, patient feels pinprick is less sharp or is dull on the affected side, or there is a loss of superficial pain with pinprick, but patient is aware of being touched  9. Best Language: 1-->Mild-to-moderate aphasia, some obvious loss of fluency or facility of comprehension, without significant limitation on ideas expressed or form of expression. Reduction of speech and/or comprehension, however, makes conversation. . . (see row details)  10. Dysarthria: 1-->Mild-to-moderate dysarthria, patient slurs at least some words and, at worst, can be understood with some difficulty  11. Extinction and Inattention (formerly Neglect): 0-->No abnormality  Total (NIH Stroke Scale): 14       Modified Sebree Score: 3  [place]  Coma Scale:    ABCD2 Score:    [id number] Score:   HAS -BLED Score:   ICH Score:   Hunt & Shane Classification:      Hemorrhagic change of an Ischemic Stroke: Does this patient have an ischemic stroke with hemorrhagic changes? No     Neurologic Chief Complaint: Infarcts in multiple vascular territories    Subjective:     Interval History: Patient is seen for follow-up neurological assessment and treatment recommendations: See Hospital Course    HPI, Past Medical, Family, and Social History remains the same as documented in the initial encounter.     Review of Systems   Constitutional:  Negative for fever.   HENT:  Positive for trouble swallowing.    Respiratory:  Positive for cough. Negative for shortness of breath.    Gastrointestinal:  Negative for abdominal pain, nausea and vomiting.   Musculoskeletal:         LUE swelling   Neurological:  Positive for facial asymmetry, weakness and numbness.   All other systems reviewed and are negative.    Scheduled Meds:   apixaban  5 mg Oral BID    atorvastatin  40 mg Oral Daily    bisacodyL  10 mg Rectal BID    carvediloL  12.5 mg Oral BID    famotidine  20 mg Oral Daily    insulin aspart U-100  3 Units Subcutaneous TIDWM    polyethylene glycol  17 g Oral BID     Continuous Infusions:      PRN Meds:  Current Facility-Administered Medications:     acetaminophen, 650 mg, Per NG tube, Q8H PRN    dextrose 10%, 12.5 g, Intravenous, PRN    dextrose 10%, 25 g, Intravenous, PRN    glucagon (human recombinant), 1 mg, Intramuscular, PRN    hydrALAZINE, 10 mg, Intravenous, Q6H PRN    insulin aspart U-100, 0-10 Units, Subcutaneous, QID (AC + HS) PRN    iohexol, 15 mL, Oral, PRN    sodium chloride 0.9%, 500 mL, Intravenous, PRN    sodium chloride 0.9%, 10 mL, Intravenous, PRN    Flushing PICC/Midline Protocol, , , Until Discontinued **AND** sodium chloride 0.9%, 10 mL, Intravenous, Q12H PRN    Objective:     Vital Signs (Most Recent):  Temp: 98 °F (36.7 °C) (10/22/24 1100)  Pulse: 65  (10/22/24 1157)  Resp: 18 (10/22/24 1100)  BP: (!) 157/70 (10/22/24 1100)  SpO2: 100 % (10/22/24 1100)  BP Location: Left arm    Vital Signs Range (Last 24H):  Temp:  [97.3 °F (36.3 °C)-98.1 °F (36.7 °C)]   Pulse:  [53-72]   Resp:  [16-18]   BP: (131-159)/(64-74)   SpO2:  [96 %-100 %]   BP Location: Left arm       Physical Exam  Vitals and nursing note reviewed.   Constitutional:       General: She is awake.      Appearance: She is ill-appearing.   Eyes:      General:         Right eye: No discharge.         Left eye: No discharge.      Extraocular Movements: Extraocular movements intact.      Conjunctiva/sclera: Conjunctivae normal.   Cardiovascular:      Rate and Rhythm: Normal rate.   Pulmonary:      Effort: Pulmonary effort is normal. No respiratory distress.   Abdominal:      General: There is no distension.   Musculoskeletal:      Comments: LUE swelling   Skin:     General: Skin is warm and dry.          Neurological:      Mental Status: She is disoriented.      Cranial Nerves: Facial asymmetry present.      Sensory: Sensory deficit present.      Motor: Weakness present.            Neurological Exam:   LOC: alert  Language: No aphasia  Articulation: Dysarthria    Laboratory:  CMP:   Recent Labs   Lab 10/22/24  0452   CALCIUM 8.1*   ALBUMIN 2.0*   PROT 5.1*      K 4.2   CO2 20*   *   BUN 10   CREATININE 0.6   ALKPHOS 69   ALT 31   AST 38   BILITOT 0.4     CBC:   Recent Labs   Lab 10/22/24  0452   WBC 9.40   RBC 3.51*   HGB 11.7*   HCT 34.4*      MCV 98   MCH 33.3*   MCHC 34.0       Diagnostic Results     Brain and Vessel Imaging   MRI/MRA Brain Ischemic Protocol: 10/6/24  Impression: Acute infarcts involving the right anterior circulation and posterior circulation as detailed above without evidence of intracranial hemorrhage or mass effect.  Embolic disease to be considered.     Chronic left parietal infarct. MRA demonstrates limited visualization of distal right KIARA branches in keeping with  the territory of the acute infarct.  There is also narrowing of at least one proximal left M2 branch although no acute infarct in that territory.          Echo: 10/7/24  Cardiac Imaging     Left Ventricle: The left ventricle is normal in size. Normal wall thickness. There is concentric remodeling. Normal wall motion. There is hyperdynamic systolic function with a visually estimated ejection fraction of greater than 70%. There is normal diastolic function.    Right Ventricle: Normal right ventricular cavity size. Wall thickness is normal. Systolic function is normal.    Left Atrium: Bubble study is likely negative, but full opacification of the RA is not well seen on these images which decreases sensitivity.    IVC/SVC: Normal venous pressure at 3 mmHg.    Vikas Chaudhari DO  Comprehensive Stroke Center  Department of Vascular Neurology   [facility])

## 2024-10-22 NOTE — PLAN OF CARE
Problem: Skin Injury Risk Increased  Goal: Skin Health and Integrity  Outcome: Progressing     Problem: Stroke, Ischemic (Includes Transient Ischemic Attack)  Goal: Optimal Coping  Outcome: Progressing  Goal: Effective Bowel Elimination  Outcome: Progressing  Goal: Optimal Cerebral Tissue Perfusion  Outcome: Progressing  Goal: Optimal Cognitive Function  Outcome: Progressing  Goal: Improved Communication Skills  Outcome: Progressing  Goal: Optimal Functional Ability  Outcome: Progressing  Goal: Effective Oxygenation and Ventilation  Outcome: Progressing  Goal: Improved Sensorimotor Function  Outcome: Progressing  Goal: Safe and Effective Swallow  Outcome: Progressing  Goal: Effective Urinary Elimination  Outcome: Progressing     Problem: Adult Inpatient Plan of Care  Goal: Plan of Care Review  Outcome: Progressing  Goal: Patient-Specific Goal (Individualized)  Description: Pt will maintain sbp below 220  Outcome: Progressing  Goal: Absence of Hospital-Acquired Illness or Injury  Outcome: Progressing  Goal: Optimal Comfort and Wellbeing  Outcome: Progressing  Goal: Readiness for Transition of Care  Outcome: Progressing

## 2024-10-23 LAB
ALBUMIN SERPL BCP-MCNC: 2.1 G/DL (ref 3.5–5.2)
ALP SERPL-CCNC: 70 U/L (ref 40–150)
ALT SERPL W/O P-5'-P-CCNC: 34 U/L (ref 10–44)
ANION GAP SERPL CALC-SCNC: 11 MMOL/L (ref 8–16)
ANION GAP SERPL CALC-SCNC: 9 MMOL/L (ref 8–16)
AST SERPL-CCNC: 31 U/L (ref 10–40)
BASOPHILS # BLD AUTO: 0.02 K/UL (ref 0–0.2)
BASOPHILS NFR BLD: 0.2 % (ref 0–1.9)
BILIRUB SERPL-MCNC: 0.4 MG/DL (ref 0.1–1)
BUN SERPL-MCNC: 11 MG/DL (ref 8–23)
BUN SERPL-MCNC: 6 MG/DL (ref 8–23)
CALCIUM SERPL-MCNC: 8.1 MG/DL (ref 8.7–10.5)
CALCIUM SERPL-MCNC: 8.3 MG/DL (ref 8.7–10.5)
CHLORIDE SERPL-SCNC: 107 MMOL/L (ref 95–110)
CHLORIDE SERPL-SCNC: 109 MMOL/L (ref 95–110)
CO2 SERPL-SCNC: 24 MMOL/L (ref 23–29)
CO2 SERPL-SCNC: 24 MMOL/L (ref 23–29)
CREAT SERPL-MCNC: 0.6 MG/DL (ref 0.5–1.4)
CREAT SERPL-MCNC: 0.6 MG/DL (ref 0.5–1.4)
DIFFERENTIAL METHOD BLD: ABNORMAL
EOSINOPHIL # BLD AUTO: 0.2 K/UL (ref 0–0.5)
EOSINOPHIL NFR BLD: 1.6 % (ref 0–8)
ERYTHROCYTE [DISTWIDTH] IN BLOOD BY AUTOMATED COUNT: 12.7 % (ref 11.5–14.5)
EST. GFR  (NO RACE VARIABLE): >60 ML/MIN/1.73 M^2
EST. GFR  (NO RACE VARIABLE): >60 ML/MIN/1.73 M^2
GLUCOSE SERPL-MCNC: 143 MG/DL (ref 70–110)
GLUCOSE SERPL-MCNC: 166 MG/DL (ref 70–110)
HCT VFR BLD AUTO: 34.2 % (ref 37–48.5)
HGB BLD-MCNC: 12 G/DL (ref 12–16)
IMM GRANULOCYTES # BLD AUTO: 0.1 K/UL (ref 0–0.04)
IMM GRANULOCYTES NFR BLD AUTO: 1 % (ref 0–0.5)
LYMPHOCYTES # BLD AUTO: 1.6 K/UL (ref 1–4.8)
LYMPHOCYTES NFR BLD: 16.4 % (ref 18–48)
MAGNESIUM SERPL-MCNC: 1.8 MG/DL (ref 1.6–2.6)
MCH RBC QN AUTO: 33.2 PG (ref 27–31)
MCHC RBC AUTO-ENTMCNC: 35.1 G/DL (ref 32–36)
MCV RBC AUTO: 95 FL (ref 82–98)
MONOCYTES # BLD AUTO: 0.5 K/UL (ref 0.3–1)
MONOCYTES NFR BLD: 4.5 % (ref 4–15)
NEUTROPHILS # BLD AUTO: 7.7 K/UL (ref 1.8–7.7)
NEUTROPHILS NFR BLD: 76.3 % (ref 38–73)
NRBC BLD-RTO: 0 /100 WBC
PHOSPHATE SERPL-MCNC: 2.4 MG/DL (ref 2.7–4.5)
PHOSPHATE SERPL-MCNC: 2.6 MG/DL (ref 2.7–4.5)
PLATELET # BLD AUTO: 193 K/UL (ref 150–450)
PMV BLD AUTO: 11.4 FL (ref 9.2–12.9)
POCT GLUCOSE: 160 MG/DL (ref 70–110)
POCT GLUCOSE: 220 MG/DL (ref 70–110)
POTASSIUM SERPL-SCNC: 2.9 MMOL/L (ref 3.5–5.1)
POTASSIUM SERPL-SCNC: 3.2 MMOL/L (ref 3.5–5.1)
POTASSIUM SERPL-SCNC: 3.7 MMOL/L (ref 3.5–5.1)
PROT SERPL-MCNC: 5.3 G/DL (ref 6–8.4)
RBC # BLD AUTO: 3.61 M/UL (ref 4–5.4)
SARS-COV-2 RNA RESP QL NAA+PROBE: NOT DETECTED
SODIUM SERPL-SCNC: 142 MMOL/L (ref 136–145)
SODIUM SERPL-SCNC: 142 MMOL/L (ref 136–145)
WBC # BLD AUTO: 10.02 K/UL (ref 3.9–12.7)

## 2024-10-23 PROCEDURE — 36415 COLL VENOUS BLD VENIPUNCTURE: CPT | Mod: XB

## 2024-10-23 PROCEDURE — 63600175 PHARM REV CODE 636 W HCPCS: Mod: UD

## 2024-10-23 PROCEDURE — 84100 ASSAY OF PHOSPHORUS: CPT | Performed by: PHYSICIAN ASSISTANT

## 2024-10-23 PROCEDURE — 36415 COLL VENOUS BLD VENIPUNCTURE: CPT

## 2024-10-23 PROCEDURE — 25000003 PHARM REV CODE 250: Performed by: STUDENT IN AN ORGANIZED HEALTH CARE EDUCATION/TRAINING PROGRAM

## 2024-10-23 PROCEDURE — 84100 ASSAY OF PHOSPHORUS: CPT | Mod: 91

## 2024-10-23 PROCEDURE — 11000001 HC ACUTE MED/SURG PRIVATE ROOM

## 2024-10-23 PROCEDURE — 83735 ASSAY OF MAGNESIUM: CPT | Performed by: PHYSICIAN ASSISTANT

## 2024-10-23 PROCEDURE — 99233 SBSQ HOSP IP/OBS HIGH 50: CPT | Mod: GC,,, | Performed by: STUDENT IN AN ORGANIZED HEALTH CARE EDUCATION/TRAINING PROGRAM

## 2024-10-23 PROCEDURE — 85025 COMPLETE CBC W/AUTO DIFF WBC: CPT | Performed by: PHYSICIAN ASSISTANT

## 2024-10-23 PROCEDURE — 87635 SARS-COV-2 COVID-19 AMP PRB: CPT | Performed by: STUDENT IN AN ORGANIZED HEALTH CARE EDUCATION/TRAINING PROGRAM

## 2024-10-23 PROCEDURE — 97535 SELF CARE MNGMENT TRAINING: CPT

## 2024-10-23 PROCEDURE — 80053 COMPREHEN METABOLIC PANEL: CPT | Performed by: PHYSICIAN ASSISTANT

## 2024-10-23 PROCEDURE — 84132 ASSAY OF SERUM POTASSIUM: CPT

## 2024-10-23 PROCEDURE — 92526 ORAL FUNCTION THERAPY: CPT

## 2024-10-23 PROCEDURE — 25000003 PHARM REV CODE 250

## 2024-10-23 PROCEDURE — 80048 BASIC METABOLIC PNL TOTAL CA: CPT

## 2024-10-23 PROCEDURE — 36415 COLL VENOUS BLD VENIPUNCTURE: CPT | Performed by: PHYSICIAN ASSISTANT

## 2024-10-23 RX ORDER — POTASSIUM CHLORIDE 7.45 MG/ML
10 INJECTION INTRAVENOUS
Status: COMPLETED | OUTPATIENT
Start: 2024-10-23 | End: 2024-10-23

## 2024-10-23 RX ORDER — POLYETHYLENE GLYCOL 3350 17 G/17G
17 POWDER, FOR SOLUTION ORAL 2 TIMES DAILY
Start: 2024-10-23

## 2024-10-23 RX ORDER — ATORVASTATIN CALCIUM 40 MG/1
40 TABLET, FILM COATED ORAL DAILY
Start: 2024-10-24 | End: 2025-10-24

## 2024-10-23 RX ORDER — POTASSIUM CHLORIDE 7.45 MG/ML
10 INJECTION INTRAVENOUS
Status: DISCONTINUED | OUTPATIENT
Start: 2024-10-23 | End: 2024-10-23

## 2024-10-23 RX ORDER — FAMOTIDINE 20 MG/1
20 TABLET, FILM COATED ORAL DAILY
Start: 2024-10-24 | End: 2025-10-24

## 2024-10-23 RX ORDER — ACETAMINOPHEN 650 MG/20.3ML
650 LIQUID ORAL EVERY 8 HOURS PRN
Start: 2024-10-23

## 2024-10-23 RX ORDER — INSULIN ASPART 100 [IU]/ML
3 INJECTION, SOLUTION INTRAVENOUS; SUBCUTANEOUS 3 TIMES DAILY
Start: 2024-10-23 | End: 2025-10-23

## 2024-10-23 RX ORDER — INSULIN ASPART 100 [IU]/ML
0-10 INJECTION, SOLUTION INTRAVENOUS; SUBCUTANEOUS
Start: 2024-10-23 | End: 2025-10-23

## 2024-10-23 RX ORDER — CARVEDILOL 12.5 MG/1
12.5 TABLET ORAL 2 TIMES DAILY
Start: 2024-10-23 | End: 2025-10-23

## 2024-10-23 RX ORDER — BISACODYL 10 MG/1
10 SUPPOSITORY RECTAL DAILY
Start: 2024-10-23

## 2024-10-23 RX ADMIN — CARVEDILOL 12.5 MG: 12.5 TABLET, FILM COATED ORAL at 10:10

## 2024-10-23 RX ADMIN — POTASSIUM BICARBONATE 40 MEQ: 391 TABLET, EFFERVESCENT ORAL at 11:10

## 2024-10-23 RX ADMIN — APIXABAN 5 MG: 5 TABLET, FILM COATED ORAL at 10:10

## 2024-10-23 RX ADMIN — POTASSIUM CHLORIDE 10 MEQ: 7.46 INJECTION, SOLUTION INTRAVENOUS at 05:10

## 2024-10-23 RX ADMIN — DIBASIC SODIUM PHOSPHATE, MONOBASIC POTASSIUM PHOSPHATE AND MONOBASIC SODIUM PHOSPHATE 1 TABLET: 852; 155; 130 TABLET ORAL at 02:10

## 2024-10-23 RX ADMIN — APIXABAN 5 MG: 5 TABLET, FILM COATED ORAL at 08:10

## 2024-10-23 RX ADMIN — DIBASIC SODIUM PHOSPHATE, MONOBASIC POTASSIUM PHOSPHATE AND MONOBASIC SODIUM PHOSPHATE 1 TABLET: 852; 155; 130 TABLET ORAL at 05:10

## 2024-10-23 RX ADMIN — DIBASIC SODIUM PHOSPHATE, MONOBASIC POTASSIUM PHOSPHATE AND MONOBASIC SODIUM PHOSPHATE 1 TABLET: 852; 155; 130 TABLET ORAL at 11:10

## 2024-10-23 RX ADMIN — POTASSIUM BICARBONATE 40 MEQ: 391 TABLET, EFFERVESCENT ORAL at 12:10

## 2024-10-23 RX ADMIN — POTASSIUM CHLORIDE 10 MEQ: 7.46 INJECTION, SOLUTION INTRAVENOUS at 12:10

## 2024-10-23 RX ADMIN — FAMOTIDINE 20 MG: 20 TABLET ORAL at 08:10

## 2024-10-23 RX ADMIN — CARVEDILOL 12.5 MG: 12.5 TABLET, FILM COATED ORAL at 08:10

## 2024-10-23 RX ADMIN — INSULIN ASPART 3 UNITS: 100 INJECTION, SOLUTION INTRAVENOUS; SUBCUTANEOUS at 12:10

## 2024-10-23 RX ADMIN — INSULIN ASPART 3 UNITS: 100 INJECTION, SOLUTION INTRAVENOUS; SUBCUTANEOUS at 05:10

## 2024-10-23 RX ADMIN — POTASSIUM CHLORIDE 10 MEQ: 7.46 INJECTION, SOLUTION INTRAVENOUS at 11:10

## 2024-10-23 RX ADMIN — ATORVASTATIN CALCIUM 40 MG: 40 TABLET, FILM COATED ORAL at 08:10

## 2024-10-23 RX ADMIN — INSULIN ASPART 2 UNITS: 100 INJECTION, SOLUTION INTRAVENOUS; SUBCUTANEOUS at 12:10

## 2024-10-23 RX ADMIN — INSULIN ASPART 3 UNITS: 100 INJECTION, SOLUTION INTRAVENOUS; SUBCUTANEOUS at 08:10

## 2024-10-23 RX ADMIN — POTASSIUM CHLORIDE 10 MEQ: 7.46 INJECTION, SOLUTION INTRAVENOUS at 06:10

## 2024-10-23 RX ADMIN — INSULIN ASPART 4 UNITS: 100 INJECTION, SOLUTION INTRAVENOUS; SUBCUTANEOUS at 08:10

## 2024-10-23 NOTE — PROGRESS NOTES
"Zackary Long - Neurosurgery ([facility])  Adult Nutrition  Progress Note    SUMMARY       Recommendations    Continue pureed diet as tolerated.   Continue Boost GC TID.   RD to monitor and follow up.    Goals: Meet % EEN/EPN by RD follow up.  Nutrition Goal Status: progressing towards goal  Communication of RD Recs: other (comment) (POC)    Assessment and Plan    Nutrition Problem  Inadequate oral intake     Related to (etiology):   Inability to consume sufficient nutrients     Signs and Symptoms (as evidenced by):   NPO      Interventions/Recommendations (treatment strategy):  Collab of nutrition care w/ other providers     Nutrition Diagnosis Status:   Resolved           Reason for Assessment    Reason For Assessment: RD follow-up  Diagnosis:  (Acute arterial ischemic stroke, multifocal, multiple vascular territories)  General Information Comments: Pt currently on a pureed diet, consuming an average of 50-75% since the last f/u. No nausea/vomiting or GI symptoms noted. Following.  Nutrition Discharge Planning: Pending medical course    Nutrition Risk Screen    Nutrition Risk Screen: no indicators present    Nutrition/Diet History    Spiritual, Cultural Beliefs, Latter-day Practices, Values that Affect Care: no  Food Allergies: other (see comments) (PETER)  Factors Affecting Nutritional Intake: difficulty/impaired swallowing    Anthropometrics    Temp: 97.9 °F (36.6 °C)  Height Method: Stated  Height: 5' 2" (157.5 cm)  Height (inches): 62 in  Weight Method: Bed Scale  Weight: 72.6 kg (160 lb 0.9 oz)  Weight (lb): 160.06 lb  Ideal Body Weight (IBW), Female: 110 lb  % Ideal Body Weight, Female (lb): 145.51 %  BMI (Calculated): 29.3  BMI Grade: 25 - 29.9 - overweight       Lab/Procedures/Meds    Pertinent Labs Reviewed: reviewed  Pertinent Labs Comments: H/H 11.7/34.4, MCH 33.3, Glucose 133, Albumin 2.0  Pertinent Medications Reviewed: reviewed  Pertinent Medications Comments: Atorvastatin, bisacodyl, famotidine, " insulin, polyethylene glycol        Estimated/Assessed Needs    Weight Used For Calorie Calculations: 72.6 kg (160 lb 0.9 oz)  Energy Calorie Requirements (kcal): 1469  Energy Need Method: Holt-St Jeor (MSJ x 1.2 PAL)  Protein Requirements: 73-87 (1.0-1.2 g/kg ABW)  Weight Used For Protein Calculations: 72.6 kg (160 lb 0.9 oz)  Fluid Requirements (mL): 1 ml/kcal or Per MD     RDA Method (mL): 1469         Nutrition Prescription Ordered    Current Diet Order: Pureed diet    Evaluation of Received Nutrient/Fluid Intake    I/O: - 5,052 net I/O  Comments: LBM 10/23  % Intake of Estimated Energy Needs: 50 - 75 %  % Meal Intake: 50 - 75 %    Nutrition Risk    Level of Risk/Frequency of Follow-up:  (1x/week)     Monitor and Evaluation    Food and Nutrient Intake: energy intake, food and beverage intake  Food and Nutrient Adminstration: diet order  Anthropometric Measurements: body mass index, weight change, weight  Biochemical Data, Medical Tests and Procedures: lipid profile, inflammatory profile, glucose/endocrine profile, gastrointestinal profile, electrolyte and renal panel  Nutrition-Focused Physical Findings: overall appearance     Nutrition Follow-Up    RD Follow-up?: Yes    Benson Wilkinson, Registration Eligible, Provisional LDN

## 2024-10-23 NOTE — PROGRESS NOTES
"Zackary Long - Neurosurgery ([facility])  Vascular Neurology  Comprehensive Stroke Center  Progress Note    Assessment/Plan:     * Acute arterial ischemic stroke, multifocal, multiple vascular territories  63 y/o F transferred from [facility] for higher level of care secondary to acute KIARA stroke on CTA.  Briefly,the patient presented to the OSH overnight for slurred speech started at 1:30 a.m. in the morning.  She has a history of his stroke with left-sided deficits but is normally able to transfer and do a little walking. This morning, she had complete hemiplegia of the left side. Telestroke consult performed, concerning for LVO.  NIH 13. CTA Stroke MP performed and  was read as "acute R KIARA infarct". No TNK due to AC use. Patient accepted to ED as transfer for emergent vascular neurology evaluation. No endovascular intervention. History of atrial fibrillation per discussion with daughter. Was started on Eliquis on 10/6, currently on heparin drip given likely lack of absorption through NG tube in setting of Ileus. Given history of Afib, etiology likely embolic. Though not apparent on MRA, if R KIARA occlusion was proximal (supported by MRI) could account for R MCA-like picture. Transient episodes of altered awareness: EEG negative, and no change in frequency observed by family w/Keppra so have stopped this medicine.     NAEON. Neuro exam remains stable. Patient with audible upper respiratory sounds and cough. CPT ordered. Will plan for d/c 10/24  -Eliquis 5 mg BID   -Atorvastatin 40 mg daily  -SBP<180  -PT/OT-Current recs for high intensity therapy  -Diet advanced to clear liquids 10/19    Left upper extremity swelling  -Patient noted to have LUE swelling and discomfort this am (10/20/2024).  -LUE US pending  -LUE elevated    Hypernatremia  Plan:   - Initially given D5W to correct, labs to monitor rate of correction   - Was on NS w/KCl but becoming acidotic on labs.  -Na remains 148 this am  -IVFs changed to " LR    Ileus  Resolved      Patient was seen by general surgery for some episodes of nausea and emesis and some constipation. KUB was concerning for SBO vs. Ileus. Follow up CT AP did not show evidence of mechanical obstruction, was more indicative of Ileus. XR gastrogaffin challenge ordered by general surgery.     Plan:   - Was NPO. Approved to advance diet as tolerated by Gen Surg on 10/19. NGT d/c'd. Patient was started on a Clear liquid diet and tolerated it well. Advanced to previous diet recommendation of puree, nectar thick today (10/20). SLP to re-eval 10/21  - monitor electrolytes daily, correct as needed   - Eliquis switched to heparin as it might not have been fully absorbed considering Ileus. Resumed Eliquis 10/19.    - scheduled suppository regimen, f/u on effectiveness and escalate prn  - general surgery following, appreciate recs     Acute focal neurological deficit  Episodes of transient altered mental status observed by daughter, sometimes with a brief left gaze. Given location of KIARA stroke, considered seizure. EEG done on 10/11, negative.     Plan:   - no longer on keppra regimen     KAILYN (acute kidney injury)  RESOLVED  Cr was initially 1.6, up from a baseline of 1. Likely pre-renal KAILYN, due to decreased PO intake. Resolved with fluids.     Plan:   - Continue to monitor CMP.   -SCr has been stable since 10/14/24    Type 2 diabetes mellitus, with long-term current use of insulin  History of. Takes Levemir 35 units QHS at home. A1C 10%.    -goal 140-180 while admitted  -SSI prn   -continue to monitor glucose  -Endocrine consulted for A1c 10%; following, appreciate recs   -24° glucose 173-211        dextrose 10% bolus 125 mL 125 mL, 12.5 g, Intravenous, PRN    dextrose 10% bolus 250 mL 250 mL, 25 g, Intravenous, PRN    glucagon (human recombinant) injection 1 mg, 1 mg, Intramuscular, PRN    insulin aspart U-100 pen 0-10 Units, 0-10 Units, Subcutaneous, QID (AC + HS) PRN    Atrial fibrillation  Takes  Eliquis 5mg BID and Coreg 6.25mg BID at home.  Initially Eliquis held given size of stroke; ASA 81mg QD in the interim. Eliquis was restarted on 10/10.       Plan:   -continue at home coreg regimen   -eliquis switched to heparin drip due to issues described in ileus section of the assessment and plan.   -Heparin gtt turned off 10/19. Eliquis resumed on 10/19.  -24° HR 63-78    Opioid use  History of. Prescribed oxycodone 15mg at home. Family concerned that medication is being diverted by family member, unclear how often patient takes. Reportedly is prescribed for neck pain.     Plan:   - holding oxycodone due to ileus    Left hemiplegia  See stroke    Plan:   - continue PT/OT/SLP   -Current recs for high intensity therapy    HTN (hypertension)  Home medication regimen as below  -lisinopril 40mg QD  -amlodipine 10mg QD    Plan:   -lisinopril and amlodipine were initially held in the setting of KAILYN. Was started on coreg in the context of Afib.   -if patient's BP still elevated, consider adding back amlodipine and lisinopril   -Goal SBP<180 for now  24° -182      carvediloL tablet 12.5 mg, Oral, BID    hydrALAZINE injection 10 mg, Intravenous, Q6H PRN         10/7: gómez SINCLAIR exam stable, echo today, NPO aside from meds per speech, barium study tmrw  10/08/2024 DOTTIE, stable neuro exam, went for the Br study, and was started on a diet. Endocrine consulted for the A1c 10%, pending recs.  10/09/2024 DOTTIE, Stable neuro exam, will restart Lisinopril 20 mg (half of her dome dose). Seen by endocrine, with adjustment of her insulin doses.  10/10/2024 DOTTIE, no change in her neuro exam, will restart Eliquis today, BP is better controlled.  10/11/2024 DOTTIE, stable neuro exam, still decreased PO intake. Patient gets some episodes of in attention; EEG was ordered. Endocrine adjusted her insulins. Accepted at [facility] in [place].   10/12/2024 DOTTIE, waiting on EEG interpretation (contacted epilepsy), started Keppra  500mg BID and will see if reduces transient alterations in awareness, neurologic examination stable  10/13/2024 NAEON, EEG w/encephalopathy (no seizure or discharges), stop Keppra, SBP ~150 to 160 so increased Coreg to 6.25mg BID  10/14/2024 General surgery was consulted overnight for constipation, episodes of nausea and emesis. KUB concerning for SBO vs. Ileus. F/u CT AP was more indicative of ileus, no evidence of mechanical obstruction was observed. Patient is NPO, can get some meds via NGT. Eliquis switched to heparin drip.   10/15/2024 Patient has still not had an actual bowel movement. Is distended, has some abdominal pain. General surgery following, is getting XR gastrogaffin challenge, will f/u results.   10/16/2024 Still monitoring patient for bowel movements in the setting of Ileus. General surgery is following. Mild hyperkalemia and hypernatremia this morning; will continue to monitor.   10/17/2024 Patient still has not had a bowel movement. Ileus not resolved on repeat imaging. Suppository ordered earlier this morning, will monitor to see if improvement with suppository.   10/18/2024 Patient still had not had a solid bowel movement due to her ileus, had a number of watery movements yesterday but nothing substantive. Suppositories now scheduled. General surgery following.   10/19/2024 Patient stable overnight. LBM yesterday. Gen Surg ok with advancing diet to clear liquids. Heparin gtt stopped, started on Eliquis. Awaiting placement.  10/20/2024 NAEON. Neuro exam remains stable. Patient with audible upper respiratory sounds and cough. CPT ordered. Patient noted to be increasingly acidotic on labs. IVFs changed from NS to LR. Patient awaiting placement.  10/21/24 NAEON. Neuro exam remains stable. Not oriented to time. Family says patient's exam stable compared to yesterday. SLP to re-evaluate, on diet level 4. Placement pending  10/22/24 NAEON. Neuro exam remains stable. Not oriented to time. Family says  patient's exam stable compared to yesterday. SLP to re-evaluate, on diet level 4. Placement pending  10/23/24 NAEON. Neuro exam remains stable/slightly improved mental status. Oriented to time. Accepted for placement but will plan for d/c tomorrow given hypokalemia. Lytes corrected and repeat lab pending.    STROKE DOCUMENTATION   Acute Stroke Times   Last Known Normal Date:  (OSH)  Stroke Team Called Date: 10/06/24  Stroke Team Called Time:  (At OSH)  Stroke Team Arrival Date: 10/06/24  Stroke Team Arrival Time: 0608  CT Interpretation Time:  (CTA done around 4:20am per documents that came to [facility] with patient)  Thrombolytic Therapy Recommended: No  Thrombectomy Recommended:  (Pending MRI Brain)    NIH Scale:  1a. Level of Consciousness: 0-->Alert, keenly responsive  1b. LOC Questions: 1-->Answers one question correctly  1c. LOC Commands: 0-->Performs both tasks correctly  2. Best Gaze: 0-->Normal  3. Visual: 1-->Partial hemianopia  4. Facial Palsy: 1-->Minor paralysis (flattened nasolabial fold, asymmetry on smiling)  5a. Motor Arm, Left: 4-->No movement  5b. Motor Arm, Right: 0-->No drift, limb holds 90 (or 45) degrees for full 10 secs  6a. Motor Leg, Left: 4-->No movement  6b. Motor Leg, Right: 0-->No drift, leg holds 30 degree position for full 5 secs  7. Limb Ataxia: 0-->Absent  8. Sensory: 1-->Mild-to-moderate sensory loss, patient feels pinprick is less sharp or is dull on the affected side, or there is a loss of superficial pain with pinprick, but patient is aware of being touched  9. Best Language: 0-->No aphasia, normal  10. Dysarthria: 0-->Normal  11. Extinction and Inattention (formerly Neglect): 0-->No abnormality  Total (NIH Stroke Scale): 12       Modified Lynn Score: 3  Diane Coma Scale:    ABCD2 Score:    [id number] Score:   HAS -BLED Score:   ICH Score:   Hunt & Shane Classification:      Hemorrhagic change of an Ischemic Stroke: Does this patient have an ischemic stroke with hemorrhagic  changes? No     Neurologic Chief Complaint: Infarcts in multiple vascular territories    Subjective:     Interval History: Patient is seen for follow-up neurological assessment and treatment recommendations: See Hospital Course    HPI, Past Medical, Family, and Social History remains the same as documented in the initial encounter.     Review of Systems   Constitutional:  Negative for fever.   HENT:  Positive for trouble swallowing.    Respiratory:  Positive for cough. Negative for shortness of breath.    Gastrointestinal:  Negative for abdominal pain, nausea and vomiting.   Musculoskeletal:         LUE swelling   Neurological:  Positive for facial asymmetry, weakness and numbness.   All other systems reviewed and are negative.    Scheduled Meds:   apixaban  5 mg Oral BID    atorvastatin  40 mg Oral Daily    bisacodyL  10 mg Rectal BID    carvediloL  12.5 mg Oral BID    famotidine  20 mg Oral Daily    insulin aspart U-100  3 Units Subcutaneous TIDWM    polyethylene glycol  17 g Oral BID     Continuous Infusions:      PRN Meds:  Current Facility-Administered Medications:     acetaminophen, 650 mg, Per NG tube, Q8H PRN    dextrose 10%, 12.5 g, Intravenous, PRN    dextrose 10%, 25 g, Intravenous, PRN    glucagon (human recombinant), 1 mg, Intramuscular, PRN    hydrALAZINE, 10 mg, Intravenous, Q6H PRN    insulin aspart U-100, 0-10 Units, Subcutaneous, QID (AC + HS) PRN    iohexol, 15 mL, Oral, PRN    sodium chloride 0.9%, 500 mL, Intravenous, PRN    sodium chloride 0.9%, 10 mL, Intravenous, PRN    Flushing PICC/Midline Protocol, , , Until Discontinued **AND** sodium chloride 0.9%, 10 mL, Intravenous, Q12H PRN    Objective:     Vital Signs (Most Recent):  Temp: 97.6 °F (36.4 °C) (10/23/24 1210)  Pulse: 70 (10/23/24 1210)  Resp: 18 (10/23/24 1210)  BP: (!) 173/79 (10/23/24 1210)  SpO2: 97 % (10/23/24 1210)  BP Location: Left arm    Vital Signs Range (Last 24H):  Temp:  [97.6 °F (36.4 °C)-98.1 °F (36.7 °C)]   Pulse:   [54-73]   Resp:  [16-18]   BP: (139-173)/(65-79)   SpO2:  [95 %-100 %]   BP Location: Left arm       Physical Exam  Vitals and nursing note reviewed.   Constitutional:       General: She is awake.      Appearance: She is ill-appearing.   Eyes:      General:         Right eye: No discharge.         Left eye: No discharge.      Extraocular Movements: Extraocular movements intact.      Conjunctiva/sclera: Conjunctivae normal.   Cardiovascular:      Rate and Rhythm: Normal rate.   Pulmonary:      Effort: Pulmonary effort is normal. No respiratory distress.   Abdominal:      General: There is no distension.   Musculoskeletal:      Comments: LUE swelling   Skin:     General: Skin is warm and dry.          Neurological:      Mental Status: She is disoriented.      Cranial Nerves: Facial asymmetry present.      Sensory: Sensory deficit present.      Motor: Weakness present.            Neurological Exam:   LOC: alert  Language: No aphasia  Articulation: Dysarthria    Laboratory:  CMP:   Recent Labs   Lab 10/23/24  0635   CALCIUM 8.1*   ALBUMIN 2.1*   PROT 5.3*      K 2.9*   CO2 24      BUN 6*   CREATININE 0.6   ALKPHOS 70   ALT 34   AST 31   BILITOT 0.4     CBC:   Recent Labs   Lab 10/23/24  0635   WBC 10.02   RBC 3.61*   HGB 12.0   HCT 34.2*      MCV 95   MCH 33.2*   MCHC 35.1       Diagnostic Results     Brain and Vessel Imaging   MRI/MRA Brain Ischemic Protocol: 10/6/24  Impression: Acute infarcts involving the right anterior circulation and posterior circulation as detailed above without evidence of intracranial hemorrhage or mass effect.  Embolic disease to be considered.     Chronic left parietal infarct. MRA demonstrates limited visualization of distal right KIARA branches in keeping with the territory of the acute infarct.  There is also narrowing of at least one proximal left M2 branch although no acute infarct in that territory.          Echo: 10/7/24  Cardiac Imaging     Left Ventricle: The left  ventricle is normal in size. Normal wall thickness. There is concentric remodeling. Normal wall motion. There is hyperdynamic systolic function with a visually estimated ejection fraction of greater than 70%. There is normal diastolic function.    Right Ventricle: Normal right ventricular cavity size. Wall thickness is normal. Systolic function is normal.    Left Atrium: Bubble study is likely negative, but full opacification of the RA is not well seen on these images which decreases sensitivity.    IVC/SVC: Normal venous pressure at 3 mmHg.    Vikas Chaudhari DO  [facility] Stroke Center  Department of Vascular Neurology   [facility])

## 2024-10-23 NOTE — PLAN OF CARE
Problem: Skin Injury Risk Increased  Goal: Skin Health and Integrity  Outcome: Progressing     Problem: Stroke, Ischemic (Includes Transient Ischemic Attack)  Goal: Improved Communication Skills  Outcome: Progressing  Goal: Optimal Functional Ability  Outcome: Progressing  Goal: Optimal Nutrition Intake  Outcome: Progressing  Goal: Improved Sensorimotor Function  Outcome: Progressing  Goal: Safe and Effective Swallow  Outcome: Progressing  Goal: Effective Urinary Elimination  Outcome: Progressing     Problem: Fall Injury Risk  Goal: Absence of Fall and Fall-Related Injury  Outcome: Progressing     Problem: Adult Inpatient Plan of Care  Goal: Plan of Care Review  Outcome: Progressing    Stroke booklet reviewed w/pt @ bedside.needs reinforcement.no family @ bedside.potassium 2.9 and phosphorous  replaced. One BM per shift.

## 2024-10-23 NOTE — SUBJECTIVE & OBJECTIVE
Neurologic Chief Complaint: Infarcts in multiple vascular territories    Subjective:     Interval History: Patient is seen for follow-up neurological assessment and treatment recommendations: See Hospital Course    HPI, Past Medical, Family, and Social History remains the same as documented in the initial encounter.     Review of Systems   Constitutional:  Negative for fever.   HENT:  Positive for trouble swallowing.    Respiratory:  Positive for cough. Negative for shortness of breath.    Gastrointestinal:  Negative for abdominal pain, nausea and vomiting.   Musculoskeletal:         LUE swelling   Neurological:  Positive for facial asymmetry, weakness and numbness.   All other systems reviewed and are negative.    Scheduled Meds:   apixaban  5 mg Oral BID    atorvastatin  40 mg Oral Daily    bisacodyL  10 mg Rectal BID    carvediloL  12.5 mg Oral BID    famotidine  20 mg Oral Daily    insulin aspart U-100  3 Units Subcutaneous TIDWM    polyethylene glycol  17 g Oral BID     Continuous Infusions:      PRN Meds:  Current Facility-Administered Medications:     acetaminophen, 650 mg, Per NG tube, Q8H PRN    dextrose 10%, 12.5 g, Intravenous, PRN    dextrose 10%, 25 g, Intravenous, PRN    glucagon (human recombinant), 1 mg, Intramuscular, PRN    hydrALAZINE, 10 mg, Intravenous, Q6H PRN    insulin aspart U-100, 0-10 Units, Subcutaneous, QID (AC + HS) PRN    iohexol, 15 mL, Oral, PRN    sodium chloride 0.9%, 500 mL, Intravenous, PRN    sodium chloride 0.9%, 10 mL, Intravenous, PRN    Flushing PICC/Midline Protocol, , , Until Discontinued **AND** sodium chloride 0.9%, 10 mL, Intravenous, Q12H PRN    Objective:     Vital Signs (Most Recent):  Temp: 97.6 °F (36.4 °C) (10/23/24 1210)  Pulse: 70 (10/23/24 1210)  Resp: 18 (10/23/24 1210)  BP: (!) 173/79 (10/23/24 1210)  SpO2: 97 % (10/23/24 1210)  BP Location: Left arm    Vital Signs Range (Last 24H):  Temp:  [97.6 °F (36.4 °C)-98.1 °F (36.7 °C)]   Pulse:  [54-73]   Resp:   [16-18]   BP: (139-173)/(65-79)   SpO2:  [95 %-100 %]   BP Location: Left arm       Physical Exam  Vitals and nursing note reviewed.   Constitutional:       General: She is awake.      Appearance: She is ill-appearing.   Eyes:      General:         Right eye: No discharge.         Left eye: No discharge.      Extraocular Movements: Extraocular movements intact.      Conjunctiva/sclera: Conjunctivae normal.   Cardiovascular:      Rate and Rhythm: Normal rate.   Pulmonary:      Effort: Pulmonary effort is normal. No respiratory distress.   Abdominal:      General: There is no distension.   Musculoskeletal:      Comments: LUE swelling   Skin:     General: Skin is warm and dry.          Neurological:      Mental Status: She is disoriented.      Cranial Nerves: Facial asymmetry present.      Sensory: Sensory deficit present.      Motor: Weakness present.            Neurological Exam:   LOC: alert  Language: No aphasia  Articulation: Dysarthria    Laboratory:  CMP:   Recent Labs   Lab 10/23/24  0635   CALCIUM 8.1*   ALBUMIN 2.1*   PROT 5.3*      K 2.9*   CO2 24      BUN 6*   CREATININE 0.6   ALKPHOS 70   ALT 34   AST 31   BILITOT 0.4     CBC:   Recent Labs   Lab 10/23/24  0635   WBC 10.02   RBC 3.61*   HGB 12.0   HCT 34.2*      MCV 95   MCH 33.2*   MCHC 35.1       Diagnostic Results     Brain and Vessel Imaging   MRI/MRA Brain Ischemic Protocol: 10/6/24  Impression: Acute infarcts involving the right anterior circulation and posterior circulation as detailed above without evidence of intracranial hemorrhage or mass effect.  Embolic disease to be considered.     Chronic left parietal infarct. MRA demonstrates limited visualization of distal right KIARA branches in keeping with the territory of the acute infarct.  There is also narrowing of at least one proximal left M2 branch although no acute infarct in that territory.          Echo: 10/7/24  Cardiac Imaging     Left Ventricle: The left ventricle is  normal in size. Normal wall thickness. There is concentric remodeling. Normal wall motion. There is hyperdynamic systolic function with a visually estimated ejection fraction of greater than 70%. There is normal diastolic function.    Right Ventricle: Normal right ventricular cavity size. Wall thickness is normal. Systolic function is normal.    Left Atrium: Bubble study is likely negative, but full opacification of the RA is not well seen on these images which decreases sensitivity.    IVC/SVC: Normal venous pressure at 3 mmHg.

## 2024-10-23 NOTE — PLAN OF CARE
[facility]    FACILITY TRANSFER ORDERS      Patient Name: Tammy Multani  YOB: 1960    PCP: Soha Tucker FNP   PCP Address: [address] / [place] [address]  PCP Phone Number: [phone]  PCP Fax: [phone]    Encounter Date: 10/23/2024    Admit to: IPR    Vital Signs:  Routine    Diagnoses:   Active Hospital Problems    Diagnosis  POA    *Acute arterial ischemic stroke, multifocal, multiple vascular territories [I63.89]  Yes    Left upper extremity swelling [M79.89]  No    Encephalopathy, metabolic [G93.41]  No     Noted to have transient alteration in mental status  Likely due to ileus  See treatment for ileus      Hypernatremia [E87.0]  No    Ileus [K56.7]  No    Acute focal neurological deficit [R29.818]  No    KAILYN (acute kidney injury) [N17.9]  Yes    Hypercoagulable state due to atrial fibrillation [D68.69, I48.91]  Yes     History of atrial fibrillation per discussion with daughter. Was started on Eliquis on 10/6: hold given size of infarct, start ASA on 10/8. Transition again to Eliquis in 5-7 days       Chronic anticoagulation [Z79.01]  Not Applicable     History of atrial fibrillation per discussion with daughter. Was started on Eliquis on 10/6: hold given size of infarct, start ASA on 10/8. Transition again to Eliquis in 5-7 days       Hyponatremia [E87.1]  Yes     Monitor with daily labs      Reduced mobility [Z74.09]  Yes     Therapy to evaluate and treat       Sensory loss [R20.0]  Yes    HTN (hypertension) [I10]  Yes    Left hemiplegia [G81.94]  Yes    Opioid use [F11.90]  Yes    Atrial fibrillation [I48.91]  Yes    Type 2 diabetes mellitus, with long-term current use of insulin [E11.9, Z79.4]  Not Applicable      Resolved Hospital Problems    Diagnosis Date Resolved POA    Hyperkalemia [E87.5] 10/17/2024 Unknown    Hypokalemia [E87.6] 10/16/2024 Yes     POA  Monitor with daily cmp  Replace   K 3.3         Allergies:Review of patient's allergies  indicates:  No Known Allergies    Diet: IDDSI level 4, Nectar Thick, mild thick    Activities: Activity as tolerated    Goals of Care Treatment Preferences:  Code Status: Full Code      Nursing: As per protocol     Labs: CBC and BMP and phos and mag Daily for 14 days     CONSULTS:    Physical Therapy to evaluate and treat. , Occupational Therapy to evaluate and treat., Speech Therapy to evaluate and treat for Language, Swallowing, and Cognition., and  to evaluate for community resources/long-range planning.    MISCELLANEOUS CARE:  Routine Skin for Bedridden Patients: Apply moisture barrier cream to all skin folds and wet areas in perineal area daily and after baths and all bowel movements. and Diabetes Care:   SN to perform and educate Diabetic management with blood glucose monitoring:, Fingerstick blood sugar AC and HS, and Report CBG < 60 or > 350 to physician.    WOUND CARE ORDERS  None    Medications: Review discharge medications with patient and family and provide education.         Medication List        START taking these medications      acetaminophen 650 mg/20.3 mL Soln  Commonly known as: TYLENOL  20.3 mLs (650 mg total) by Per NG tube route every 8 (eight) hours as needed.     atorvastatin 40 MG tablet  Commonly known as: LIPITOR  Take 1 tablet (40 mg total) by mouth once daily.  Start taking on: October 24, 2024     bisacodyL 10 mg Supp  Commonly known as: DULCOLAX  Place 1 suppository (10 mg total) rectally once daily.     famotidine 20 MG tablet  Commonly known as: PEPCID  Take 1 tablet (20 mg total) by mouth once daily.  Start taking on: October 24, 2024     * insulin aspart U-100 100 unit/mL (3 mL) Inpn pen  Commonly known as: NovoLOG  Inject 0-10 Units into the skin before meals and at bedtime as needed (Before meals and nightly).     * insulin aspart U-100 100 unit/mL (3 mL) Inpn pen  Commonly known as: NovoLOG  Inject 3 Units into the skin 3 (three) times daily.     polyethylene  glycol 17 gram Pwpk  Commonly known as: GLYCOLAX  Take 17 g by mouth 2 (two) times daily.           * This list has 2 medication(s) that are the same as other medications prescribed for you. Read the directions carefully, and ask your doctor or other care provider to review them with you.                CHANGE how you take these medications      carvediloL 12.5 MG tablet  Commonly known as: COREG  Take 1 tablet (12.5 mg total) by mouth 2 (two) times daily.  What changed:   medication strength  how much to take  when to take this            CONTINUE taking these medications      ELIQUIS 5 mg Tab  Generic drug: apixaban  Take 5 mg by mouth 2 (two) times daily.     FREESTYLE CLIFFORD 3 SENSOR Lamar  Generic drug: blood-glucose sensor  SMARTSI Topical Once     gabapentin 600 MG tablet  Commonly known as: NEURONTIN  Take 600 mg by mouth 3 (three) times daily.     ONETOUCH ULTRA2 METER Misc  Generic drug: blood-glucose meter  SMARTSI Via Meter Daily PRN     oxyCODONE 15 MG Tab  Commonly known as: ROXICODONE  Take 15 mg by mouth every 12 (twelve) hours as needed for Pain.     tiZANidine 4 MG tablet  Commonly known as: ZANAFLEX  Take 4 mg by mouth every evening.     zolpidem 5 MG Tab  Commonly known as: AMBIEN  Take 5 mg by mouth nightly as needed.            STOP taking these medications      aspirin 81 MG Chew     hydroCHLOROthiazide 50 MG tablet  Commonly known as: HYDRODIURIL     LEVEMIR FLEXPEN 100 unit/mL (3 mL) Inpn pen  Generic drug: insulin detemir U-100 (Levemir)     lisinopriL 40 MG tablet  Commonly known as: PRINIVIL,ZESTRIL     rosuvastatin 5 MG tablet  Commonly known as: CRESTOR              _________________________________  Vikas Chaudhari,   10/23/2024

## 2024-10-23 NOTE — ASSESSMENT & PLAN NOTE
"65 y/o F transferred from [facility] for higher level of care secondary to acute KIARA stroke on CTA.  Briefly,the patient presented to the OSH overnight for slurred speech started at 1:30 a.m. in the morning.  She has a history of his stroke with left-sided deficits but is normally able to transfer and do a little walking. This morning, she had complete hemiplegia of the left side. Telestroke consult performed, concerning for LVO.  NIH 13. CTA Stroke MP performed and  was read as "acute R KIARA infarct". No TNK due to AC use. Patient accepted to ED as transfer for emergent vascular neurology evaluation. No endovascular intervention. History of atrial fibrillation per discussion with daughter. Was started on Eliquis on 10/6, currently on heparin drip given likely lack of absorption through NG tube in setting of Ileus. Given history of Afib, etiology likely embolic. Though not apparent on MRA, if R KIARA occlusion was proximal (supported by MRI) could account for R MCA-like picture. Transient episodes of altered awareness: EEG negative, and no change in frequency observed by family w/Keppra so have stopped this medicine.     NAEON. Neuro exam remains stable. Patient with audible upper respiratory sounds and cough. CPT ordered. Will plan for d/c 10/24  -Eliquis 5 mg BID   -Atorvastatin 40 mg daily  -SBP<180  -PT/OT-Current recs for high intensity therapy  -Diet advanced to clear liquids 10/19  "

## 2024-10-23 NOTE — PLAN OF CARE
Recommendations    Continue pureed diet as tolerated.   Continue Boost GC TID.   RD to monitor and follow up.    Goals: Meet % EEN/EPN by RD follow up.  Nutrition Goal Status: progressing towards goal  Communication of RD Recs: other (comment) (POC)

## 2024-10-23 NOTE — PLAN OF CARE
Problem: Stroke, Ischemic (Includes Transient Ischemic Attack)  Goal: Optimal Coping  Outcome: Progressing     Problem: Stroke, Ischemic (Includes Transient Ischemic Attack)  Goal: Optimal Cerebral Tissue Perfusion  Outcome: Progressing     Problem: Stroke, Ischemic (Includes Transient Ischemic Attack)  Goal: Improved Communication Skills  Outcome: Progressing     Problem: Stroke, Ischemic (Includes Transient Ischemic Attack)  Goal: Optimal Nutrition Intake  Outcome: Progressing     Problem: Stroke, Ischemic (Includes Transient Ischemic Attack)  Goal: Improved Sensorimotor Function  Outcome: Progressing     Problem: Adult Inpatient Plan of Care  Goal: Plan of Care Review  Outcome: Progressing     Problem: Adult Inpatient Plan of Care  Goal: Patient-Specific Goal (Individualized)  Description: Pt will maintain sbp below 220  Outcome: Progressing

## 2024-10-23 NOTE — CARE UPDATE
-Glucose Goal 140-180    -A1C:   Hemoglobin A1C   Date Value Ref Range Status   10/08/2024 10.0 (H) 4.0 - 5.6 % Final     Comment:     ADA Screening Guidelines:  5.7-6.4%  Consistent with prediabetes  >or=6.5%  Consistent with diabetes    High levels of fetal hemoglobin interfere with the HbA1C  assay. Heterozygous hemoglobin variants (HbS, HgC, etc)do  not significantly interfere with this assay.   However, presence of multiple variants may affect accuracy.           -HOME REGIMEN:     -GLUCOSE TREND FOR THE PAST 24HRS:   Recent Labs   Lab 10/21/24  2130 10/22/24  0749 10/22/24  1155 10/22/24  1625 10/22/24  2126 10/23/24  0819   POCTGLUCOSE 116* 155* 163* 146* 137* 220*         -NO HYPOGYCEMIAS NOTED     - Diet  Diet Pureed (IDDSI Level 4) Nectar Thick (Mildly Thick); Standard Tray    T2DM admitted with acute KIARA stroke.   Blood sugar mostly at goal with isolated elevation this morning.     - Continue Novolog 3 units with meals.  - Continue Novolog Moderate dose correction with ISF 25 starting at 150    - POCT Glucose AC/HS  - Hypoglycemia protocol in place      ** Please notify Endocrine for any change and/or advance in diet**  ** Please call Endocrine for any BG related issues **     Discharge Planning:   Addendum  Notified of discharge potentially today to inpatient rehab.  Patient can continue on current inpatient regimen

## 2024-10-23 NOTE — PLAN OF CARE
10/23/24 1454   Post-Acute Status   Post-Acute Authorization Placement   Post-Acute Placement Status Pending medical clearance/testing   Discharge Delays (!) Change in Medical Condition   Discharge Plan   Discharge Plan A Rehab     SW received notification from Elin at [facility] that they have auth and can accept patient.  SW reached out to provider, but patient had some instablity in her electrolytes and they are needing to run some labs this afternoon.  SW set up transport for tomorrow morning at 9am  Patient and her daughter are both aware and [facility] aware and agreeable  orders and neg covid were faxed over.      Miladys Brown, THONG  [facility]  [phone]

## 2024-10-23 NOTE — PT/OT/SLP PROGRESS
"Speech Language Pathology Treatment    Patient Name:  Tammy Multani   MRN:  [id number]  Admitting Diagnosis: Acute arterial ischemic stroke, multifocal, multiple vascular territories    Recommendations:                 General Recommendations:  Dysphagia therapy, Speech/language therapy, and Cognitive-linguistic therapy  Diet recommendations:  Puree Diet - IDDSI Level 4, Liquid Diet Level: Mildly thick/Nectar thick liquids - IDDSI Level 2   Aspiration Precautions: 1 bite/sip at a time, Alternating bites/sips, Assistance with meals and Assistance with thickening liquids, Avoid talking while eating, Double swallow with each bite/sip, Eliminate distractions, Feed only when awake/alert, Frequent oral care, HOB to 90 degrees, Meds crushed in puree, Monitor for s/s of aspiration, Remain upright 30 minutes post meal, Small bites/sips, and Strict aspiration precautions   General Precautions: Standard, aspiration, fall, nectar thick, pureed diet, vision impaired  Communication strategies:  provide increased time to answer and go to room if call light pushed    Assessment:     Tammy Multani is a 64 y.o. female with an SLP diagnosis of Dysphagia, Cognitive-Linguistic Impairment, and Visio-Spatial Impairment.      Subjective     "Applesauce is good."    Pain/Comfort:  Pain Rating 1: 0/10    Respiratory Status: Room air    Objective:     Has the patient been evaluated by SLP for swallowing?   Yes  Keep patient NPO? No   Current Respiratory Status:        Pt agreed to receive some trials from her pureed lunch tray, but only accepted 1 bite of pureed meat and 2 bites of mashed potatoes.  Pt self fed a portion of cup of applesauce and entire serving of cookies and cream moose.  Pt also consumed over half of a carton of Boost and cup and straw sips of nectar thick liquid.  Pt agreed to accept trials of soft solids (crumbled srinivasan crackers mixed in pudding) x 2.  Increased mastication time, slow oral transit time, and " delayed initiation of swallow observed, but pt was able to adequately clear oral cavity and no overt s/s of aspiration were observed.  Pt did not wish to receive any further PO intake and stated need to have a BM.  Session ended with call light within reach and nurse notified of pt's urge for BM.      Education was provided to pt regarding importance of maintaining nutrition in order to avoid need for alternative means of nutrition, trials of soft solids to prepare for future diet advancement, aspiration precautions, and SLP treatment plan and POC. Pt will benefit from continued reinforcement.     Goals:   Multidisciplinary Problems       SLP Goals          Problem: SLP    Goal Priority Disciplines Outcome   SLP Goal     SLP    Description: Speech Language Pathology Goals  Updated goals expected to be met 10/29:  1. Pt will participate in ongoing speech/language/cognitive evaluation.  2.  Assess writing skills.    3.  [place] to month, year and place.   4. Pt will complete moderate level convergent categorization tasks with 70% accuracy  5. Pt will list an average of 9 items in a category in one minute given min cues.   6. Pt will complete simple left neglect tasks with 70% accuracy given mod cues.                 Goals expected to be met by 10/14:  1. Pt will participate in Modified Barium Swallow Study to r/o aspiration and determine safest oral diet. Goal met  2. Pt will participate in speech/language/cognitive evaluation to establish further tx plan. Goal met/ongoing  3.  Assess functional reading and writing skills.  Goal met for reading abilities  4.  [place] to month, year and place. ongoing  5.  Respond to simple categorization tasks with 75% accuracy. Goal met for concrete convergent categorization tasks.                                 Plan:     Patient to be seen:  4 x/week   Plan of Care expires:  11/06/24  Plan of Care reviewed with:  patient   SLP Follow-Up:  Yes       Discharge recommendations:  High  Intensity Therapy     Time Tracking:     SLP Treatment Date:   10/23/24  Speech Start Time:  1226  Speech Stop Time:  1248     Speech Total Time (min):  22 min    Billable Minutes: Treatment Swallowing Dysfunction 14 and Self Care/Home Management Training 8    10/23/2024

## 2024-10-24 PROBLEM — E87.20 ACIDOSIS: Status: ACTIVE | Noted: 2024-10-24

## 2024-10-24 LAB
ALBUMIN SERPL BCP-MCNC: 2.3 G/DL (ref 3.5–5.2)
ALP SERPL-CCNC: 68 U/L (ref 40–150)
ALT SERPL W/O P-5'-P-CCNC: 32 U/L (ref 10–44)
ANION GAP SERPL CALC-SCNC: 10 MMOL/L (ref 8–16)
ANION GAP SERPL CALC-SCNC: 10 MMOL/L (ref 8–16)
ANION GAP SERPL CALC-SCNC: 8 MMOL/L (ref 8–16)
ANION GAP SERPL CALC-SCNC: 8 MMOL/L (ref 8–16)
AST SERPL-CCNC: 30 U/L (ref 10–40)
BACTERIA #/AREA URNS AUTO: NORMAL /HPF
BASOPHILS # BLD AUTO: 0.03 K/UL (ref 0–0.2)
BASOPHILS NFR BLD: 0.4 % (ref 0–1.9)
BILIRUB SERPL-MCNC: 0.5 MG/DL (ref 0.1–1)
BILIRUB UR QL STRIP: NEGATIVE
BUN SERPL-MCNC: 5 MG/DL (ref 8–23)
BUN SERPL-MCNC: 5 MG/DL (ref 8–23)
BUN SERPL-MCNC: 7 MG/DL (ref 8–23)
BUN SERPL-MCNC: 7 MG/DL (ref 8–23)
CALCIUM SERPL-MCNC: 8.2 MG/DL (ref 8.7–10.5)
CALCIUM SERPL-MCNC: 8.2 MG/DL (ref 8.7–10.5)
CALCIUM SERPL-MCNC: 8.3 MG/DL (ref 8.7–10.5)
CALCIUM SERPL-MCNC: 9.3 MG/DL (ref 8.7–10.5)
CHLORIDE SERPL-SCNC: 104 MMOL/L (ref 95–110)
CHLORIDE SERPL-SCNC: 105 MMOL/L (ref 95–110)
CHLORIDE SERPL-SCNC: 106 MMOL/L (ref 95–110)
CHLORIDE SERPL-SCNC: 108 MMOL/L (ref 95–110)
CLARITY UR REFRACT.AUTO: CLEAR
CO2 SERPL-SCNC: 25 MMOL/L (ref 23–29)
CO2 SERPL-SCNC: 27 MMOL/L (ref 23–29)
CO2 SERPL-SCNC: 27 MMOL/L (ref 23–29)
CO2 SERPL-SCNC: 28 MMOL/L (ref 23–29)
COLOR UR AUTO: YELLOW
CREAT SERPL-MCNC: 0.6 MG/DL (ref 0.5–1.4)
CREAT SERPL-MCNC: 0.7 MG/DL (ref 0.5–1.4)
CREAT UR-MCNC: 20 MG/DL (ref 15–325)
CREAT UR-MCNC: 20 MG/DL (ref 15–325)
DIFFERENTIAL METHOD BLD: ABNORMAL
EOSINOPHIL # BLD AUTO: 0.1 K/UL (ref 0–0.5)
EOSINOPHIL NFR BLD: 1.1 % (ref 0–8)
ERYTHROCYTE [DISTWIDTH] IN BLOOD BY AUTOMATED COUNT: 12.9 % (ref 11.5–14.5)
EST. GFR  (NO RACE VARIABLE): >60 ML/MIN/1.73 M^2
GLUCOSE SERPL-MCNC: 154 MG/DL (ref 70–110)
GLUCOSE SERPL-MCNC: 177 MG/DL (ref 70–110)
GLUCOSE SERPL-MCNC: 188 MG/DL (ref 70–110)
GLUCOSE SERPL-MCNC: 192 MG/DL (ref 70–110)
GLUCOSE UR QL STRIP: ABNORMAL
HCT VFR BLD AUTO: 34 % (ref 37–48.5)
HGB BLD-MCNC: 11.5 G/DL (ref 12–16)
HGB UR QL STRIP: NEGATIVE
HYALINE CASTS UR QL AUTO: 1 /LPF
IMM GRANULOCYTES # BLD AUTO: 0.05 K/UL (ref 0–0.04)
IMM GRANULOCYTES NFR BLD AUTO: 0.6 % (ref 0–0.5)
KETONES UR QL STRIP: NEGATIVE
LEUKOCYTE ESTERASE UR QL STRIP: NEGATIVE
LYMPHOCYTES # BLD AUTO: 1.8 K/UL (ref 1–4.8)
LYMPHOCYTES NFR BLD: 21.4 % (ref 18–48)
MAGNESIUM SERPL-MCNC: 1.8 MG/DL (ref 1.6–2.6)
MCH RBC QN AUTO: 32.8 PG (ref 27–31)
MCHC RBC AUTO-ENTMCNC: 33.8 G/DL (ref 32–36)
MCV RBC AUTO: 97 FL (ref 82–98)
MICROSCOPIC COMMENT: NORMAL
MONOCYTES # BLD AUTO: 0.4 K/UL (ref 0.3–1)
MONOCYTES NFR BLD: 5 % (ref 4–15)
NEUTROPHILS # BLD AUTO: 6 K/UL (ref 1.8–7.7)
NEUTROPHILS NFR BLD: 71.5 % (ref 38–73)
NITRITE UR QL STRIP: NEGATIVE
NRBC BLD-RTO: 0 /100 WBC
PH UR STRIP: 8 [PH] (ref 5–8)
PHOSPHATE SERPL-MCNC: 2.4 MG/DL (ref 2.7–4.5)
PLATELET # BLD AUTO: 203 K/UL (ref 150–450)
PMV BLD AUTO: 11.2 FL (ref 9.2–12.9)
POCT GLUCOSE: 162 MG/DL (ref 70–110)
POCT GLUCOSE: 167 MG/DL (ref 70–110)
POCT GLUCOSE: 201 MG/DL (ref 70–110)
POCT GLUCOSE: 231 MG/DL (ref 70–110)
POTASSIUM SERPL-SCNC: 3 MMOL/L (ref 3.5–5.1)
POTASSIUM SERPL-SCNC: 3.1 MMOL/L (ref 3.5–5.1)
POTASSIUM SERPL-SCNC: 3.2 MMOL/L (ref 3.5–5.1)
POTASSIUM SERPL-SCNC: 3.7 MMOL/L (ref 3.5–5.1)
POTASSIUM UR-SCNC: 24 MMOL/L (ref 15–95)
PROT SERPL-MCNC: 5.6 G/DL (ref 6–8.4)
PROT UR QL STRIP: NEGATIVE
PROT UR-MCNC: <7 MG/DL (ref 0–15)
PROT/CREAT UR: NORMAL MG/G{CREAT} (ref 0–0.2)
RBC # BLD AUTO: 3.51 M/UL (ref 4–5.4)
RBC #/AREA URNS AUTO: 0 /HPF (ref 0–4)
SODIUM SERPL-SCNC: 140 MMOL/L (ref 136–145)
SODIUM SERPL-SCNC: 141 MMOL/L (ref 136–145)
SODIUM SERPL-SCNC: 142 MMOL/L (ref 136–145)
SODIUM SERPL-SCNC: 143 MMOL/L (ref 136–145)
SODIUM UR-SCNC: 120 MMOL/L (ref 20–250)
SP GR UR STRIP: 1.01 (ref 1–1.03)
SQUAMOUS #/AREA URNS AUTO: 0 /HPF
URN SPEC COLLECT METH UR: ABNORMAL
WBC # BLD AUTO: 8.33 K/UL (ref 3.9–12.7)
WBC #/AREA URNS AUTO: 0 /HPF (ref 0–5)
YEAST UR QL AUTO: NORMAL

## 2024-10-24 PROCEDURE — 84133 ASSAY OF URINE POTASSIUM: CPT

## 2024-10-24 PROCEDURE — 97535 SELF CARE MNGMENT TRAINING: CPT

## 2024-10-24 PROCEDURE — 97129 THER IVNTJ 1ST 15 MIN: CPT

## 2024-10-24 PROCEDURE — 84100 ASSAY OF PHOSPHORUS: CPT | Performed by: PHYSICIAN ASSISTANT

## 2024-10-24 PROCEDURE — 92526 ORAL FUNCTION THERAPY: CPT

## 2024-10-24 PROCEDURE — 36415 COLL VENOUS BLD VENIPUNCTURE: CPT

## 2024-10-24 PROCEDURE — 80053 COMPREHEN METABOLIC PANEL: CPT | Performed by: PHYSICIAN ASSISTANT

## 2024-10-24 PROCEDURE — 25000003 PHARM REV CODE 250: Performed by: STUDENT IN AN ORGANIZED HEALTH CARE EDUCATION/TRAINING PROGRAM

## 2024-10-24 PROCEDURE — 63600175 PHARM REV CODE 636 W HCPCS: Mod: UD

## 2024-10-24 PROCEDURE — 99233 SBSQ HOSP IP/OBS HIGH 50: CPT | Mod: GC,,, | Performed by: STUDENT IN AN ORGANIZED HEALTH CARE EDUCATION/TRAINING PROGRAM

## 2024-10-24 PROCEDURE — 80048 BASIC METABOLIC PNL TOTAL CA: CPT | Mod: XB

## 2024-10-24 PROCEDURE — 82088 ASSAY OF ALDOSTERONE: CPT

## 2024-10-24 PROCEDURE — 99232 SBSQ HOSP IP/OBS MODERATE 35: CPT | Mod: ,,, | Performed by: NURSE PRACTITIONER

## 2024-10-24 PROCEDURE — 82570 ASSAY OF URINE CREATININE: CPT

## 2024-10-24 PROCEDURE — 83735 ASSAY OF MAGNESIUM: CPT | Performed by: PHYSICIAN ASSISTANT

## 2024-10-24 PROCEDURE — 84300 ASSAY OF URINE SODIUM: CPT

## 2024-10-24 PROCEDURE — 81001 URINALYSIS AUTO W/SCOPE: CPT

## 2024-10-24 PROCEDURE — 25000003 PHARM REV CODE 250

## 2024-10-24 PROCEDURE — 11000001 HC ACUTE MED/SURG PRIVATE ROOM

## 2024-10-24 PROCEDURE — 63600175 PHARM REV CODE 636 W HCPCS

## 2024-10-24 PROCEDURE — 85025 COMPLETE CBC W/AUTO DIFF WBC: CPT | Performed by: PHYSICIAN ASSISTANT

## 2024-10-24 PROCEDURE — 36415 COLL VENOUS BLD VENIPUNCTURE: CPT | Performed by: PHYSICIAN ASSISTANT

## 2024-10-24 RX ORDER — MAGNESIUM SULFATE HEPTAHYDRATE 40 MG/ML
2 INJECTION, SOLUTION INTRAVENOUS ONCE
Status: COMPLETED | OUTPATIENT
Start: 2024-10-24 | End: 2024-10-24

## 2024-10-24 RX ORDER — INSULIN GLARGINE 100 [IU]/ML
10 INJECTION, SOLUTION SUBCUTANEOUS DAILY
Status: DISCONTINUED | OUTPATIENT
Start: 2024-10-24 | End: 2024-10-28 | Stop reason: HOSPADM

## 2024-10-24 RX ORDER — POTASSIUM CHLORIDE 7.45 MG/ML
10 INJECTION INTRAVENOUS
Status: COMPLETED | OUTPATIENT
Start: 2024-10-24 | End: 2024-10-24

## 2024-10-24 RX ORDER — HYDRALAZINE HYDROCHLORIDE 20 MG/ML
5 INJECTION INTRAMUSCULAR; INTRAVENOUS EVERY 6 HOURS PRN
Status: DISCONTINUED | OUTPATIENT
Start: 2024-10-24 | End: 2024-10-28 | Stop reason: HOSPADM

## 2024-10-24 RX ADMIN — POTASSIUM CHLORIDE 10 MEQ: 7.46 INJECTION, SOLUTION INTRAVENOUS at 08:10

## 2024-10-24 RX ADMIN — APIXABAN 5 MG: 5 TABLET, FILM COATED ORAL at 09:10

## 2024-10-24 RX ADMIN — INSULIN ASPART 1 UNITS: 100 INJECTION, SOLUTION INTRAVENOUS; SUBCUTANEOUS at 09:10

## 2024-10-24 RX ADMIN — MAGNESIUM SULFATE HEPTAHYDRATE 2 G: 40 INJECTION, SOLUTION INTRAVENOUS at 12:10

## 2024-10-24 RX ADMIN — DIBASIC SODIUM PHOSPHATE, MONOBASIC POTASSIUM PHOSPHATE AND MONOBASIC SODIUM PHOSPHATE 1 TABLET: 852; 155; 130 TABLET ORAL at 08:10

## 2024-10-24 RX ADMIN — INSULIN ASPART 3 UNITS: 100 INJECTION, SOLUTION INTRAVENOUS; SUBCUTANEOUS at 12:10

## 2024-10-24 RX ADMIN — FAMOTIDINE 20 MG: 20 TABLET ORAL at 09:10

## 2024-10-24 RX ADMIN — POTASSIUM BICARBONATE 40 MEQ: 391 TABLET, EFFERVESCENT ORAL at 08:10

## 2024-10-24 RX ADMIN — CARVEDILOL 12.5 MG: 12.5 TABLET, FILM COATED ORAL at 09:10

## 2024-10-24 RX ADMIN — INSULIN ASPART 2 UNITS: 100 INJECTION, SOLUTION INTRAVENOUS; SUBCUTANEOUS at 06:10

## 2024-10-24 RX ADMIN — INSULIN ASPART 4 UNITS: 100 INJECTION, SOLUTION INTRAVENOUS; SUBCUTANEOUS at 12:10

## 2024-10-24 RX ADMIN — POTASSIUM CHLORIDE 10 MEQ: 7.46 INJECTION, SOLUTION INTRAVENOUS at 10:10

## 2024-10-24 RX ADMIN — DIBASIC SODIUM PHOSPHATE, MONOBASIC POTASSIUM PHOSPHATE AND MONOBASIC SODIUM PHOSPHATE 1 TABLET: 852; 155; 130 TABLET ORAL at 11:10

## 2024-10-24 RX ADMIN — INSULIN ASPART 3 UNITS: 100 INJECTION, SOLUTION INTRAVENOUS; SUBCUTANEOUS at 06:10

## 2024-10-24 RX ADMIN — INSULIN GLARGINE 10 UNITS: 100 INJECTION, SOLUTION SUBCUTANEOUS at 12:10

## 2024-10-24 RX ADMIN — INSULIN ASPART 4 UNITS: 100 INJECTION, SOLUTION INTRAVENOUS; SUBCUTANEOUS at 09:10

## 2024-10-24 RX ADMIN — APIXABAN 5 MG: 5 TABLET, FILM COATED ORAL at 08:10

## 2024-10-24 RX ADMIN — CARVEDILOL 12.5 MG: 12.5 TABLET, FILM COATED ORAL at 08:10

## 2024-10-24 RX ADMIN — ATORVASTATIN CALCIUM 40 MG: 40 TABLET, FILM COATED ORAL at 08:10

## 2024-10-24 RX ADMIN — INSULIN ASPART 3 UNITS: 100 INJECTION, SOLUTION INTRAVENOUS; SUBCUTANEOUS at 09:10

## 2024-10-24 NOTE — PLAN OF CARE
SW received notification from provider that patient is not medically ready to discharge to Rehab due to an issue with her potassium.  LISS notified Elin at [facility] and also called patient's dtr Milana.  Transport cancelled and LISS will continue to follow.      Miladys Brown, THONG  [facility]  [phone]

## 2024-10-24 NOTE — PROGRESS NOTES
"[facility] - Neurosurgery ([facility])  Endocrinology  Progress Note    Admit Date: 10/6/2024     Reason for Consult: Management of T2DM, Hyperglycemia       Diabetes diagnosis year:  Over 5 years    Home Diabetes Medications:    -liraglutide  -Levemir 35 units QHS    How often checking glucose at home? Not checking       Diabetes Complications include:     Hyperglycemia    Complicating diabetes co morbidities:   History of CVA      HPI:   Patient is a 64 y.o. female with  DM2 , HTN who was transferred from [facility] for higher level of care secondary to acute KIARA stroke on CTA. Briefly,the patient presented to the OSH overnight for slurred speech started at 1:30 a.m. in the morning. She has a history of his stroke with left-sided deficits but is normally able to transfer and do a little walking.  Endocrine consulted for BG management.      Interval HPI:   No acute events overnight. Patient in room 960/960 A. Blood glucose stable. BG at and above goal on current insulin regimen (SSI and prandial insulin). Steroid use- None.    Renal function- Normal   Vasopressors-  None     Lab Results   Component Value Date    CREATININE 0.6 10/24/2024       Endocrine will continue to follow and manage insulin orders inpatient.       Diet Pureed (IDDSI Level 4) Nectar Thick (Mildly Thick); Standard Tray     Overnight events:  Eatin%  Nausea: No  Hypoglycemia and intervention: No  Fever: No  TPN and/or TF: No    BP (!) 184/88   Pulse 60   Temp 97.7 °F (36.5 °C) (Axillary)   Resp 16   Ht 5' 2" (1.575 m)   Wt 72.6 kg (160 lb 0.9 oz)   LMP  (LMP Unknown) Comment: Doesn't  have periods anymore  SpO2 96%   Breastfeeding No   BMI 29.27 kg/m²     Labs Reviewed and Include    Recent Labs   Lab 10/24/24  0316 10/24/24  0821   * 192*   CALCIUM 8.3* 8.2*   ALBUMIN 2.3*  --    PROT 5.6*  --     143   K 3.0* 3.2*   CO2 27 27    108   BUN 7* 7*   CREATININE 0.6 0.6   ALKPHOS 68  --    ALT 32  --    AST " "30  --    BILITOT 0.5  --      Lab Results   Component Value Date    WBC 8.33 10/24/2024    HGB 11.5 (L) 10/24/2024    HCT 34.0 (L) 10/24/2024    MCV 97 10/24/2024     10/24/2024     No results for input(s): "TSH", "FREET4" in the last 168 hours.  Lab Results   Component Value Date    HGBA1C 10.0 (H) 10/08/2024       Nutritional status:   Body mass index is 29.27 kg/m².  Lab Results   Component Value Date    ALBUMIN 2.3 (L) 10/24/2024    ALBUMIN 2.1 (L) 10/23/2024    ALBUMIN 2.0 (L) 10/22/2024     No results found for: "PREALBUMIN"    Estimated Creatinine Clearance: 87.2 mL/min (based on SCr of 0.6 mg/dL).    Accu-Checks  Recent Labs     10/21/24  1120 10/21/24  1654 10/21/24  2130 10/22/24  0749 10/22/24  1155 10/22/24  1625 10/22/24  2126 10/23/24  0819 10/23/24  1212 10/24/24  0827   POCTGLUCOSE 209* 164* 116* 155* 163* 146* 137* 220* 160* 201*       Current Medications and/or Treatments Impacting Glycemic Control  Immunotherapy:    Immunosuppressants       None          Steroids:   Hormones (From admission, onward)      None          Pressors:    Autonomic Drugs (From admission, onward)      None          Hyperglycemia/Diabetes Medications:   Antihyperglycemics (From admission, onward)      Start     Stop Route Frequency Ordered    10/21/24 1130  insulin aspart U-100 pen 3 Units         -- SubQ 3 times daily with meals 10/21/24 0902    10/19/24 2256  insulin aspart U-100 pen 0-10 Units         -- SubQ Before meals & nightly PRN 10/19/24 2157            ASSESSMENT and PLAN    Neuro  * Acute arterial ischemic stroke, multifocal, multiple vascular territories  Managed by primary team  Optimize BG control      Cardiac/Vascular  HTN (hypertension)  Managed by primary team  Optimize BG control      Endocrine  Type 2 diabetes mellitus, with long-term current use of insulin  BG goal: 140-180  T2DM admitted with acute KIARA stroke. Lantus d/c on 10/16 do to hypoglycemia. Morning blood sugars trending up.  Long " acting insulin added back to insulin regimen at 0.5u/kg/d for AM hyperglycemia.    - Start Lanuts 10 units QD (fasting BG above goal ranges)   - Continue Novolog 3 units TID with meals   - Continue Novolog MDC SSI with ISF 25 starting at 150    - POCT Glucose ac/hs  - Hypoglycemia protocol in place      ** Please notify Endocrine for any change and/or advance in diet**  ** Please call Endocrine for any BG related issues **     Discharge Planning: TBD. Please notify endocrinology prior to discharge.            Mattie Ramsey NP  Endocrinology  [facility] ([facility])

## 2024-10-24 NOTE — CARE UPDATE
I have reviewed the chart of Tammy Multani who is hospitalized for the following:    Active Hospital Problems    Diagnosis    *Acute arterial ischemic stroke, multifocal, multiple vascular territories    Acidosis     Patient noted to be increasingly acidotic on labs. IVFs changed from NS to LR.       Left upper extremity swelling    Encephalopathy, metabolic     Noted to have transient alteration in mental status  Likely due to ileus  See treatment for ileus      Hypernatremia    Ileus    Acute focal neurological deficit    KAILYN (acute kidney injury)    Hypercoagulable state due to atrial fibrillation     History of atrial fibrillation per discussion with daughter. Was started on Eliquis on 10/6: hold given size of infarct, start ASA on 10/8. Transition again to Eliquis in 5-7 days       Chronic anticoagulation     History of atrial fibrillation per discussion with daughter. Was started on Eliquis on 10/6: hold given size of infarct, start ASA on 10/8. Transition again to Eliquis in 5-7 days       Hyponatremia     Monitor with daily labs      Reduced mobility     Therapy to evaluate and treat       Sensory loss    HTN (hypertension)    Hypokalemia     POA  Monitor with daily cmp  Replace   K 3.3      Left hemiplegia    Opioid use    Atrial fibrillation    Type 2 diabetes mellitus, with long-term current use of insulin        Jacque Whitfield NP  Unit Based [facility]

## 2024-10-24 NOTE — ASSESSMENT & PLAN NOTE
"63 y/o F transferred from [facility] for higher level of care secondary to acute KIARA stroke on CTA.  Briefly,the patient presented to the OSH overnight for slurred speech started at 1:30 a.m. in the morning.  She has a history of his stroke with left-sided deficits but is normally able to transfer and do a little walking. This morning, she had complete hemiplegia of the left side. Telestroke consult performed, concerning for LVO.  NIH 13. CTA Stroke MP performed and  was read as "acute R KIARA infarct". No TNK due to AC use. Patient accepted to ED as transfer for emergent vascular neurology evaluation. No endovascular intervention. History of atrial fibrillation per discussion with daughter. Was started on Eliquis on 10/6, currently on heparin drip given likely lack of absorption through NG tube in setting of Ileus. Given history of Afib, etiology likely embolic. Though not apparent on MRA, if R KIARA occlusion was proximal (supported by MRI) could account for R MCA-like picture. Transient episodes of altered awareness: EEG negative, and no change in frequency observed by family w/Keppra so have stopped this medicine.     NAEON. Neuro exam remains stable. Patient with audible upper respiratory sounds and cough. CPT ordered.   -Eliquis 5 mg BID   -Atorvastatin 40 mg daily  -SBP<180  -PT/OT-Current recs for high intensity therapy  -Diet advanced to clear liquids 10/19  "

## 2024-10-24 NOTE — PT/OT/SLP PROGRESS
"Speech Language Pathology Treatment    Patient Name:  Tammy Multani   MRN:  [id number]  Admitting Diagnosis: Acute arterial ischemic stroke, multifocal, multiple vascular territories    Recommendations:                 General Recommendations:  Dysphagia therapy, Speech/language therapy, and Cognitive-linguistic therapy  Diet recommendations:  Puree Diet - IDDSI Level 4, Liquid Diet Level: Mildly thick/Nectar thick liquids - IDDSI Level 2   Aspiration Precautions: 1 bite/sip at a time, Alternating bites/sips, Assistance with thickening liquids, Avoid talking while eating, Double swallow with each bite/sip, Eliminate distractions, Feed only when awake/alert, Frequent oral care, HOB to 90 degrees, Meds crushed in puree, Monitor for s/s of aspiration, Remain upright 30 minutes post meal, Small bites/sips, and Strict aspiration precautions   General Precautions: Standard, aspiration, fall, pureed diet, nectar thick, vision impaired  Communication strategies:  provide increased time to answer and go to room if call light pushed    Assessment:     Tammy Multani is a 64 y.o. female with an SLP diagnosis of Dysphagia, Cognitive-Linguistic Impairment, and Visio-Spatial Impairment.      Subjective     "I gotta go to the bathroom."      Pain/Comfort:  Pain Rating 1: 4/10  Location - Side 1: Bilateral  Location 1: leg    Respiratory Status: Room air    Objective:     Has the patient been evaluated by SLP for swallowing?   Yes  Keep patient NPO? No   Current Respiratory Status:        Pt seen for ongoing dysphagia therapy/swallowing assessment and left neglect tx. Pt accepted trials of soft solids (srinivasan crackers dipped in milk) x 6 with alternating straw sips of nectar thick juice via straw.   Pt exhibiting prolonged mastication time, slow oral transit time, and delayed swallow initiation. Pt also distractible and needing cues to redirect focus on eliciting swallows (both initial and secondary). Pt exhibited cough x 1 " and throat clear x 1 after soft solid trials. Delayed throat clear present after nectar thick straw sip x 1.  Pt was oriented to place/[facility], but believed she was in [place], [place] and her sister was in the other room.  Pt was oriented to month and situation IND, but required min cues to orient to year.  Pt completed a trail making attention task with min A. Pt legibly wrote her full name with right dominant hand, though did exhibit visual spatial deficits as evidenced by inability to maintain writing in straight line.  Education was provided to pt regarding trials of soft solids, maintaining attention during PO intake, maintaining nutrition, orientation, visual spatial and attention tasks, and SLP treatment plan and POC.  Pt demonstrated understanding of education provided, but will benefit from continued reinforcement.   .      Goals:   Multidisciplinary Problems       SLP Goals          Problem: SLP    Goal Priority Disciplines Outcome   SLP Goal     SLP    Description: Speech Language Pathology Goals  Updated goals expected to be met 10/29:  1. Pt will participate in ongoing speech/language/cognitive evaluation.  2.  Assess writing skills.    3.  [place] to month, year and place.   4. Pt will complete moderate level convergent categorization tasks with 70% accuracy  5. Pt will list an average of 9 items in a category in one minute given min cues.   6. Pt will complete simple left neglect tasks with 70% accuracy given mod cues.                 Goals expected to be met by 10/14:  1. Pt will participate in Modified Barium Swallow Study to r/o aspiration and determine safest oral diet. Goal met  2. Pt will participate in speech/language/cognitive evaluation to establish further tx plan. Goal met/ongoing  3.  Assess functional reading and writing skills.  Goal met for reading abilities  4.  [place] to month, year and place. ongoing  5.  Respond to simple categorization tasks with 75% accuracy. Goal met for concrete  convergent categorization tasks.                                 Plan:     Patient to be seen:  4 x/week   Plan of Care expires:  11/06/24  Plan of Care reviewed with:  patient   SLP Follow-Up:  Yes       Discharge recommendations:  High Intensity Therapy     Time Tracking:     SLP Treatment Date:   10/24/24  Speech Start Time:  0958  Speech Stop Time:  1032     Speech Total Time (min):  34 min    Billable Minutes: Speech Therapy Individual (cognitive therapy) 10, Treatment Swallowing Dysfunction 14, and Self Care/Home Management Training 10    10/24/2024

## 2024-10-24 NOTE — ASSESSMENT & PLAN NOTE
BG goal: 140-180  T2DM admitted with acute KIARA stroke. Lantus d/c on 10/16 do to hypoglycemia. Morning blood sugars trending up.  Long acting insulin added back to insulin regimen at 0.5u/kg/d for AM hyperglycemia.    - Start Lanuts 10 units QD (fasting BG above goal ranges)   - Continue Novolog 3 units TID with meals   - Continue Novolog MDC SSI with ISF 25 starting at 150    - POCT Glucose ac/hs  - Hypoglycemia protocol in place      ** Please notify Endocrine for any change and/or advance in diet**  ** Please call Endocrine for any BG related issues **     Discharge Planning: TBD. Please notify endocrinology prior to discharge.

## 2024-10-24 NOTE — SUBJECTIVE & OBJECTIVE
"Interval HPI:   No acute events overnight. Patient in room 960/960 A. Blood glucose stable. BG at and above goal on current insulin regimen (SSI and prandial insulin). Steroid use- None.    Renal function- Normal   Vasopressors-  None     Lab Results   Component Value Date    CREATININE 0.6 10/24/2024       Endocrine will continue to follow and manage insulin orders inpatient.       Diet Pureed (IDDSI Level 4) Nectar Thick (Mildly Thick); Standard Tray     Overnight events:  Eatin%  Nausea: No  Hypoglycemia and intervention: No  Fever: No  TPN and/or TF: No    BP (!) 184/88   Pulse 60   Temp 97.7 °F (36.5 °C) (Axillary)   Resp 16   Ht 5' 2" (1.575 m)   Wt 72.6 kg (160 lb 0.9 oz)   LMP  (LMP Unknown) Comment: Doesn't  have periods anymore  SpO2 96%   Breastfeeding No   BMI 29.27 kg/m²     Labs Reviewed and Include    Recent Labs   Lab 10/24/24  0316 10/24/24  0821   * 192*   CALCIUM 8.3* 8.2*   ALBUMIN 2.3*  --    PROT 5.6*  --     143   K 3.0* 3.2*   CO2 27 27    108   BUN 7* 7*   CREATININE 0.6 0.6   ALKPHOS 68  --    ALT 32  --    AST 30  --    BILITOT 0.5  --      Lab Results   Component Value Date    WBC 8.33 10/24/2024    HGB 11.5 (L) 10/24/2024    HCT 34.0 (L) 10/24/2024    MCV 97 10/24/2024     10/24/2024     No results for input(s): "TSH", "FREET4" in the last 168 hours.  Lab Results   Component Value Date    HGBA1C 10.0 (H) 10/08/2024       Nutritional status:   Body mass index is 29.27 kg/m².  Lab Results   Component Value Date    ALBUMIN 2.3 (L) 10/24/2024    ALBUMIN 2.1 (L) 10/23/2024    ALBUMIN 2.0 (L) 10/22/2024     No results found for: "PREALBUMIN"    Estimated Creatinine Clearance: 87.2 mL/min (based on SCr of 0.6 mg/dL).    Accu-Checks  Recent Labs     10/21/24  1120 10/21/24  1654 10/21/24  2130 10/22/24  0749 10/22/24  1155 10/22/24  1625 10/22/24  2126 10/23/24  0819 10/23/24  1212 10/24/24  0827   POCTGLUCOSE 209* 164* 116* 155* 163* 146* 137* 220* 160* " 201*       Current Medications and/or Treatments Impacting Glycemic Control  Immunotherapy:    Immunosuppressants       None          Steroids:   Hormones (From admission, onward)      None          Pressors:    Autonomic Drugs (From admission, onward)      None          Hyperglycemia/Diabetes Medications:   Antihyperglycemics (From admission, onward)      Start     Stop Route Frequency Ordered    10/21/24 1130  insulin aspart U-100 pen 3 Units         -- SubQ 3 times daily with meals 10/21/24 0902    10/19/24 2256  insulin aspart U-100 pen 0-10 Units         -- SubQ Before meals & nightly PRN 10/19/24 3159

## 2024-10-24 NOTE — PLAN OF CARE
Problem: Skin Injury Risk Increased  Goal: Skin Health and Integrity  Outcome: Progressing     Problem: Stroke, Ischemic (Includes Transient Ischemic Attack)  Goal: Optimal Coping  Outcome: Progressing     Problem: Stroke, Ischemic (Includes Transient Ischemic Attack)  Goal: Optimal Cerebral Tissue Perfusion  Outcome: Progressing     Problem: Stroke, Ischemic (Includes Transient Ischemic Attack)  Goal: Improved Communication Skills  Outcome: Progressing     Problem: Stroke, Ischemic (Includes Transient Ischemic Attack)  Goal: Optimal Functional Ability  Outcome: Progressing     Problem: Stroke, Ischemic (Includes Transient Ischemic Attack)  Goal: Optimal Nutrition Intake  Outcome: Progressing

## 2024-10-24 NOTE — ASSESSMENT & PLAN NOTE
Resolved    Plan:   - Initially given D5W to correct, labs to monitor rate of correction   - Was on NS w/KCl but becoming acidotic on labs.  -Na remains 148 this am  -IVFs changed to LR

## 2024-10-24 NOTE — PROGRESS NOTES
"[facility] - Neurosurgery ([facility])  Vascular Neurology  Comprehensive Stroke Center  Progress Note    Assessment/Plan:     * Acute arterial ischemic stroke, multifocal, multiple vascular territories  65 y/o F transferred from [facility] for higher level of care secondary to acute KIARA stroke on CTA.  Briefly,the patient presented to the OSH overnight for slurred speech started at 1:30 a.m. in the morning.  She has a history of his stroke with left-sided deficits but is normally able to transfer and do a little walking. This morning, she had complete hemiplegia of the left side. Telestroke consult performed, concerning for LVO.  NIH 13. CTA Stroke MP performed and  was read as "acute R KIARA infarct". No TNK due to AC use. Patient accepted to ED as transfer for emergent vascular neurology evaluation. No endovascular intervention. History of atrial fibrillation per discussion with daughter. Was started on Eliquis on 10/6, currently on heparin drip given likely lack of absorption through NG tube in setting of Ileus. Given history of Afib, etiology likely embolic. Though not apparent on MRA, if R KIARA occlusion was proximal (supported by MRI) could account for R MCA-like picture. Transient episodes of altered awareness: EEG negative, and no change in frequency observed by family w/Keppra so have stopped this medicine.     NAEON. Neuro exam remains stable. Patient with audible upper respiratory sounds and cough. CPT ordered.   -Eliquis 5 mg BID   -Atorvastatin 40 mg daily  -SBP<180  -PT/OT-Current recs for high intensity therapy  -Diet advanced to clear liquids 10/19    Left upper extremity swelling  -Patient noted to have LUE swelling and discomfort this am (10/20/2024).  -LUE US pending  -LUE elevated    Hypernatremia  Resolved    Plan:   - Initially given D5W to correct, labs to monitor rate of correction   - Was on NS w/KCl but becoming acidotic on labs.  -Na remains 148 this am  -IVFs changed to " LR    Ileus  Resolved      Patient was seen by general surgery for some episodes of nausea and emesis and some constipation. KUB was concerning for SBO vs. Ileus. Follow up CT AP did not show evidence of mechanical obstruction, was more indicative of Ileus. XR gastrogaffin challenge ordered by general surgery.     Plan:   - Was NPO. Approved to advance diet as tolerated by Gen Surg on 10/19. NGT d/c'd. Patient was started on a Clear liquid diet and tolerated it well. Advanced to previous diet recommendation of puree, nectar thick today (10/20). SLP to re-eval 10/21  - monitor electrolytes daily, correct as needed   - Eliquis switched to heparin as it might not have been fully absorbed considering Ileus. Resumed Eliquis 10/19.    - scheduled suppository regimen, f/u on effectiveness and escalate prn  - general surgery following, appreciate recs     Acute focal neurological deficit  Episodes of transient altered mental status observed by daughter, sometimes with a brief left gaze. Given location of KIARA stroke, considered seizure. EEG done on 10/11, negative.     Plan:   - no longer on keppra regimen     KAILYN (acute kidney injury)  RESOLVED  Cr was initially 1.6, up from a baseline of 1. Likely pre-renal KAILYN, due to decreased PO intake. Resolved with fluids.     Plan:   - Continue to monitor CMP.   -SCr has been stable since 10/14/24    Type 2 diabetes mellitus, with long-term current use of insulin  History of. Takes Levemir 35 units QHS at home. A1C 10%.    -goal 140-180 while admitted  -SSI prn   -continue to monitor glucose  -Endocrine consulted for A1c 10%; following, appreciate recs   -24° glucose 173-211        dextrose 10% bolus 125 mL 125 mL, 12.5 g, Intravenous, PRN    dextrose 10% bolus 250 mL 250 mL, 25 g, Intravenous, PRN    glucagon (human recombinant) injection 1 mg, 1 mg, Intramuscular, PRN    insulin aspart U-100 pen 0-10 Units, 0-10 Units, Subcutaneous, QID (AC + HS) PRN    Atrial fibrillation  Takes  Eliquis 5mg BID and Coreg 6.25mg BID at home.  Initially Eliquis held given size of stroke; ASA 81mg QD in the interim. Eliquis was restarted on 10/10.       Plan:   -continue at home coreg regimen   -eliquis switched to heparin drip due to issues described in ileus section of the assessment and plan.   -Heparin gtt turned off 10/19. Eliquis resumed on 10/19.  -24° HR 63-78    Opioid use  History of. Prescribed oxycodone 15mg at home. Family concerned that medication is being diverted by family member, unclear how often patient takes. Reportedly is prescribed for neck pain.     Plan:   - holding oxycodone due to ileus    Left hemiplegia  See stroke    Plan:   - continue PT/OT/SLP   -Current recs for high intensity therapy    Hypokalemia  Persistent/Recurrent HypoK in setting of normal mag    Urinary workup shows normal K  Stool studies pending  Hyperaldo workup pending    HTN (hypertension)  Home medication regimen as below  -lisinopril 40mg QD  -amlodipine 10mg QD    Plan:   -lisinopril and amlodipine were initially held in the setting of KAILYN. Was started on coreg in the context of Afib.   -if patient's BP still elevated, consider adding back amlodipine and lisinopril   -Goal SBP<180 for now  24° -182      carvediloL tablet 12.5 mg, Oral, BID    hydrALAZINE injection 10 mg, Intravenous, Q6H PRN         10/7: gómez SINCLAIR exam stable, echo today, NPO aside from meds per speech, barium study tmrw  10/08/2024 DOTTIE, stable neuro exam, went for the Br study, and was started on a diet. Endocrine consulted for the A1c 10%, pending recs.  10/09/2024 RODERICKEON, Stable neuro exam, will restart Lisinopril 20 mg (half of her dome dose). Seen by endocrine, with adjustment of her insulin doses.  10/10/2024 NAEON, no change in her neuro exam, will restart Eliquis today, BP is better controlled.  10/11/2024 NAEON, stable neuro exam, still decreased PO intake. Patient gets some episodes of in attention; EEG was ordered.  Endocrine adjusted her insulins. Accepted at [facility] in [place].   10/12/2024 NAEON, waiting on EEG interpretation (contacted epilepsy), started Keppra 500mg BID and will see if reduces transient alterations in awareness, neurologic examination stable  10/13/2024 NAEON, EEG w/encephalopathy (no seizure or discharges), stop Keppra, SBP ~150 to 160 so increased Coreg to 6.25mg BID  10/14/2024 General surgery was consulted overnight for constipation, episodes of nausea and emesis. KUB concerning for SBO vs. Ileus. F/u CT AP was more indicative of ileus, no evidence of mechanical obstruction was observed. Patient is NPO, can get some meds via NGT. Eliquis switched to heparin drip.   10/15/2024 Patient has still not had an actual bowel movement. Is distended, has some abdominal pain. General surgery following, is getting XR gastrogaffin challenge, will f/u results.   10/16/2024 Still monitoring patient for bowel movements in the setting of Ileus. General surgery is following. Mild hyperkalemia and hypernatremia this morning; will continue to monitor.   10/17/2024 Patient still has not had a bowel movement. Ileus not resolved on repeat imaging. Suppository ordered earlier this morning, will monitor to see if improvement with suppository.   10/18/2024 Patient still had not had a solid bowel movement due to her ileus, had a number of watery movements yesterday but nothing substantive. Suppositories now scheduled. General surgery following.   10/19/2024 Patient stable overnight. LBM yesterday. Gen Surg ok with advancing diet to clear liquids. Heparin gtt stopped, started on Eliquis. Awaiting placement.  10/20/2024 NAEON. Neuro exam remains stable. Patient with audible upper respiratory sounds and cough. CPT ordered. Patient noted to be increasingly acidotic on labs. IVFs changed from NS to LR. Patient awaiting placement.  10/21/24 NAEON. Neuro exam remains stable. Not oriented to time. Family says patient's exam stable  compared to yesterday. SLP to re-evaluate, on diet level 4. Placement pending  10/22/24 NAEON. Neuro exam remains stable. Not oriented to time. Family says patient's exam stable compared to yesterday. SLP to re-evaluate, on diet level 4. Placement pending  10/23/24 NAEON. Neuro exam remains stable/slightly improved mental status. Oriented to time. Accepted for placement but will plan for d/c tomorrow given hypokalemia. Lytes corrected and repeat lab pending.  10/24/24 NAEON. Neuro exam remains stable/slightly improved mental status. Workup for hypoK ordered and pending. DDX HyperAldo vs Diarrhea.    STROKE DOCUMENTATION   Acute Stroke Times   Last Known Normal Date:  (OSH)  Stroke Team Called Date: 10/06/24  Stroke Team Called Time:  (At OSH)  Stroke Team Arrival Date: 10/06/24  Stroke Team Arrival Time: 0608  CT Interpretation Time:  (CTA done around 4:20am per documents that came to [facility] with patient)  Thrombolytic Therapy Recommended: No  Thrombectomy Recommended:  (Pending MRI Brain)    NIH Scale:  1a. Level of Consciousness: 0-->Alert, keenly responsive  1b. LOC Questions: 0-->Answers both questions correctly  1c. LOC Commands: 0-->Performs both tasks correctly  2. Best Gaze: 0-->Normal  3. Visual: 1-->Partial hemianopia  4. Facial Palsy: 1-->Minor paralysis (flattened nasolabial fold, asymmetry on smiling)  5a. Motor Arm, Left: 4-->No movement  5b. Motor Arm, Right: 0-->No drift, limb holds 90 (or 45) degrees for full 10 secs  6a. Motor Leg, Left: 4-->No movement  6b. Motor Leg, Right: 0-->No drift, leg holds 30 degree position for full 5 secs  7. Limb Ataxia: 0-->Absent  8. Sensory: 1-->Mild-to-moderate sensory loss, patient feels pinprick is less sharp or is dull on the affected side, or there is a loss of superficial pain with pinprick, but patient is aware of being touched  9. Best Language: 0-->No aphasia, normal  10. Dysarthria: 0-->Normal  11. Extinction and Inattention (formerly Neglect): 0-->No  abnormality  Total (NIH Stroke Scale): 11       Modified Nome Score: 3  Tracy Coma Scale:    ABCD2 Score:    [id number] Score:   HAS -BLED Score:   ICH Score:   Hunt & Shane Classification:      Hemorrhagic change of an Ischemic Stroke: Does this patient have an ischemic stroke with hemorrhagic changes? No     Neurologic Chief Complaint: Infarcts in multiple vascular territories    Subjective:     Interval History: Patient is seen for follow-up neurological assessment and treatment recommendations: See Hospital Course    HPI, Past Medical, Family, and Social History remains the same as documented in the initial encounter.     Review of Systems   Constitutional:  Negative for fever.   HENT:  Positive for trouble swallowing.    Respiratory:  Positive for cough. Negative for shortness of breath.    Gastrointestinal:  Negative for abdominal pain, nausea and vomiting.   Musculoskeletal:         LUE swelling   Neurological:  Positive for facial asymmetry, weakness and numbness.   All other systems reviewed and are negative.    Scheduled Meds:   apixaban  5 mg Oral BID    atorvastatin  40 mg Oral Daily    carvediloL  12.5 mg Oral BID    famotidine  20 mg Oral Daily    insulin aspart U-100  3 Units Subcutaneous TIDWM    insulin glargine U-100  10 Units Subcutaneous Daily    k phos di & mono-sod phos mono  1 tablet Oral BID    magnesium sulfate IVPB  2 g Intravenous Once     Continuous Infusions:      PRN Meds:  Current Facility-Administered Medications:     acetaminophen, 650 mg, Per NG tube, Q8H PRN    dextrose 10%, 12.5 g, Intravenous, PRN    dextrose 10%, 25 g, Intravenous, PRN    glucagon (human recombinant), 1 mg, Intramuscular, PRN    hydrALAZINE, 10 mg, Intravenous, Q6H PRN    insulin aspart U-100, 0-10 Units, Subcutaneous, QID (AC + HS) PRN    iohexol, 15 mL, Oral, PRN    sodium chloride 0.9%, 500 mL, Intravenous, PRN    sodium chloride 0.9%, 10 mL, Intravenous, PRN    Flushing PICC/Midline Protocol, , , Until  Discontinued **AND** sodium chloride 0.9%, 10 mL, Intravenous, Q12H PRN    Objective:     Vital Signs (Most Recent):  Temp: 97.8 °F (36.6 °C) (10/24/24 1215)  Pulse: 69 (10/24/24 1215)  Resp: 18 (10/24/24 1215)  BP: 133/69 (10/24/24 1215)  SpO2: 99 % (10/24/24 1215)  BP Location: Left arm    Vital Signs Range (Last 24H):  Temp:  [97.6 °F (36.4 °C)-98.3 °F (36.8 °C)]   Pulse:  [55-86]   Resp:  [16-20]   BP: (133-190)/(67-88)   SpO2:  [95 %-99 %]   BP Location: Left arm       Physical Exam  Vitals and nursing note reviewed.   Constitutional:       General: She is awake.      Appearance: She is ill-appearing.   Eyes:      General:         Right eye: No discharge.         Left eye: No discharge.      Extraocular Movements: Extraocular movements intact.      Conjunctiva/sclera: Conjunctivae normal.   Cardiovascular:      Rate and Rhythm: Normal rate.   Pulmonary:      Effort: Pulmonary effort is normal. No respiratory distress.   Abdominal:      General: There is no distension.   Musculoskeletal:      Comments: LUE swelling   Skin:     General: Skin is warm and dry.          Neurological:      Mental Status: She is disoriented.      Cranial Nerves: Facial asymmetry present.      Sensory: Sensory deficit present.      Motor: Weakness present.            Neurological Exam:   LOC: alert  Language: No aphasia  Articulation: Dysarthria    Laboratory:  CMP:   Recent Labs   Lab 10/24/24  0316 10/24/24  0821   CALCIUM 8.3* 8.2*   ALBUMIN 2.3*  --    PROT 5.6*  --     143   K 3.0* 3.2*   CO2 27 27    108   BUN 7* 7*   CREATININE 0.6 0.6   ALKPHOS 68  --    ALT 32  --    AST 30  --    BILITOT 0.5  --      CBC:   Recent Labs   Lab 10/24/24  0316   WBC 8.33   RBC 3.51*   HGB 11.5*   HCT 34.0*      MCV 97   MCH 32.8*   MCHC 33.8       Diagnostic Results     Brain and Vessel Imaging   MRI/MRA Brain Ischemic Protocol: 10/6/24  Impression: Acute infarcts involving the right anterior circulation and posterior  circulation as detailed above without evidence of intracranial hemorrhage or mass effect.  Embolic disease to be considered.     Chronic left parietal infarct. MRA demonstrates limited visualization of distal right KIARA branches in keeping with the territory of the acute infarct.  There is also narrowing of at least one proximal left M2 branch although no acute infarct in that territory.          Echo: 10/7/24  Cardiac Imaging     Left Ventricle: The left ventricle is normal in size. Normal wall thickness. There is concentric remodeling. Normal wall motion. There is hyperdynamic systolic function with a visually estimated ejection fraction of greater than 70%. There is normal diastolic function.    Right Ventricle: Normal right ventricular cavity size. Wall thickness is normal. Systolic function is normal.    Left Atrium: Bubble study is likely negative, but full opacification of the RA is not well seen on these images which decreases sensitivity.    IVC/SVC: Normal venous pressure at 3 mmHg.    Vikas Chaudhari DO  Comprehensive Stroke Center  Department of Vascular Neurology   [facility])

## 2024-10-24 NOTE — ASSESSMENT & PLAN NOTE
Persistent/Recurrent HypoK in setting of normal mag    Urinary workup shows normal K  Stool studies pending  Hyperaldo workup pending

## 2024-10-24 NOTE — PT/OT/SLP PROGRESS
"Occupational Therapy   Treatment    Name: Tammy Multani  MRN: [id number]  Admitting Diagnosis:  Acute arterial ischemic stroke, multifocal, multiple vascular territories       Recommendations:     Discharge Recommendations: Moderate Intensity Therapy  Discharge Equipment Recommendations:  wheelchair, hospital bed, lift device  Barriers to discharge:  Decreased caregiver support    Assessment:     Tammy Multani is a 64 y.o. female with a medical diagnosis of Acute arterial ischemic stroke, multifocal, multiple vascular territories.  She presents found soiled in incontinent episode upon entry to session. Patient agreeable to session. Attempted education on one handed techniques and patient requiring increased time and multiple demonstrations. Noted increased awareness to L side today.  Performance deficits affecting function are weakness, impaired endurance, impaired self care skills, impaired functional mobility, gait instability, decreased lower extremity function, decreased upper extremity function, visual deficits, impaired balance, decreased safety awareness, impaired cognition.     Rehab Prognosis:  Good; patient would benefit from acute skilled OT services to address these deficits and reach maximum level of function.       Plan:     Patient to be seen 4 x/week to address the above listed problems via self-care/home management, therapeutic activities, therapeutic exercises, neuromuscular re-education  Plan of Care Expires: 11/07/24  Plan of Care Reviewed with: patient    Subjective     Chief Complaint: "I've had a few strokes by now"  Patient/Family Comments/goals: "I want to listen to Shauna"  Pain/Comfort:  Pain Rating 1: 0/10  Pain Rating Post-Intervention 1: 0/10    Objective:   Additional Staff Present: Therapy tech utilized during session due to patient complexity and required physical assistance to ensure patient safety    Communicated with: nursing prior to session.  Patient found HOB elevated " with TLSO, bed alarm, PureWick, peripheral IV upon OT entry to room.    General Precautions: Standard, fall, aspiration, pureed diet, vision impaired    Orthopedic Precautions:N/A  Braces: N/A  Respiratory Status: Room air     Occupational Performance:     Bed Mobility:    Patient completed Rolling/Turning to Left with  maximal assistance  Patient completed Rolling/Turning to Right with total assistance  Patient completed Supine to Sit with total assistance and 2 persons  Patient completed Sit to Supine with total assistance and 2 persons   Patient sat EOB for ~ 15 minutes with Minimal Assistance, although primarily positional d/t lack of postural reactions    Activities of Daily Living:  Grooming: minimum assistance to use rung oral swab   Upper Body Dressing: maximal assistance to don gown; patient educated on one handed techniques but with decreased attention to LUE   Lower Body Dressing: total assistance to don socks  Toileting: total assistance at bed level      [facility] 6 Click ADL: 8    Treatment & Education:    Patient educated on:   -increasing attention to L side  -purpose of OT and OT POC  -facilitation and education on proper body mechanics, energy conservation, and safety  -importance of early mobility   -overall benefits of therapy     All questions answered within OT scope and to patient's satisfaction    Patient left HOB elevated with all lines intact, call button in reach, bed alarm on, and nursing notified    GOALS:   Multidisciplinary Problems       Occupational Therapy Goals          Problem: Occupational Therapy    Goal Priority Disciplines Outcome Interventions   Occupational Therapy Goal     OT, PT/OT Progressing    Description: Goals to be met by: 11/7/24     Patient will increase functional independence with ADLs by performing:    UE Dressing with Minimal Assistance.  LE Dressing with Moderate Assistance.  Grooming while seated with Set-up Assistance.  Toileting from bedside commode with  Moderate Assistance for hygiene and clothing management.   Sitting at edge of bed x5 minutes with Contact Guard Assistance.  Supine to sit with Contact Guard Assistance.  Step transfer with Moderate Assistance    DME Justifications (see above for complete DME recommendations)    Bedside Commode- Patient has a mobility limitation that significantly impairs their ability to participate in one or more mobility related activities of daily living, including toileting. This deficit can be resolved by using a bedside commode. Patient demonstrates mobility limitations that will cause them to be confined to one room at home without bathroom access for up to 30 days. Using a bedside commode will greatly improve the patient's ability to participate in MRADLs.     Wheelchair-  Patient has a mobility limitation that significantly impairs their ability to participate in one or more mobility related activities of daily living in customary locations in the home. The mobility limitation cannot be sufficiently resolved by the use of a cane or walker. The use of a manual wheelchair will greatly improve the patient's ability to participate in MRADLs. The patient will use the wheelchair on a regular basis at home. They have expressed their willingness to use a manual wheelchair in the home, and have a caregiver who is available and willing to assist with the wheelchair if needed.     Hospital Bed-  Patient requires a hospital bed for positioning of the body in ways that are not feasible with an ordinary bed. The patient requires special positioning for pain relief, limited mobility, and/or being unable to independently make changes in body position without the use of a hospital bed. Pillows and wedges will not be adequate for resolving these positional issues.                        Time Tracking:     OT Date of Treatment: 10/24/24  OT Start Time: 1354  OT Stop Time: 1423  OT Total Time (min): 29 min    Billable Minutes:Self Care/Home  Management 29    OT/SUSANNE: OT     Number of SUSANNE visits since last OT visit: 0    10/24/2024

## 2024-10-24 NOTE — PLAN OF CARE
Problem: Skin Injury Risk Increased  Goal: Skin Health and Integrity  Outcome: Progressing     Problem: Stroke, Ischemic (Includes Transient Ischemic Attack)  Goal: Optimal Coping  Outcome: Progressing  Goal: Effective Bowel Elimination  Outcome: Progressing  Goal: Optimal Functional Ability  Outcome: Progressing  Goal: Optimal Nutrition Intake  Outcome: Progressing  Goal: Improved Sensorimotor Function  Outcome: Progressing  Goal: Safe and Effective Swallow  Outcome: Progressing  Goal: Effective Urinary Elimination  Outcome: Progressing     Problem: Fall Injury Risk  Goal: Absence of Fall and Fall-Related Injury  Outcome: Progressing     Problem: Adult Inpatient Plan of Care  Goal: Plan of Care Review  Outcome: Progressing   Stroke booklet reviewed w/ pt @ bedside. Pt still have normo color brown fully liquid large volume stools . 3 times per shift. Team aware. stool sample need to be collected and sent from her next BM. Replaced K,n  mg . Pt has no appetite. Barely eat something. All electrolytes came normal range in her evening bld draw.

## 2024-10-24 NOTE — SUBJECTIVE & OBJECTIVE
Neurologic Chief Complaint: Infarcts in multiple vascular territories    Subjective:     Interval History: Patient is seen for follow-up neurological assessment and treatment recommendations: See Hospital Course    HPI, Past Medical, Family, and Social History remains the same as documented in the initial encounter.     Review of Systems   Constitutional:  Negative for fever.   HENT:  Positive for trouble swallowing.    Respiratory:  Positive for cough. Negative for shortness of breath.    Gastrointestinal:  Negative for abdominal pain, nausea and vomiting.   Musculoskeletal:         LUE swelling   Neurological:  Positive for facial asymmetry, weakness and numbness.   All other systems reviewed and are negative.    Scheduled Meds:   apixaban  5 mg Oral BID    atorvastatin  40 mg Oral Daily    carvediloL  12.5 mg Oral BID    famotidine  20 mg Oral Daily    insulin aspart U-100  3 Units Subcutaneous TIDWM    insulin glargine U-100  10 Units Subcutaneous Daily    k phos di & mono-sod phos mono  1 tablet Oral BID    magnesium sulfate IVPB  2 g Intravenous Once     Continuous Infusions:      PRN Meds:  Current Facility-Administered Medications:     acetaminophen, 650 mg, Per NG tube, Q8H PRN    dextrose 10%, 12.5 g, Intravenous, PRN    dextrose 10%, 25 g, Intravenous, PRN    glucagon (human recombinant), 1 mg, Intramuscular, PRN    hydrALAZINE, 10 mg, Intravenous, Q6H PRN    insulin aspart U-100, 0-10 Units, Subcutaneous, QID (AC + HS) PRN    iohexol, 15 mL, Oral, PRN    sodium chloride 0.9%, 500 mL, Intravenous, PRN    sodium chloride 0.9%, 10 mL, Intravenous, PRN    Flushing PICC/Midline Protocol, , , Until Discontinued **AND** sodium chloride 0.9%, 10 mL, Intravenous, Q12H PRN    Objective:     Vital Signs (Most Recent):  Temp: 97.8 °F (36.6 °C) (10/24/24 1215)  Pulse: 69 (10/24/24 1215)  Resp: 18 (10/24/24 1215)  BP: 133/69 (10/24/24 1215)  SpO2: 99 % (10/24/24 1215)  BP Location: Left arm    Vital Signs Range (Last  24H):  Temp:  [97.6 °F (36.4 °C)-98.3 °F (36.8 °C)]   Pulse:  [55-86]   Resp:  [16-20]   BP: (133-190)/(67-88)   SpO2:  [95 %-99 %]   BP Location: Left arm       Physical Exam  Vitals and nursing note reviewed.   Constitutional:       General: She is awake.      Appearance: She is ill-appearing.   Eyes:      General:         Right eye: No discharge.         Left eye: No discharge.      Extraocular Movements: Extraocular movements intact.      Conjunctiva/sclera: Conjunctivae normal.   Cardiovascular:      Rate and Rhythm: Normal rate.   Pulmonary:      Effort: Pulmonary effort is normal. No respiratory distress.   Abdominal:      General: There is no distension.   Musculoskeletal:      Comments: LUE swelling   Skin:     General: Skin is warm and dry.          Neurological:      Mental Status: She is disoriented.      Cranial Nerves: Facial asymmetry present.      Sensory: Sensory deficit present.      Motor: Weakness present.            Neurological Exam:   LOC: alert  Language: No aphasia  Articulation: Dysarthria    Laboratory:  CMP:   Recent Labs   Lab 10/24/24  0316 10/24/24  0821   CALCIUM 8.3* 8.2*   ALBUMIN 2.3*  --    PROT 5.6*  --     143   K 3.0* 3.2*   CO2 27 27    108   BUN 7* 7*   CREATININE 0.6 0.6   ALKPHOS 68  --    ALT 32  --    AST 30  --    BILITOT 0.5  --      CBC:   Recent Labs   Lab 10/24/24  0316   WBC 8.33   RBC 3.51*   HGB 11.5*   HCT 34.0*      MCV 97   MCH 32.8*   MCHC 33.8       Diagnostic Results     Brain and Vessel Imaging   MRI/MRA Brain Ischemic Protocol: 10/6/24  Impression: Acute infarcts involving the right anterior circulation and posterior circulation as detailed above without evidence of intracranial hemorrhage or mass effect.  Embolic disease to be considered.     Chronic left parietal infarct. MRA demonstrates limited visualization of distal right KIARA branches in keeping with the territory of the acute infarct.  There is also narrowing of at least one  proximal left M2 branch although no acute infarct in that territory.          Echo: 10/7/24  Cardiac Imaging     Left Ventricle: The left ventricle is normal in size. Normal wall thickness. There is concentric remodeling. Normal wall motion. There is hyperdynamic systolic function with a visually estimated ejection fraction of greater than 70%. There is normal diastolic function.    Right Ventricle: Normal right ventricular cavity size. Wall thickness is normal. Systolic function is normal.    Left Atrium: Bubble study is likely negative, but full opacification of the RA is not well seen on these images which decreases sensitivity.    IVC/SVC: Normal venous pressure at 3 mmHg.

## 2024-10-25 LAB
ALBUMIN SERPL BCP-MCNC: 2.4 G/DL (ref 3.5–5.2)
ALP SERPL-CCNC: 71 U/L (ref 40–150)
ALT SERPL W/O P-5'-P-CCNC: 38 U/L (ref 10–44)
ANION GAP SERPL CALC-SCNC: 10 MMOL/L (ref 8–16)
ANION GAP SERPL CALC-SCNC: 11 MMOL/L (ref 8–16)
ANION GAP SERPL CALC-SCNC: 9 MMOL/L (ref 8–16)
AST SERPL-CCNC: 34 U/L (ref 10–40)
BASOPHILS # BLD AUTO: 0.03 K/UL (ref 0–0.2)
BASOPHILS NFR BLD: 0.3 % (ref 0–1.9)
BILIRUB SERPL-MCNC: 0.5 MG/DL (ref 0.1–1)
BUN SERPL-MCNC: 5 MG/DL (ref 8–23)
CALCIUM SERPL-MCNC: 8.1 MG/DL (ref 8.7–10.5)
CALCIUM SERPL-MCNC: 8.5 MG/DL (ref 8.7–10.5)
CALCIUM SERPL-MCNC: 8.6 MG/DL (ref 8.7–10.5)
CHLORIDE SERPL-SCNC: 105 MMOL/L (ref 95–110)
CHLORIDE SERPL-SCNC: 105 MMOL/L (ref 95–110)
CHLORIDE SERPL-SCNC: 106 MMOL/L (ref 95–110)
CHLORIDE SERPL-SCNC: 107 MMOL/L (ref 95–110)
CHLORIDE SERPL-SCNC: 107 MMOL/L (ref 95–110)
CO2 SERPL-SCNC: 24 MMOL/L (ref 23–29)
CO2 SERPL-SCNC: 25 MMOL/L (ref 23–29)
CO2 SERPL-SCNC: 26 MMOL/L (ref 23–29)
CO2 SERPL-SCNC: 26 MMOL/L (ref 23–29)
CO2 SERPL-SCNC: 27 MMOL/L (ref 23–29)
CREAT SERPL-MCNC: 0.6 MG/DL (ref 0.5–1.4)
CREAT SERPL-MCNC: 0.7 MG/DL (ref 0.5–1.4)
DIFFERENTIAL METHOD BLD: ABNORMAL
EOSINOPHIL # BLD AUTO: 0.1 K/UL (ref 0–0.5)
EOSINOPHIL NFR BLD: 1.3 % (ref 0–8)
ERYTHROCYTE [DISTWIDTH] IN BLOOD BY AUTOMATED COUNT: 13.1 % (ref 11.5–14.5)
EST. GFR  (NO RACE VARIABLE): >60 ML/MIN/1.73 M^2
GLUCOSE SERPL-MCNC: 143 MG/DL (ref 70–110)
GLUCOSE SERPL-MCNC: 160 MG/DL (ref 70–110)
GLUCOSE SERPL-MCNC: 160 MG/DL (ref 70–110)
GLUCOSE SERPL-MCNC: 171 MG/DL (ref 70–110)
GLUCOSE SERPL-MCNC: 183 MG/DL (ref 70–110)
HCT VFR BLD AUTO: 36 % (ref 37–48.5)
HGB BLD-MCNC: 12.7 G/DL (ref 12–16)
IMM GRANULOCYTES # BLD AUTO: 0.07 K/UL (ref 0–0.04)
IMM GRANULOCYTES NFR BLD AUTO: 0.8 % (ref 0–0.5)
LYMPHOCYTES # BLD AUTO: 2.4 K/UL (ref 1–4.8)
LYMPHOCYTES NFR BLD: 25.7 % (ref 18–48)
MAGNESIUM SERPL-MCNC: 2.2 MG/DL (ref 1.6–2.6)
MCH RBC QN AUTO: 33.2 PG (ref 27–31)
MCHC RBC AUTO-ENTMCNC: 35.3 G/DL (ref 32–36)
MCV RBC AUTO: 94 FL (ref 82–98)
MONOCYTES # BLD AUTO: 0.5 K/UL (ref 0.3–1)
MONOCYTES NFR BLD: 5.8 % (ref 4–15)
NEUTROPHILS # BLD AUTO: 6.2 K/UL (ref 1.8–7.7)
NEUTROPHILS NFR BLD: 66.1 % (ref 38–73)
NRBC BLD-RTO: 0 /100 WBC
PHOSPHATE SERPL-MCNC: 2.6 MG/DL (ref 2.7–4.5)
PLATELET # BLD AUTO: 216 K/UL (ref 150–450)
PMV BLD AUTO: 11.7 FL (ref 9.2–12.9)
POCT GLUCOSE: 191 MG/DL (ref 70–110)
POCT GLUCOSE: 202 MG/DL (ref 70–110)
POCT GLUCOSE: 85 MG/DL (ref 70–110)
POTASSIUM SERPL-SCNC: 3.3 MMOL/L (ref 3.5–5.1)
POTASSIUM SERPL-SCNC: 3.4 MMOL/L (ref 3.5–5.1)
POTASSIUM SERPL-SCNC: 3.5 MMOL/L (ref 3.5–5.1)
POTASSIUM SERPL-SCNC: 3.5 MMOL/L (ref 3.5–5.1)
POTASSIUM SERPL-SCNC: 4.3 MMOL/L (ref 3.5–5.1)
PROT SERPL-MCNC: 5.8 G/DL (ref 6–8.4)
RBC # BLD AUTO: 3.82 M/UL (ref 4–5.4)
SODIUM SERPL-SCNC: 140 MMOL/L (ref 136–145)
SODIUM SERPL-SCNC: 141 MMOL/L (ref 136–145)
SODIUM SERPL-SCNC: 141 MMOL/L (ref 136–145)
SODIUM SERPL-SCNC: 142 MMOL/L (ref 136–145)
SODIUM SERPL-SCNC: 142 MMOL/L (ref 136–145)
WBC # BLD AUTO: 9.31 K/UL (ref 3.9–12.7)

## 2024-10-25 PROCEDURE — 36415 COLL VENOUS BLD VENIPUNCTURE: CPT | Performed by: PHYSICIAN ASSISTANT

## 2024-10-25 PROCEDURE — 99900035 HC TECH TIME PER 15 MIN (STAT)

## 2024-10-25 PROCEDURE — 11000001 HC ACUTE MED/SURG PRIVATE ROOM

## 2024-10-25 PROCEDURE — 94761 N-INVAS EAR/PLS OXIMETRY MLT: CPT

## 2024-10-25 PROCEDURE — 80053 COMPREHEN METABOLIC PANEL: CPT | Performed by: PHYSICIAN ASSISTANT

## 2024-10-25 PROCEDURE — 80048 BASIC METABOLIC PNL TOTAL CA: CPT

## 2024-10-25 PROCEDURE — 84100 ASSAY OF PHOSPHORUS: CPT | Performed by: PHYSICIAN ASSISTANT

## 2024-10-25 PROCEDURE — 80048 BASIC METABOLIC PNL TOTAL CA: CPT | Mod: 91,XB

## 2024-10-25 PROCEDURE — 97112 NEUROMUSCULAR REEDUCATION: CPT | Mod: CQ

## 2024-10-25 PROCEDURE — 97530 THERAPEUTIC ACTIVITIES: CPT | Mod: CQ

## 2024-10-25 PROCEDURE — 99233 SBSQ HOSP IP/OBS HIGH 50: CPT | Mod: GC,,, | Performed by: PSYCHIATRY & NEUROLOGY

## 2024-10-25 PROCEDURE — 25000003 PHARM REV CODE 250: Performed by: STUDENT IN AN ORGANIZED HEALTH CARE EDUCATION/TRAINING PROGRAM

## 2024-10-25 PROCEDURE — 25000003 PHARM REV CODE 250

## 2024-10-25 PROCEDURE — 94664 DEMO&/EVAL PT USE INHALER: CPT

## 2024-10-25 PROCEDURE — 94668 MNPJ CHEST WALL SBSQ: CPT

## 2024-10-25 PROCEDURE — 83735 ASSAY OF MAGNESIUM: CPT | Performed by: PHYSICIAN ASSISTANT

## 2024-10-25 PROCEDURE — 85025 COMPLETE CBC W/AUTO DIFF WBC: CPT | Performed by: PHYSICIAN ASSISTANT

## 2024-10-25 PROCEDURE — 99232 SBSQ HOSP IP/OBS MODERATE 35: CPT | Mod: ,,, | Performed by: NURSE PRACTITIONER

## 2024-10-25 PROCEDURE — 36415 COLL VENOUS BLD VENIPUNCTURE: CPT

## 2024-10-25 RX ORDER — INSULIN ASPART 100 [IU]/ML
5 INJECTION, SOLUTION INTRAVENOUS; SUBCUTANEOUS
Status: DISCONTINUED | OUTPATIENT
Start: 2024-10-25 | End: 2024-10-28 | Stop reason: HOSPADM

## 2024-10-25 RX ADMIN — POTASSIUM BICARBONATE 30 MEQ: 391 TABLET, EFFERVESCENT ORAL at 10:10

## 2024-10-25 RX ADMIN — INSULIN GLARGINE 10 UNITS: 100 INJECTION, SOLUTION SUBCUTANEOUS at 08:10

## 2024-10-25 RX ADMIN — CARVEDILOL 12.5 MG: 12.5 TABLET, FILM COATED ORAL at 10:10

## 2024-10-25 RX ADMIN — INSULIN ASPART 2 UNITS: 100 INJECTION, SOLUTION INTRAVENOUS; SUBCUTANEOUS at 08:10

## 2024-10-25 RX ADMIN — DIBASIC SODIUM PHOSPHATE, MONOBASIC POTASSIUM PHOSPHATE AND MONOBASIC SODIUM PHOSPHATE 1 TABLET: 852; 155; 130 TABLET ORAL at 08:10

## 2024-10-25 RX ADMIN — DIBASIC SODIUM PHOSPHATE, MONOBASIC POTASSIUM PHOSPHATE AND MONOBASIC SODIUM PHOSPHATE 1 TABLET: 852; 155; 130 TABLET ORAL at 10:10

## 2024-10-25 RX ADMIN — INSULIN ASPART 5 UNITS: 100 INJECTION, SOLUTION INTRAVENOUS; SUBCUTANEOUS at 06:10

## 2024-10-25 RX ADMIN — INSULIN ASPART 4 UNITS: 100 INJECTION, SOLUTION INTRAVENOUS; SUBCUTANEOUS at 12:10

## 2024-10-25 RX ADMIN — CARVEDILOL 12.5 MG: 12.5 TABLET, FILM COATED ORAL at 08:10

## 2024-10-25 RX ADMIN — INSULIN ASPART 3 UNITS: 100 INJECTION, SOLUTION INTRAVENOUS; SUBCUTANEOUS at 12:10

## 2024-10-25 RX ADMIN — POTASSIUM BICARBONATE 30 MEQ: 391 TABLET, EFFERVESCENT ORAL at 08:10

## 2024-10-25 RX ADMIN — FAMOTIDINE 20 MG: 20 TABLET ORAL at 08:10

## 2024-10-25 RX ADMIN — ATORVASTATIN CALCIUM 40 MG: 40 TABLET, FILM COATED ORAL at 08:10

## 2024-10-25 RX ADMIN — APIXABAN 5 MG: 5 TABLET, FILM COATED ORAL at 08:10

## 2024-10-25 RX ADMIN — INSULIN ASPART 3 UNITS: 100 INJECTION, SOLUTION INTRAVENOUS; SUBCUTANEOUS at 08:10

## 2024-10-25 RX ADMIN — APIXABAN 5 MG: 5 TABLET, FILM COATED ORAL at 10:10

## 2024-10-25 NOTE — PT/OT/SLP PROGRESS
"Physical Therapy Treatment    Patient Name:  Tammy Multani   MRN:  [id number]    Recommendations:     Discharge Recommendations: High Intensity Therapy  Discharge Equipment Recommendations: wheelchair, hospital bed, lift device  Barriers to discharge: Decreased caregiver support and Increased level of assistance required    Assessment:     Tammy Multani is a 64 y.o. female admitted with a medical diagnosis of Acute arterial ischemic stroke, multifocal, multiple vascular territories.  She presents with the following impairments/functional limitations: weakness, impaired endurance, impaired self care skills, impaired functional mobility, gait instability, decreased lower extremity function, decreased upper extremity function, visual deficits, impaired balance, decreased safety awareness, impaired coordination.    Pt met with HOB elevated and agreeable to session. Pt tolerates session well with emphasis on bed mobility, sitting balance, and therex. Improvement noted in alertness compared to previous PT session. Pt will continue to benefit from skilled therapy services.    Rehab Prognosis: Good; patient would benefit from acute skilled PT services to address these deficits and reach maximum level of function.    Recent Surgery: * No surgery found *      Plan:     During this hospitalization, patient to be seen 4 x/week to address the identified rehab impairments via gait training, therapeutic activities, therapeutic exercises, neuromuscular re-education and progress toward the following goals:    Plan of Care Expires:  11/07/24    Subjective     Chief Complaint: "I have to pee"  Patient/Family Comments/goals: pt agreeable to session  Pain/Comfort:  Pain Rating 1: 0/10  Pain Rating Post-Intervention 1: 0/10      Objective:     Communicated with RN (Mayuri) prior to session.  Patient found HOB elevated with bed alarm, PureWick, telemetry upon PTA entry to room.     General Precautions: Standard, fall, aspiration, " vision impaired  Orthopedic Precautions: N/A  Braces: N/A  Respiratory Status: Room air    Pt assisted with perianal hygiene, sharona pad, and gown change d/t soiled with urine an BM.     Cognition:   Oriented X 3, Alert, and Cooperative   Patient is oriented to Person, Place, Situation  Command following: Follows one-step verbal commands  Fluency: clear/fluent       Functional Mobility:  Bed Mobility:     Rolling Right: maximal assistance  Rolling Left:  maximal assistance  Scooting: anteriorly to EOB maximal assistance via sharona pads  Supine to Sit: maximal assistance x2 persons  Sit to Supine: total assistance x2 persons  Balance:   Sitting Balance at EOB:  Level of Assist Required: Moderate Assistance and Maximum Assistance  Deviations noted: slouched posture, multidirectional LOB d/t reaching for bedrail with BUE, posterior lean  Verbal and tactile cues and assistance provided for upright posture, scapular retraction, B hand positioning  to help correct midline orientation   Total Time Sitting EOB:    AM-PAC 6 CLICK MOBILITY  Turning over in bed (including adjusting bedclothes, sheets and blankets)?: 2  Sitting down on and standing up from a chair with arms (e.g., wheelchair, bedside commode, etc.): 1  Moving from lying on back to sitting on the side of the bed?: 1  Moving to and from a bed to a chair (including a wheelchair)?: 1  Need to walk in hospital room?: 1  Climbing 3-5 steps with a railing?: 1  Basic Mobility Total Score: 7       Treatment & Education:  Patient provided with daily orientation and goals of this PT session.     Activities completed at EOB:  PROM LLE  x10 AP, LAQ, hip flexion  AROM RLE x10 AP, LAQ, hip flexion  PTA provided skilled instruction  for good form and technique and verbal and tactile cueing for continuation of and attention to task.     Pt educated to call for assistance and to transfer with hospital staff only.  Also, pt was educated on the effects of prolonged immobility and  the importance of performing OOB activity and exercises to promote healing and reduce recovery time.    Patient left HOB elevated with all lines intact, call button in reach, bed alarm on, and RN notified.    GOALS:   Multidisciplinary Problems       Physical Therapy Goals          Problem: Physical Therapy    Goal Priority Disciplines Outcome Interventions   Physical Therapy Goal     PT, PT/OT Progressing    Description: Goals to be met by: 2024     Patient will increase functional independence with mobility by performin. Supine to sit with Moderate Assistance  2. Sit to supine with Moderate Assistance  3. Bed to chair transfer with Maximum Assistance using LRAD  4. Sitting at edge of bed x5 minutes with Stand-by Assistance  5. Lower extremity exercise program x15 reps per handout, with independence    Hospital Bed - Patient requires a hospital bed for positioning of the body in ways that are not feasible with an ordinary bed. The patient requires special positioning for pain relief, limited mobility, and/or being unable to independently make changes in body position without the use of a hospital bed. Pillows and wedges will not be adequate for resolving these positional issues.     Wheelchair - Patient has a mobility limitation that significantly impairs their ability to participate in one or more mobility related activities of daily living in customary locations in the home. The mobility limitation cannot be sufficiently resolved by the use of a cane or walker. The use of a manual wheelchair will greatly improve the patient's ability to participate in MRADLs. The patient will use the wheelchair on a regular basis at home. They have expressed their willingness to use a manual wheelchair in the home, and have a caregiver who is available and willing to assist with the wheelchair if needed.                       Time Tracking:     PT Received On: 10/25/24  PT Start Time: 823     PT Stop Time: 847  PT  Total Time (min): 24 min     Billable Minutes: Therapeutic Activity 16 and Neuromuscular Re-education 8    Treatment Type: Treatment  PT/PTA: PTA     Number of PTA visits since last PT visit: 1     10/25/2024

## 2024-10-25 NOTE — PLAN OF CARE
[facility] ([facility])  Discharge Reassessment    Primary Care Provider: Soha Tucker FNP    Expected Discharge Date: 10/28/2024    Reassessment (most recent)       Discharge Reassessment - 10/25/24 1245          Discharge Reassessment    Assessment Type Discharge Planning Reassessment (P)      Did the patient's condition or plan change since previous assessment? Yes (P)      Discharge Plan discussed with: Patient;Adult children (P)      Communicated SHARONDA with patient/caregiver Yes (P)      Discharge Plan A Rehab (P)      DME Needed Upon Discharge  none (P)      Transition of Care Barriers None (P)      Why the patient remains in the hospital Acute bed availability (P)         Post-Acute Status    Post-Acute Authorization Placement (P)      Post-Acute Placement Status Set-up Complete/Auth obtained (P)      Discharge Delays Change in Medical Condition (P)                  LISS received notification that patient is medically ready to discharge to [facility].  However, when LISS contacted Elin at [facility] ([phone]), she stated they had to give the bed away and would be able to accept on Monday.  LISS notified provider.  Patient will need repeat covid to be completed on Sunday.      Discharge Plan A and Plan B have been determined by review of patient's clinical status, future medical and therapeutic needs, and coverage/benefits for post-acute care in coordination with multidisciplinary team members.    Miladys Brown, LISS  [facility]  [phone]

## 2024-10-25 NOTE — PROGRESS NOTES
"Zackary Long - Neurosurgery ([facility])  Vascular Neurology  Comprehensive Stroke Center  Progress Note    Assessment/Plan:     * Acute arterial ischemic stroke, multifocal, multiple vascular territories  63 y/o F transferred from [facility] for higher level of care secondary to acute KIARA stroke on CTA.  Briefly,the patient presented to the OSH overnight for slurred speech started at 1:30 a.m. in the morning.  She has a history of his stroke with left-sided deficits but is normally able to transfer and do a little walking. This morning, she had complete hemiplegia of the left side. Telestroke consult performed, concerning for LVO.  NIH 13. CTA Stroke MP performed and  was read as "acute R KIARA infarct". No TNK due to AC use. Patient accepted to ED as transfer for emergent vascular neurology evaluation. No endovascular intervention. History of atrial fibrillation per discussion with daughter. Was started on Eliquis on 10/6, currently on heparin drip given likely lack of absorption through NG tube in setting of Ileus. Given history of Afib, etiology likely embolic. Though not apparent on MRA, if R KIARA occlusion was proximal (supported by MRI) could account for R MCA-like picture. Transient episodes of altered awareness: EEG negative, and no change in frequency observed by family w/Keppra so have stopped this medicine.     NAEON. Neuro exam remains stable. Patient with audible upper respiratory sounds and cough. CPT ordered.   -Eliquis 5 mg BID   -Atorvastatin 40 mg daily  -SBP<180  -PT/OT-Current recs for high intensity therapy  -Diet advanced to clear liquids 10/19    Left upper extremity swelling  -Patient noted to have LUE swelling and discomfort this am (10/20/2024).  -LUE US pending  -LUE elevated    Hypernatremia  Resolved    Plan:   - Initially given D5W to correct, labs to monitor rate of correction   - Was on NS w/KCl but becoming acidotic on labs.  -Na remains 148 this am  -IVFs changed to " LR    Ileus  Resolved      Patient was seen by general surgery for some episodes of nausea and emesis and some constipation. KUB was concerning for SBO vs. Ileus. Follow up CT AP did not show evidence of mechanical obstruction, was more indicative of Ileus. XR gastrogaffin challenge ordered by general surgery.     Plan:   - Was NPO. Approved to advance diet as tolerated by Gen Surg on 10/19. NGT d/c'd. Patient was started on a Clear liquid diet and tolerated it well. Advanced to previous diet recommendation of puree, nectar thick today (10/20). SLP to re-eval 10/21  - monitor electrolytes daily, correct as needed   - Eliquis switched to heparin as it might not have been fully absorbed considering Ileus. Resumed Eliquis 10/19.    - scheduled suppository regimen, f/u on effectiveness and escalate prn  - general surgery following, appreciate recs     Acute focal neurological deficit  Episodes of transient altered mental status observed by daughter, sometimes with a brief left gaze. Given location of KIARA stroke, considered seizure. EEG done on 10/11, negative.     Plan:   - no longer on keppra regimen     KAILYN (acute kidney injury)  RESOLVED  Cr was initially 1.6, up from a baseline of 1. Likely pre-renal KAILYN, due to decreased PO intake. Resolved with fluids.     Plan:   - Continue to monitor CMP.   -SCr has been stable since 10/14/24    Type 2 diabetes mellitus, with long-term current use of insulin  History of. Takes Levemir 35 units QHS at home. A1C 10%.    -goal 140-180 while admitted  -SSI prn   -continue to monitor glucose  -Endocrine consulted for A1c 10%; following, appreciate recs   -24° glucose 173-211        dextrose 10% bolus 125 mL 125 mL, 12.5 g, Intravenous, PRN    dextrose 10% bolus 250 mL 250 mL, 25 g, Intravenous, PRN    glucagon (human recombinant) injection 1 mg, 1 mg, Intramuscular, PRN    insulin aspart U-100 pen 0-10 Units, 0-10 Units, Subcutaneous, QID (AC + HS) PRN    Atrial fibrillation  Takes  Eliquis 5mg BID and Coreg 6.25mg BID at home.  Initially Eliquis held given size of stroke; ASA 81mg QD in the interim. Eliquis was restarted on 10/10.       Plan:   -continue at home coreg regimen   -eliquis switched to heparin drip due to issues described in ileus section of the assessment and plan.   -Heparin gtt turned off 10/19. Eliquis resumed on 10/19.  -24° HR 63-78    Opioid use  History of. Prescribed oxycodone 15mg at home. Family concerned that medication is being diverted by family member, unclear how often patient takes. Reportedly is prescribed for neck pain.     Plan:   - holding oxycodone due to ileus    Left hemiplegia  See stroke    Plan:   - continue PT/OT/SLP   -Current recs for high intensity therapy    Hypokalemia  Persistent/Recurrent HypoK in setting of normal mag. Improving. BID labs    Urinary workup shows normal K  Stool studies pending  Hyperaldo workup pending    HTN (hypertension)  Home medication regimen as below  -lisinopril 40mg QD  -amlodipine 10mg QD    Plan:   -lisinopril and amlodipine were initially held in the setting of KAILYN. Was started on coreg in the context of Afib.   -if patient's BP still elevated, consider adding back amlodipine and lisinopril   -Goal SBP<180 for now  24° -182      carvediloL tablet 12.5 mg, Oral, BID    hydrALAZINE injection 10 mg, Intravenous, Q6H PRN         10/7: gómez SINCLAIR exam stable, echo today, NPO aside from meds per speech, barium study tmrw  10/08/2024 DOTTIE, stable neuro exam, went for the Br study, and was started on a diet. Endocrine consulted for the A1c 10%, pending recs.  10/09/2024 DOTTIE, Stable neuro exam, will restart Lisinopril 20 mg (half of her dome dose). Seen by endocrine, with adjustment of her insulin doses.  10/10/2024 DOTTIE, no change in her neuro exam, will restart Eliquis today, BP is better controlled.  10/11/2024 NAEON, stable neuro exam, still decreased PO intake. Patient gets some episodes of in attention; EEG  was ordered. Endocrine adjusted her insulins. Accepted at [facility] in [place].   10/12/2024 NAEON, waiting on EEG interpretation (contacted epilepsy), started Keppra 500mg BID and will see if reduces transient alterations in awareness, neurologic examination stable  10/13/2024 NAEON, EEG w/encephalopathy (no seizure or discharges), stop Keppra, SBP ~150 to 160 so increased Coreg to 6.25mg BID  10/14/2024 General surgery was consulted overnight for constipation, episodes of nausea and emesis. KUB concerning for SBO vs. Ileus. F/u CT AP was more indicative of ileus, no evidence of mechanical obstruction was observed. Patient is NPO, can get some meds via NGT. Eliquis switched to heparin drip.   10/15/2024 Patient has still not had an actual bowel movement. Is distended, has some abdominal pain. General surgery following, is getting XR gastrogaffin challenge, will f/u results.   10/16/2024 Still monitoring patient for bowel movements in the setting of Ileus. General surgery is following. Mild hyperkalemia and hypernatremia this morning; will continue to monitor.   10/17/2024 Patient still has not had a bowel movement. Ileus not resolved on repeat imaging. Suppository ordered earlier this morning, will monitor to see if improvement with suppository.   10/18/2024 Patient still had not had a solid bowel movement due to her ileus, had a number of watery movements yesterday but nothing substantive. Suppositories now scheduled. General surgery following.   10/19/2024 Patient stable overnight. LBM yesterday. Gen Surg ok with advancing diet to clear liquids. Heparin gtt stopped, started on Eliquis. Awaiting placement.  10/20/2024 NAEON. Neuro exam remains stable. Patient with audible upper respiratory sounds and cough. CPT ordered. Patient noted to be increasingly acidotic on labs. IVFs changed from NS to LR. Patient awaiting placement.  10/21/24 NAEON. Neuro exam remains stable. Not oriented to time. Family says patient's  exam stable compared to yesterday. SLP to re-evaluate, on diet level 4. Placement pending  10/22/24 NAEON. Neuro exam remains stable. Not oriented to time. Family says patient's exam stable compared to yesterday. SLP to re-evaluate, on diet level 4. Placement pending  10/23/24 NAEON. Neuro exam remains stable/slightly improved mental status. Oriented to time. Accepted for placement but will plan for d/c tomorrow given hypokalemia. Lytes corrected and repeat lab pending.  10/24/24 NAEON. Neuro exam remains stable/slightly improved mental status. Workup for hypoK ordered and pending. DDX HyperAldo vs Diarrhea.  10/25/2024 NAEON. Neuro exam remains stable/slightly improved mental status. Diarrhea resolving. D/C Monday d/t logisitics      STROKE DOCUMENTATION   Acute Stroke Times   Last Known Normal Date:  (OSH)  Stroke Team Called Date: 10/06/24  Stroke Team Called Time:  (At OSH)  Stroke Team Arrival Date: 10/06/24  Stroke Team Arrival Time: 0608  CT Interpretation Time:  (CTA done around 4:20am per documents that came to C with patient)  Thrombolytic Therapy Recommended: No  Thrombectomy Recommended:  (Pending MRI Brain)    NIH Scale:  1a. Level of Consciousness: 0-->Alert, keenly responsive  1b. LOC Questions: 0-->Answers both questions correctly  1c. LOC Commands: 0-->Performs both tasks correctly  2. Best Gaze: 0-->Normal  3. Visual: 1-->Partial hemianopia  4. Facial Palsy: 1-->Minor paralysis (flattened nasolabial fold, asymmetry on smiling)  5a. Motor Arm, Left: 4-->No movement  5b. Motor Arm, Right: 0-->No drift, limb holds 90 (or 45) degrees for full 10 secs  6a. Motor Leg, Left: 4-->No movement  6b. Motor Leg, Right: 0-->No drift, leg holds 30 degree position for full 5 secs  7. Limb Ataxia: 0-->Absent  8. Sensory: 1-->Mild-to-moderate sensory loss, patient feels pinprick is less sharp or is dull on the affected side, or there is a loss of superficial pain with pinprick, but patient is aware of being  touched  9. Best Language: 0-->No aphasia, normal  10. Dysarthria: 0-->Normal  11. Extinction and Inattention (formerly Neglect): 0-->No abnormality  Total (NIH Stroke Scale): 11       Modified [place] Score: 3  [place] Coma Scale:    ABCD2 Score:    [id number] Score:   HAS -BLED Score:   ICH Score:   Hunt & Shane Classification:      Hemorrhagic change of an Ischemic Stroke: Does this patient have an ischemic stroke with hemorrhagic changes? No     Neurologic Chief Complaint: Infarcts in multiple vascular territories    Subjective:     Interval History: Patient is seen for follow-up neurological assessment and treatment recommendations: See Hospital Course    HPI, Past Medical, Family, and Social History remains the same as documented in the initial encounter.     Review of Systems   Constitutional:  Negative for fever.   HENT:  Positive for trouble swallowing.    Respiratory:  Positive for cough. Negative for shortness of breath.    Gastrointestinal:  Negative for abdominal pain, nausea and vomiting.   Musculoskeletal:         LUE swelling   Neurological:  Positive for facial asymmetry, weakness and numbness.   All other systems reviewed and are negative.    Scheduled Meds:   apixaban  5 mg Oral BID    atorvastatin  40 mg Oral Daily    carvediloL  12.5 mg Oral BID    famotidine  20 mg Oral Daily    insulin aspart U-100  5 Units Subcutaneous TIDWM    insulin glargine U-100  10 Units Subcutaneous Daily    k phos di & mono-sod phos mono  1 tablet Oral BID     Continuous Infusions:      PRN Meds:  Current Facility-Administered Medications:     acetaminophen, 650 mg, Per NG tube, Q8H PRN    dextrose 10%, 12.5 g, Intravenous, PRN    dextrose 10%, 25 g, Intravenous, PRN    glucagon (human recombinant), 1 mg, Intramuscular, PRN    hydrALAZINE, 5 mg, Intravenous, Q6H PRN    insulin aspart U-100, 0-10 Units, Subcutaneous, QID (AC + HS) PRN    iohexol, 15 mL, Oral, PRN    sodium chloride 0.9%, 500 mL, Intravenous, PRN     sodium chloride 0.9%, 10 mL, Intravenous, PRN    Flushing PICC/Midline Protocol, , , Until Discontinued **AND** sodium chloride 0.9%, 10 mL, Intravenous, Q12H PRN    Objective:     Vital Signs (Most Recent):  Temp: 97.9 °F (36.6 °C) (10/25/24 1144)  Pulse: 64 (10/25/24 1144)  Resp: 20 (10/25/24 1144)  BP: (!) 143/76 (10/25/24 1144)  SpO2: 97 % (10/25/24 1144)  BP Location: Left arm    Vital Signs Range (Last 24H):  Temp:  [97.5 °F (36.4 °C)-97.9 °F (36.6 °C)]   Pulse:  [55-71]   Resp:  [16-20]   BP: (127-200)/(76-93)   SpO2:  [96 %-98 %]   BP Location: Left arm       Physical Exam  Vitals and nursing note reviewed.   Constitutional:       General: She is awake.      Appearance: She is ill-appearing.   Eyes:      General:         Right eye: No discharge.         Left eye: No discharge.      Extraocular Movements: Extraocular movements intact.      Conjunctiva/sclera: Conjunctivae normal.   Cardiovascular:      Rate and Rhythm: Normal rate.   Pulmonary:      Effort: Pulmonary effort is normal. No respiratory distress.   Abdominal:      General: There is no distension.   Musculoskeletal:      Comments: LUE swelling   Skin:     General: Skin is warm and dry.          Neurological:      Mental Status: She is disoriented.      Cranial Nerves: Facial asymmetry present.      Sensory: Sensory deficit present.      Motor: Weakness present.            Neurological Exam:   LOC: alert  Language: No aphasia  Articulation: Dysarthria    Laboratory:  CMP:   Recent Labs   Lab 10/25/24  0410 10/25/24  1114   CALCIUM 8.5*  8.5* 8.5*   ALBUMIN 2.4*  --    PROT 5.8*  --      142 141   K 3.5  3.5 4.3   CO2 26  26 24     107 106   BUN 5*  5* 5*   CREATININE 0.6  0.6 0.7   ALKPHOS 71  --    ALT 38  --    AST 34  --    BILITOT 0.5  --      CBC:   Recent Labs   Lab 10/25/24  0410   WBC 9.31   RBC 3.82*   HGB 12.7   HCT 36.0*      MCV 94   MCH 33.2*   MCHC 35.3       Diagnostic Results     Brain and Vessel Imaging    MRI/MRA Brain Ischemic Protocol: 10/6/24  Impression: Acute infarcts involving the right anterior circulation and posterior circulation as detailed above without evidence of intracranial hemorrhage or mass effect.  Embolic disease to be considered.     Chronic left parietal infarct. MRA demonstrates limited visualization of distal right KIARA branches in keeping with the territory of the acute infarct.  There is also narrowing of at least one proximal left M2 branch although no acute infarct in that territory.          Echo: 10/7/24  Cardiac Imaging     Left Ventricle: The left ventricle is normal in size. Normal wall thickness. There is concentric remodeling. Normal wall motion. There is hyperdynamic systolic function with a visually estimated ejection fraction of greater than 70%. There is normal diastolic function.    Right Ventricle: Normal right ventricular cavity size. Wall thickness is normal. Systolic function is normal.    Left Atrium: Bubble study is likely negative, but full opacification of the RA is not well seen on these images which decreases sensitivity.    IVC/SVC: Normal venous pressure at 3 mmHg.    Vikas Chaudhari DO  Comprehensive Stroke Center  Department of Vascular Neurology   [facility] - [facility])

## 2024-10-25 NOTE — ASSESSMENT & PLAN NOTE
Persistent/Recurrent HypoK in setting of normal mag. Improving. BID labs    Urinary workup shows normal K  Stool studies pending  Hyperaldo workup pending

## 2024-10-25 NOTE — ASSESSMENT & PLAN NOTE
BG goal: 140-180  T2DM admitted with acute KIARA stroke. Lantus d/c on 10/16 do to hypoglycemia. Morning blood sugars trending up.  Long acting insulin added back to insulin regimen at 0.5u/kg/d for AM hyperglycemia.    - Continue Lanuts 10 units QD   - Increase Novolog to 5 units TID with meals (based on prn SQ insulin requirements)   - Continue Novolog MDC SSI with ISF 25 starting at 150    - POCT Glucose ac/hs  - Hypoglycemia protocol in place      ** Please notify Endocrine for any change and/or advance in diet**  ** Please call Endocrine for any BG related issues **     Discharge Planning: TBD. Please notify endocrinology prior to discharge.

## 2024-10-25 NOTE — PT/OT/SLP PROGRESS
Speech Language Pathology      Tammy Multani  MRN: [id number]    Patient not seen today secondary to Bowel/bladder accident. SLP attempted twice at 1505 and 1532.  Will follow-up 10/28.

## 2024-10-25 NOTE — PROGRESS NOTES
"Zackary Long - Neurosurgery ([facility])  Endocrinology  Progress Note    Admit Date: 10/6/2024     Reason for Consult: Management of T2DM, Hyperglycemia       Diabetes diagnosis year:  Over 5 years    Home Diabetes Medications:    -liraglutide  -Levemir 35 units QHS    How often checking glucose at home? Not checking       Diabetes Complications include:     Hyperglycemia    Complicating diabetes co morbidities:   History of CVA      HPI:   Patient is a 64 y.o. female with  DM2 , HTN who was transferred from [facility] for higher level of care secondary to acute KIARA stroke on CTA. Briefly,the patient presented to the OSH overnight for slurred speech started at 1:30 a.m. in the morning. She has a history of his stroke with left-sided deficits but is normally able to transfer and do a little walking.  Endocrine consulted for BG management.      Interval HPI:   Overnight events:  No acute events overnight. Patient in room 960/960 A. Blood glucose stable. BG at and above goal on current insulin regimen (SSI, prandial, and basal insulin ). Steroid use- None.    Renal function- Normal   Vasopressors-  None     Lab Results   Component Value Date    CREATININE 0.6 10/25/2024    CREATININE 0.6 10/25/2024       Endocrine will continue to follow and manage insulin orders inpatient.         Diet Pureed (IDDSI Level 4) Nectar Thick (Mildly Thick); Standard Tray     Eatin%  Nausea: No  Hypoglycemia and intervention: No  Fever: No  TPN and/or TF: No    BP (!) 182/85   Pulse (!) 55   Temp 97.7 °F (36.5 °C)   Resp 18   Ht 5' 2" (1.575 m)   Wt 72.6 kg (160 lb 0.9 oz)   LMP  (LMP Unknown) Comment: Doesn't  have periods anymore  SpO2 97%   Breastfeeding No   BMI 29.27 kg/m²     Labs Reviewed and Include    Recent Labs   Lab 10/25/24  0410   *  160*   CALCIUM 8.5*  8.5*   ALBUMIN 2.4*   PROT 5.8*     142   K 3.5  3.5   CO2 26  26     107   BUN 5*  5*   CREATININE 0.6  0.6   ALKPHOS 71 " "  ALT 38   AST 34   BILITOT 0.5     Lab Results   Component Value Date    WBC 9.31 10/25/2024    HGB 12.7 10/25/2024    HCT 36.0 (L) 10/25/2024    MCV 94 10/25/2024     10/25/2024     No results for input(s): "TSH", "FREET4" in the last 168 hours.  Lab Results   Component Value Date    HGBA1C 10.0 (H) 10/08/2024       Nutritional status:   Body mass index is 29.27 kg/m².  Lab Results   Component Value Date    ALBUMIN 2.4 (L) 10/25/2024    ALBUMIN 2.3 (L) 10/24/2024    ALBUMIN 2.1 (L) 10/23/2024     No results found for: "PREALBUMIN"    Estimated Creatinine Clearance: 87.2 mL/min (based on SCr of 0.6 mg/dL).    Accu-Checks  Recent Labs     10/22/24  1155 10/22/24  1625 10/22/24  2126 10/23/24  0819 10/23/24  1212 10/24/24  0827 10/24/24  1217 10/24/24  1558 10/24/24  2033 10/25/24  0810   POCTGLUCOSE 163* 146* 137* 220* 160* 201* 231* 167* 162* 191*       Current Medications and/or Treatments Impacting Glycemic Control  Immunotherapy:    Immunosuppressants       None          Steroids:   Hormones (From admission, onward)      None          Pressors:    Autonomic Drugs (From admission, onward)      None          Hyperglycemia/Diabetes Medications:   Antihyperglycemics (From admission, onward)      Start     Stop Route Frequency Ordered    10/24/24 1045  insulin glargine U-100 (Lantus) pen 10 Units         -- SubQ Daily 10/24/24 1041    10/21/24 1130  insulin aspart U-100 pen 3 Units         -- SubQ 3 times daily with meals 10/21/24 0902    10/19/24 2256  insulin aspart U-100 pen 0-10 Units         -- SubQ Before meals & nightly PRN 10/19/24 2157            ASSESSMENT and PLAN    Neuro  * Acute arterial ischemic stroke, multifocal, multiple vascular territories  Managed by primary team  Optimize BG control      Cardiac/Vascular  HTN (hypertension)  Managed by primary team  Optimize BG control      Endocrine  Type 2 diabetes mellitus, with long-term current use of insulin  BG goal: 140-180  T2DM admitted with " acute KIARA stroke. Lantus d/c on 10/16 do to hypoglycemia. Morning blood sugars trending up.  Long acting insulin added back to insulin regimen at 0.5u/kg/d for AM hyperglycemia.    - Continue Lanuts 10 units QD   - Increase Novolog to 5 units TID with meals (based on prn SQ insulin requirements)   - Continue Novolog MDC SSI with ISF 25 starting at 150    - POCT Glucose ac/hs  - Hypoglycemia protocol in place      ** Please notify Endocrine for any change and/or advance in diet**  ** Please call Endocrine for any BG related issues **     Discharge Planning: TBD. Please notify endocrinology prior to discharge.            Mattie Ramsey, NP  Endocrinology  Zackary Long - Neurosurgery ([facility])

## 2024-10-25 NOTE — SUBJECTIVE & OBJECTIVE
Neurologic Chief Complaint: Infarcts in multiple vascular territories    Subjective:     Interval History: Patient is seen for follow-up neurological assessment and treatment recommendations: See Hospital Course    HPI, Past Medical, Family, and Social History remains the same as documented in the initial encounter.     Review of Systems   Constitutional:  Negative for fever.   HENT:  Positive for trouble swallowing.    Respiratory:  Positive for cough. Negative for shortness of breath.    Gastrointestinal:  Negative for abdominal pain, nausea and vomiting.   Musculoskeletal:         LUE swelling   Neurological:  Positive for facial asymmetry, weakness and numbness.   All other systems reviewed and are negative.    Scheduled Meds:   apixaban  5 mg Oral BID    atorvastatin  40 mg Oral Daily    carvediloL  12.5 mg Oral BID    famotidine  20 mg Oral Daily    insulin aspart U-100  5 Units Subcutaneous TIDWM    insulin glargine U-100  10 Units Subcutaneous Daily    k phos di & mono-sod phos mono  1 tablet Oral BID     Continuous Infusions:      PRN Meds:  Current Facility-Administered Medications:     acetaminophen, 650 mg, Per NG tube, Q8H PRN    dextrose 10%, 12.5 g, Intravenous, PRN    dextrose 10%, 25 g, Intravenous, PRN    glucagon (human recombinant), 1 mg, Intramuscular, PRN    hydrALAZINE, 5 mg, Intravenous, Q6H PRN    insulin aspart U-100, 0-10 Units, Subcutaneous, QID (AC + HS) PRN    iohexol, 15 mL, Oral, PRN    sodium chloride 0.9%, 500 mL, Intravenous, PRN    sodium chloride 0.9%, 10 mL, Intravenous, PRN    Flushing PICC/Midline Protocol, , , Until Discontinued **AND** sodium chloride 0.9%, 10 mL, Intravenous, Q12H PRN    Objective:     Vital Signs (Most Recent):  Temp: 97.9 °F (36.6 °C) (10/25/24 1144)  Pulse: 64 (10/25/24 1144)  Resp: 20 (10/25/24 1144)  BP: (!) 143/76 (10/25/24 1144)  SpO2: 97 % (10/25/24 1144)  BP Location: Left arm    Vital Signs Range (Last 24H):  Temp:  [97.5 °F (36.4 °C)-97.9 °F  (36.6 °C)]   Pulse:  [55-71]   Resp:  [16-20]   BP: (127-200)/(76-93)   SpO2:  [96 %-98 %]   BP Location: Left arm       Physical Exam  Vitals and nursing note reviewed.   Constitutional:       General: She is awake.      Appearance: She is ill-appearing.   Eyes:      General:         Right eye: No discharge.         Left eye: No discharge.      Extraocular Movements: Extraocular movements intact.      Conjunctiva/sclera: Conjunctivae normal.   Cardiovascular:      Rate and Rhythm: Normal rate.   Pulmonary:      Effort: Pulmonary effort is normal. No respiratory distress.   Abdominal:      General: There is no distension.   Musculoskeletal:      Comments: LUE swelling   Skin:     General: Skin is warm and dry.          Neurological:      Mental Status: She is disoriented.      Cranial Nerves: Facial asymmetry present.      Sensory: Sensory deficit present.      Motor: Weakness present.            Neurological Exam:   LOC: alert  Language: No aphasia  Articulation: Dysarthria    Laboratory:  CMP:   Recent Labs   Lab 10/25/24  0410 10/25/24  1114   CALCIUM 8.5*  8.5* 8.5*   ALBUMIN 2.4*  --    PROT 5.8*  --      142 141   K 3.5  3.5 4.3   CO2 26  26 24     107 106   BUN 5*  5* 5*   CREATININE 0.6  0.6 0.7   ALKPHOS 71  --    ALT 38  --    AST 34  --    BILITOT 0.5  --      CBC:   Recent Labs   Lab 10/25/24  0410   WBC 9.31   RBC 3.82*   HGB 12.7   HCT 36.0*      MCV 94   MCH 33.2*   MCHC 35.3       Diagnostic Results     Brain and Vessel Imaging   MRI/MRA Brain Ischemic Protocol: 10/6/24  Impression: Acute infarcts involving the right anterior circulation and posterior circulation as detailed above without evidence of intracranial hemorrhage or mass effect.  Embolic disease to be considered.     Chronic left parietal infarct. MRA demonstrates limited visualization of distal right KIARA branches in keeping with the territory of the acute infarct.  There is also narrowing of at least one proximal  left M2 branch although no acute infarct in that territory.          Echo: 10/7/24  Cardiac Imaging     Left Ventricle: The left ventricle is normal in size. Normal wall thickness. There is concentric remodeling. Normal wall motion. There is hyperdynamic systolic function with a visually estimated ejection fraction of greater than 70%. There is normal diastolic function.    Right Ventricle: Normal right ventricular cavity size. Wall thickness is normal. Systolic function is normal.    Left Atrium: Bubble study is likely negative, but full opacification of the RA is not well seen on these images which decreases sensitivity.    IVC/SVC: Normal venous pressure at 3 mmHg.

## 2024-10-25 NOTE — SUBJECTIVE & OBJECTIVE
"Interval HPI:   Overnight events:  No acute events overnight. Patient in room 960/960 A. Blood glucose stable. BG at and above goal on current insulin regimen (SSI, prandial, and basal insulin ). Steroid use- None.    Renal function- Normal   Vasopressors-  None     Lab Results   Component Value Date    CREATININE 0.6 10/25/2024    CREATININE 0.6 10/25/2024       Endocrine will continue to follow and manage insulin orders inpatient.         Diet Pureed (IDDSI Level 4) Nectar Thick (Mildly Thick); Standard Tray     Eatin%  Nausea: No  Hypoglycemia and intervention: No  Fever: No  TPN and/or TF: No    BP (!) 182/85   Pulse (!) 55   Temp 97.7 °F (36.5 °C)   Resp 18   Ht 5' 2" (1.575 m)   Wt 72.6 kg (160 lb 0.9 oz)   LMP  (LMP Unknown) Comment: Doesn't  have periods anymore  SpO2 97%   Breastfeeding No   BMI 29.27 kg/m²     Labs Reviewed and Include    Recent Labs   Lab 10/25/24  0410   *  160*   CALCIUM 8.5*  8.5*   ALBUMIN 2.4*   PROT 5.8*     142   K 3.5  3.5   CO2 26  26     107   BUN 5*  5*   CREATININE 0.6  0.6   ALKPHOS 71   ALT 38   AST 34   BILITOT 0.5     Lab Results   Component Value Date    WBC 9.31 10/25/2024    HGB 12.7 10/25/2024    HCT 36.0 (L) 10/25/2024    MCV 94 10/25/2024     10/25/2024     No results for input(s): "TSH", "FREET4" in the last 168 hours.  Lab Results   Component Value Date    HGBA1C 10.0 (H) 10/08/2024       Nutritional status:   Body mass index is 29.27 kg/m².  Lab Results   Component Value Date    ALBUMIN 2.4 (L) 10/25/2024    ALBUMIN 2.3 (L) 10/24/2024    ALBUMIN 2.1 (L) 10/23/2024     No results found for: "PREALBUMIN"    Estimated Creatinine Clearance: 87.2 mL/min (based on SCr of 0.6 mg/dL).    Accu-Checks  Recent Labs     10/22/24  1155 10/22/24  1625 10/22/24  2126 10/23/24  0819 10/23/24  1212 10/24/24  0827 10/24/24  1217 10/24/24  1558 10/24/24  2033 10/25/24  0810   POCTGLUCOSE 163* 146* 137* 220* 160* 201* 231* 167* 162* " 191*       Current Medications and/or Treatments Impacting Glycemic Control  Immunotherapy:    Immunosuppressants       None          Steroids:   Hormones (From admission, onward)      None          Pressors:    Autonomic Drugs (From admission, onward)      None          Hyperglycemia/Diabetes Medications:   Antihyperglycemics (From admission, onward)      Start     Stop Route Frequency Ordered    10/24/24 1045  insulin glargine U-100 (Lantus) pen 10 Units         -- SubQ Daily 10/24/24 1041    10/21/24 1130  insulin aspart U-100 pen 3 Units         -- SubQ 3 times daily with meals 10/21/24 0902    10/19/24 2256  insulin aspart U-100 pen 0-10 Units         -- SubQ Before meals & nightly PRN 10/19/24 1986

## 2024-10-26 LAB
ALBUMIN SERPL BCP-MCNC: 2.4 G/DL (ref 3.5–5.2)
ALBUMIN SERPL BCP-MCNC: 2.5 G/DL (ref 3.5–5.2)
ALP SERPL-CCNC: 70 U/L (ref 40–150)
ALP SERPL-CCNC: 71 U/L (ref 40–150)
ALT SERPL W/O P-5'-P-CCNC: 44 U/L (ref 10–44)
ALT SERPL W/O P-5'-P-CCNC: 44 U/L (ref 10–44)
ANION GAP SERPL CALC-SCNC: 10 MMOL/L (ref 8–16)
ANION GAP SERPL CALC-SCNC: 11 MMOL/L (ref 8–16)
ANION GAP SERPL CALC-SCNC: 11 MMOL/L (ref 8–16)
AST SERPL-CCNC: 38 U/L (ref 10–40)
AST SERPL-CCNC: 38 U/L (ref 10–40)
BASOPHILS # BLD AUTO: 0.04 K/UL (ref 0–0.2)
BASOPHILS NFR BLD: 0.3 % (ref 0–1.9)
BILIRUB SERPL-MCNC: 0.6 MG/DL (ref 0.1–1)
BILIRUB SERPL-MCNC: 0.6 MG/DL (ref 0.1–1)
BUN SERPL-MCNC: 4 MG/DL (ref 8–23)
BUN SERPL-MCNC: 5 MG/DL (ref 8–23)
BUN SERPL-MCNC: 6 MG/DL (ref 8–23)
CALCIUM SERPL-MCNC: 8.3 MG/DL (ref 8.7–10.5)
CALCIUM SERPL-MCNC: 8.6 MG/DL (ref 8.7–10.5)
CALCIUM SERPL-MCNC: 8.8 MG/DL (ref 8.7–10.5)
CHLORIDE SERPL-SCNC: 105 MMOL/L (ref 95–110)
CHLORIDE SERPL-SCNC: 105 MMOL/L (ref 95–110)
CHLORIDE SERPL-SCNC: 107 MMOL/L (ref 95–110)
CO2 SERPL-SCNC: 24 MMOL/L (ref 23–29)
CO2 SERPL-SCNC: 24 MMOL/L (ref 23–29)
CO2 SERPL-SCNC: 25 MMOL/L (ref 23–29)
CREAT SERPL-MCNC: 0.6 MG/DL (ref 0.5–1.4)
CREAT SERPL-MCNC: 0.6 MG/DL (ref 0.5–1.4)
CREAT SERPL-MCNC: 0.7 MG/DL (ref 0.5–1.4)
CREAT UR-MCNC: 14 MG/DL (ref 15–325)
DIFFERENTIAL METHOD BLD: ABNORMAL
EOSINOPHIL # BLD AUTO: 0.1 K/UL (ref 0–0.5)
EOSINOPHIL NFR BLD: 0.9 % (ref 0–8)
ERYTHROCYTE [DISTWIDTH] IN BLOOD BY AUTOMATED COUNT: 13.3 % (ref 11.5–14.5)
EST. GFR  (NO RACE VARIABLE): >60 ML/MIN/1.73 M^2
GLUCOSE SERPL-MCNC: 109 MG/DL (ref 70–110)
GLUCOSE SERPL-MCNC: 136 MG/DL (ref 70–110)
GLUCOSE SERPL-MCNC: 141 MG/DL (ref 70–110)
HCT VFR BLD AUTO: 36.2 % (ref 37–48.5)
HGB BLD-MCNC: 12.5 G/DL (ref 12–16)
IMM GRANULOCYTES # BLD AUTO: 0.21 K/UL (ref 0–0.04)
IMM GRANULOCYTES NFR BLD AUTO: 1.7 % (ref 0–0.5)
LYMPHOCYTES # BLD AUTO: 2.2 K/UL (ref 1–4.8)
LYMPHOCYTES NFR BLD: 17.4 % (ref 18–48)
MAGNESIUM SERPL-MCNC: 2 MG/DL (ref 1.6–2.6)
MCH RBC QN AUTO: 33.2 PG (ref 27–31)
MCHC RBC AUTO-ENTMCNC: 34.5 G/DL (ref 32–36)
MCV RBC AUTO: 96 FL (ref 82–98)
MONOCYTES # BLD AUTO: 0.7 K/UL (ref 0.3–1)
MONOCYTES NFR BLD: 5.7 % (ref 4–15)
NEUTROPHILS # BLD AUTO: 9.3 K/UL (ref 1.8–7.7)
NEUTROPHILS NFR BLD: 74 % (ref 38–73)
NRBC BLD-RTO: 0 /100 WBC
PHOSPHATE SERPL-MCNC: 3 MG/DL (ref 2.7–4.5)
PLATELET # BLD AUTO: 238 K/UL (ref 150–450)
PMV BLD AUTO: 10.9 FL (ref 9.2–12.9)
POCT GLUCOSE: 115 MG/DL (ref 70–110)
POCT GLUCOSE: 142 MG/DL (ref 70–110)
POCT GLUCOSE: 182 MG/DL (ref 70–110)
POCT GLUCOSE: 93 MG/DL (ref 70–110)
POTASSIUM SERPL-SCNC: 3.1 MMOL/L (ref 3.5–5.1)
POTASSIUM SERPL-SCNC: 3.5 MMOL/L (ref 3.5–5.1)
POTASSIUM SERPL-SCNC: 5.3 MMOL/L (ref 3.5–5.1)
POTASSIUM UR-SCNC: 21 MMOL/L (ref 15–95)
PROT SERPL-MCNC: 5.7 G/DL (ref 6–8.4)
PROT SERPL-MCNC: 5.9 G/DL (ref 6–8.4)
RBC # BLD AUTO: 3.77 M/UL (ref 4–5.4)
SODIUM SERPL-SCNC: 140 MMOL/L (ref 136–145)
SODIUM SERPL-SCNC: 140 MMOL/L (ref 136–145)
SODIUM SERPL-SCNC: 142 MMOL/L (ref 136–145)
WBC # BLD AUTO: 12.5 K/UL (ref 3.9–12.7)

## 2024-10-26 PROCEDURE — 63600175 PHARM REV CODE 636 W HCPCS

## 2024-10-26 PROCEDURE — 36415 COLL VENOUS BLD VENIPUNCTURE: CPT | Performed by: PSYCHIATRY & NEUROLOGY

## 2024-10-26 PROCEDURE — 84133 ASSAY OF URINE POTASSIUM: CPT

## 2024-10-26 PROCEDURE — 80053 COMPREHEN METABOLIC PANEL: CPT | Performed by: PHYSICIAN ASSISTANT

## 2024-10-26 PROCEDURE — 84100 ASSAY OF PHOSPHORUS: CPT | Mod: 91

## 2024-10-26 PROCEDURE — 83735 ASSAY OF MAGNESIUM: CPT | Performed by: PHYSICIAN ASSISTANT

## 2024-10-26 PROCEDURE — 25000003 PHARM REV CODE 250

## 2024-10-26 PROCEDURE — 99900035 HC TECH TIME PER 15 MIN (STAT)

## 2024-10-26 PROCEDURE — 89055 LEUKOCYTE ASSESSMENT FECAL: CPT

## 2024-10-26 PROCEDURE — 63600175 PHARM REV CODE 636 W HCPCS: Mod: UD

## 2024-10-26 PROCEDURE — 11000001 HC ACUTE MED/SURG PRIVATE ROOM

## 2024-10-26 PROCEDURE — 25000003 PHARM REV CODE 250: Performed by: STUDENT IN AN ORGANIZED HEALTH CARE EDUCATION/TRAINING PROGRAM

## 2024-10-26 PROCEDURE — 99232 SBSQ HOSP IP/OBS MODERATE 35: CPT | Mod: ,,, | Performed by: NURSE PRACTITIONER

## 2024-10-26 PROCEDURE — 36415 COLL VENOUS BLD VENIPUNCTURE: CPT | Mod: XB

## 2024-10-26 PROCEDURE — 85025 COMPLETE CBC W/AUTO DIFF WBC: CPT | Performed by: PHYSICIAN ASSISTANT

## 2024-10-26 PROCEDURE — 80053 COMPREHEN METABOLIC PANEL: CPT | Mod: 91 | Performed by: PSYCHIATRY & NEUROLOGY

## 2024-10-26 PROCEDURE — 84100 ASSAY OF PHOSPHORUS: CPT | Performed by: PHYSICIAN ASSISTANT

## 2024-10-26 PROCEDURE — 36415 COLL VENOUS BLD VENIPUNCTURE: CPT | Performed by: PHYSICIAN ASSISTANT

## 2024-10-26 PROCEDURE — 82570 ASSAY OF URINE CREATININE: CPT

## 2024-10-26 PROCEDURE — 80048 BASIC METABOLIC PNL TOTAL CA: CPT | Mod: XB

## 2024-10-26 PROCEDURE — 82374 ASSAY BLOOD CARBON DIOXIDE: CPT

## 2024-10-26 PROCEDURE — 99233 SBSQ HOSP IP/OBS HIGH 50: CPT | Mod: GC,,, | Performed by: PSYCHIATRY & NEUROLOGY

## 2024-10-26 RX ORDER — LISINOPRIL 20 MG/1
20 TABLET ORAL DAILY
Status: DISCONTINUED | OUTPATIENT
Start: 2024-10-26 | End: 2024-10-28 | Stop reason: HOSPADM

## 2024-10-26 RX ORDER — POTASSIUM CHLORIDE 7.45 MG/ML
10 INJECTION INTRAVENOUS
Status: COMPLETED | OUTPATIENT
Start: 2024-10-26 | End: 2024-10-26

## 2024-10-26 RX ADMIN — FAMOTIDINE 20 MG: 20 TABLET ORAL at 09:10

## 2024-10-26 RX ADMIN — INSULIN ASPART 5 UNITS: 100 INJECTION, SOLUTION INTRAVENOUS; SUBCUTANEOUS at 12:10

## 2024-10-26 RX ADMIN — LISINOPRIL 20 MG: 20 TABLET ORAL at 09:10

## 2024-10-26 RX ADMIN — CARVEDILOL 12.5 MG: 12.5 TABLET, FILM COATED ORAL at 08:10

## 2024-10-26 RX ADMIN — INSULIN GLARGINE 10 UNITS: 100 INJECTION, SOLUTION SUBCUTANEOUS at 09:10

## 2024-10-26 RX ADMIN — ATORVASTATIN CALCIUM 40 MG: 40 TABLET, FILM COATED ORAL at 09:10

## 2024-10-26 RX ADMIN — INSULIN ASPART 5 UNITS: 100 INJECTION, SOLUTION INTRAVENOUS; SUBCUTANEOUS at 08:10

## 2024-10-26 RX ADMIN — APIXABAN 5 MG: 5 TABLET, FILM COATED ORAL at 08:10

## 2024-10-26 RX ADMIN — INSULIN ASPART 5 UNITS: 100 INJECTION, SOLUTION INTRAVENOUS; SUBCUTANEOUS at 03:10

## 2024-10-26 RX ADMIN — POTASSIUM CHLORIDE 10 MEQ: 7.46 INJECTION, SOLUTION INTRAVENOUS at 03:10

## 2024-10-26 RX ADMIN — POTASSIUM BICARBONATE 40 MEQ: 391 TABLET, EFFERVESCENT ORAL at 03:10

## 2024-10-26 RX ADMIN — INSULIN ASPART 2 UNITS: 100 INJECTION, SOLUTION INTRAVENOUS; SUBCUTANEOUS at 08:10

## 2024-10-26 RX ADMIN — POTASSIUM BICARBONATE 40 MEQ: 391 TABLET, EFFERVESCENT ORAL at 06:10

## 2024-10-26 RX ADMIN — POTASSIUM CHLORIDE 10 MEQ: 7.46 INJECTION, SOLUTION INTRAVENOUS at 05:10

## 2024-10-26 RX ADMIN — APIXABAN 5 MG: 5 TABLET, FILM COATED ORAL at 09:10

## 2024-10-26 RX ADMIN — CARVEDILOL 12.5 MG: 12.5 TABLET, FILM COATED ORAL at 09:10

## 2024-10-26 NOTE — SUBJECTIVE & OBJECTIVE
Neurologic Chief Complaint: Infarcts in multiple vascular territories    Subjective:     Interval History: Patient is seen for follow-up neurological assessment and treatment recommendations: See Hospital Course    HPI, Past Medical, Family, and Social History remains the same as documented in the initial encounter.     Review of Systems   Constitutional:  Negative for fever.   HENT:  Positive for trouble swallowing.    Respiratory:  Positive for cough. Negative for shortness of breath.    Gastrointestinal:  Negative for abdominal pain, nausea and vomiting.   Musculoskeletal:         LUE swelling   Neurological:  Positive for facial asymmetry, weakness and numbness.   All other systems reviewed and are negative.    Scheduled Meds:   apixaban  5 mg Oral BID    atorvastatin  40 mg Oral Daily    carvediloL  12.5 mg Oral BID    famotidine  20 mg Oral Daily    insulin aspart U-100  5 Units Subcutaneous TIDWM    insulin glargine U-100  10 Units Subcutaneous Daily    lisinopriL  20 mg Oral Daily     Continuous Infusions:      PRN Meds:  Current Facility-Administered Medications:     acetaminophen, 650 mg, Per NG tube, Q8H PRN    dextrose 10%, 12.5 g, Intravenous, PRN    dextrose 10%, 25 g, Intravenous, PRN    glucagon (human recombinant), 1 mg, Intramuscular, PRN    hydrALAZINE, 5 mg, Intravenous, Q6H PRN    insulin aspart U-100, 0-10 Units, Subcutaneous, QID (AC + HS) PRN    iohexol, 15 mL, Oral, PRN    sodium chloride 0.9%, 500 mL, Intravenous, PRN    sodium chloride 0.9%, 10 mL, Intravenous, PRN    Flushing PICC/Midline Protocol, , , Until Discontinued **AND** sodium chloride 0.9%, 10 mL, Intravenous, Q12H PRN    Objective:     Vital Signs (Most Recent):  Temp: 97.8 °F (36.6 °C) (10/26/24 1205)  Pulse: 67 (10/26/24 1205)  Resp: 18 (10/26/24 1205)  BP: (!) 172/82 (10/26/24 1205)  SpO2: 96 % (10/26/24 1205)  BP Location: Left arm    Vital Signs Range (Last 24H):  Temp:  [97.6 °F (36.4 °C)-98.8 °F (37.1 °C)]   Pulse:   [53-87]   Resp:  [16-20]   BP: (158-186)/(79-89)   SpO2:  [96 %-98 %]   BP Location: Left arm       Physical Exam  Vitals and nursing note reviewed.   Constitutional:       General: She is awake.      Appearance: She is ill-appearing.   Eyes:      General:         Right eye: No discharge.         Left eye: No discharge.      Extraocular Movements: Extraocular movements intact.      Conjunctiva/sclera: Conjunctivae normal.   Cardiovascular:      Rate and Rhythm: Normal rate.   Pulmonary:      Effort: Pulmonary effort is normal. No respiratory distress.   Abdominal:      General: There is no distension.   Musculoskeletal:      Comments: LUE swelling   Skin:     General: Skin is warm and dry.          Neurological:      Mental Status: She is disoriented.      Cranial Nerves: Facial asymmetry present.      Sensory: Sensory deficit present.      Motor: Weakness present.            Neurological Exam:   LOC: alert  Language: No aphasia  Articulation: Dysarthria    Laboratory:  CMP:   Recent Labs   Lab 10/26/24  1338   CALCIUM 8.3*   ALBUMIN 2.4*   PROT 5.7*      K 3.1*   CO2 24      BUN 6*   CREATININE 0.6   ALKPHOS 71   ALT 44   AST 38   BILITOT 0.6     CBC:   Recent Labs   Lab 10/26/24  0434   WBC 12.50   RBC 3.77*   HGB 12.5   HCT 36.2*      MCV 96   MCH 33.2*   MCHC 34.5       Diagnostic Results     Brain and Vessel Imaging   MRI/MRA Brain Ischemic Protocol: 10/6/24  Impression: Acute infarcts involving the right anterior circulation and posterior circulation as detailed above without evidence of intracranial hemorrhage or mass effect.  Embolic disease to be considered.     Chronic left parietal infarct. MRA demonstrates limited visualization of distal right KIARA branches in keeping with the territory of the acute infarct.  There is also narrowing of at least one proximal left M2 branch although no acute infarct in that territory.          Echo: 10/7/24  Cardiac Imaging     Left Ventricle: The left  ventricle is normal in size. Normal wall thickness. There is concentric remodeling. Normal wall motion. There is hyperdynamic systolic function with a visually estimated ejection fraction of greater than 70%. There is normal diastolic function.    Right Ventricle: Normal right ventricular cavity size. Wall thickness is normal. Systolic function is normal.    Left Atrium: Bubble study is likely negative, but full opacification of the RA is not well seen on these images which decreases sensitivity.    IVC/SVC: Normal venous pressure at 3 mmHg.

## 2024-10-26 NOTE — PROGRESS NOTES
"[facility] - Neurosurgery ([facility])  Endocrinology  Progress Note    Admit Date: 10/6/2024     Reason for Consult: Management of T2DM, Hyperglycemia       Diabetes diagnosis year:  Over 5 years    Home Diabetes Medications:    -liraglutide  -Levemir 35 units QHS    How often checking glucose at home? Not checking       Diabetes Complications include:     Hyperglycemia    Complicating diabetes co morbidities:   History of CVA      HPI:   Patient is a 64 y.o. female with  DM2 , HTN who was transferred from [facility] for higher level of care secondary to acute KIARA stroke on CTA. Briefly,the patient presented to the OSH overnight for slurred speech started at 1:30 a.m. in the morning. She has a history of his stroke with left-sided deficits but is normally able to transfer and do a little walking.  Endocrine consulted for BG management.      Interval HPI:   Overnight events: No acute events overnight. Patient in room 960/960 A. Blood glucose stable. BG at goal on current insulin regimen (SSI, prandial, and basal insulin ). Steroid use- None .    Renal function- Normal   Vasopressors-  None       Endocrine will continue to follow and manage insulin orders inpatient.         Diet Pureed (IDDSI Level 4) Nectar Thick (Mildly Thick); Standard Tray     Eatin%  Nausea: No  Hypoglycemia and intervention: No  Fever: No  TPN and/or TF: No  If yes, type of TF/TPN and rate: n/a    BP (!) 167/79 (BP Location: Left arm, Patient Position: Lying)   Pulse 73   Temp 98.8 °F (37.1 °C) (Oral)   Resp 16   Ht 5' 2" (1.575 m)   Wt 72.6 kg (160 lb 0.9 oz)   LMP  (LMP Unknown) Comment: Doesn't  have periods anymore  SpO2 98%   Breastfeeding No   BMI 29.27 kg/m²     Labs Reviewed and Include    Recent Labs   Lab 10/26/24  0434   *   CALCIUM 8.6*   ALBUMIN 2.5*   PROT 5.9*      K 3.5   CO2 24      BUN 4*   CREATININE 0.6   ALKPHOS 70   ALT 44   AST 38   BILITOT 0.6     Lab Results   Component Value " "Date    WBC 12.50 10/26/2024    HGB 12.5 10/26/2024    HCT 36.2 (L) 10/26/2024    MCV 96 10/26/2024     10/26/2024     No results for input(s): "TSH", "FREET4" in the last 168 hours.  Lab Results   Component Value Date    HGBA1C 10.0 (H) 10/08/2024       Nutritional status:   Body mass index is 29.27 kg/m².  Lab Results   Component Value Date    ALBUMIN 2.5 (L) 10/26/2024    ALBUMIN 2.4 (L) 10/25/2024    ALBUMIN 2.3 (L) 10/24/2024     No results found for: "PREALBUMIN"    Estimated Creatinine Clearance: 87.2 mL/min (based on SCr of 0.6 mg/dL).    Accu-Checks  Recent Labs     10/23/24  1212 10/24/24  0827 10/24/24  1217 10/24/24  1558 10/24/24  2033 10/25/24  0810 10/25/24  1138 10/25/24  1633 10/26/24  0802   POCTGLUCOSE 160* 201* 231* 167* 162* 191* 202* 85 182*       Current Medications and/or Treatments Impacting Glycemic Control  Immunotherapy:    Immunosuppressants       None          Steroids:   Hormones (From admission, onward)      None          Pressors:    Autonomic Drugs (From admission, onward)      None          Hyperglycemia/Diabetes Medications:   Antihyperglycemics (From admission, onward)      Start     Stop Route Frequency Ordered    10/25/24 1645  insulin aspart U-100 pen 5 Units         -- SubQ 3 times daily with meals 10/25/24 1404    10/24/24 1045  insulin glargine U-100 (Lantus) pen 10 Units         -- SubQ Daily 10/24/24 1041    10/19/24 2256  insulin aspart U-100 pen 0-10 Units         -- SubQ Before meals & nightly PRN 10/19/24 2157            ASSESSMENT and PLAN    Neuro  * Acute arterial ischemic stroke, multifocal, multiple vascular territories  Managed by primary team  Optimize BG control      Cardiac/Vascular  HTN (hypertension)  Managed by primary team  Optimize BG control      Endocrine  Type 2 diabetes mellitus, with long-term current use of insulin  BG goal: 140-180  T2DM admitted with acute KIARA stroke. Lantus d/c on 10/16 do to hypoglycemia. Morning blood sugars trending " up.  Long acting insulin added back to insulin regimen at 0.5u/kg/d for AM hyperglycemia.    - Continue Lanuts 10 units QD   - Continue Novolog 5 units TID with meals (based on prn SQ insulin requirements)   - Continue Novolog MDC SSI with ISF 25 starting at 150    - POCT Glucose ac/hs  - Hypoglycemia protocol in place      ** Please notify Endocrine for any change and/or advance in diet**  ** Please call Endocrine for any BG related issues **     Discharge Planning: TBD. Please notify endocrinology prior to discharge.            Mattie Ramsey, NP  Endocrinology  [facility] - Neurosurgery ([facility])

## 2024-10-26 NOTE — ASSESSMENT & PLAN NOTE
BG goal: 140-180  T2DM admitted with acute KIARA stroke. Lantus d/c on 10/16 do to hypoglycemia. Morning blood sugars trending up.  Long acting insulin added back to insulin regimen at 0.5u/kg/d for AM hyperglycemia.    - Continue Lanuts 10 units QD   - Continue Novolog 5 units TID with meals (based on prn SQ insulin requirements)   - Continue Novolog MDC SSI with ISF 25 starting at 150    - POCT Glucose ac/hs  - Hypoglycemia protocol in place      ** Please notify Endocrine for any change and/or advance in diet**  ** Please call Endocrine for any BG related issues **     Discharge Planning: TBD. Please notify endocrinology prior to discharge.       - - -

## 2024-10-26 NOTE — PROGRESS NOTES
"Zackary Long - Neurosurgery ([facility])  Vascular Neurology  Comprehensive Stroke Center  Progress Note    Assessment/Plan:     * Acute arterial ischemic stroke, multifocal, multiple vascular territories  65 y/o F transferred from [facility] for higher level of care secondary to acute KIARA stroke on CTA.  Briefly,the patient presented to the OSH overnight for slurred speech started at 1:30 a.m. in the morning.  She has a history of his stroke with left-sided deficits but is normally able to transfer and do a little walking. This morning, she had complete hemiplegia of the left side. Telestroke consult performed, concerning for LVO.  NIH 13. CTA Stroke MP performed and  was read as "acute R KIARA infarct". No TNK due to AC use. Patient accepted to ED as transfer for emergent vascular neurology evaluation. No endovascular intervention. History of atrial fibrillation per discussion with daughter. Was started on Eliquis on 10/6, currently on heparin drip given likely lack of absorption through NG tube in setting of Ileus. Given history of Afib, etiology likely embolic. Though not apparent on MRA, if R KIARA occlusion was proximal (supported by MRI) could account for R MCA-like picture. Transient episodes of altered awareness: EEG negative, and no change in frequency observed by family w/Keppra so have stopped this medicine.     NAEON. Neuro exam remains stable. Patient with audible upper respiratory sounds and cough. CPT ordered.   -Eliquis 5 mg BID   -Atorvastatin 40 mg daily  -SBP<180  -PT/OT-Current recs for high intensity therapy  -Diet advanced to clear liquids 10/19    Left upper extremity swelling  -Patient noted to have LUE swelling and discomfort this am (10/20/2024).  -LUE US pending  -LUE elevated    Hypernatremia  Resolved    Plan:   - Initially given D5W to correct, labs to monitor rate of correction   - Was on NS w/KCl but becoming acidotic on labs.  -Na remains 148 this am  -IVFs changed to " LR    Ileus  Resolved      Patient was seen by general surgery for some episodes of nausea and emesis and some constipation. KUB was concerning for SBO vs. Ileus. Follow up CT AP did not show evidence of mechanical obstruction, was more indicative of Ileus. XR gastrogaffin challenge ordered by general surgery.     Plan:   - Was NPO. Approved to advance diet as tolerated by Gen Surg on 10/19. NGT d/c'd. Patient was started on a Clear liquid diet and tolerated it well. Advanced to previous diet recommendation of puree, nectar thick today (10/20). SLP to re-eval 10/21  - monitor electrolytes daily, correct as needed   - Eliquis switched to heparin as it might not have been fully absorbed considering Ileus. Resumed Eliquis 10/19.    - scheduled suppository regimen, f/u on effectiveness and escalate prn  - general surgery following, appreciate recs     Acute focal neurological deficit  Episodes of transient altered mental status observed by daughter, sometimes with a brief left gaze. Given location of KIARA stroke, considered seizure. EEG done on 10/11, negative.     Plan:   - no longer on keppra regimen     KAILYN (acute kidney injury)  RESOLVED  Cr was initially 1.6, up from a baseline of 1. Likely pre-renal KAILYN, due to decreased PO intake. Resolved with fluids.     Plan:   - Continue to monitor CMP.   -SCr has been stable since 10/14/24    Type 2 diabetes mellitus, with long-term current use of insulin  History of. Takes Levemir 35 units QHS at home. A1C 10%.    -goal 140-180 while admitted  -SSI prn   -continue to monitor glucose  -Endocrine consulted for A1c 10%; following, appreciate recs   -24° glucose 173-211        dextrose 10% bolus 125 mL 125 mL, 12.5 g, Intravenous, PRN    dextrose 10% bolus 250 mL 250 mL, 25 g, Intravenous, PRN    glucagon (human recombinant) injection 1 mg, 1 mg, Intramuscular, PRN    insulin aspart U-100 pen 0-10 Units, 0-10 Units, Subcutaneous, QID (AC + HS) PRN    Atrial fibrillation  Takes  Eliquis 5mg BID and Coreg 6.25mg BID at home.  Initially Eliquis held given size of stroke; ASA 81mg QD in the interim. Eliquis was restarted on 10/10.       Plan:   -continue at home coreg regimen   -eliquis switched to heparin drip due to issues described in ileus section of the assessment and plan.   -Heparin gtt turned off 10/19. Eliquis resumed on 10/19.  -24° HR 63-78    Opioid use  History of. Prescribed oxycodone 15mg at home. Family concerned that medication is being diverted by family member, unclear how often patient takes. Reportedly is prescribed for neck pain.     Plan:   - holding oxycodone due to ileus    Left hemiplegia  See stroke    Plan:   - continue PT/OT/SLP   -Current recs for high intensity therapy    Hypokalemia  Persistent/Recurrent HypoK in setting of normal mag. Improving. BID labs    Urinary workup shows normal K  Stool studies pending  Hyperaldo workup pending    HTN (hypertension)  Home medication regimen as below  -lisinopril 40mg QD  -amlodipine 10mg QD    Plan:   -lisinopril and amlodipine were initially held in the setting of KAILYN. Was started on coreg in the context of Afib.   -if patient's BP still elevated, consider adding back amlodipine and lisinopril   -Goal SBP<180 for now  24° -182      carvediloL tablet 12.5 mg, Oral, BID    hydrALAZINE injection 10 mg, Intravenous, Q6H PRN         10/7: gómez SINCLAIR exam stable, echo today, NPO aside from meds per speech, barium study tmrw  10/08/2024 DOTTIE, stable neuro exam, went for the Br study, and was started on a diet. Endocrine consulted for the A1c 10%, pending recs.  10/09/2024 DOTTIE, Stable neuro exam, will restart Lisinopril 20 mg (half of her dome dose). Seen by endocrine, with adjustment of her insulin doses.  10/10/2024 DOTTIE, no change in her neuro exam, will restart Eliquis today, BP is better controlled.  10/11/2024 NAEON, stable neuro exam, still decreased PO intake. Patient gets some episodes of in attention; EEG  was ordered. Endocrine adjusted her insulins. Accepted at [facility] in [place].   10/12/2024 NAEON, waiting on EEG interpretation (contacted epilepsy), started Keppra 500mg BID and will see if reduces transient alterations in awareness, neurologic examination stable  10/13/2024 NAEON, EEG w/encephalopathy (no seizure or discharges), stop Keppra, SBP ~150 to 160 so increased Coreg to 6.25mg BID  10/14/2024 General surgery was consulted overnight for constipation, episodes of nausea and emesis. KUB concerning for SBO vs. Ileus. F/u CT AP was more indicative of ileus, no evidence of mechanical obstruction was observed. Patient is NPO, can get some meds via NGT. Eliquis switched to heparin drip.   10/15/2024 Patient has still not had an actual bowel movement. Is distended, has some abdominal pain. General surgery following, is getting XR gastrogaffin challenge, will f/u results.   10/16/2024 Still monitoring patient for bowel movements in the setting of Ileus. General surgery is following. Mild hyperkalemia and hypernatremia this morning; will continue to monitor.   10/17/2024 Patient still has not had a bowel movement. Ileus not resolved on repeat imaging. Suppository ordered earlier this morning, will monitor to see if improvement with suppository.   10/18/2024 Patient still had not had a solid bowel movement due to her ileus, had a number of watery movements yesterday but nothing substantive. Suppositories now scheduled. General surgery following.   10/19/2024 Patient stable overnight. LBM yesterday. Gen Surg ok with advancing diet to clear liquids. Heparin gtt stopped, started on Eliquis. Awaiting placement.  10/20/2024 NAEON. Neuro exam remains stable. Patient with audible upper respiratory sounds and cough. CPT ordered. Patient noted to be increasingly acidotic on labs. IVFs changed from NS to LR. Patient awaiting placement.  10/21/24 NAEON. Neuro exam remains stable. Not oriented to time. Family says patient's  exam stable compared to yesterday. SLP to re-evaluate, on diet level 4. Placement pending  10/22/24 NAEON. Neuro exam remains stable. Not oriented to time. Family says patient's exam stable compared to yesterday. SLP to re-evaluate, on diet level 4. Placement pending  10/23/24 NAEON. Neuro exam remains stable/slightly improved mental status. Oriented to time. Accepted for placement but will plan for d/c tomorrow given hypokalemia. Lytes corrected and repeat lab pending.  10/24/24 NAEON. Neuro exam remains stable/slightly improved mental status. Workup for hypoK ordered and pending. DDX HyperAldo vs Diarrhea.  10/25/2024 NAEON. Neuro exam remains stable/slightly improved mental status. Diarrhea resolving. D/C Monday d/t logisitics  10/26/2024 NAEON. Neuro exam remains stable/slightly improved mental status. D/C Monday d/t logisitics    STROKE DOCUMENTATION   Acute Stroke Times   Last Known Normal Date:  (OSH)  Stroke Team Called Date: 10/06/24  Stroke Team Called Time:  (At OSH)  Stroke Team Arrival Date: 10/06/24  Stroke Team Arrival Time: 0608  CT Interpretation Time:  (CTA done around 4:20am per documents that came to [facility] [place] [place] with patient)  Thrombolytic Therapy Recommended: No  Thrombectomy Recommended:  (Pending MRI Brain)    NIH Scale:          Modified Karthaus Score: 3  Diane Coma Scale:    ABCD2 Score:    [id number] Score:   HAS -BLED Score:   ICH Score:   Hunt & Shane Classification:      Hemorrhagic change of an Ischemic Stroke: Does this patient have an ischemic stroke with hemorrhagic changes? No     Neurologic Chief Complaint: Infarcts in multiple vascular territories    Subjective:     Interval History: Patient is seen for follow-up neurological assessment and treatment recommendations: See Hospital Course    HPI, Past Medical, Family, and Social History remains the same as documented in the initial encounter.     Review of Systems   Constitutional:  Negative for fever.   HENT:  Positive for trouble  swallowing.    Respiratory:  Positive for cough. Negative for shortness of breath.    Gastrointestinal:  Negative for abdominal pain, nausea and vomiting.   Musculoskeletal:         LUE swelling   Neurological:  Positive for facial asymmetry, weakness and numbness.   All other systems reviewed and are negative.    Scheduled Meds:   apixaban  5 mg Oral BID    atorvastatin  40 mg Oral Daily    carvediloL  12.5 mg Oral BID    famotidine  20 mg Oral Daily    insulin aspart U-100  5 Units Subcutaneous TIDWM    insulin glargine U-100  10 Units Subcutaneous Daily    lisinopriL  20 mg Oral Daily     Continuous Infusions:      PRN Meds:  Current Facility-Administered Medications:     acetaminophen, 650 mg, Per NG tube, Q8H PRN    dextrose 10%, 12.5 g, Intravenous, PRN    dextrose 10%, 25 g, Intravenous, PRN    glucagon (human recombinant), 1 mg, Intramuscular, PRN    hydrALAZINE, 5 mg, Intravenous, Q6H PRN    insulin aspart U-100, 0-10 Units, Subcutaneous, QID (AC + HS) PRN    iohexol, 15 mL, Oral, PRN    sodium chloride 0.9%, 500 mL, Intravenous, PRN    sodium chloride 0.9%, 10 mL, Intravenous, PRN    Flushing PICC/Midline Protocol, , , Until Discontinued **AND** sodium chloride 0.9%, 10 mL, Intravenous, Q12H PRN    Objective:     Vital Signs (Most Recent):  Temp: 97.8 °F (36.6 °C) (10/26/24 1205)  Pulse: 67 (10/26/24 1205)  Resp: 18 (10/26/24 1205)  BP: (!) 172/82 (10/26/24 1205)  SpO2: 96 % (10/26/24 1205)  BP Location: Left arm    Vital Signs Range (Last 24H):  Temp:  [97.6 °F (36.4 °C)-98.8 °F (37.1 °C)]   Pulse:  [53-87]   Resp:  [16-20]   BP: (158-186)/(79-89)   SpO2:  [96 %-98 %]   BP Location: Left arm       Physical Exam  Vitals and nursing note reviewed.   Constitutional:       General: She is awake.      Appearance: She is ill-appearing.   Eyes:      General:         Right eye: No discharge.         Left eye: No discharge.      Extraocular Movements: Extraocular movements intact.      Conjunctiva/sclera:  Conjunctivae normal.   Cardiovascular:      Rate and Rhythm: Normal rate.   Pulmonary:      Effort: Pulmonary effort is normal. No respiratory distress.   Abdominal:      General: There is no distension.   Musculoskeletal:      Comments: LUE swelling   Skin:     General: Skin is warm and dry.          Neurological:      Mental Status: She is disoriented.      Cranial Nerves: Facial asymmetry present.      Sensory: Sensory deficit present.      Motor: Weakness present.            Neurological Exam:   LOC: alert  Language: No aphasia  Articulation: Dysarthria    Laboratory:  CMP:   Recent Labs   Lab 10/26/24  1338   CALCIUM 8.3*   ALBUMIN 2.4*   PROT 5.7*      K 3.1*   CO2 24      BUN 6*   CREATININE 0.6   ALKPHOS 71   ALT 44   AST 38   BILITOT 0.6     CBC:   Recent Labs   Lab 10/26/24  0434   WBC 12.50   RBC 3.77*   HGB 12.5   HCT 36.2*      MCV 96   MCH 33.2*   MCHC 34.5       Diagnostic Results     Brain and Vessel Imaging   MRI/MRA Brain Ischemic Protocol: 10/6/24  Impression: Acute infarcts involving the right anterior circulation and posterior circulation as detailed above without evidence of intracranial hemorrhage or mass effect.  Embolic disease to be considered.     Chronic left parietal infarct. MRA demonstrates limited visualization of distal right KIARA branches in keeping with the territory of the acute infarct.  There is also narrowing of at least one proximal left M2 branch although no acute infarct in that territory.          Echo: 10/7/24  Cardiac Imaging     Left Ventricle: The left ventricle is normal in size. Normal wall thickness. There is concentric remodeling. Normal wall motion. There is hyperdynamic systolic function with a visually estimated ejection fraction of greater than 70%. There is normal diastolic function.    Right Ventricle: Normal right ventricular cavity size. Wall thickness is normal. Systolic function is normal.    Left Atrium: Bubble study is likely negative,  but full opacification of the RA is not well seen on these images which decreases sensitivity.    IVC/SVC: Normal venous pressure at 3 mmHg.    Vikas Chaudhari DO  [facility]  Department of Vascular Neurology   [facility])

## 2024-10-26 NOTE — SUBJECTIVE & OBJECTIVE
"Interval HPI:   Overnight events: No acute events overnight. Patient in room 960/960 A. Blood glucose stable. BG at goal on current insulin regimen (SSI, prandial, and basal insulin ). Steroid use- None .    Renal function- Normal   Vasopressors-  None       Endocrine will continue to follow and manage insulin orders inpatient.         Diet Pureed (IDDSI Level 4) Nectar Thick (Mildly Thick); Standard Tray     Eatin%  Nausea: No  Hypoglycemia and intervention: No  Fever: No  TPN and/or TF: No  If yes, type of TF/TPN and rate: n/a    BP (!) 167/79 (BP Location: Left arm, Patient Position: Lying)   Pulse 73   Temp 98.8 °F (37.1 °C) (Oral)   Resp 16   Ht 5' 2" (1.575 m)   Wt 72.6 kg (160 lb 0.9 oz)   LMP  (LMP Unknown) Comment: Doesn't  have periods anymore  SpO2 98%   Breastfeeding No   BMI 29.27 kg/m²     Labs Reviewed and Include    Recent Labs   Lab 10/26/24  0434   *   CALCIUM 8.6*   ALBUMIN 2.5*   PROT 5.9*      K 3.5   CO2 24      BUN 4*   CREATININE 0.6   ALKPHOS 70   ALT 44   AST 38   BILITOT 0.6     Lab Results   Component Value Date    WBC 12.50 10/26/2024    HGB 12.5 10/26/2024    HCT 36.2 (L) 10/26/2024    MCV 96 10/26/2024     10/26/2024     No results for input(s): "TSH", "FREET4" in the last 168 hours.  Lab Results   Component Value Date    HGBA1C 10.0 (H) 10/08/2024       Nutritional status:   Body mass index is 29.27 kg/m².  Lab Results   Component Value Date    ALBUMIN 2.5 (L) 10/26/2024    ALBUMIN 2.4 (L) 10/25/2024    ALBUMIN 2.3 (L) 10/24/2024     No results found for: "PREALBUMIN"    Estimated Creatinine Clearance: 87.2 mL/min (based on SCr of 0.6 mg/dL).    Accu-Checks  Recent Labs     10/23/24  1212 10/24/24  0827 10/24/24  1217 10/24/24  1558 10/24/24  2033 10/25/24  0810 10/25/24  1138 10/25/24  1633 10/26/24  0802   POCTGLUCOSE 160* 201* 231* 167* 162* 191* 202* 85 182*       Current Medications and/or Treatments Impacting Glycemic " Control  Immunotherapy:    Immunosuppressants       None          Steroids:   Hormones (From admission, onward)      None          Pressors:    Autonomic Drugs (From admission, onward)      None          Hyperglycemia/Diabetes Medications:   Antihyperglycemics (From admission, onward)      Start     Stop Route Frequency Ordered    10/25/24 1645  insulin aspart U-100 pen 5 Units         -- SubQ 3 times daily with meals 10/25/24 1404    10/24/24 1045  insulin glargine U-100 (Lantus) pen 10 Units         -- SubQ Daily 10/24/24 1041    10/19/24 2256  insulin aspart U-100 pen 0-10 Units         -- SubQ Before meals & nightly PRN 10/19/24 9472

## 2024-10-27 PROBLEM — R79.89 ELEVATED LFTS: Status: ACTIVE | Noted: 2024-10-27

## 2024-10-27 PROBLEM — R74.01 TRANSAMINITIS: Status: ACTIVE | Noted: 2024-10-27

## 2024-10-27 LAB
ALBUMIN SERPL BCP-MCNC: 2.7 G/DL (ref 3.5–5.2)
ALP SERPL-CCNC: 76 U/L (ref 40–150)
ALT SERPL W/O P-5'-P-CCNC: 67 U/L (ref 10–44)
ANION GAP SERPL CALC-SCNC: 10 MMOL/L (ref 8–16)
ANION GAP SERPL CALC-SCNC: 11 MMOL/L (ref 8–16)
ANION GAP SERPL CALC-SCNC: 12 MMOL/L (ref 8–16)
AST SERPL-CCNC: 65 U/L (ref 10–40)
BASOPHILS # BLD AUTO: 0.02 K/UL (ref 0–0.2)
BASOPHILS NFR BLD: 0.3 % (ref 0–1.9)
BILIRUB SERPL-MCNC: 0.7 MG/DL (ref 0.1–1)
BUN SERPL-MCNC: 6 MG/DL (ref 8–23)
BUN SERPL-MCNC: 6 MG/DL (ref 8–23)
BUN SERPL-MCNC: 7 MG/DL (ref 8–23)
CALCIUM SERPL-MCNC: 8.6 MG/DL (ref 8.7–10.5)
CALCIUM SERPL-MCNC: 9 MG/DL (ref 8.7–10.5)
CALCIUM SERPL-MCNC: 9 MG/DL (ref 8.7–10.5)
CHLORIDE SERPL-SCNC: 102 MMOL/L (ref 95–110)
CHLORIDE SERPL-SCNC: 103 MMOL/L (ref 95–110)
CHLORIDE SERPL-SCNC: 105 MMOL/L (ref 95–110)
CO2 SERPL-SCNC: 23 MMOL/L (ref 23–29)
CO2 SERPL-SCNC: 25 MMOL/L (ref 23–29)
CO2 SERPL-SCNC: 25 MMOL/L (ref 23–29)
CREAT SERPL-MCNC: 0.6 MG/DL (ref 0.5–1.4)
CREAT SERPL-MCNC: 0.7 MG/DL (ref 0.5–1.4)
CREAT SERPL-MCNC: 0.7 MG/DL (ref 0.5–1.4)
DIFFERENTIAL METHOD BLD: ABNORMAL
EOSINOPHIL # BLD AUTO: 0 K/UL (ref 0–0.5)
EOSINOPHIL NFR BLD: 0.6 % (ref 0–8)
ERYTHROCYTE [DISTWIDTH] IN BLOOD BY AUTOMATED COUNT: 13.7 % (ref 11.5–14.5)
EST. GFR  (NO RACE VARIABLE): >60 ML/MIN/1.73 M^2
GLUCOSE SERPL-MCNC: 155 MG/DL (ref 70–110)
GLUCOSE SERPL-MCNC: 253 MG/DL (ref 70–110)
GLUCOSE SERPL-MCNC: 86 MG/DL (ref 70–110)
HCT VFR BLD AUTO: 38.3 % (ref 37–48.5)
HCV AB SERPL QL IA: NORMAL
HGB BLD-MCNC: 12.3 G/DL (ref 12–16)
IMM GRANULOCYTES # BLD AUTO: 0.02 K/UL (ref 0–0.04)
IMM GRANULOCYTES NFR BLD AUTO: 0.3 % (ref 0–0.5)
LYMPHOCYTES # BLD AUTO: 1.4 K/UL (ref 1–4.8)
LYMPHOCYTES NFR BLD: 21.3 % (ref 18–48)
MAGNESIUM SERPL-MCNC: 2.1 MG/DL (ref 1.6–2.6)
MCH RBC QN AUTO: 32.6 PG (ref 27–31)
MCHC RBC AUTO-ENTMCNC: 32.1 G/DL (ref 32–36)
MCV RBC AUTO: 102 FL (ref 82–98)
MONOCYTES # BLD AUTO: 0.4 K/UL (ref 0.3–1)
MONOCYTES NFR BLD: 6.6 % (ref 4–15)
NEUTROPHILS # BLD AUTO: 4.7 K/UL (ref 1.8–7.7)
NEUTROPHILS NFR BLD: 70.9 % (ref 38–73)
NRBC BLD-RTO: 0 /100 WBC
PHOSPHATE SERPL-MCNC: 2.5 MG/DL (ref 2.7–4.5)
PLATELET # BLD AUTO: 240 K/UL (ref 150–450)
PMV BLD AUTO: 10.7 FL (ref 9.2–12.9)
POCT GLUCOSE: 147 MG/DL (ref 70–110)
POCT GLUCOSE: 198 MG/DL (ref 70–110)
POCT GLUCOSE: 202 MG/DL (ref 70–110)
POCT GLUCOSE: 86 MG/DL (ref 70–110)
POTASSIUM SERPL-SCNC: 3.6 MMOL/L (ref 3.5–5.1)
POTASSIUM SERPL-SCNC: 4.3 MMOL/L (ref 3.5–5.1)
POTASSIUM SERPL-SCNC: 4.4 MMOL/L (ref 3.5–5.1)
POTASSIUM SERPL-SCNC: 5.4 MMOL/L (ref 3.5–5.1)
PROT SERPL-MCNC: 6.4 G/DL (ref 6–8.4)
RBC # BLD AUTO: 3.77 M/UL (ref 4–5.4)
SARS-COV-2 RNA RESP QL NAA+PROBE: NOT DETECTED
SODIUM SERPL-SCNC: 136 MMOL/L (ref 136–145)
SODIUM SERPL-SCNC: 140 MMOL/L (ref 136–145)
SODIUM SERPL-SCNC: 140 MMOL/L (ref 136–145)
WBC # BLD AUTO: 6.63 K/UL (ref 3.9–12.7)

## 2024-10-27 PROCEDURE — 84132 ASSAY OF SERUM POTASSIUM: CPT

## 2024-10-27 PROCEDURE — 94664 DEMO&/EVAL PT USE INHALER: CPT

## 2024-10-27 PROCEDURE — 11000001 HC ACUTE MED/SURG PRIVATE ROOM

## 2024-10-27 PROCEDURE — 83735 ASSAY OF MAGNESIUM: CPT | Performed by: PHYSICIAN ASSISTANT

## 2024-10-27 PROCEDURE — 25000003 PHARM REV CODE 250

## 2024-10-27 PROCEDURE — 36415 COLL VENOUS BLD VENIPUNCTURE: CPT

## 2024-10-27 PROCEDURE — 94761 N-INVAS EAR/PLS OXIMETRY MLT: CPT

## 2024-10-27 PROCEDURE — 80053 COMPREHEN METABOLIC PANEL: CPT | Performed by: PHYSICIAN ASSISTANT

## 2024-10-27 PROCEDURE — 99233 SBSQ HOSP IP/OBS HIGH 50: CPT | Mod: GC,,, | Performed by: PSYCHIATRY & NEUROLOGY

## 2024-10-27 PROCEDURE — 25000003 PHARM REV CODE 250: Performed by: STUDENT IN AN ORGANIZED HEALTH CARE EDUCATION/TRAINING PROGRAM

## 2024-10-27 PROCEDURE — 87635 SARS-COV-2 COVID-19 AMP PRB: CPT | Performed by: PSYCHIATRY & NEUROLOGY

## 2024-10-27 PROCEDURE — 85025 COMPLETE CBC W/AUTO DIFF WBC: CPT | Performed by: PHYSICIAN ASSISTANT

## 2024-10-27 PROCEDURE — 84100 ASSAY OF PHOSPHORUS: CPT | Performed by: PHYSICIAN ASSISTANT

## 2024-10-27 PROCEDURE — 87635 SARS-COV-2 COVID-19 AMP PRB: CPT | Mod: 91 | Performed by: PSYCHIATRY & NEUROLOGY

## 2024-10-27 PROCEDURE — 99900035 HC TECH TIME PER 15 MIN (STAT)

## 2024-10-27 PROCEDURE — 99232 SBSQ HOSP IP/OBS MODERATE 35: CPT | Mod: ,,, | Performed by: NURSE PRACTITIONER

## 2024-10-27 PROCEDURE — 80048 BASIC METABOLIC PNL TOTAL CA: CPT | Mod: 91,XB

## 2024-10-27 PROCEDURE — 86803 HEPATITIS C AB TEST: CPT

## 2024-10-27 RX ADMIN — INSULIN ASPART 4 UNITS: 100 INJECTION, SOLUTION INTRAVENOUS; SUBCUTANEOUS at 04:10

## 2024-10-27 RX ADMIN — INSULIN ASPART 5 UNITS: 100 INJECTION, SOLUTION INTRAVENOUS; SUBCUTANEOUS at 04:10

## 2024-10-27 RX ADMIN — INSULIN ASPART 5 UNITS: 100 INJECTION, SOLUTION INTRAVENOUS; SUBCUTANEOUS at 07:10

## 2024-10-27 RX ADMIN — APIXABAN 5 MG: 5 TABLET, FILM COATED ORAL at 09:10

## 2024-10-27 RX ADMIN — CARVEDILOL 12.5 MG: 12.5 TABLET, FILM COATED ORAL at 09:10

## 2024-10-27 RX ADMIN — APIXABAN 5 MG: 5 TABLET, FILM COATED ORAL at 10:10

## 2024-10-27 RX ADMIN — ATORVASTATIN CALCIUM 40 MG: 40 TABLET, FILM COATED ORAL at 09:10

## 2024-10-27 RX ADMIN — FAMOTIDINE 20 MG: 20 TABLET ORAL at 09:10

## 2024-10-27 RX ADMIN — LISINOPRIL 20 MG: 20 TABLET ORAL at 09:10

## 2024-10-27 RX ADMIN — DIBASIC SODIUM PHOSPHATE, MONOBASIC POTASSIUM PHOSPHATE AND MONOBASIC SODIUM PHOSPHATE 1 TABLET: 852; 155; 130 TABLET ORAL at 12:10

## 2024-10-27 RX ADMIN — INSULIN ASPART 5 UNITS: 100 INJECTION, SOLUTION INTRAVENOUS; SUBCUTANEOUS at 11:10

## 2024-10-27 RX ADMIN — DIBASIC SODIUM PHOSPHATE, MONOBASIC POTASSIUM PHOSPHATE AND MONOBASIC SODIUM PHOSPHATE 1 TABLET: 852; 155; 130 TABLET ORAL at 10:10

## 2024-10-27 RX ADMIN — INSULIN ASPART 2 UNITS: 100 INJECTION, SOLUTION INTRAVENOUS; SUBCUTANEOUS at 11:10

## 2024-10-27 RX ADMIN — POTASSIUM BICARBONATE 40 MEQ: 391 TABLET, EFFERVESCENT ORAL at 12:10

## 2024-10-27 RX ADMIN — CARVEDILOL 12.5 MG: 12.5 TABLET, FILM COATED ORAL at 10:10

## 2024-10-27 RX ADMIN — INSULIN GLARGINE 10 UNITS: 100 INJECTION, SOLUTION SUBCUTANEOUS at 09:10

## 2024-10-27 NOTE — PROGRESS NOTES
"Zackary Long - Neurosurgery ([facility])  Endocrinology  Progress Note    Admit Date: 10/6/2024     Reason for Consult: Management of T2DM, Hyperglycemia       Diabetes diagnosis year:  Over 5 years    Home Diabetes Medications:    -liraglutide  -Levemir 35 units QHS    How often checking glucose at home? Not checking       Diabetes Complications include:     Hyperglycemia    Complicating diabetes co morbidities:   History of CVA      HPI:   Patient is a 64 y.o. female with  DM2 , HTN who was transferred from [facility] for higher level of care secondary to acute KIARA stroke on CTA. Briefly,the patient presented to the OSH overnight for slurred speech started at 1:30 a.m. in the morning. She has a history of his stroke with left-sided deficits but is normally able to transfer and do a little walking.  Endocrine consulted for BG management.      Interval HPI:   Overnight events: No acute events overnight. Patient in room 960/960 A. Blood glucose stable. BG at goal on current insulin regimen (SSI, prandial, and basal insulin ). Steroid use- None .    Renal function- Normal   Vasopressors-  None       Endocrine will continue to follow and manage insulin orders inpatient.         Diet Pureed (IDDSI Level 4) Nectar Thick (Mildly Thick); Standard Tray     Eatin%  Nausea: No  Hypoglycemia and intervention: No  Fever: No  TPN and/or TF: No  If yes, type of TF/TPN and rate: n/a    BP (!) 155/78 (BP Location: Left arm, Patient Position: Lying)   Pulse 68   Temp 97.7 °F (36.5 °C) (Oral)   Resp 18   Ht 5' 2" (1.575 m)   Wt 72.6 kg (160 lb 0.9 oz)   LMP  (LMP Unknown) Comment: Doesn't  have periods anymore  SpO2 96%   Breastfeeding No   BMI 29.27 kg/m²     Labs Reviewed and Include    Recent Labs   Lab 10/26/24  1338 10/26/24  1945    141*   CALCIUM 8.3* 8.8   ALBUMIN 2.4*  --    PROT 5.7*  --     140   K 3.1* 5.3*   CO2 24 25    105   BUN 6* 5*   CREATININE 0.6 0.7   ALKPHOS 71  --  " "  ALT 44  --    AST 38  --    BILITOT 0.6  --      Lab Results   Component Value Date    WBC 12.50 10/26/2024    HGB 12.5 10/26/2024    HCT 36.2 (L) 10/26/2024    MCV 96 10/26/2024     10/26/2024     No results for input(s): "TSH", "FREET4" in the last 168 hours.  Lab Results   Component Value Date    HGBA1C 10.0 (H) 10/08/2024       Nutritional status:   Body mass index is 29.27 kg/m².  Lab Results   Component Value Date    ALBUMIN 2.4 (L) 10/26/2024    ALBUMIN 2.5 (L) 10/26/2024    ALBUMIN 2.4 (L) 10/25/2024     No results found for: "PREALBUMIN"    Estimated Creatinine Clearance: 74.8 mL/min (based on SCr of 0.7 mg/dL).    Accu-Checks  Recent Labs     10/24/24  1558 10/24/24  2033 10/25/24  0810 10/25/24  1138 10/25/24  1633 10/26/24  0802 10/26/24  1212 10/26/24  1544 10/26/24  1942 10/27/24  0756   POCTGLUCOSE 167* 162* 191* 202* 85 182* 115* 93 142* 147*       Current Medications and/or Treatments Impacting Glycemic Control  Immunotherapy:    Immunosuppressants       None          Steroids:   Hormones (From admission, onward)      None          Pressors:    Autonomic Drugs (From admission, onward)      None          Hyperglycemia/Diabetes Medications:   Antihyperglycemics (From admission, onward)      Start     Stop Route Frequency Ordered    10/25/24 1645  insulin aspart U-100 pen 5 Units         -- SubQ 3 times daily with meals 10/25/24 1404    10/24/24 1045  insulin glargine U-100 (Lantus) pen 10 Units         -- SubQ Daily 10/24/24 1041    10/19/24 2256  insulin aspart U-100 pen 0-10 Units         -- SubQ Before meals & nightly PRN 10/19/24 2157            ASSESSMENT and PLAN    Neuro  * Acute arterial ischemic stroke, multifocal, multiple vascular territories  Managed by primary team  Optimize BG control      Cardiac/Vascular  HTN (hypertension)  Managed by primary team  Optimize BG control      Endocrine  Type 2 diabetes mellitus, with long-term current use of insulin  BG goal: 140-180  T2DM " admitted with acute KIARA stroke. Lantus d/c on 10/16 do to hypoglycemia. Morning blood sugars trending up.  Long acting insulin added back to insulin regimen at 0.5u/kg/d for AM hyperglycemia.    - Continue Lanuts 10 units QD   - Continue Novolog 5 units TID with meals (HOLD if BG < 100 or eating < 25%)  - Continue Novolog MDC SSI with ISF 25 starting at 150    - POCT Glucose ac/hs  - Hypoglycemia protocol in place      ** Please notify Endocrine for any change and/or advance in diet**  ** Please call Endocrine for any BG related issues **     Discharge Planning: TBD. Please notify endocrinology prior to discharge.            Mattie Ramsey NP  Endocrinology  Zackary Long - Neurosurgery ([facility])

## 2024-10-27 NOTE — ASSESSMENT & PLAN NOTE
Patient's most recent potassium results are listed below.  K appears to be improving.  I suspect her hypokalemia is 2/2 poor absorption due to recent illness and poor PO intake c/b intracellular shift given increases in insulin.  Renin aldosterone pending which is reasonable.  Follow up urine K to creatine ratio.  If K stable tomorrow I feel she can be monitored at [facility] for improvement.   Recent Labs     10/26/24  1338 10/26/24  1945 10/27/24  1014   K 3.1* 5.3* 3.6     Plan  - Replete potassium per protocol  - Monitor potassium daily.

## 2024-10-27 NOTE — ASSESSMENT & PLAN NOTE
Mild and potentially 2/2 statin or other medication.  Patient likely has poor adherence with medication regimen at home and per chart was on a low dose of statin, she may have had elevation in enzymes prior.  Given weight and DM2 GROVER is possible, agree with US liver with doppler.  Screen for Hep C.  Follow repeat LFTs tomorrow.

## 2024-10-27 NOTE — HPI
64 year old female with DM2, HTN, chronic pain on opiates who was transferred from OSH for higher level of care secondary to acute KIARA stroke.  The patient presented to the OSH overnight for slurred speech started.  She has a history of his stroke with left-sided deficits but is normally able to transfer and do a little walking.  Hospital course c/b Kathleen now resolved, Ileus now resolved,  and hypokalemia for which medicine was consulted.  Patient seen in her room with mild confusion.  She perseverates on using the bathroom but reports being told her K was low int he past, she reports poor adherence with K supplements and is unclear of the dose she takes.

## 2024-10-27 NOTE — ASSESSMENT & PLAN NOTE
BG goal: 140-180  T2DM admitted with acute KIARA stroke. Lantus d/c on 10/16 do to hypoglycemia. Morning blood sugars trending up.  Long acting insulin added back to insulin regimen at 0.5u/kg/d for AM hyperglycemia.    - Continue Lanuts 10 units QD   - Continue Novolog 5 units TID with meals (HOLD if BG < 100 or eating < 25%)  - Continue Novolog MDC SSI with ISF 25 starting at 150    - POCT Glucose ac/hs  - Hypoglycemia protocol in place      ** Please notify Endocrine for any change and/or advance in diet**  ** Please call Endocrine for any BG related issues **     Discharge Planning: TBD. Please notify endocrinology prior to discharge.

## 2024-10-27 NOTE — SUBJECTIVE & OBJECTIVE
Past Medical History:   Diagnosis Date    Anticoagulant long-term use     Diabetes mellitus     Hypertension     Stroke        Past Surgical History:   Procedure Laterality Date    ABDOMINAL SURGERY      JOINT REPLACEMENT         Review of patient's allergies indicates:  No Known Allergies    No current facility-administered medications on file prior to encounter.     Current Outpatient Medications on File Prior to Encounter   Medication Sig    ELIQUIS 5 mg Tab Take 5 mg by mouth 2 (two) times daily.    FREESTYLE CLIFFORD 3 SENSOR Lamar SMARTSI Topical Once    gabapentin (NEURONTIN) 600 MG tablet Take 600 mg by mouth 3 (three) times daily.    ONETOUCH ULTRA2 METER Misc SMARTSI Via Meter Daily PRN    oxyCODONE (ROXICODONE) 15 MG Tab Take 15 mg by mouth every 12 (twelve) hours as needed for Pain.    tiZANidine (ZANAFLEX) 4 MG tablet Take 4 mg by mouth every evening.    zolpidem (AMBIEN) 5 MG Tab Take 5 mg by mouth nightly as needed.     Family History    None       Tobacco Use    Smoking status: Never    Smokeless tobacco: Not on file   Substance and Sexual Activity    Alcohol use: Never    Drug use: Never    Sexual activity: Not Currently     Review of Systems   Reason unable to perform ROS: Limited 2/2 mentation.   Constitutional:  Negative for fatigue and fever.   HENT:  Positive for trouble swallowing.    Eyes:  Negative for pain.   Respiratory:  Positive for cough. Negative for shortness of breath.    Cardiovascular:  Negative for chest pain and palpitations.   Gastrointestinal:  Negative for abdominal pain, nausea and vomiting.   Genitourinary:  Negative for dysuria.   Musculoskeletal:         LUE swelling   Neurological:  Positive for facial asymmetry, weakness and numbness.   All other systems reviewed and are negative.    Objective:     Vital Signs (Most Recent):  Temp: 98.2 °F (36.8 °C) (10/27/24 1147)  Pulse: 62 (10/27/24 114)  Resp: 18 (10/27/24 114)  BP: (!) 143/64 (10/27/24 114)  SpO2: 100 %  (10/27/24 1147) Vital Signs (24h Range):  Temp:  [97.7 °F (36.5 °C)-98.9 °F (37.2 °C)] 98.2 °F (36.8 °C)  Pulse:  [56-76] 62  Resp:  [16-18] 18  SpO2:  [95 %-100 %] 100 %  BP: (139-164)/(64-80) 143/64     Weight: 72.6 kg (160 lb 0.9 oz)  Body mass index is 29.27 kg/m².     Physical Exam  Vitals and nursing note reviewed.   Constitutional:       General: She is awake.      Appearance: She is ill-appearing.   Eyes:      General:         Right eye: No discharge.         Left eye: No discharge.      Extraocular Movements: Extraocular movements intact.      Conjunctiva/sclera: Conjunctivae normal.   Cardiovascular:      Rate and Rhythm: Normal rate.   Pulmonary:      Effort: Pulmonary effort is normal. No respiratory distress.   Abdominal:      General: There is no distension.   Musculoskeletal:      Comments: LUE swelling   Skin:     General: Skin is warm and dry.          Neurological:      Mental Status: She is disoriented.      Cranial Nerves: Facial asymmetry present.      Motor: Weakness (Left sided) present.          Significant Labs: All pertinent labs within the past 24 hours have been reviewed.  CMP:   Recent Labs   Lab 10/26/24  0434 10/26/24  1338 10/26/24  1945 10/27/24  1014    142 140 140   K 3.5 3.1* 5.3* 3.6    107 105 103   CO2 24 24 25 25   * 109 141* 155*   BUN 4* 6* 5* 6*   CREATININE 0.6 0.6 0.7 0.7   CALCIUM 8.6* 8.3* 8.8 9.0   PROT 5.9* 5.7*  --  6.4   ALBUMIN 2.5* 2.4*  --  2.7*   BILITOT 0.6 0.6  --  0.7   ALKPHOS 70 71  --  76   AST 38 38  --  65*   ALT 44 44  --  67*   ANIONGAP 11 11 10 12       Significant Imaging: I have reviewed all pertinent imaging results/findings within the past 24 hours.

## 2024-10-27 NOTE — PROGRESS NOTES
"[facility]ack[facility] Long - Neurosurgery ([facility])  Vascular Neurology  Comprehensive Stroke Center  Progress Note    Assessment/Plan:     * Acute arterial ischemic stroke, multifocal, multiple vascular territories  63 y/o F transferred from [facility] for higher level of care secondary to acute KIARA stroke on CTA.  Briefly,the patient presented to the OSH overnight for slurred speech started at 1:30 a.m. in the morning.  She has a history of his stroke with left-sided deficits but is normally able to transfer and do a little walking. This morning, she had complete hemiplegia of the left side. Telestroke consult performed, concerning for LVO.  NIH 13. CTA Stroke MP performed and  was read as "acute R KIARA infarct". No TNK due to AC use. Patient accepted to ED as transfer for emergent vascular neurology evaluation. No endovascular intervention. History of atrial fibrillation per discussion with daughter. Was started on Eliquis on 10/6, currently on heparin drip given likely lack of absorption through NG tube in setting of Ileus. Given history of Afib, etiology likely embolic. Though not apparent on MRA, if R KIARA occlusion was proximal (supported by MRI) could account for R MCA-like picture. Transient episodes of altered awareness: EEG negative, and no change in frequency observed by family w/Keppra so have stopped this medicine.     NAEON. Neuro exam remains stable. Patient with audible upper respiratory sounds and cough. CPT ordered.   -Eliquis 5 mg BID   -Atorvastatin 40 mg daily  -SBP<180  -PT/OT-Current recs for high intensity therapy  -Diet advanced to clear liquids 10/19    Transaminitis  Liver US pending  Hep C pending    Left upper extremity swelling  -Patient noted to have LUE swelling and discomfort this am (10/20/2024).  -LUE US pending  -LUE elevated    Hypernatremia  Resolved    Plan:   - Initially given D5W to correct, labs to monitor rate of correction   - Was on NS w/KCl but becoming acidotic on labs.  -Na " remains 148 this am  -IVFs changed to LR    Ileus  Resolved      Patient was seen by general surgery for some episodes of nausea and emesis and some constipation. KUB was concerning for SBO vs. Ileus. Follow up CT AP did not show evidence of mechanical obstruction, was more indicative of Ileus. XR gastrogaffin challenge ordered by general surgery.     Plan:   - Was NPO. Approved to advance diet as tolerated by Gen Surg on 10/19. NGT d/c'd. Patient was started on a Clear liquid diet and tolerated it well. Advanced to previous diet recommendation of puree, nectar thick today (10/20). SLP to re-eval 10/21  - monitor electrolytes daily, correct as needed   - Eliquis switched to heparin as it might not have been fully absorbed considering Ileus. Resumed Eliquis 10/19.    - scheduled suppository regimen, f/u on effectiveness and escalate prn  - general surgery following, appreciate recs     Acute focal neurological deficit  Episodes of transient altered mental status observed by daughter, sometimes with a brief left gaze. Given location of KIARA stroke, considered seizure. EEG done on 10/11, negative.     Plan:   - no longer on keppra regimen     KAILYN (acute kidney injury)  RESOLVED  Cr was initially 1.6, up from a baseline of 1. Likely pre-renal KAILYN, due to decreased PO intake. Resolved with fluids.     Plan:   - Continue to monitor CMP.   -SCr has been stable since 10/14/24    Type 2 diabetes mellitus, with long-term current use of insulin  History of. Takes Levemir 35 units QHS at home. A1C 10%.    -goal 140-180 while admitted  -SSI prn   -continue to monitor glucose  -Endocrine consulted for A1c 10%; following, appreciate recs   -24° glucose 173-211        dextrose 10% bolus 125 mL 125 mL, 12.5 g, Intravenous, PRN    dextrose 10% bolus 250 mL 250 mL, 25 g, Intravenous, PRN    glucagon (human recombinant) injection 1 mg, 1 mg, Intramuscular, PRN    insulin aspart U-100 pen 0-10 Units, 0-10 Units, Subcutaneous, QID (AC +  HS) PRN    Atrial fibrillation  Takes Eliquis 5mg BID and Coreg 6.25mg BID at home.  Initially Eliquis held given size of stroke; ASA 81mg QD in the interim. Eliquis was restarted on 10/10.       Plan:   -continue at home coreg regimen   -eliquis switched to heparin drip due to issues described in ileus section of the assessment and plan.   -Heparin gtt turned off 10/19. Eliquis resumed on 10/19.  -24° HR 63-78    Opioid use  History of. Prescribed oxycodone 15mg at home. Family concerned that medication is being diverted by family member, unclear how often patient takes. Reportedly is prescribed for neck pain.     Plan:   - holding oxycodone due to ileus    Left hemiplegia  See stroke    Plan:   - continue PT/OT/SLP   -Current recs for high intensity therapy    Hypokalemia  Persistent/Recurrent HypoK in setting of normal mag. Improving. BID labs    Urinary workup shows normal K  Stool studies pending  Hyperaldo workup pending    HTN (hypertension)  Home medication regimen as below  -lisinopril 40mg QD  -amlodipine 10mg QD    Plan:   -lisinopril and amlodipine were initially held in the setting of KAILYN. Was started on coreg in the context of Afib.   -if patient's BP still elevated, consider adding back amlodipine and lisinopril   -Goal SBP<180 for now  24° -182      carvediloL tablet 12.5 mg, Oral, BID    hydrALAZINE injection 10 mg, Intravenous, Q6H PRN         10/7: RODERICKEgómez GARCIA exam stable, echo today, NPO aside from meds per speech, barium study tmrw  10/08/2024 NAEON, stable neuro exam, went for the Br study, and was started on a diet. Endocrine consulted for the A1c 10%, pending recs.  10/09/2024 NAEON, Stable neuro exam, will restart Lisinopril 20 mg (half of her dome dose). Seen by endocrine, with adjustment of her insulin doses.  10/10/2024 NAEON, no change in her neuro exam, will restart Eliquis today, BP is better controlled.  10/11/2024 NAEON, stable neuro exam, still decreased PO intake. Patient  gets some episodes of in attention; EEG was ordered. Endocrine adjusted her insulins. Accepted at [facility] in [place].   10/12/2024 NAEON, waiting on EEG interpretation (contacted epilepsy), started Keppra 500mg BID and will see if reduces transient alterations in awareness, neurologic examination stable  10/13/2024 NAEON, EEG w/encephalopathy (no seizure or discharges), stop Keppra, SBP ~150 to 160 so increased Coreg to 6.25mg BID  10/14/2024 General surgery was consulted overnight for constipation, episodes of nausea and emesis. KUB concerning for SBO vs. Ileus. F/u CT AP was more indicative of ileus, no evidence of mechanical obstruction was observed. Patient is NPO, can get some meds via NGT. Eliquis switched to heparin drip.   10/15/2024 Patient has still not had an actual bowel movement. Is distended, has some abdominal pain. General surgery following, is getting XR gastrogaffin challenge, will f/u results.   10/16/2024 Still monitoring patient for bowel movements in the setting of Ileus. General surgery is following. Mild hyperkalemia and hypernatremia this morning; will continue to monitor.   10/17/2024 Patient still has not had a bowel movement. Ileus not resolved on repeat imaging. Suppository ordered earlier this morning, will monitor to see if improvement with suppository.   10/18/2024 Patient still had not had a solid bowel movement due to her ileus, had a number of watery movements yesterday but nothing substantive. Suppositories now scheduled. General surgery following.   10/19/2024 Patient stable overnight. LBM yesterday. Gen Surg ok with advancing diet to clear liquids. Heparin gtt stopped, started on Eliquis. Awaiting placement.  10/20/2024 NAEON. Neuro exam remains stable. Patient with audible upper respiratory sounds and cough. CPT ordered. Patient noted to be increasingly acidotic on labs. IVFs changed from NS to LR. Patient awaiting placement.  10/21/24 NAEON. Neuro exam remains stable. Not  oriented to time. Family says patient's exam stable compared to yesterday. SLP to re-evaluate, on diet level 4. Placement pending  10/22/24 NAEON. Neuro exam remains stable. Not oriented to time. Family says patient's exam stable compared to yesterday. SLP to re-evaluate, on diet level 4. Placement pending  10/23/24 NAEON. Neuro exam remains stable/slightly improved mental status. Oriented to time. Accepted for placement but will plan for d/c tomorrow given hypokalemia. Lytes corrected and repeat lab pending.  10/24/24 NAEON. Neuro exam remains stable/slightly improved mental status. Workup for hypoK ordered and pending. DDX HyperAldo vs Diarrhea.  10/25/2024 NAEON. Neuro exam remains stable/slightly improved mental status. Diarrhea resolving. D/C Monday d/t logisitics  10/26/2024 NAEON. Neuro exam remains stable/slightly improved mental status. D/C Monday d/t logisitics  10/27/2024 NAEON. Neuro exam remains stable/slightly improved mental status. Mild transamnitis, will get Liver US and Hep C. Likely D/C Monday d/t logisitics    STROKE DOCUMENTATION   Acute Stroke Times   Last Known Normal Date:  (OSH)  Stroke Team Called Date: 10/06/24  Stroke Team Called Time:  (At OSH)  Stroke Team Arrival Date: 10/06/24  Stroke Team Arrival Time: 0608  CT Interpretation Time:  (CTA done around 4:20am per documents that came to [facility] with patient)  Thrombolytic Therapy Recommended: No  Thrombectomy Recommended:  (Pending MRI Brain)    NIH Scale:          Modified Cam[place] Score: 3  [place] Coma Scale:    ABCD2 Score:    [id number] Score:   HAS -BLED Score:   ICH Score:   Hunt & Shane Classification:      Hemorrhagic change of an Ischemic Stroke: Does this patient have an ischemic stroke with hemorrhagic changes? No     Neurologic Chief Complaint: Infarcts in multiple vascular territories    Subjective:     Interval History: Patient is seen for follow-up neurological assessment and treatment recommendations: See Hospital  Course    HPI, Past Medical, Family, and Social History remains the same as documented in the initial encounter.     Review of Systems   Constitutional:  Negative for fever.   HENT:  Positive for trouble swallowing.    Respiratory:  Positive for cough. Negative for shortness of breath.    Gastrointestinal:  Negative for abdominal pain, nausea and vomiting.   Musculoskeletal:         LUE swelling   Neurological:  Positive for facial asymmetry, weakness and numbness.   All other systems reviewed and are negative.    Scheduled Meds:   apixaban  5 mg Oral BID    atorvastatin  40 mg Oral Daily    carvediloL  12.5 mg Oral BID    famotidine  20 mg Oral Daily    insulin aspart U-100  5 Units Subcutaneous TIDWM    insulin glargine U-100  10 Units Subcutaneous Daily    k phos di & mono-sod phos mono  1 tablet Oral BID    lisinopriL  20 mg Oral Daily    potassium bicarbonate  40 mEq Oral Once     Continuous Infusions:      PRN Meds:  Current Facility-Administered Medications:     acetaminophen, 650 mg, Per NG tube, Q8H PRN    dextrose 10%, 12.5 g, Intravenous, PRN    dextrose 10%, 25 g, Intravenous, PRN    glucagon (human recombinant), 1 mg, Intramuscular, PRN    hydrALAZINE, 5 mg, Intravenous, Q6H PRN    insulin aspart U-100, 0-10 Units, Subcutaneous, QID (AC + HS) PRN    iohexol, 15 mL, Oral, PRN    sodium chloride 0.9%, 500 mL, Intravenous, PRN    sodium chloride 0.9%, 10 mL, Intravenous, PRN    Flushing PICC/Midline Protocol, , , Until Discontinued **AND** sodium chloride 0.9%, 10 mL, Intravenous, Q12H PRN    Objective:     Vital Signs (Most Recent):  Temp: 98.2 °F (36.8 °C) (10/27/24 1147)  Pulse: 62 (10/27/24 1147)  Resp: 18 (10/27/24 1147)  BP: (!) 143/64 (10/27/24 1147)  SpO2: 100 % (10/27/24 1147)  BP Location: Left arm    Vital Signs Range (Last 24H):  Temp:  [97.7 °F (36.5 °C)-98.9 °F (37.2 °C)]   Pulse:  [56-76]   Resp:  [16-18]   BP: (139-164)/(64-80)   SpO2:  [95 %-100 %]   BP Location: Left arm       Physical  Exam  Vitals and nursing note reviewed.   Constitutional:       General: She is awake.      Appearance: She is ill-appearing.   Eyes:      General:         Right eye: No discharge.         Left eye: No discharge.      Extraocular Movements: Extraocular movements intact.      Conjunctiva/sclera: Conjunctivae normal.   Cardiovascular:      Rate and Rhythm: Normal rate.   Pulmonary:      Effort: Pulmonary effort is normal. No respiratory distress.   Abdominal:      General: There is no distension.   Musculoskeletal:      Comments: LUE swelling   Skin:     General: Skin is warm and dry.          Neurological:      Mental Status: She is disoriented.      Cranial Nerves: Facial asymmetry present.      Sensory: Sensory deficit present.      Motor: Weakness present.            Neurological Exam:   LOC: alert  Language: No aphasia  Articulation: Dysarthria    Laboratory:  CMP:   Recent Labs   Lab 10/27/24  1014   CALCIUM 9.0   ALBUMIN 2.7*   PROT 6.4      K 3.6   CO2 25      BUN 6*   CREATININE 0.7   ALKPHOS 76   ALT 67*   AST 65*   BILITOT 0.7     CBC:   Recent Labs   Lab 10/27/24  1014   WBC 6.63   RBC 3.77*   HGB 12.3   HCT 38.3      *   MCH 32.6*   MCHC 32.1       Diagnostic Results     Brain and Vessel Imaging   MRI/MRA Brain Ischemic Protocol: 10/6/24  Impression: Acute infarcts involving the right anterior circulation and posterior circulation as detailed above without evidence of intracranial hemorrhage or mass effect.  Embolic disease to be considered.     Chronic left parietal infarct. MRA demonstrates limited visualization of distal right KIARA branches in keeping with the territory of the acute infarct.  There is also narrowing of at least one proximal left M2 branch although no acute infarct in that territory.          Echo: 10/7/24  Cardiac Imaging     Left Ventricle: The left ventricle is normal in size. Normal wall thickness. There is concentric remodeling. Normal wall motion. There is  hyperdynamic systolic function with a visually estimated ejection fraction of greater than 70%. There is normal diastolic function.    Right Ventricle: Normal right ventricular cavity size. Wall thickness is normal. Systolic function is normal.    Left Atrium: Bubble study is likely negative, but full opacification of the RA is not well seen on these images which decreases sensitivity.    IVC/SVC: Normal venous pressure at 3 mmHg.    Vikas Chaudhari DO  [facility]  Department of Vascular Neurology   [facility])

## 2024-10-27 NOTE — CONSULTS
[facility] - Neurosurgery ([facility])  Hospital Medicine  Consult Note    Patient Name: Tammy Multani  MRN: [id number]  Admission Date: 10/6/2024  Hospital Length of Stay: 21 days  Attending Physician: Germain Smyth MD   Primary Care Provider: Soha Tucker FNP           Patient information was obtained from patient and past medical records.     Inpatient consult to Hospital Medicine-General  Consult performed by: Edvin Trent MD  Consult ordered by: Roger Conklin DO        Subjective:     Principal Problem: Acute arterial ischemic stroke, multifocal, multiple vascular territories    Chief Complaint:   Chief Complaint   Patient presents with    Transfer     From [facility] for Vascular Neuro eval         HPI: 64 year old female with DM2, HTN, chronic pain on opiates who was transferred from [facility] for higher level of care secondary to acute KIARA stroke.  The patient presented to the OSH overnight for slurred speech started.  She has a history of his stroke with left-sided deficits but is normally able to transfer and do a little walking.  Hospital course c/b Kathleen now resolved, Ileus now resolved,  and hypokalemia for which medicine was consulted.  Patient seen in her room with mild confusion.  She perseverates on using the bathroom but reports being told her K was low int he past, she reports poor adherence with K supplements and is unclear of the dose she takes.     Past Medical History:   Diagnosis Date    Anticoagulant long-term use     Diabetes mellitus     Hypertension     Stroke        Past Surgical History:   Procedure Laterality Date    ABDOMINAL SURGERY      JOINT REPLACEMENT         Review of patient's allergies indicates:  No Known Allergies    No current facility-administered medications on file prior to encounter.     Current Outpatient Medications on File Prior to Encounter   Medication Sig    ELIQUIS 5 mg Tab Take 5 mg by mouth 2 (two) times daily.    FREESTYLE CLIFFORD 3 SENSOR Lamar  SMARTSI Topical Once    gabapentin (NEURONTIN) 600 MG tablet Take 600 mg by mouth 3 (three) times daily.    ONETOUCH ULTRA2 METER Misc SMARTSI Via Meter Daily PRN    oxyCODONE (ROXICODONE) 15 MG Tab Take 15 mg by mouth every 12 (twelve) hours as needed for Pain.    tiZANidine (ZANAFLEX) 4 MG tablet Take 4 mg by mouth every evening.    zolpidem (AMBIEN) 5 MG Tab Take 5 mg by mouth nightly as needed.     Family History    None       Tobacco Use    Smoking status: Never    Smokeless tobacco: Not on file   Substance and Sexual Activity    Alcohol use: Never    Drug use: Never    Sexual activity: Not Currently     Review of Systems   Reason unable to perform ROS: Limited 2/2 mentation.   Constitutional:  Negative for fatigue and fever.   HENT:  Positive for trouble swallowing.    Eyes:  Negative for pain.   Respiratory:  Positive for cough. Negative for shortness of breath.    Cardiovascular:  Negative for chest pain and palpitations.   Gastrointestinal:  Negative for abdominal pain, nausea and vomiting.   Genitourinary:  Negative for dysuria.   Musculoskeletal:         LUE swelling   Neurological:  Positive for facial asymmetry, weakness and numbness.   All other systems reviewed and are negative.    Objective:     Vital Signs (Most Recent):  Temp: 98.2 °F (36.8 °C) (10/27/24 1147)  Pulse: 62 (10/27/24 1147)  Resp: 18 (10/27/24 1147)  BP: (!) 143/64 (10/27/24 1147)  SpO2: 100 % (10/27/24 1147) Vital Signs (24h Range):  Temp:  [97.7 °F (36.5 °C)-98.9 °F (37.2 °C)] 98.2 °F (36.8 °C)  Pulse:  [56-76] 62  Resp:  [16-18] 18  SpO2:  [95 %-100 %] 100 %  BP: (139-164)/(64-80) 143/64     Weight: 72.6 kg (160 lb 0.9 oz)  Body mass index is 29.27 kg/m².     Physical Exam  Vitals and nursing note reviewed.   Constitutional:       General: She is awake.      Appearance: She is ill-appearing.   Eyes:      General:         Right eye: No discharge.         Left eye: No discharge.      Extraocular Movements: Extraocular movements  intact.      Conjunctiva/sclera: Conjunctivae normal.   Cardiovascular:      Rate and Rhythm: Normal rate.   Pulmonary:      Effort: Pulmonary effort is normal. No respiratory distress.   Abdominal:      General: There is no distension.   Musculoskeletal:      Comments: LUE swelling   Skin:     General: Skin is warm and dry.          Neurological:      Mental Status: She is disoriented.      Cranial Nerves: Facial asymmetry present.      Motor: Weakness (Left sided) present.          Significant Labs: All pertinent labs within the past 24 hours have been reviewed.  CMP:   Recent Labs   Lab 10/26/24  0434 10/26/24  1338 10/26/24  1945 10/27/24  1014    142 140 140   K 3.5 3.1* 5.3* 3.6    107 105 103   CO2 24 24 25 25   * 109 141* 155*   BUN 4* 6* 5* 6*   CREATININE 0.6 0.6 0.7 0.7   CALCIUM 8.6* 8.3* 8.8 9.0   PROT 5.9* 5.7*  --  6.4   ALBUMIN 2.5* 2.4*  --  2.7*   BILITOT 0.6 0.6  --  0.7   ALKPHOS 70 71  --  76   AST 38 38  --  65*   ALT 44 44  --  67*   ANIONGAP 11 11 10 12       Significant Imaging: I have reviewed all pertinent imaging results/findings within the past 24 hours.    Assessment/Plan:     Elevated LFTs  Mild and potentially 2/2 statin or other medication.  Patient likely has poor adherence with medication regimen at home and per chart was on a low dose of statin, she may have had elevation in enzymes prior.  Given weight and DM2 GROVER is possible, agree with US liver with doppler.  Screen for Hep C.  Follow repeat LFTs tomorrow.     Hypokalemia  Patient's most recent potassium results are listed below.  K appears to be improving.  I suspect her hypokalemia is 2/2 poor absorption due to recent ileus and poor PO intake c/b intracellular shift given increases in her insulin regimen, outpatient A1c >10.  Renin aldosterone pending which is reasonable.  Follow up urine K to creatine ratio.  If K stable tomorrow I feel she can be monitored at SNF for improvement.   Recent Labs      10/26/24  1338 10/26/24  1945 10/27/24  1014   K 3.1* 5.3* 3.6     Plan  - Replete potassium per protocol  - Monitor potassium daily.       VTE Risk Mitigation (From admission, onward)           Ordered     apixaban tablet 5 mg  2 times daily         10/19/24 0903     Reason for No Pharmacological VTE Prophylaxis  Once        Question:  Reasons:  Answer:  Already adequately anticoagulated on oral Anticoagulants    10/06/24 1103     IP VTE HIGH RISK PATIENT  Once         10/06/24 1102     Place sequential compression device  Until discontinued         10/06/24 1102                        Thank you for your consult. I will follow-up with patient. Please contact us if you have any additional questions.    Edvin Trent MD  Department of Hospital Medicine   [facility] - Neurosurgery ([facility])

## 2024-10-27 NOTE — SUBJECTIVE & OBJECTIVE
"Interval HPI:   Overnight events: No acute events overnight. Patient in room 960/960 A. Blood glucose stable. BG at goal on current insulin regimen (SSI, prandial, and basal insulin ). Steroid use- None .    Renal function- Normal   Vasopressors-  None       Endocrine will continue to follow and manage insulin orders inpatient.         Diet Pureed (IDDSI Level 4) Nectar Thick (Mildly Thick); Standard Tray     Eatin%  Nausea: No  Hypoglycemia and intervention: No  Fever: No  TPN and/or TF: No  If yes, type of TF/TPN and rate: n/a    BP (!) 155/78 (BP Location: Left arm, Patient Position: Lying)   Pulse 68   Temp 97.7 °F (36.5 °C) (Oral)   Resp 18   Ht 5' 2" (1.575 m)   Wt 72.6 kg (160 lb 0.9 oz)   LMP  (LMP Unknown) Comment: Doesn't  have periods anymore  SpO2 96%   Breastfeeding No   BMI 29.27 kg/m²     Labs Reviewed and Include    Recent Labs   Lab 10/26/24  1338 10/26/24  194    141*   CALCIUM 8.3* 8.8   ALBUMIN 2.4*  --    PROT 5.7*  --     140   K 3.1* 5.3*   CO2 24 25    105   BUN 6* 5*   CREATININE 0.6 0.7   ALKPHOS 71  --    ALT 44  --    AST 38  --    BILITOT 0.6  --      Lab Results   Component Value Date    WBC 12.50 10/26/2024    HGB 12.5 10/26/2024    HCT 36.2 (L) 10/26/2024    MCV 96 10/26/2024     10/26/2024     No results for input(s): "TSH", "FREET4" in the last 168 hours.  Lab Results   Component Value Date    HGBA1C 10.0 (H) 10/08/2024       Nutritional status:   Body mass index is 29.27 kg/m².  Lab Results   Component Value Date    ALBUMIN 2.4 (L) 10/26/2024    ALBUMIN 2.5 (L) 10/26/2024    ALBUMIN 2.4 (L) 10/25/2024     No results found for: "PREALBUMIN"    Estimated Creatinine Clearance: 74.8 mL/min (based on SCr of 0.7 mg/dL).    Accu-Checks  Recent Labs     10/24/24  1558 10/24/24  2033 10/25/24  0810 10/25/24  1138 10/25/24  1633 10/26/24  0802 10/26/24  1212 10/26/24  1544 10/26/24  1942 10/27/24  0756   POCTGLUCOSE 167* 162* 191* 202* 85 182* 115* " 93 142* 147*       Current Medications and/or Treatments Impacting Glycemic Control  Immunotherapy:    Immunosuppressants       None          Steroids:   Hormones (From admission, onward)      None          Pressors:    Autonomic Drugs (From admission, onward)      None          Hyperglycemia/Diabetes Medications:   Antihyperglycemics (From admission, onward)      Start     Stop Route Frequency Ordered    10/25/24 1645  insulin aspart U-100 pen 5 Units         -- SubQ 3 times daily with meals 10/25/24 1404    10/24/24 1045  insulin glargine U-100 (Lantus) pen 10 Units         -- SubQ Daily 10/24/24 1041    10/19/24 2256  insulin aspart U-100 pen 0-10 Units         -- SubQ Before meals & nightly PRN 10/19/24 0450

## 2024-10-27 NOTE — SUBJECTIVE & OBJECTIVE
Neurologic Chief Complaint: Infarcts in multiple vascular territories    Subjective:     Interval History: Patient is seen for follow-up neurological assessment and treatment recommendations: See Hospital Course    HPI, Past Medical, Family, and Social History remains the same as documented in the initial encounter.     Review of Systems   Constitutional:  Negative for fever.   HENT:  Positive for trouble swallowing.    Respiratory:  Positive for cough. Negative for shortness of breath.    Gastrointestinal:  Negative for abdominal pain, nausea and vomiting.   Musculoskeletal:         LUE swelling   Neurological:  Positive for facial asymmetry, weakness and numbness.   All other systems reviewed and are negative.    Scheduled Meds:   apixaban  5 mg Oral BID    atorvastatin  40 mg Oral Daily    carvediloL  12.5 mg Oral BID    famotidine  20 mg Oral Daily    insulin aspart U-100  5 Units Subcutaneous TIDWM    insulin glargine U-100  10 Units Subcutaneous Daily    k phos di & mono-sod phos mono  1 tablet Oral BID    lisinopriL  20 mg Oral Daily    potassium bicarbonate  40 mEq Oral Once     Continuous Infusions:      PRN Meds:  Current Facility-Administered Medications:     acetaminophen, 650 mg, Per NG tube, Q8H PRN    dextrose 10%, 12.5 g, Intravenous, PRN    dextrose 10%, 25 g, Intravenous, PRN    glucagon (human recombinant), 1 mg, Intramuscular, PRN    hydrALAZINE, 5 mg, Intravenous, Q6H PRN    insulin aspart U-100, 0-10 Units, Subcutaneous, QID (AC + HS) PRN    iohexol, 15 mL, Oral, PRN    sodium chloride 0.9%, 500 mL, Intravenous, PRN    sodium chloride 0.9%, 10 mL, Intravenous, PRN    Flushing PICC/Midline Protocol, , , Until Discontinued **AND** sodium chloride 0.9%, 10 mL, Intravenous, Q12H PRN    Objective:     Vital Signs (Most Recent):  Temp: 98.2 °F (36.8 °C) (10/27/24 1147)  Pulse: 62 (10/27/24 1147)  Resp: 18 (10/27/24 1147)  BP: (!) 143/64 (10/27/24 1147)  SpO2: 100 % (10/27/24 1147)  BP Location: Left  arm    Vital Signs Range (Last 24H):  Temp:  [97.7 °F (36.5 °C)-98.9 °F (37.2 °C)]   Pulse:  [56-76]   Resp:  [16-18]   BP: (139-164)/(64-80)   SpO2:  [95 %-100 %]   BP Location: Left arm       Physical Exam  Vitals and nursing note reviewed.   Constitutional:       General: She is awake.      Appearance: She is ill-appearing.   Eyes:      General:         Right eye: No discharge.         Left eye: No discharge.      Extraocular Movements: Extraocular movements intact.      Conjunctiva/sclera: Conjunctivae normal.   Cardiovascular:      Rate and Rhythm: Normal rate.   Pulmonary:      Effort: Pulmonary effort is normal. No respiratory distress.   Abdominal:      General: There is no distension.   Musculoskeletal:      Comments: LUE swelling   Skin:     General: Skin is warm and dry.          Neurological:      Mental Status: She is disoriented.      Cranial Nerves: Facial asymmetry present.      Sensory: Sensory deficit present.      Motor: Weakness present.            Neurological Exam:   LOC: alert  Language: No aphasia  Articulation: Dysarthria    Laboratory:  CMP:   Recent Labs   Lab 10/27/24  1014   CALCIUM 9.0   ALBUMIN 2.7*   PROT 6.4      K 3.6   CO2 25      BUN 6*   CREATININE 0.7   ALKPHOS 76   ALT 67*   AST 65*   BILITOT 0.7     CBC:   Recent Labs   Lab 10/27/24  1014   WBC 6.63   RBC 3.77*   HGB 12.3   HCT 38.3      *   MCH 32.6*   MCHC 32.1       Diagnostic Results     Brain and Vessel Imaging   MRI/MRA Brain Ischemic Protocol: 10/6/24  Impression: Acute infarcts involving the right anterior circulation and posterior circulation as detailed above without evidence of intracranial hemorrhage or mass effect.  Embolic disease to be considered.     Chronic left parietal infarct. MRA demonstrates limited visualization of distal right KIARA branches in keeping with the territory of the acute infarct.  There is also narrowing of at least one proximal left M2 branch although no acute  infarct in that territory.          Echo: 10/7/24  Cardiac Imaging     Left Ventricle: The left ventricle is normal in size. Normal wall thickness. There is concentric remodeling. Normal wall motion. There is hyperdynamic systolic function with a visually estimated ejection fraction of greater than 70%. There is normal diastolic function.    Right Ventricle: Normal right ventricular cavity size. Wall thickness is normal. Systolic function is normal.    Left Atrium: Bubble study is likely negative, but full opacification of the RA is not well seen on these images which decreases sensitivity.    IVC/SVC: Normal venous pressure at 3 mmHg.

## 2024-10-27 NOTE — PLAN OF CARE
POC updated and reviewed with the patient at the bedside. Questions regarding POC were encouraged and addressed. VSS, see flowsheets. Patient is AOX 4 at this time w/ short term memory loss/ repetition . Tele maintained per order. Fall and safety precautions maintained, no signs of injury noted during shift. Patient repositioned independently and with assistance in bed for comfort. Upon exiting room, patient's bed locked in low position, side rails up x 3, bed alarm on, with call light within reach. Instructed patient to call staff for mobility, verbalized understanding. Stroke book and education reviewed at the bedside, see education flowsheets for details. No acute signs of distress noted at this time.      -Sraa Epstein    Problem: Skin Injury Risk Increased  Goal: Skin Health and Integrity  Outcome: Progressing     Problem: Stroke, Ischemic (Includes Transient Ischemic Attack)  Goal: Optimal Coping  Outcome: Progressing  Goal: Effective Bowel Elimination  Outcome: Progressing  Goal: Optimal Cognitive Function  Outcome: Progressing  Goal: Improved Communication Skills  Outcome: Progressing  Goal: Optimal Functional Ability  Outcome: Progressing  Goal: Optimal Nutrition Intake  Outcome: Progressing  Goal: Effective Urinary Elimination  Outcome: Progressing     Problem: Fall Injury Risk  Goal: Absence of Fall and Fall-Related Injury  Outcome: Progressing     Problem: Adult Inpatient Plan of Care  Goal: Readiness for Transition of Care  Outcome: Progressing     Problem: Diabetes Comorbidity  Goal: Blood Glucose Level Within Targeted Range  Outcome: Progressing     Problem: Wound  Goal: Skin Health and Integrity  Outcome: Progressing  Goal: Optimal Wound Healing  Outcome: Progressing

## 2024-10-28 VITALS
HEART RATE: 92 BPM | RESPIRATION RATE: 18 BRPM | DIASTOLIC BLOOD PRESSURE: 76 MMHG | SYSTOLIC BLOOD PRESSURE: 166 MMHG | WEIGHT: 160.06 LBS | TEMPERATURE: 98 F | BODY MASS INDEX: 29.45 KG/M2 | OXYGEN SATURATION: 100 % | HEIGHT: 62 IN

## 2024-10-28 LAB
ALBUMIN SERPL BCP-MCNC: 2.6 G/DL (ref 3.5–5.2)
ALDOST SERPL-MCNC: <3 NG/DL
ALDOST/RENIN PLAS-RTO: <10 RATIO
ALP SERPL-CCNC: 75 U/L (ref 40–150)
ALT SERPL W/O P-5'-P-CCNC: 59 U/L (ref 10–44)
ANION GAP SERPL CALC-SCNC: 10 MMOL/L (ref 8–16)
ANION GAP SERPL CALC-SCNC: 11 MMOL/L (ref 8–16)
AST SERPL-CCNC: 43 U/L (ref 10–40)
BASOPHILS # BLD AUTO: 0.07 K/UL (ref 0–0.2)
BASOPHILS NFR BLD: 0.8 % (ref 0–1.9)
BILIRUB SERPL-MCNC: 0.7 MG/DL (ref 0.1–1)
BUN SERPL-MCNC: 7 MG/DL (ref 8–23)
BUN SERPL-MCNC: 7 MG/DL (ref 8–23)
CALCIUM SERPL-MCNC: 8.7 MG/DL (ref 8.7–10.5)
CALCIUM SERPL-MCNC: 8.8 MG/DL (ref 8.7–10.5)
CHLORIDE SERPL-SCNC: 102 MMOL/L (ref 95–110)
CHLORIDE SERPL-SCNC: 104 MMOL/L (ref 95–110)
CO2 SERPL-SCNC: 24 MMOL/L (ref 23–29)
CO2 SERPL-SCNC: 28 MMOL/L (ref 23–29)
CREAT SERPL-MCNC: 0.6 MG/DL (ref 0.5–1.4)
CREAT SERPL-MCNC: 0.7 MG/DL (ref 0.5–1.4)
DIFFERENTIAL METHOD BLD: ABNORMAL
EOSINOPHIL # BLD AUTO: 0.1 K/UL (ref 0–0.5)
EOSINOPHIL NFR BLD: 0.8 % (ref 0–8)
ERYTHROCYTE [DISTWIDTH] IN BLOOD BY AUTOMATED COUNT: 14 % (ref 11.5–14.5)
EST. GFR  (NO RACE VARIABLE): >60 ML/MIN/1.73 M^2
EST. GFR  (NO RACE VARIABLE): >60 ML/MIN/1.73 M^2
GLUCOSE SERPL-MCNC: 136 MG/DL (ref 70–110)
GLUCOSE SERPL-MCNC: 158 MG/DL (ref 70–110)
HCT VFR BLD AUTO: 36.1 % (ref 37–48.5)
HGB BLD-MCNC: 12.9 G/DL (ref 12–16)
IMM GRANULOCYTES # BLD AUTO: 0.19 K/UL (ref 0–0.04)
IMM GRANULOCYTES NFR BLD AUTO: 2.1 % (ref 0–0.5)
LYMPHOCYTES # BLD AUTO: 2.1 K/UL (ref 1–4.8)
LYMPHOCYTES NFR BLD: 22.5 % (ref 18–48)
MAGNESIUM SERPL-MCNC: 2 MG/DL (ref 1.6–2.6)
MCH RBC QN AUTO: 33.5 PG (ref 27–31)
MCHC RBC AUTO-ENTMCNC: 35.7 G/DL (ref 32–36)
MCV RBC AUTO: 94 FL (ref 82–98)
MONOCYTES # BLD AUTO: 0.7 K/UL (ref 0.3–1)
MONOCYTES NFR BLD: 7.4 % (ref 4–15)
NEUTROPHILS # BLD AUTO: 6.1 K/UL (ref 1.8–7.7)
NEUTROPHILS NFR BLD: 66.4 % (ref 38–73)
NRBC BLD-RTO: 0 /100 WBC
PHOSPHATE SERPL-MCNC: 2.9 MG/DL (ref 2.7–4.5)
PLATELET # BLD AUTO: 261 K/UL (ref 150–450)
PMV BLD AUTO: 11 FL (ref 9.2–12.9)
POTASSIUM SERPL-SCNC: 3.6 MMOL/L (ref 3.5–5.1)
POTASSIUM SERPL-SCNC: 4.2 MMOL/L (ref 3.5–5.1)
PROT SERPL-MCNC: 6 G/DL (ref 6–8.4)
RBC # BLD AUTO: 3.85 M/UL (ref 4–5.4)
RENIN PLAS-CCNC: 0.3 NG/ML/HR
SODIUM SERPL-SCNC: 139 MMOL/L (ref 136–145)
SODIUM SERPL-SCNC: 140 MMOL/L (ref 136–145)
WBC # BLD AUTO: 9.15 K/UL (ref 3.9–12.7)

## 2024-10-28 PROCEDURE — 83735 ASSAY OF MAGNESIUM: CPT | Performed by: PHYSICIAN ASSISTANT

## 2024-10-28 PROCEDURE — 36415 COLL VENOUS BLD VENIPUNCTURE: CPT | Performed by: PHYSICIAN ASSISTANT

## 2024-10-28 PROCEDURE — 80053 COMPREHEN METABOLIC PANEL: CPT | Performed by: PHYSICIAN ASSISTANT

## 2024-10-28 PROCEDURE — 85025 COMPLETE CBC W/AUTO DIFF WBC: CPT | Performed by: PHYSICIAN ASSISTANT

## 2024-10-28 PROCEDURE — 80048 BASIC METABOLIC PNL TOTAL CA: CPT | Mod: XB

## 2024-10-28 PROCEDURE — 97535 SELF CARE MNGMENT TRAINING: CPT

## 2024-10-28 PROCEDURE — 97530 THERAPEUTIC ACTIVITIES: CPT

## 2024-10-28 PROCEDURE — 99233 SBSQ HOSP IP/OBS HIGH 50: CPT | Mod: GC,,, | Performed by: PSYCHIATRY & NEUROLOGY

## 2024-10-28 PROCEDURE — 99232 SBSQ HOSP IP/OBS MODERATE 35: CPT | Mod: ,,, | Performed by: NURSE PRACTITIONER

## 2024-10-28 PROCEDURE — 25000003 PHARM REV CODE 250: Performed by: STUDENT IN AN ORGANIZED HEALTH CARE EDUCATION/TRAINING PROGRAM

## 2024-10-28 PROCEDURE — 84100 ASSAY OF PHOSPHORUS: CPT | Performed by: PHYSICIAN ASSISTANT

## 2024-10-28 PROCEDURE — 97112 NEUROMUSCULAR REEDUCATION: CPT

## 2024-10-28 PROCEDURE — 25000003 PHARM REV CODE 250

## 2024-10-28 PROCEDURE — 36415 COLL VENOUS BLD VENIPUNCTURE: CPT

## 2024-10-28 RX ORDER — POTASSIUM CHLORIDE 750 MG/1
10 TABLET, EXTENDED RELEASE ORAL DAILY
Start: 2024-10-28

## 2024-10-28 RX ADMIN — CARVEDILOL 12.5 MG: 12.5 TABLET, FILM COATED ORAL at 09:10

## 2024-10-28 RX ADMIN — APIXABAN 5 MG: 5 TABLET, FILM COATED ORAL at 09:10

## 2024-10-28 RX ADMIN — FAMOTIDINE 20 MG: 20 TABLET ORAL at 09:10

## 2024-10-28 RX ADMIN — ATORVASTATIN CALCIUM 40 MG: 40 TABLET, FILM COATED ORAL at 09:10

## 2024-10-28 RX ADMIN — LISINOPRIL 20 MG: 20 TABLET ORAL at 09:10

## 2024-10-28 RX ADMIN — INSULIN GLARGINE 10 UNITS: 100 INJECTION, SOLUTION SUBCUTANEOUS at 09:10

## 2024-10-28 NOTE — ASSESSMENT & PLAN NOTE
BG goal: 140-180  T2DM admitted with acute KIARA stroke. Lantus d/c on 10/16 do to hypoglycemia. Morning blood sugars trending up.  Long acting insulin added back to insulin regimen at 0.5u/kg/d for AM hyperglycemia.    - Continue Lanuts 10 units QD   - Continue Novolog 5 units TID with meals (HOLD if BG < 100 or eating < 25%)  - Continue Novolog MDC SSI with ISF 25 starting at 150    - POCT Glucose ac/hs  - Hypoglycemia protocol in place      ** Please notify Endocrine for any change and/or advance in diet**  ** Please call Endocrine for any BG related issues **     Discharge Planning: Likely continue on current inpatient regimen

## 2024-10-28 NOTE — PLAN OF CARE
[facility] - Neurosurgery (Hospital)  Discharge Final Note    Primary Care Provider: Soha Tucker FNP    Expected Discharge Date: 10/28/2024    Final Discharge Note (most recent)       Final Note - 10/28/24 1049          Final Note    Assessment Type Final Discharge Note (P)      Anticipated Discharge Disposition Rehab Facility (P)         Post-Acute Status    Post-Acute Authorization Placement (P)      Post-Acute Placement Status Set-up Complete/Auth obtained (P)      Discharge Delays Change in Medical Condition (P)                  Patient discharging to [facility] in [place], [place].  SW provided RN with number for report and set up noon transport.  SW let patient know and called her daughter Milana.      Miladys Brown, LMSW [facility]  [phone]    No future appointments.      Important Message from Medicare  Important Message from Medicare regarding Discharge Appeal Rights: Other (comments) (pt. going to Rehab)     Date IMM was signed: 10/25/24  Time IMM was signed: 1129    Contact Info       [facility] VASCULAR NEUROLOGY   Specialty: Vascular Neurology    [address]  [facility] [address]   Phone: [phone]       Next Steps: Follow up    Instructions: clinic will contact you for follow-up appt.

## 2024-10-28 NOTE — SUBJECTIVE & OBJECTIVE
"Interval HPI:   Overnight events: No acute events overnight. Patient in room 960/960 A. Blood glucose stable. BG at goal on current insulin regimen (SSI, prandial, and basal insulin ). Steroid use- None .    Renal function- Normal   Vasopressors-  None       Endocrine will continue to follow and manage insulin orders inpatient.         Diet Pureed (IDDSI Level 4) Nectar Thick (Mildly Thick); Standard Tray     Eatin%  Nausea: No  Hypoglycemia and intervention: No  Fever: No  TPN and/or TF: No  If yes, type of TF/TPN and rate: n/a    BP (!) 166/76   Pulse (!) 59   Temp 98 °F (36.7 °C) (Oral)   Resp 18   Ht 5' 2" (1.575 m)   Wt 72.6 kg (160 lb 0.9 oz)   LMP  (LMP Unknown) Comment: Doesn't  have periods anymore  SpO2 100%   Breastfeeding No   BMI 29.27 kg/m²     Labs Reviewed and Include    Recent Labs   Lab 10/28/24  0618 10/28/24  0840   * 158*   CALCIUM 8.7 8.8   ALBUMIN 2.6*  --    PROT 6.0  --     140   K 4.2 3.6   CO2 24 28    102   BUN 7* 7*   CREATININE 0.6 0.7   ALKPHOS 75  --    ALT 59*  --    AST 43*  --    BILITOT 0.7  --      Lab Results   Component Value Date    WBC 9.15 10/28/2024    HGB 12.9 10/28/2024    HCT 36.1 (L) 10/28/2024    MCV 94 10/28/2024     10/28/2024     No results for input(s): "TSH", "FREET4" in the last 168 hours.  Lab Results   Component Value Date    HGBA1C 10.0 (H) 10/08/2024       Nutritional status:   Body mass index is 29.27 kg/m².  Lab Results   Component Value Date    ALBUMIN 2.6 (L) 10/28/2024    ALBUMIN 2.7 (L) 10/27/2024    ALBUMIN 2.4 (L) 10/26/2024     No results found for: "PREALBUMIN"    Estimated Creatinine Clearance: 74.8 mL/min (based on SCr of 0.7 mg/dL).    Accu-Checks  Recent Labs     10/25/24  1138 10/25/24  1633 10/26/24  0802 10/26/24  1212 10/26/24  1544 10/26/24  1942 10/27/24  0756 10/27/24  1144 10/27/24  1537 10/27/24  1924   POCTGLUCOSE 202* 85 182* 115* 93 142* 147* 198* 202* 86       Current Medications and/or " Treatments Impacting Glycemic Control  Immunotherapy:    Immunosuppressants       None          Steroids:   Hormones (From admission, onward)      None          Pressors:    Autonomic Drugs (From admission, onward)      None          Hyperglycemia/Diabetes Medications:   Antihyperglycemics (From admission, onward)      Start     Stop Route Frequency Ordered    10/25/24 1645  insulin aspart U-100 pen 5 Units         -- SubQ 3 times daily with meals 10/25/24 1404    10/24/24 1045  insulin glargine U-100 (Lantus) pen 10 Units         -- SubQ Daily 10/24/24 1041    10/19/24 2256  insulin aspart U-100 pen 0-10 Units         -- SubQ Before meals & nightly PRN 10/19/24 6531

## 2024-10-28 NOTE — PT/OT/SLP PROGRESS
Occupational Therapy   Co-Treatment    Name: Tammy Multani  MRN: [id number]  Admitting Diagnosis:  Acute arterial ischemic stroke, multifocal, multiple vascular territories       Recommendations:     Discharge Recommendations: Moderate Intensity Therapy  Discharge Equipment Recommendations:  wheelchair, hospital bed, lift device  Barriers to discharge:  Decreased caregiver support    Assessment:     Tammy Multani is a 64 y.o. female with a medical diagnosis of Acute arterial ischemic stroke, multifocal, multiple vascular territories.  She presents with the following performance deficits affecting function are weakness, impaired endurance, impaired self care skills, impaired functional mobility, gait instability, decreased lower extremity function, decreased upper extremity function, visual deficits, impaired balance, decreased safety awareness, impaired coordination.     Pt was found in supine, positioned diagonally across bed, and demonstrated overall good tolerance of session. Required assist for bed mobility and to maintain balance while seated EOB. Performed grooming at EOB with cues required for L sided inattention. Demonstrated new L hand and wrist movement (flexion/extension of fingers and slight supination of wrist). Used LUE to performed AAROM of shoulder flexion, to 90*, and extension. Observed to push through RUE and required cues to bring R hand into lap to refrain from leaning laterally to the L. Pt would benefit from continued skilled acute OT services during this admission in order to maximize independence and safety with ADLs and functional mobility to ensure safe return to PLOF in the least restrictive environment. Patient currently demonstrates a need for moderate intensity therapy on a daily basis post acute secondary to a decline in functional status due to illness.    Rehab Prognosis:  Good; patient would benefit from acute skilled OT services to address these deficits and reach maximum  "level of function.       Plan:     Patient to be seen 4 x/week to address the above listed problems via self-care/home management, therapeutic activities, therapeutic exercises, neuromuscular re-education  Plan of Care Expires: 11/07/24  Plan of Care Reviewed with: patient    Subjective     Chief Complaint: "feels like I wet myself"  Patient/Family Comments/goals: return to PLOF  Pain/Comfort:  Pain Rating 1: 0/10    Objective:   Co-evaluation/treatment performed due to patient's multiple deficits requiring two skilled therapists to appropriately and safely assess patient's strength and endurance while facilitating functional tasks in addition to accommodating for patient's activity tolerance.     Communicated with: RN prior to session.  Patient found supine with PureWick, telemetry upon OT entry to room.    General Precautions: Standard, aspiration, fall, vision impaired    Orthopedic Precautions:N/A  Braces: N/A  Respiratory Status: Room air     Occupational Performance:     Bed Mobility:    Patient completed Rolling/Turning to Left with  maximal assistance x 2 persons  Patient completed Supine to Sit with maximal assistance x 2 persons  EOB CGA to mod. assistance  Patient completed Sit to Supine with maximal assistance x 2 persons    Activities of Daily Living:  Grooming: minimum assistance to brush teeth while seated EOB. Required min. verbal cues to scan left side of bedside table to grooming utensils (tooth brush, tooth paste, water cup). Observed to lean on RUE and lean forward, positioning face within an inch of spit basin. Required verbal cues and physical assist to return to upright seated posture.     Therapeutic Exercises:  AAROM of LUE, initially on bedside table with pillow case followed by SOT off loading LUE weight to perform movements. Demonstrated ability to open and close fist with verbal and tactile cues. Required verbal cues and physical assist to refrain from using RUE to move LUE. Demonstrated " pushing through RUE while seated at EOB with verbal and tactile cues provided to move RUE closer to body to prevent L sided lean.     [facility] 6 Click ADL: 8    Treatment & Education:  Therapist provided facilitation and instruction of proper body mechanics and fall prevention strategies during tasks listed above.  Instructed patient to sit in bedside chair daily to increase OOB/activity tolerance.  Instructed patient to use call light to have nursing staff assist with needs/transfers.  Discussed OT POC and answered all questions within OT scope of practice.  Whiteboard updated     Patient left HOB elevated with all lines intact, call button in reach, and RN notified    GOALS:   Multidisciplinary Problems       Occupational Therapy Goals          Problem: Occupational Therapy    Goal Priority Disciplines Outcome Interventions   Occupational Therapy Goal     OT, PT/OT Progressing    Description: Goals to be met by: 11/7/24     Patient will increase functional independence with ADLs by performing:    UE Dressing with Minimal Assistance.  LE Dressing with Moderate Assistance.  Grooming while seated with Set-up Assistance.  Toileting from bedside commode with Moderate Assistance for hygiene and clothing management.   Sitting at edge of bed x5 minutes with Contact Guard Assistance.  Supine to sit with Contact Guard Assistance.  Step transfer with Moderate Assistance    DME Justifications (see above for complete DME recommendations)    Bedside Commode- Patient has a mobility limitation that significantly impairs their ability to participate in one or more mobility related activities of daily living, including toileting. This deficit can be resolved by using a bedside commode. Patient demonstrates mobility limitations that will cause them to be confined to one room at home without bathroom access for up to 30 days. Using a bedside commode will greatly improve the patient's ability to participate in MRADLs.     Wheelchair-   Patient has a mobility limitation that significantly impairs their ability to participate in one or more mobility related activities of daily living in customary locations in the home. The mobility limitation cannot be sufficiently resolved by the use of a cane or walker. The use of a manual wheelchair will greatly improve the patient's ability to participate in MRADLs. The patient will use the wheelchair on a regular basis at home. They have expressed their willingness to use a manual wheelchair in the home, and have a caregiver who is available and willing to assist with the wheelchair if needed.     Hospital Bed-  Patient requires a hospital bed for positioning of the body in ways that are not feasible with an ordinary bed. The patient requires special positioning for pain relief, limited mobility, and/or being unable to independently make changes in body position without the use of a hospital bed. Pillows and wedges will not be adequate for resolving these positional issues.                        Time Tracking:     OT Date of Treatment: 10/28/24  OT Start Time: 1116  OT Stop Time: 1143  OT Total Time (min): 27 min    Billable Minutes:Self Care/Home Management 14  Therapeutic Activity 13    OT/SUSANNE: OT     Number of SUSANNE visits since last OT visit: 0    10/28/2024

## 2024-10-28 NOTE — PLAN OF CARE
[facility]    FACILITY TRANSFER ORDERS      Patient Name: Tammy Multani  YOB: 1960    PCP: Soha Tucker FNP   PCP Address: [address] / [place] [address]  PCP Phone Number: [phone]  PCP Fax: [phone]    Encounter Date: 10/28/2024    Admit to: [facility]    Vital Signs:  Routine    Diagnoses:   Active Hospital Problems    Diagnosis  POA    *Acute arterial ischemic stroke, multifocal, multiple vascular territories [I63.89]  Yes    Transaminitis [R74.01]  Unknown    Elevated LFTs [R79.89]  No    Acidosis [E87.20]  Yes     Patient noted to be increasingly acidotic on labs. IVFs changed from NS to LR.       Left upper extremity swelling [M79.89]  No    Encephalopathy, metabolic [G93.41]  No     Noted to have transient alteration in mental status  Likely due to ileus  See treatment for ileus      Hypernatremia [E87.0]  No    Ileus [K56.7]  No    Acute focal neurological deficit [R29.818]  No    KAILYN (acute kidney injury) [N17.9]  Yes    Hypercoagulable state due to atrial fibrillation [D68.69, I48.91]  Yes     History of atrial fibrillation per discussion with daughter. Was started on Eliquis on 10/6: hold given size of infarct, start ASA on 10/8. Transition again to Eliquis in 5-7 days       Chronic anticoagulation [Z79.01]  Not Applicable     History of atrial fibrillation per discussion with daughter. Was started on Eliquis on 10/6: hold given size of infarct, start ASA on 10/8. Transition again to Eliquis in 5-7 days       Hyponatremia [E87.1]  Yes     Monitor with daily labs      Reduced mobility [Z74.09]  Yes     Therapy to evaluate and treat       Sensory loss [R20.0]  Yes    HTN (hypertension) [I10]  Yes    Hypokalemia [E87.6]  Yes     POA  Monitor with daily cmp  Replace   K 3.3      Left hemiplegia [G81.94]  Yes    Opioid use [F11.90]  Yes    Atrial fibrillation [I48.91]  Yes    Type 2 diabetes mellitus, with long-term current use of insulin [E11.9, Z79.4]   Not Applicable      Resolved Hospital Problems    Diagnosis Date Resolved POA    Hyperkalemia [E87.5] 10/17/2024 Unknown       Allergies:Review of patient's allergies indicates:  No Known Allergies    Diet: pureed diet nectar thick    Activities: Activity as tolerated    Goals of Care Treatment Preferences:  Code Status: Full Code      Nursing: Per facility     Labs:  Per facility       CONSULTS:    Physical Therapy to evaluate and treat. , Occupational Therapy to evaluate and treat., Speech Therapy to evaluate and treat for Language, Swallowing, and Cognition., and  to evaluate for community resources/long-range planning.    MISCELLANEOUS CARE:  N/a    WOUND CARE ORDERS  None    Medications: Review discharge medications with patient and family and provide education.         Medication List        START taking these medications      acetaminophen 650 mg/20.3 mL Soln  Commonly known as: TYLENOL  20.3 mLs (650 mg total) by Per NG tube route every 8 (eight) hours as needed.     atorvastatin 40 MG tablet  Commonly known as: LIPITOR  Take 1 tablet (40 mg total) by mouth once daily.     bisacodyL 10 mg Supp  Commonly known as: DULCOLAX  Place 1 suppository (10 mg total) rectally once daily.     famotidine 20 MG tablet  Commonly known as: PEPCID  Take 1 tablet (20 mg total) by mouth once daily.     * insulin aspart U-100 100 unit/mL (3 mL) Inpn pen  Commonly known as: NovoLOG  Inject 0-10 Units into the skin before meals and at bedtime as needed (Before meals and nightly).     * insulin aspart U-100 100 unit/mL (3 mL) Inpn pen  Commonly known as: NovoLOG  Inject 3 Units into the skin 3 (three) times daily.     polyethylene glycol 17 gram Pwpk  Commonly known as: GLYCOLAX  Take 17 g by mouth 2 (two) times daily.     potassium chloride 10 MEQ Tbsr  Commonly known as: KLOR-CON  Take 1 tablet (10 mEq total) by mouth once daily.           * This list has 2 medication(s) that are the same as other medications  prescribed for you. Read the directions carefully, and ask your doctor or other care provider to review them with you.                CHANGE how you take these medications      carvediloL 12.5 MG tablet  Commonly known as: COREG  Take 1 tablet (12.5 mg total) by mouth 2 (two) times daily.  What changed:   medication strength  how much to take  when to take this            CONTINUE taking these medications      ELIQUIS 5 mg Tab  Generic drug: apixaban  Take 5 mg by mouth 2 (two) times daily.     FREESTYLE CLIFFORD 3 SENSOR Lamar  Generic drug: blood-glucose sensor  SMARTSI Topical Once     gabapentin 600 MG tablet  Commonly known as: NEURONTIN  Take 600 mg by mouth 3 (three) times daily.     ONETOUCH ULTRA2 METER Misc  Generic drug: blood-glucose meter  SMARTSI Via Meter Daily PRN     oxyCODONE 15 MG Tab  Commonly known as: ROXICODONE  Take 15 mg by mouth every 12 (twelve) hours as needed for Pain.     tiZANidine 4 MG tablet  Commonly known as: ZANAFLEX  Take 4 mg by mouth every evening.     zolpidem 5 MG Tab  Commonly known as: AMBIEN  Take 5 mg by mouth nightly as needed.            STOP taking these medications      aspirin 81 MG Chew     hydroCHLOROthiazide 50 MG tablet  Commonly known as: HYDRODIURIL     LEVEMIR FLEXPEN 100 unit/mL (3 mL) Inpn pen  Generic drug: insulin detemir U-100 (Levemir)     lisinopriL 40 MG tablet  Commonly known as: PRINIVIL,ZESTRIL     rosuvastatin 5 MG tablet  Commonly known as: CRESTOR                Immunizations Administered as of 10/28/2024       No immunizations on file.            Covid test negative as of 10/27/2024    Some patients may experience side effects after vaccination.  These may include fever, headache, muscle or joint aches.  Most symptoms resolve with 24-48 hours and do not require urgent medical evaluation unless they persist for more than 72 hours or symptoms are concerning for an unrelated medical condition.          _________________________________  Corine JARRETT  TAVON Vora  10/28/2024

## 2024-10-28 NOTE — PT/OT/SLP PROGRESS
Physical Therapy Co-Treatment    Patient Name: Tammy Multani   MRN: [id number]    Co-treatment performed for this visit due to patient need for two skilled therapists to ensure patient and staff safety and to accommodate for patient activity tolerance/pain management   Recommendations:     Discharge Recommendations: High Intensity Therapy  Discharge Equipment Recommendations: hospital bed, lift device, wheelchair  Barriers to Discharge: Increased level of assist and Decreased caregiver support  Safest Mobility Level with Nursing: Bed Level ROM - Pt only safe for OOB mobility with therapy staff at this time    Assessment:     Tammy Multani is a 64 y.o. female admitted with a medical diagnosis of Acute arterial ischemic stroke, multifocal, multiple vascular territories. She presents with the following impairments/functional limitations: weakness, impaired endurance, impaired sensation, impaired self care skills, impaired functional mobility, gait instability, impaired balance, pain, decreased safety awareness, decreased lower extremity function, decreased upper extremity function, impaired fine motor, impaired coordination, impaired cognition, visual deficits, decreased ROM, abnormal tone. Pt presenting with HOB elevated and agreeable to completing therapy session. Pt with continued improvements noted in postural stability while seated at EOB with intermittent verbal cues for improved RUE positioning. Pt still with absent LLE motor activation, but distal movement noted in LUE (wrist and fingers). Pt frequently requesting to return supine 2/2 fatigue and feeling of needing to have BM; thought pt redirected easily. Pt remains appropriate for high intensity therapy following discharge once medically stable. Pt would continue to benefit from skilled acute PT in order to address current deficits and progress functional mobility.     Rehab Prognosis: Good; patient continues to benefit from acute skilled PT services  "to address these deficits and reach maximum level of function.  Recent Surgery: * No surgery found *      Plan:     During this hospitalization, patient to be seen 4 x/week to address the identified rehab impairments via gait training, therapeutic activities, therapeutic exercises, neuromuscular re-education and progress toward the following goals:    Plan of Care Expires:  11/07/24    Subjective     Chief Complaint: None verbalized  Patient/Family Comments/Goals: "I am really tired guys."  Pain/Comfort:  Pain Rating 1: 0/10    Objective:     Communicated with RN prior to session. Patient found HOB elevated with bed alarm, PureWick, telemetry upon PT entry to room.     General Precautions: Standard, aspiration, fall  Orthopedic Precautions: N/A  Braces: N/A    Functional Mobility:  Bed Mobility:    Scooting: maximal assistance  Supine to Sit: maximal assistance of 2 persons for LE management and trunk management  Sit to Supine: maximal assistance of 2 persons for LE management and trunk management  Transfers:   Deferred 2/2 decreased LLE motor activation  Balance:   Static Sitting: Fair, able to maintain for 15 minute(s) with CGA-mod  Verbal and tactile cues provided for upright posture, increased cervical extension, maintenance of midline orientation, anterior trunk lean, core activation, command following, and improved used of BUE for support  Dynamic Sitting: Poor: Patient unable to accept challenge or move without loss of balance, moderate assistance  PT completed PROM 1x10 to all major joints in LLE completed to reduce stiffness, maintain joint integrity, and prevent joint contractures while seated at EOB  Patient performed 2 set(s) of 10 repetitions of  the following seated exercises: ankle pumps, long arc quads, and marches for right LE. Patient required skilled PT for instruction of exercises and appropriate cues to perform exercises safely and appropriately.    Pt completed oral care, MEENAKSHI ZUÑIGA, and MEENAKSHI " table slides while seated at EOB with OT  Pt completed 2min lateral lean onto left elbow while seated at EOB to increase joint proprioception and weightbearing with emphasis on tricep activation  Static Standing: not assessed this visit  Dynamic Standing: not assessed this visit    AM-PAC 6 CLICK MOBILITY  Turning over in bed (including adjusting bedclothes, sheets and blankets)?: 2  Sitting down on and standing up from a chair with arms (e.g., wheelchair, bedside commode, etc.): 1  Moving from lying on back to sitting on the side of the bed?: 1  Moving to and from a bed to a chair (including a wheelchair)?: 1  Need to walk in hospital room?: 1  Climbing 3-5 steps with a railing?: 1  Basic Mobility Total Score: 7     Therapeutic Activities and Exercises:  Patient educated on role of acute care PT and PT POC, safety while in hospital including calling nurse for mobility, and call light usage  Pt educated on importance of maximal participation in therapy session in order to reduce negative effects of prolonged sedentary positioning.   Answered all questions within PT scope of practice and addressed functional mobility concerns.    Patient left HOB elevated with all lines intact, call button in reach, RN notified, and bed alarm on.    GOALS:   Multidisciplinary Problems       Physical Therapy Goals          Problem: Physical Therapy    Goal Priority Disciplines Outcome Interventions   Physical Therapy Goal     PT, PT/OT Progressing    Description: Goals to be met by: 2024     Patient will increase functional independence with mobility by performin. Supine to sit with Moderate Assistance  2. Sit to supine with Moderate Assistance  3. Bed to chair transfer with Maximum Assistance using LRAD  4. Sitting at edge of bed x5 minutes with Stand-by Assistance  5. Lower extremity exercise program x15 reps per handout, with independence    Hospital Bed - Patient requires a hospital bed for positioning of the body in  ways that are not feasible with an ordinary bed. The patient requires special positioning for pain relief, limited mobility, and/or being unable to independently make changes in body position without the use of a hospital bed. Pillows and wedges will not be adequate for resolving these positional issues.     Wheelchair - Patient has a mobility limitation that significantly impairs their ability to participate in one or more mobility related activities of daily living in customary locations in the home. The mobility limitation cannot be sufficiently resolved by the use of a cane or walker. The use of a manual wheelchair will greatly improve the patient's ability to participate in MRADLs. The patient will use the wheelchair on a regular basis at home. They have expressed their willingness to use a manual wheelchair in the home, and have a caregiver who is available and willing to assist with the wheelchair if needed.                       Time Tracking:     PT Received On: 10/28/24  PT Start Time: 1116     PT Stop Time: 1142  PT Total Time (min): 26 min     Billable Minutes: Therapeutic Activity 14 and Neuromuscular Re-education 12      Treatment Type: Treatment  PT/PTA: PT     Number of PTA visits since last PT visit: 0     10/28/2024

## 2024-10-28 NOTE — PROGRESS NOTES
"Zackary Long - Neurosurgery ([facility])  Endocrinology  Progress Note    Admit Date: 10/6/2024     Reason for Consult: Management of T2DM, Hyperglycemia       Diabetes diagnosis year:  Over 5 years    Home Diabetes Medications:    -liraglutide  -Levemir 35 units QHS    How often checking glucose at home? Not checking       Diabetes Complications include:     Hyperglycemia    Complicating diabetes co morbidities:   History of CVA      HPI:   Patient is a 64 y.o. female with  DM2 , HTN who was transferred from [facility] for higher level of care secondary to acute KIARA stroke on CTA. Briefly,the patient presented to the OSH overnight for slurred speech started at 1:30 a.m. in the morning. She has a history of his stroke with left-sided deficits but is normally able to transfer and do a little walking.  Endocrine consulted for BG management.      Interval HPI:   Overnight events: No acute events overnight. Patient in room 960/960 A. Blood glucose stable. BG at goal on current insulin regimen (SSI, prandial, and basal insulin ). Steroid use- None .    Renal function- Normal   Vasopressors-  None       Endocrine will continue to follow and manage insulin orders inpatient.         Diet Pureed (IDDSI Level 4) Nectar Thick (Mildly Thick); Standard Tray     Eatin%  Nausea: No  Hypoglycemia and intervention: No  Fever: No  TPN and/or TF: No  If yes, type of TF/TPN and rate: n/a    BP (!) 166/76   Pulse (!) 59   Temp 98 °F (36.7 °C) (Oral)   Resp 18   Ht 5' 2" (1.575 m)   Wt 72.6 kg (160 lb 0.9 oz)   LMP  (LMP Unknown) Comment: Doesn't  have periods anymore  SpO2 100%   Breastfeeding No   BMI 29.27 kg/m²     Labs Reviewed and Include    Recent Labs   Lab 10/28/24  0618 10/28/24  0840   * 158*   CALCIUM 8.7 8.8   ALBUMIN 2.6*  --    PROT 6.0  --     140   K 4.2 3.6   CO2 24 28    102   BUN 7* 7*   CREATININE 0.6 0.7   ALKPHOS 75  --    ALT 59*  --    AST 43*  --    BILITOT 0.7  --  " "    Lab Results   Component Value Date    WBC 9.15 10/28/2024    HGB 12.9 10/28/2024    HCT 36.1 (L) 10/28/2024    MCV 94 10/28/2024     10/28/2024     No results for input(s): "TSH", "FREET4" in the last 168 hours.  Lab Results   Component Value Date    HGBA1C 10.0 (H) 10/08/2024       Nutritional status:   Body mass index is 29.27 kg/m².  Lab Results   Component Value Date    ALBUMIN 2.6 (L) 10/28/2024    ALBUMIN 2.7 (L) 10/27/2024    ALBUMIN 2.4 (L) 10/26/2024     No results found for: "PREALBUMIN"    Estimated Creatinine Clearance: 74.8 mL/min (based on SCr of 0.7 mg/dL).    Accu-Checks  Recent Labs     10/25/24  1138 10/25/24  1633 10/26/24  0802 10/26/24  1212 10/26/24  1544 10/26/24  1942 10/27/24  0756 10/27/24  1144 10/27/24  1537 10/27/24  1924   POCTGLUCOSE 202* 85 182* 115* 93 142* 147* 198* 202* 86       Current Medications and/or Treatments Impacting Glycemic Control  Immunotherapy:    Immunosuppressants       None          Steroids:   Hormones (From admission, onward)      None          Pressors:    Autonomic Drugs (From admission, onward)      None          Hyperglycemia/Diabetes Medications:   Antihyperglycemics (From admission, onward)      Start     Stop Route Frequency Ordered    10/25/24 1645  insulin aspart U-100 pen 5 Units         -- SubQ 3 times daily with meals 10/25/24 1404    10/24/24 1045  insulin glargine U-100 (Lantus) pen 10 Units         -- SubQ Daily 10/24/24 1041    10/19/24 2256  insulin aspart U-100 pen 0-10 Units         -- SubQ Before meals & nightly PRN 10/19/24 2157            ASSESSMENT and PLAN    Neuro  * Acute arterial ischemic stroke, multifocal, multiple vascular territories  Managed by primary team  Optimize BG control      Cardiac/Vascular  HTN (hypertension)  Managed by primary team  Optimize BG control      Endocrine  Type 2 diabetes mellitus, with long-term current use of insulin  BG goal: 140-180  T2DM admitted with acute KIARA stroke. Lantus d/dorita on 10/16 do " to hypoglycemia. Morning blood sugars trending up.  Long acting insulin added back to insulin regimen at 0.5u/kg/d for AM hyperglycemia.    - Continue Lanuts 10 units QD   - Continue Novolog 5 units TID with meals (HOLD if BG < 100 or eating < 25%)  - Continue Novolog MDC SSI with ISF 25 starting at 150    - POCT Glucose ac/hs  - Hypoglycemia protocol in place      ** Please notify Endocrine for any change and/or advance in diet**  ** Please call Endocrine for any BG related issues **     Discharge Planning: Likely continue on current inpatient regimen            Mattie Ramsey NP  Endocrinology  Zackary Long - Neurosurgery ([facility])

## 2024-10-28 NOTE — CARE UPDATE
I have reviewed the chart of Tammy Multani who is hospitalized for the following:    Active Hospital Problems    Diagnosis    *Acute arterial ischemic stroke, multifocal, multiple vascular territories    Transaminitis    Elevated LFTs    Acidosis     Patient noted to be increasingly acidotic on labs. IVFs changed from NS to LR.       Left upper extremity swelling    Encephalopathy, metabolic     Noted to have transient alteration in mental status  Likely due to ileus  See treatment for ileus      Hypernatremia    Ileus    Acute focal neurological deficit    KAILYN (acute kidney injury)    Hypercoagulable state due to atrial fibrillation     History of atrial fibrillation per discussion with daughter. Was started on Eliquis on 10/6: hold given size of infarct, start ASA on 10/8. Transition again to Eliquis in 5-7 days       Chronic anticoagulation     History of atrial fibrillation per discussion with daughter. Was started on Eliquis on 10/6: hold given size of infarct, start ASA on 10/8. Transition again to Eliquis in 5-7 days       Hyponatremia     Monitor with daily labs      Reduced mobility     Therapy to evaluate and treat       Sensory loss    HTN (hypertension)    Hypokalemia     POA  Monitor with daily cmp  Replace   K 3.3      Left hemiplegia    Opioid use    Atrial fibrillation    Type 2 diabetes mellitus, with long-term current use of insulin        Jacque Whitfield NP  Unit Based [facility]

## 2024-10-28 NOTE — PT/OT/SLP PROGRESS
Speech Language Pathology      Tammy Multani  MRN: [id number]    Patient not seen today secondary to  (pt discharging today and transport scheduled to arrive at noon). Cont SLP services upon t/f to IRP.

## 2024-10-29 NOTE — ASSESSMENT & PLAN NOTE
History of. Prescribed oxycodone 15mg at home. Family concerned that medication is being diverted by family member, unclear how often patient takes. Reportedly is prescribed for neck pain.     Plan:   - hold oxycodone due to ileus

## 2024-10-29 NOTE — SUBJECTIVE & OBJECTIVE
Neurologic Chief Complaint: infarcts in multiple vascular    Subjective:     Interval History: Patient is seen for follow-up neurological assessment and treatment recommendations: See Hospital Course    HPI, Past Medical, Family, and Social History remains the same as documented in the initial encounter.     Review of Systems   Neurological:  Positive for weakness.     Scheduled Meds:  Current Outpatient Medications:     ELIQUIS 5 mg Tab, Take 5 mg by mouth 2 (two) times daily., Disp: , Rfl:     FREESTYLE CLIFFORD 3 SENSOR Lamar, SMARTSI Topical Once, Disp: , Rfl:     gabapentin (NEURONTIN) 600 MG tablet, Take 600 mg by mouth 3 (three) times daily., Disp: , Rfl:     ONETOUCH ULTRA2 METER Misc, SMARTSI Via Meter Daily PRN, Disp: , Rfl:     oxyCODONE (ROXICODONE) 15 MG Tab, Take 15 mg by mouth every 12 (twelve) hours as needed for Pain., Disp: , Rfl:     tiZANidine (ZANAFLEX) 4 MG tablet, Take 4 mg by mouth every evening., Disp: , Rfl:     zolpidem (AMBIEN) 5 MG Tab, Take 5 mg by mouth nightly as needed., Disp: , Rfl:     acetaminophen (TYLENOL) 650 mg/20.3 mL Soln, 20.3 mLs (650 mg total) by Per NG tube route every 8 (eight) hours as needed., Disp: , Rfl:     atorvastatin (LIPITOR) 40 MG tablet, Take 1 tablet (40 mg total) by mouth once daily., Disp: , Rfl:     bisacodyL (DULCOLAX) 10 mg Supp, Place 1 suppository (10 mg total) rectally once daily., Disp: , Rfl:     carvediloL (COREG) 12.5 MG tablet, Take 1 tablet (12.5 mg total) by mouth 2 (two) times daily., Disp: , Rfl:     famotidine (PEPCID) 20 MG tablet, Take 1 tablet (20 mg total) by mouth once daily., Disp: , Rfl:     insulin aspart U-100 (NOVOLOG) 100 unit/mL (3 mL) InPn pen, Inject 0-10 Units into the skin before meals and at bedtime as needed (Before meals and nightly)., Disp: , Rfl:     insulin aspart U-100 (NOVOLOG) 100 unit/mL (3 mL) InPn pen, Inject 3 Units into the skin 3 (three) times daily., Disp: , Rfl:     polyethylene glycol (GLYCOLAX) 17 gram  PwPk, Take 17 g by mouth 2 (two) times daily., Disp: , Rfl:     potassium chloride (KLOR-CON) 10 MEQ TbSR, Take 1 tablet (10 mEq total) by mouth once daily., Disp: , Rfl:       Objective:     Vital Signs (Most Recent):  Temp: 98 °F (36.7 °C) (10/28/24 0743)  Pulse: 92 (10/28/24 1131)  Resp: 18 (10/28/24 0743)  BP: (!) 166/76 (10/28/24 0905)  SpO2: 100 % (10/28/24 0743)  BP Location: Left arm    Vital Signs Range (Last 24H):  Temp:  [97.7 °F (36.5 °C)-98 °F (36.7 °C)]   Pulse:  [53-92]   Resp:  [16-18]   BP: (115-166)/(54-85)   SpO2:  [95 %-100 %]   BP Location: Left arm       Physical Exam  Vitals and nursing note reviewed.   HENT:      Mouth/Throat:      Mouth: Mucous membranes are moist.   Cardiovascular:      Rate and Rhythm: Normal rate.   Pulmonary:      Effort: Pulmonary effort is normal. No respiratory distress.   Abdominal:      General: There is no distension.   Skin:     General: Skin is warm and dry.   Neurological:      Mental Status: She is alert.      Cranial Nerves: Cranial nerve deficit present.      Motor: Weakness present.            Neurological Exam:   LOC: alert  Attention Span: Good   Articulation: Dysarthria    Laboratory:  CMP:   Recent Labs   Lab 10/28/24  0618 10/28/24  0840   CALCIUM 8.7 8.8   ALBUMIN 2.6*  --    PROT 6.0  --     140   K 4.2 3.6   CO2 24 28    102   BUN 7* 7*   CREATININE 0.6 0.7   ALKPHOS 75  --    ALT 59*  --    AST 43*  --    BILITOT 0.7  --      CBC:   Recent Labs   Lab 10/28/24  0618   WBC 9.15   RBC 3.85*   HGB 12.9   HCT 36.1*      MCV 94   MCH 33.5*   MCHC 35.7       Diagnostic Results     Brain and Vessel Imaging   MRI/MRA Brain Ischemic Protocol: 10/6/24  Impression: Acute infarcts involving the right anterior circulation and posterior circulation as detailed above without evidence of intracranial hemorrhage or mass effect.  Embolic disease to be considered.     Chronic left parietal infarct. MRA demonstrates limited visualization of distal  right KIARA branches in keeping with the territory of the acute infarct.  There is also narrowing of at least one proximal left M2 branch although no acute infarct in that territory.          Echo: 10/7/24  Cardiac Imaging     Left Ventricle: The left ventricle is normal in size. Normal wall thickness. There is concentric remodeling. Normal wall motion. There is hyperdynamic systolic function with a visually estimated ejection fraction of greater than 70%. There is normal diastolic function.    Right Ventricle: Normal right ventricular cavity size. Wall thickness is normal. Systolic function is normal.    Left Atrium: Bubble study is likely negative, but full opacification of the RA is not well seen on these images which decreases sensitivity.    IVC/SVC: Normal venous pressure at 3 mmHg.

## 2024-10-29 NOTE — ASSESSMENT & PLAN NOTE
"65 y/o F transferred from [facility] for higher level of care secondary to acute KIARA stroke on CTA.  Briefly,the patient presented to the OSH overnight for slurred speech started at 1:30 a.m. in the morning.  She has a history of his stroke with left-sided deficits but is normally able to transfer and do a little walking. This morning, she had complete hemiplegia of the left side. Telestroke consult performed, concerning for LVO.  NIH 13. CTA Stroke MP performed and  was read as "acute R KIARA infarct". No TNK due to AC use. Patient accepted to ED as transfer for emergent vascular neurology evaluation. No endovascular intervention. History of atrial fibrillation per discussion with daughter. Was started on Eliquis on 10/6, currently on heparin drip given likely lack of absorption through NG tube in setting of Ileus. Given history of Afib, etiology likely embolic. Though not apparent on MRA, if R KIARA occlusion was proximal (supported by MRI) could account for R MCA-like picture. Transient episodes of altered awareness: EEG negative, and no change in frequency observed by family w/Keppra so have stopped this medicine.   Neuro exam remained stable. LFTs stable. Liver US negative. Hep C non-reactive. Patient to d/c to IPR.    -Eliquis 5 mg BID   -Atorvastatin 40 mg daily  -PT/OT-Current recs for high intensity therapy. Discharged to IPR.  -Diet advanced to clear liquids 10/19, continued to advance as the patient tolerated.  "

## 2024-10-29 NOTE — ASSESSMENT & PLAN NOTE
Home medication regimen as below  -lisinopril 40mg QD  -amlodipine 10mg QD    Plan:   -lisinopril and amlodipine were initially held in the setting of KAILYN. Was started on coreg in the context of Afib.   -Long term goal SBP normotension      carvediloL tablet 12.5 mg, Oral, BID

## 2024-10-29 NOTE — ASSESSMENT & PLAN NOTE
See stroke    Plan:   - continue PT/OT/SLP   -Current recs for high intensity therapy  -Pending IPR placement

## 2024-10-29 NOTE — ASSESSMENT & PLAN NOTE
Takes Eliquis 5mg BID and Coreg 6.25mg BID at home.  Initially Eliquis held given size of stroke; ASA 81mg QD in the interim. Eliquis was restarted on 10/10.       Plan:   -continue at home coreg regimen   -eliquis switched to heparin drip due to issues described in ileus section of the assessment and plan.   -Heparin gtt turned off 10/19. Eliquis resumed on 10/19.

## 2024-10-29 NOTE — ASSESSMENT & PLAN NOTE
"65 y/o F transferred from [facility] for higher level of care secondary to acute KIARA stroke on CTA.  Briefly,the patient presented to the OSH overnight for slurred speech started at 1:30 a.m. in the morning.  She has a history of his stroke with left-sided deficits but is normally able to transfer and do a little walking. This morning, she had complete hemiplegia of the left side. Telestroke consult performed, concerning for LVO.  NIH 13. CTA Stroke MP performed and  was read as "acute R KIARA infarct". No TNK due to AC use. Patient accepted to ED as transfer for emergent vascular neurology evaluation. No endovascular intervention. History of atrial fibrillation per discussion with daughter. Was started on Eliquis on 10/6, currently on heparin drip given likely lack of absorption through NG tube in setting of Ileus. Given history of Afib, etiology likely embolic. Though not apparent on MRA, if R KIARA occlusion was proximal (supported by MRI) could account for R MCA-like picture. Transient episodes of altered awareness: EEG negative, and no change in frequency observed by family w/Keppra so have stopped this medicine.     Patient remained stable overnight. Neuro exam stable. LFTs stable. Liver US negative. Hep C non-reactive. Patient to d/c to IPR today pending transportation.    -Eliquis 5 mg BID   -Atorvastatin 40 mg daily  -SBP<180  -PT/OT-Current recs for high intensity therapy  -Diet advanced to clear liquids 10/19, continued to advance as the patient tolerated.  "

## 2024-10-29 NOTE — ASSESSMENT & PLAN NOTE
History of. Takes Levemir 35 units QHS at home. A1C 10%.    -goal 140-180 while admitted  -SSI prn   -continue to monitor glucose  -Endocrine consulted for A1c 10%; following, appreciate recs   -24° glucose         dextrose 10% bolus 125 mL 125 mL, 12.5 g, Intravenous, PRN    dextrose 10% bolus 250 mL 250 mL, 25 g, Intravenous, PRN    glucagon (human recombinant) injection 1 mg, 1 mg, Intramuscular, PRN    insulin aspart U-100 pen 0-10 Units, 0-10 Units, Subcutaneous, QID (AC + HS) PRN

## 2024-10-29 NOTE — ASSESSMENT & PLAN NOTE
See stroke    Plan:   - continue PT/OT/SLP   -Current recs for high intensity therapy  -Discharged to IPR

## 2024-10-29 NOTE — PROGRESS NOTES
"Zackary Long - Neurosurgery ([facility]  Vascular Neurology  Comprehensive Stroke Center  Progress Note    Assessment/Plan:     * Acute arterial ischemic stroke, multifocal, multiple vascular territories  63 y/o F transferred from [facility] for higher level of care secondary to acute KIARA stroke on CTA.  Briefly,the patient presented to the OSH overnight for slurred speech started at 1:30 a.m. in the morning.  She has a history of his stroke with left-sided deficits but is normally able to transfer and do a little walking. This morning, she had complete hemiplegia of the left side. Telestroke consult performed, concerning for LVO.  NIH 13. CTA Stroke MP performed and  was read as "acute R KIARA infarct". No TNK due to AC use. Patient accepted to ED as transfer for emergent vascular neurology evaluation. No endovascular intervention. History of atrial fibrillation per discussion with daughter. Was started on Eliquis on 10/6, currently on heparin drip given likely lack of absorption through NG tube in setting of Ileus. Given history of Afib, etiology likely embolic. Though not apparent on MRA, if R KIARA occlusion was proximal (supported by MRI) could account for R MCA-like picture. Transient episodes of altered awareness: EEG negative, and no change in frequency observed by family w/Keppra so have stopped this medicine.     Patient remained stable overnight. Neuro exam stable. LFTs stable. Liver US negative. Hep C non-reactive. Patient to d/c to [facility] today pending transportation.    -Eliquis 5 mg BID   -Atorvastatin 40 mg daily  -SBP<180  -PT/OT-Current recs for high intensity therapy  -Diet advanced to clear liquids 10/19, continued to advance as the patient tolerated.    Transaminitis  Liver US pending  Hep C pending    Left upper extremity swelling  -Patient noted to have LUE swelling and discomfort this am (10/20/2024).  -LUE US pending  -LUE elevated    Hypernatremia  Resolved    Plan:   - Initially given D5W " to correct, labs to monitor rate of correction   - Was on NS w/KCl but becoming acidotic on labs.  -Na remains 148 this am  -IVFs changed to LR    Ileus  Resolved      Patient was seen by general surgery for some episodes of nausea and emesis and some constipation. KUB was concerning for SBO vs. Ileus. Follow up CT AP did not show evidence of mechanical obstruction, was more indicative of Ileus. XR gastrogaffin challenge ordered by general surgery.     Plan:   - Was NPO. Approved to advance diet as tolerated by Gen Surg on 10/19. NGT d/c'd. Patient was started on a Clear liquid diet and tolerated it well. Advanced to previous diet recommendation of puree, nectar thick today (10/20). SLP to re-eval 10/21  - monitor electrolytes daily, correct as needed   - Eliquis switched to heparin as it might not have been fully absorbed considering Ileus. Resumed Eliquis 10/19.    - scheduled suppository regimen, f/u on effectiveness and escalate prn  - general surgery following, appreciate recs     Acute focal neurological deficit  Episodes of transient altered mental status observed by daughter, sometimes with a brief left gaze. Given location of KIARA stroke, considered seizure. EEG done on 10/11, negative.     Plan:   - no longer on keppra regimen     KAILYN (acute kidney injury)  RESOLVED  Cr was initially 1.6, up from a baseline of 1. Likely pre-renal KAILYN, due to decreased PO intake. Resolved with fluids.     Plan:   - Continue to monitor CMP.   -SCr has been stable since 10/14/24    Type 2 diabetes mellitus, with long-term current use of insulin  History of. Takes Levemir 35 units QHS at home. A1C 10%.    -goal 140-180 while admitted  -SSI prn   -continue to monitor glucose  -Endocrine consulted for A1c 10%; following, appreciate recs   -24° glucose         dextrose 10% bolus 125 mL 125 mL, 12.5 g, Intravenous, PRN    dextrose 10% bolus 250 mL 250 mL, 25 g, Intravenous, PRN    glucagon (human recombinant) injection 1 mg,  1 mg, Intramuscular, PRN    insulin aspart U-100 pen 0-10 Units, 0-10 Units, Subcutaneous, QID (AC + HS) PRN    Atrial fibrillation  Takes Eliquis 5mg BID and Coreg 6.25mg BID at home.  Initially Eliquis held given size of stroke; ASA 81mg QD in the interim. Eliquis was restarted on 10/10.       Plan:   -continue at home coreg regimen   -eliquis switched to heparin drip due to issues described in ileus section of the assessment and plan.   -Heparin gtt turned off 10/19. Eliquis resumed on 10/19.  -24° HR 53-92    Opioid use  History of. Prescribed oxycodone 15mg at home. Family concerned that medication is being diverted by family member, unclear how often patient takes. Reportedly is prescribed for neck pain.     Plan:   - holding oxycodone due to ileus    Left hemiplegia  See stroke    Plan:   - continue PT/OT/SLP   -Current recs for high intensity therapy  -Pending IPR placement    Hypokalemia  Persistent/Recurrent HypoK in setting of normal mag. Improved.  Will d/c on potassium 10 mEQ daily.      HTN (hypertension)  Home medication regimen as below  -lisinopril 40mg QD  -amlodipine 10mg QD    Plan:   -lisinopril and amlodipine were initially held in the setting of KAILYN. Was started on coreg in the context of Afib.   -if patient's BP still elevated, consider adding back amlodipine and lisinopril   -Long term goal SBP normotension  24° -166      carvediloL tablet 12.5 mg, Oral, BID    hydrALAZINE injection 10 mg, Intravenous, Q6H PRN         10/7: gómez SINCLAIR exam stable, echo today, NPO aside from meds per speech, barium study tmrw  10/08/2024 DOTTIE, stable neuro exam, went for the Br study, and was started on a diet. Endocrine consulted for the A1c 10%, pending recs.  10/09/2024 DOTTIE, Stable neuro exam, will restart Lisinopril 20 mg (half of her dome dose). Seen by endocrine, with adjustment of her insulin doses.  10/10/2024 DOTTIE, no change in her neuro exam, will restart Eliquis today, BP is better  controlled.  10/11/2024 NAEON, stable neuro exam, still decreased PO intake. Patient gets some episodes of in attention; EEG was ordered. Endocrine adjusted her insulins. Accepted at [facility] in [place].   10/12/2024 NAEON, waiting on EEG interpretation (contacted epilepsy), started Keppra 500mg BID and will see if reduces transient alterations in awareness, neurologic examination stable  10/13/2024 NAEON, EEG w/encephalopathy (no seizure or discharges), stop Keppra, SBP ~150 to 160 so increased Coreg to 6.25mg BID  10/14/2024 General surgery was consulted overnight for constipation, episodes of nausea and emesis. KUB concerning for SBO vs. Ileus. F/u CT AP was more indicative of ileus, no evidence of mechanical obstruction was observed. Patient is NPO, can get some meds via NGT. Eliquis switched to heparin drip.   10/15/2024 Patient has still not had an actual bowel movement. Is distended, has some abdominal pain. General surgery following, is getting XR gastrogaffin challenge, will f/u results.   10/16/2024 Still monitoring patient for bowel movements in the setting of Ileus. General surgery is following. Mild hyperkalemia and hypernatremia this morning; will continue to monitor.   10/17/2024 Patient still has not had a bowel movement. Ileus not resolved on repeat imaging. Suppository ordered earlier this morning, will monitor to see if improvement with suppository.   10/18/2024 Patient still had not had a solid bowel movement due to her ileus, had a number of watery movements yesterday but nothing substantive. Suppositories now scheduled. General surgery following.   10/19/2024 Patient stable overnight. LBM yesterday. Gen Surg ok with advancing diet to clear liquids. Heparin gtt stopped, started on Eliquis. Awaiting placement.  10/20/2024 NAEON. Neuro exam remains stable. Patient with audible upper respiratory sounds and cough. CPT ordered. Patient noted to be increasingly acidotic on labs. IVFs changed from  NS to LR. Patient awaiting placement.  10/21/24 NAEON. Neuro exam remains stable. Not oriented to time. Family says patient's exam stable compared to yesterday. SLP to re-evaluate, on diet level 4. Placement pending  10/22/24 NAEON. Neuro exam remains stable. Not oriented to time. Family says patient's exam stable compared to yesterday. SLP to re-evaluate, on diet level 4. Placement pending  10/23/24 NAEON. Neuro exam remains stable/slightly improved mental status. Oriented to time. Accepted for placement but will plan for d/c tomorrow given hypokalemia. Lytes corrected and repeat lab pending.  10/24/24 NAEON. Neuro exam remains stable/slightly improved mental status. Workup for hypoK ordered and pending. DDX HyperAldo vs Diarrhea.  10/25/2024 NAEON. Neuro exam remains stable/slightly improved mental status. Diarrhea resolving. D/C Monday d/t logisitics  10/26/2024 NAEON. Neuro exam remains stable/slightly improved mental status. D/C Monday d/t logisitics  10/27/2024 NAEON. Neuro exam remains stable/slightly improved mental status. Mild transamnitis, will get Liver US and Hep C. Likely D/C Monday d/t logisitics  10/28/2024 Patient remained stable overnight. Neuro exam stable. LFTs stable. Liver US negative. Hep C non-reactive. Patient to d/c to [facility] today pending transportation.    STROKE DOCUMENTATION   Acute Stroke Times   Last Known Normal Date:  (OSH)  Stroke Team Called Date: 10/06/24  Stroke Team Called Time:  (At OSH)  Stroke Team Arrival Date: 10/06/24  Stroke Team Arrival Time: 0608  CT Interpretation Time:  (CTA done around 4:20am per documents that came to [facility] with patient)  Thrombolytic Therapy Recommended: No  Thrombectomy Recommended:  (Pending MRI Brain)    NIH Scale:  Interval: 7 days or at discharge (whichever comes first)  1a. Level of Consciousness: 0-->Alert, keenly responsive  1b. LOC Questions: 0-->Answers both questions correctly  1c. LOC Commands: 0-->Performs both tasks correctly  2. Best  Gaze: 0-->Normal  3. Visual: 1-->Partial hemianopia  4. Facial Palsy: 1-->Minor paralysis (flattened nasolabial fold, asymmetry on smiling)  5a. Motor Arm, Left: 3-->No effort against gravity, limb falls  5b. Motor Arm, Right: 0-->No drift, limb holds 90 (or 45) degrees for full 10 secs  6a. Motor Leg, Left: 3-->No effort against gravity, leg falls to bed immediately  6b. Motor Leg, Right: 0-->No drift, leg holds 30 degree position for full 5 secs  7. Limb Ataxia: 0-->Absent  8. Sensory: 1-->Mild-to-moderate sensory loss, patient feels pinprick is less sharp or is dull on the affected side, or there is a loss of superficial pain with pinprick, but patient is aware of being touched  9. Best Language: 0-->No aphasia, normal  10. Dysarthria: 0-->Normal  11. Extinction and Inattention (formerly Neglect): 0-->No abnormality  Total (NIH Stroke Scale): 9       Modified [facility] Score: 3  Farrell Coma Scale:14   ABCD2 Score:    [id number] Score:   HAS -BLED Score:   ICH Score:   Hunt & Shane Classification:      Hemorrhagic change of an Ischemic Stroke: Does this patient have an ischemic stroke with hemorrhagic changes? No     Neurologic Chief Complaint: infarcts in multiple vascular    Subjective:     Interval History: Patient is seen for follow-up neurological assessment and treatment recommendations: See Hospital Course    HPI, Past Medical, Family, and Social History remains the same as documented in the initial encounter.     Review of Systems   Neurological:  Positive for weakness.     Scheduled Meds:  Current Outpatient Medications:     ELIQUIS 5 mg Tab, Take 5 mg by mouth 2 (two) times daily., Disp: , Rfl:     FREESTYLE CLIFFORD 3 SENSOR Lamar, SMARTSI Topical Once, Disp: , Rfl:     gabapentin (NEURONTIN) 600 MG tablet, Take 600 mg by mouth 3 (three) times daily., Disp: , Rfl:     ONETOUCH ULTRA2 METER Misc, SMARTSI Via Meter Daily PRN, Disp: , Rfl:     oxyCODONE (ROXICODONE) 15 MG Tab, Take 15 mg by mouth every 12  (twelve) hours as needed for Pain., Disp: , Rfl:     tiZANidine (ZANAFLEX) 4 MG tablet, Take 4 mg by mouth every evening., Disp: , Rfl:     zolpidem (AMBIEN) 5 MG Tab, Take 5 mg by mouth nightly as needed., Disp: , Rfl:     acetaminophen (TYLENOL) 650 mg/20.3 mL Soln, 20.3 mLs (650 mg total) by Per NG tube route every 8 (eight) hours as needed., Disp: , Rfl:     atorvastatin (LIPITOR) 40 MG tablet, Take 1 tablet (40 mg total) by mouth once daily., Disp: , Rfl:     bisacodyL (DULCOLAX) 10 mg Supp, Place 1 suppository (10 mg total) rectally once daily., Disp: , Rfl:     carvediloL (COREG) 12.5 MG tablet, Take 1 tablet (12.5 mg total) by mouth 2 (two) times daily., Disp: , Rfl:     famotidine (PEPCID) 20 MG tablet, Take 1 tablet (20 mg total) by mouth once daily., Disp: , Rfl:     insulin aspart U-100 (NOVOLOG) 100 unit/mL (3 mL) InPn pen, Inject 0-10 Units into the skin before meals and at bedtime as needed (Before meals and nightly)., Disp: , Rfl:     insulin aspart U-100 (NOVOLOG) 100 unit/mL (3 mL) InPn pen, Inject 3 Units into the skin 3 (three) times daily., Disp: , Rfl:     polyethylene glycol (GLYCOLAX) 17 gram PwPk, Take 17 g by mouth 2 (two) times daily., Disp: , Rfl:     potassium chloride (KLOR-CON) 10 MEQ TbSR, Take 1 tablet (10 mEq total) by mouth once daily., Disp: , Rfl:       Objective:     Vital Signs (Most Recent):  Temp: 98 °F (36.7 °C) (10/28/24 0743)  Pulse: 92 (10/28/24 1131)  Resp: 18 (10/28/24 0743)  BP: (!) 166/76 (10/28/24 0905)  SpO2: 100 % (10/28/24 0743)  BP Location: Left arm    Vital Signs Range (Last 24H):  Temp:  [97.7 °F (36.5 °C)-98 °F (36.7 °C)]   Pulse:  [53-92]   Resp:  [16-18]   BP: (115-166)/(54-85)   SpO2:  [95 %-100 %]   BP Location: Left arm       Physical Exam  Vitals and nursing note reviewed.   HENT:      Mouth/Throat:      Mouth: Mucous membranes are moist.   Cardiovascular:      Rate and Rhythm: Normal rate.   Pulmonary:      Effort: Pulmonary effort is normal. No  respiratory distress.   Abdominal:      General: There is no distension.   Skin:     General: Skin is warm and dry.   Neurological:      Mental Status: She is alert.      Cranial Nerves: Cranial nerve deficit present.      Motor: Weakness present.            Neurological Exam:   LOC: alert  Attention Span: Good   Articulation: Dysarthria    Laboratory:  CMP:   Recent Labs   Lab 10/28/24  0618 10/28/24  0840   CALCIUM 8.7 8.8   ALBUMIN 2.6*  --    PROT 6.0  --     140   K 4.2 3.6   CO2 24 28    102   BUN 7* 7*   CREATININE 0.6 0.7   ALKPHOS 75  --    ALT 59*  --    AST 43*  --    BILITOT 0.7  --      CBC:   Recent Labs   Lab 10/28/24  0618   WBC 9.15   RBC 3.85*   HGB 12.9   HCT 36.1*      MCV 94   MCH 33.5*   MCHC 35.7       Diagnostic Results     Brain and Vessel Imaging   MRI/MRA Brain Ischemic Protocol: 10/6/24  Impression: Acute infarcts involving the right anterior circulation and posterior circulation as detailed above without evidence of intracranial hemorrhage or mass effect.  Embolic disease to be considered.     Chronic left parietal infarct. MRA demonstrates limited visualization of distal right KIARA branches in keeping with the territory of the acute infarct.  There is also narrowing of at least one proximal left M2 branch although no acute infarct in that territory.          Echo: 10/7/24  Cardiac Imaging     Left Ventricle: The left ventricle is normal in size. Normal wall thickness. There is concentric remodeling. Normal wall motion. There is hyperdynamic systolic function with a visually estimated ejection fraction of greater than 70%. There is normal diastolic function.    Right Ventricle: Normal right ventricular cavity size. Wall thickness is normal. Systolic function is normal.    Left Atrium: Bubble study is likely negative, but full opacification of the RA is not well seen on these images which decreases sensitivity.    IVC/SVC: Normal venous pressure at 3 mmHg.      Corine M  TAVON Vora  Comprehensive Stroke Center  Department of Vascular Neurology   Zackary Long - Neurosurgery ([facility])

## 2024-10-29 NOTE — ASSESSMENT & PLAN NOTE
Home medication regimen as below  -lisinopril 40mg QD  -amlodipine 10mg QD    Plan:   -lisinopril and amlodipine were initially held in the setting of KAILYN. Was started on coreg in the context of Afib.   -if patient's BP still elevated, consider adding back amlodipine and lisinopril   -Long term goal SBP normotension  24° -166      carvediloL tablet 12.5 mg, Oral, BID    hydrALAZINE injection 10 mg, Intravenous, Q6H PRN

## 2024-10-29 NOTE — ASSESSMENT & PLAN NOTE
History of. Takes Levemir 35 units QHS at home. A1C 10%.    -goal 140-180 while admitted  -SSI prn   -continue to monitor glucose  -Endocrine consulted for A1c 10%   Inpatient regimen:  - Continue Lanuts 10 units QD   - Continue Novolog 5 units TID with meals (HOLD if BG < 100 or eating < 25%)  - Continue Novolog MDC SSI with ISF 25 starting at 150    - POCT Glucose ac/hs  - Hypoglycemia protocol in place

## 2024-10-29 NOTE — ASSESSMENT & PLAN NOTE
Takes Eliquis 5mg BID and Coreg 6.25mg BID at home.  Initially Eliquis held given size of stroke; ASA 81mg QD in the interim. Eliquis was restarted on 10/10.       Plan:   -continue at home coreg regimen   -eliquis switched to heparin drip due to issues described in ileus section of the assessment and plan.   -Heparin gtt turned off 10/19. Eliquis resumed on 10/19.  -24° HR 53-92

## 2024-10-29 NOTE — ASSESSMENT & PLAN NOTE
Persistent/Recurrent HypoK in setting of normal mag. Improved.  Will d/c on potassium 10 mEQ daily.

## 2024-10-30 NOTE — ASSESSMENT & PLAN NOTE
Resolved    Patient was seen by general surgery for some episodes of nausea and emesis and some constipation. KUB was concerning for SBO vs. Ileus. Follow up CT AP did not show evidence of mechanical obstruction, was more indicative of Ileus. XR gastrogaffin challenge ordered by general surgery.     Plan:   - Was NPO. Approved to advance diet as tolerated by Gen Surg on 10/19. NGT d/c'd. Patient was started on a Clear liquid diet and tolerated it well. Advanced to previous diet recommendation of puree, nectar thick today (10/20). SLP to re-eval 10/21  - monitor electrolytes daily, correct as needed   - Eliquis switched to heparin as it might not have been fully absorbed considering Ileus. Resumed Eliquis 10/19.    - scheduled suppository regimen, f/u on effectiveness and escalate prn  - general surgery followed while inpatient

## 2024-10-30 NOTE — ASSESSMENT & PLAN NOTE
-Patient noted to have LUE swelling and discomfort this am (10/20/2024).  -LUE US negative  -LUE elevated

## 2024-10-30 NOTE — DISCHARGE SUMMARY
"Zackary Long - Neurosurgery ([facility])  Vascular Neurology  Comprehensive Stroke Center  Discharge Summary     Summary:     Admit Date: 10/6/2024  9:08 AM    Discharge Date and Time: 10/28/2024  1:29 PM    Attending Physician: Dr. Smyth    Discharge Provider: Corine Vora DNP    History of Present Illness: 63 y/o F transferred from [facility] for higher level of care secondary to acute KIARA stroke on CTA.  Briefly,the patient presented to the OSH overnight for slurred speech started at 1:30 a.m. in the morning.  She has a history of his stroke with left-sided deficits but is normally able to transfer and do a little walking. This morning, she had complete hemiplegia of the left side. Telestroke consult performed, concerning for LVO.  CTA Stroke MP performed and  was read as "acute R KIARA infarct". Patient accepted to ED as transfer for emergent vascular neurology evaluation.     Hospital Course (synopsis of major diagnoses, care, treatment, and services provided during the course of the hospital stay): 10/7: gómez SINCLAIR exam stable, echo today, NPO aside from meds per speech, barium study tmrw  10/08/2024 DOTTIE, stable neuro exam, went for the Br study, and was started on a diet. Endocrine consulted for the A1c 10%, pending recs.  10/09/2024 DOTTIE, Stable neuro exam, will restart Lisinopril 20 mg (half of her dome dose). Seen by endocrine, with adjustment of her insulin doses.  10/10/2024 DOTTIE, no change in her neuro exam, will restart Eliquis today, BP is better controlled.  10/11/2024 DOTTIE, stable neuro exam, still decreased PO intake. Patient gets some episodes of in attention; EEG was ordered. Endocrine adjusted her insulins. Accepted at [facility] in [place].   10/12/2024 DOTTIE, waiting on EEG interpretation (contacted epilepsy), started Keppra 500mg BID and will see if reduces transient alterations in awareness, neurologic examination stable  10/13/2024 DOTTIE, EEG w/encephalopathy (no seizure or " discharges), stop Keppra, SBP ~150 to 160 so increased Coreg to 6.25mg BID  10/14/2024 General surgery was consulted overnight for constipation, episodes of nausea and emesis. KUB concerning for SBO vs. Ileus. F/u CT AP was more indicative of ileus, no evidence of mechanical obstruction was observed. Patient is NPO, can get some meds via NGT. Eliquis switched to heparin drip.   10/15/2024 Patient has still not had an actual bowel movement. Is distended, has some abdominal pain. General surgery following, is getting XR gastrogaffin challenge, will f/u results.   10/16/2024 Still monitoring patient for bowel movements in the setting of Ileus. General surgery is following. Mild hyperkalemia and hypernatremia this morning; will continue to monitor.   10/17/2024 Patient still has not had a bowel movement. Ileus not resolved on repeat imaging. Suppository ordered earlier this morning, will monitor to see if improvement with suppository.   10/18/2024 Patient still had not had a solid bowel movement due to her ileus, had a number of watery movements yesterday but nothing substantive. Suppositories now scheduled. General surgery following.   10/19/2024 Patient stable overnight. LBM yesterday. Gen Surg ok with advancing diet to clear liquids. Heparin gtt stopped, started on Eliquis. Awaiting placement.  10/20/2024 NAEON. Neuro exam remains stable. Patient with audible upper respiratory sounds and cough. CPT ordered. Patient noted to be increasingly acidotic on labs. IVFs changed from NS to LR. Patient awaiting placement.  10/21/24 NAEON. Neuro exam remains stable. Not oriented to time. Family says patient's exam stable compared to yesterday. SLP to re-evaluate, on diet level 4. Placement pending  10/22/24 NAEON. Neuro exam remains stable. Not oriented to time. Family says patient's exam stable compared to yesterday. SLP to re-evaluate, on diet level 4. Placement pending  10/23/24 NAEON. Neuro exam remains stable/slightly  improved mental status. Oriented to time. Accepted for placement but will plan for d/c tomorrow given hypokalemia. Lytes corrected and repeat lab pending.  10/24/24 NAEON. Neuro exam remains stable/slightly improved mental status. Workup for hypoK ordered and pending. DDX HyperAldo vs Diarrhea.  10/25/2024 NAEON. Neuro exam remains stable/slightly improved mental status. Diarrhea resolving. D/C Monday d/t logisitics  10/26/2024 NAEON. Neuro exam remains stable/slightly improved mental status. D/C Monday d/t logisitics  10/27/2024 NAEON. Neuro exam remains stable/slightly improved mental status. Mild transamnitis, will get Liver US and Hep C. Likely D/C Monday d/t logisitics  10/28/2024 Patient remained stable overnight. Neuro exam stable. LFTs stable. Liver US negative. Hep C non-reactive. Patient to d/c to [facility] today pending transportation.    Goals of Care Treatment Preferences:  Code Status: Full Code      Stroke Etiology: Ischemic Cardioembolic Atrial fibrillation    STROKE DOCUMENTATION   Acute Stroke Times   Last Known Normal Date:  (OSH)  Stroke Team Called Date: 10/06/24  Stroke Team Called Time:  (At OSH)  Stroke Team Arrival Date: 10/06/24  Stroke Team Arrival Time: 0608  CT Interpretation Time:  (CTA done around 4:20am per documents that came to [facility] with patient)  Thrombolytic Therapy Recommended: No  Thrombectomy Recommended:  (Pending MRI Brain)     NIH Scale:     Current NIH Stroke Score Values      Flowsheet Row Most Recent Value   Interval 7 days or at discharge (whichever comes first)   1a. Level of Consciousness 0-->Alert, keenly responsive   1b. LOC Questions 0-->Answers both questions correctly   1c. LOC Commands 0-->Performs both tasks correctly   2. Best Gaze 0-->Normal   3. Visual 1-->Partial hemianopia   4. Facial Palsy 1-->Minor paralysis (flattened nasolabial fold, asymmetry on smiling)   5a. Motor Arm, Left 3-->No effort against gravity, limb falls   5b. Motor Arm, Right 0-->No drift, limb  holds 90 (or 45) degrees for full 10 secs   6a. Motor Leg, Left 3-->No effort against gravity, leg falls to bed immediately   6b. Motor Leg, Right 0-->No drift, leg holds 30 degree position for full 5 secs   7. Limb Ataxia 0-->Absent   8. Sensory 1-->Mild-to-moderate sensory loss, patient feels pinprick is less sharp or is dull on the affected side, or there is a loss of superficial pain with pinprick, but patient is aware of being touched   9. Best Language 0-->No aphasia, normal   10. Dysarthria 0-->Normal   11. Extinction and Inattention (formerly Neglect) 0-->No abnormality   Total (NIH Stroke Scale) 9                Modified Sequoyah Score: 3  Diane Coma Scale:    ABCD2 Score:    [id number] Score:   HAS -BLED Score:   ICH Score:   Hunt & Shane Classification:       Assessment/Plan:     Diagnostic Results:    Brain and Vessel Imaging   MRI/MRA Brain Ischemic Protocol: 10/6/24  Impression: Acute infarcts involving the right anterior circulation and posterior circulation as detailed above without evidence of intracranial hemorrhage or mass effect.  Embolic disease to be considered.     Chronic left parietal infarct. MRA demonstrates limited visualization of distal right KIARA branches in keeping with the territory of the acute infarct.  There is also narrowing of at least one proximal left M2 branch although no acute infarct in that territory.          Echo: 10/7/24  Cardiac Imaging     Left Ventricle: The left ventricle is normal in size. Normal wall thickness. There is concentric remodeling. Normal wall motion. There is hyperdynamic systolic function with a visually estimated ejection fraction of greater than 70%. There is normal diastolic function.    Right Ventricle: Normal right ventricular cavity size. Wall thickness is normal. Systolic function is normal.    Left Atrium: Bubble study is likely negative, but full opacification of the RA is not well seen on these images which decreases sensitivity.    IVC/SVC:  Normal venous pressure at 3 mmHg.    Interventions: None    Complications: None    Disposition: Rehab Facility    Final Active Diagnoses:    Diagnosis Date Noted POA    PRINCIPAL PROBLEM:  Acute arterial ischemic stroke, multifocal, multiple vascular territories [I63.89] 10/06/2024 Yes    Transaminitis [R74.01] 10/27/2024 No    Elevated LFTs [R79.89] 10/27/2024 No    Acidosis [E87.20] 10/24/2024 Yes    Left upper extremity swelling [M79.89] 10/20/2024 No    Encephalopathy, metabolic [G93.41] 10/17/2024 No    Hypernatremia [E87.0] 10/16/2024 No    Ileus [K56.7] 10/14/2024 No    Acute focal neurological deficit [R29.818] 10/12/2024 No    KAILYN (acute kidney injury) [N17.9] 10/11/2024 Yes    Hypercoagulable state due to atrial fibrillation [D68.69, I48.91] 10/09/2024 Yes    Chronic anticoagulation [Z79.01] 10/09/2024 Not Applicable    Hyponatremia [E87.1] 10/09/2024 Yes    Reduced mobility [Z74.09] 10/07/2024 Yes    Sensory loss [R20.0] 10/07/2024 Yes    HTN (hypertension) [I10] 10/07/2024 Yes    Hypokalemia [E87.6] 10/07/2024 Yes    Left hemiplegia [G81.94] 10/07/2024 Yes    Opioid use [F11.90] 10/07/2024 Yes    Atrial fibrillation [I48.91] 10/07/2024 Yes    Type 2 diabetes mellitus, with long-term current use of insulin [E11.9, Z79.4] 10/07/2024 Not Applicable      Problems Resolved During this Admission:    Diagnosis Date Noted Date Resolved POA    Hyperkalemia [E87.5] 10/16/2024 10/17/2024 Unknown     Neuro  * Acute arterial ischemic stroke, multifocal, multiple vascular territories  63 y/o F transferred from [facility] for higher level of care secondary to acute KIARA stroke on CTA.  Briefly,the patient presented to the OSH overnight for slurred speech started at 1:30 a.m. in the morning.  She has a history of his stroke with left-sided deficits but is normally able to transfer and do a little walking. This morning, she had complete hemiplegia of the left side. Telestroke consult performed, concerning for  "LVO.  NIH 13. CTA Stroke MP performed and  was read as "acute R KIARA infarct". No TNK due to AC use. Patient accepted to ED as transfer for emergent vascular neurology evaluation. No endovascular intervention. History of atrial fibrillation per discussion with daughter. Was started on Eliquis on 10/6, currently on heparin drip given likely lack of absorption through NG tube in setting of Ileus. Given history of Afib, etiology likely embolic. Though not apparent on MRA, if R KIARA occlusion was proximal (supported by MRI) could account for R MCA-like picture. Transient episodes of altered awareness: EEG negative, and no change in frequency observed by family w/Keppra so have stopped this medicine.   Neuro exam remained stable. LFTs stable. Liver US negative. Hep C non-reactive. Patient to d/c to IPR.    -Eliquis 5 mg BID   -Atorvastatin 40 mg daily  -PT/OT-Current recs for high intensity therapy. Discharged to IPR.  -Diet advanced to clear liquids 10/19, continued to advance as the patient tolerated.    Left hemiplegia  See stroke    Plan:   - continue PT/OT/SLP   -Current recs for high intensity therapy  -Discharged to IPR     Psychiatric  Opioid use  History of. Prescribed oxycodone 15mg at home. Family concerned that medication is being diverted by family member, unclear how often patient takes. Reportedly is prescribed for neck pain.     Plan:   - hold oxycodone due to ileus    Cardiac/Vascular  Atrial fibrillation  Takes Eliquis 5mg BID and Coreg 6.25mg BID at home.  Initially Eliquis held given size of stroke; ASA 81mg QD in the interim. Eliquis was restarted on 10/10.       Plan:   -continue at home coreg regimen   -eliquis switched to heparin drip due to issues described in ileus section of the assessment and plan.   -Heparin gtt turned off 10/19. Eliquis resumed on 10/19.      HTN (hypertension)  Home medication regimen as below  -lisinopril 40mg QD  -amlodipine 10mg QD    Plan:   -lisinopril and amlodipine " were initially held in the setting of KAILYN. Was started on coreg in the context of Afib.   -Long term goal SBP normotension      carvediloL tablet 12.5 mg, Oral, BID      Endocrine  Type 2 diabetes mellitus, with long-term current use of insulin  History of. Takes Levemir 35 units QHS at home. A1C 10%.    -goal 140-180 while admitted  -SSI prn   -continue to monitor glucose  -Endocrine consulted for A1c 10%   Inpatient regimen:  - Continue Lanuts 10 units QD   - Continue Novolog 5 units TID with meals (HOLD if BG < 100 or eating < 25%)  - Continue Novolog [facility] SSI with ISF 25 starting at 150    - POCT Glucose ac/hs  - Hypoglycemia protocol in place    GI  Transaminitis  Liver US negative for liver concerns  Hep C non-reactive.    Ileus  Resolved    Patient was seen by general surgery for some episodes of nausea and emesis and some constipation. KUB was concerning for SBO vs. Ileus. Follow up CT AP did not show evidence of mechanical obstruction, was more indicative of Ileus. XR gastrogaffin challenge ordered by general surgery.     Plan:   - Was NPO. Approved to advance diet as tolerated by Gen Surg on 10/19. NGT d/c'd. Patient was started on a Clear liquid diet and tolerated it well. Advanced to previous diet recommendation of puree, nectar thick today (10/20). SLP to re-eval 10/21  - monitor electrolytes daily, correct as needed   - Eliquis switched to heparin as it might not have been fully absorbed considering Ileus. Resumed Eliquis 10/19.    - scheduled suppository regimen, f/u on effectiveness and escalate prn  - general surgery followed while inpatient    Orthopedic  Left upper extremity swelling  -Patient noted to have LUE swelling and discomfort this am (10/20/2024).  -LUE US negative  -LUE elevated        Recommendations:     Post-discharge complication risks: Falls, Skin breakdown    Stroke Education given to: patient    Follow-up in Stroke Clinic in 4-6 weeks.     Discharge Plan:  Statin: Atorvastatin 40  mg  Anticoagulant: Apixaban 5mg  Aggresive risk factor modification:  Hypertension  Diabetes  Atrial Fibrillation    Follow Up:   Follow-up Information       [facility] [place] up.    Specialty: Vascular Neurology  Why: clinic will contact you for follow-up appt.  Contact information:  Walter Long  [facility] [id number] [phone]                           Patient Instructions:   No discharge procedures on file.    Medications:  Reconciled Home Medications:      Medication List        START taking these medications      acetaminophen 650 mg/20.3 mL Soln  Commonly known as: TYLENOL  20.3 mLs (650 mg total) by Per NG tube route every 8 (eight) hours as needed.     atorvastatin 40 MG tablet  Commonly known as: LIPITOR  Take 1 tablet (40 mg total) by mouth once daily.     bisacodyL 10 mg Supp  Commonly known as: DULCOLAX  Place 1 suppository (10 mg total) rectally once daily.     famotidine 20 MG tablet  Commonly known as: PEPCID  Take 1 tablet (20 mg total) by mouth once daily.     * insulin aspart U-100 100 unit/mL (3 mL) Inpn pen  Commonly known as: NovoLOG  Inject 0-10 Units into the skin before meals and at bedtime as needed (Before meals and nightly).     * insulin aspart U-100 100 unit/mL (3 mL) Inpn pen  Commonly known as: NovoLOG  Inject 3 Units into the skin 3 (three) times daily.     polyethylene glycol 17 gram Pwpk  Commonly known as: GLYCOLAX  Take 17 g by mouth 2 (two) times daily.     potassium chloride 10 MEQ Tbsr  Commonly known as: KLOR-CON  Take 1 tablet (10 mEq total) by mouth once daily.           * This list has 2 medication(s) that are the same as other medications prescribed for you. Read the directions carefully, and ask your doctor or other care provider to review them with you.                CHANGE how you take these medications      carvediloL 12.5 MG tablet  Commonly known as: COREG  Take 1 tablet (12.5 mg total) by mouth 2 (two) times daily.  What changed:    medication strength  how much to take  when to take this            CONTINUE taking these medications      ELIQUIS 5 mg Tab  Generic drug: apixaban  Take 5 mg by mouth 2 (two) times daily.     FREESTYLE CLIFFORD 3 SENSOR Lamar  Generic drug: blood-glucose sensor  SMARTSI Topical Once     gabapentin 600 MG tablet  Commonly known as: NEURONTIN  Take 600 mg by mouth 3 (three) times daily.     ONETOUCH ULTRA2 METER Misc  Generic drug: blood-glucose meter  SMARTSI Via Meter Daily PRN     oxyCODONE 15 MG Tab  Commonly known as: ROXICODONE  Take 15 mg by mouth every 12 (twelve) hours as needed for Pain.     tiZANidine 4 MG tablet  Commonly known as: ZANAFLEX  Take 4 mg by mouth every evening.     zolpidem 5 MG Tab  Commonly known as: AMBIEN  Take 5 mg by mouth nightly as needed.            STOP taking these medications      aspirin 81 MG Chew     hydroCHLOROthiazide 50 MG tablet  Commonly known as: HYDRODIURIL     LEVEMIR FLEXPEN 100 unit/mL (3 mL) Inpn pen  Generic drug: insulin detemir U-100 (Levemir)     lisinopriL 40 MG tablet  Commonly known as: PRINIVIL,ZESTRIL     rosuvastatin 5 MG tablet  Commonly known as: MAGNUS Vora DNP  Comprehensive Stroke Center  Department of Vascular Neurology   [facility] - Neurosurgery ([facility])

## 2024-10-31 LAB
CHLORIDE STL-SCNC: NORMAL MMOL/L
HCO3 UR-SCNC: NORMAL MOLAR
MAGNESIUM FLD-MCNC: NORMAL MG/DL
OSMOL GAP STL: NORMAL MOSM/KG
OSMOLALITY STL: NORMAL MOSM/KG
PHOSPHORUS, FECES: NORMAL
POTASSIUM FLD-SCNC: NORMAL MMOL/L
SODIUM STL-SCNC: NORMAL MMOL/L

## 2024-11-08 ENCOUNTER — TELEPHONE (OUTPATIENT)
Dept: NEUROLOGY | Facility: CLINIC | Age: 64
End: 2024-11-08
Payer: MEDICARE

## 2024-11-08 NOTE — TELEPHONE ENCOUNTER
----- Message from Tracy sent at 11/8/2024 10:07 AM CST -----  Regarding: Appt  Contact: [phone]  Tammy Multani calling regarding Patient Advice (message) for Gabrielle Valencia from [facility] is calling to schedule a f/u appt with provider she is being discharged today pls advise   [phone]